# Patient Record
Sex: MALE | Race: WHITE | Employment: OTHER | URBAN - METROPOLITAN AREA
[De-identification: names, ages, dates, MRNs, and addresses within clinical notes are randomized per-mention and may not be internally consistent; named-entity substitution may affect disease eponyms.]

---

## 2017-01-03 ENCOUNTER — HOSPITAL ENCOUNTER (OUTPATIENT)
Dept: RADIATION THERAPY | Age: 82
Discharge: HOME | End: 2017-01-03
Payer: MEDICARE

## 2017-01-03 PROCEDURE — 77373 STRTCTC BDY RAD THER TX DLVR: CPT

## 2017-01-04 ENCOUNTER — HOSPITAL ENCOUNTER (OUTPATIENT)
Dept: RADIATION THERAPY | Age: 82
Discharge: HOME | End: 2017-01-04
Payer: MEDICARE

## 2017-01-05 ENCOUNTER — HOSPITAL ENCOUNTER (OUTPATIENT)
Dept: RADIATION THERAPY | Age: 82
Discharge: HOME | End: 2017-01-05
Payer: MEDICARE

## 2017-01-05 PROCEDURE — 77373 STRTCTC BDY RAD THER TX DLVR: CPT

## 2017-01-06 ENCOUNTER — HOSPITAL ENCOUNTER (OUTPATIENT)
Dept: RADIATION THERAPY | Age: 82
Discharge: HOME | End: 2017-01-06
Payer: MEDICARE

## 2017-01-09 ENCOUNTER — HOSPITAL ENCOUNTER (OUTPATIENT)
Dept: RADIATION THERAPY | Age: 82
Discharge: HOME | End: 2017-01-09
Payer: MEDICARE

## 2017-01-09 ENCOUNTER — TELEPHONE (OUTPATIENT)
Dept: CARDIOLOGY CLINIC | Age: 82
End: 2017-01-09

## 2017-01-09 NOTE — TELEPHONE ENCOUNTER
Patient scheduled with Dr Sally Rosales as [facility] is closer for him. Will notified [facility] of this.

## 2017-01-09 NOTE — TELEPHONE ENCOUNTER
Due for annual f/u with Dr Caity Schwab. Last device check was at [address]. Left message for daughter to contact us to make appt with Dr Caity Schwab.

## 2017-01-09 NOTE — TELEPHONE ENCOUNTER
His [facility] is now with [facility] for remote follow up. His return to [facility] in clinic visit was already scheduled.

## 2017-01-10 ENCOUNTER — OFFICE VISIT (OUTPATIENT)
Dept: CARDIOLOGY CLINIC | Age: 82
End: 2017-01-10

## 2017-01-10 ENCOUNTER — HOSPITAL ENCOUNTER (OUTPATIENT)
Dept: RADIATION THERAPY | Age: 82
Discharge: HOME | End: 2017-01-10
Payer: MEDICARE

## 2017-01-10 DIAGNOSIS — Z95.0 CARDIAC PACEMAKER IN SITU: Primary | ICD-10-CM

## 2017-01-10 PROCEDURE — 77373 STRTCTC BDY RAD THER TX DLVR: CPT

## 2017-01-11 ENCOUNTER — HOSPITAL ENCOUNTER (OUTPATIENT)
Dept: RADIATION THERAPY | Age: 82
Discharge: HOME | End: 2017-01-11
Payer: MEDICARE

## 2017-01-12 ENCOUNTER — HOSPITAL ENCOUNTER (OUTPATIENT)
Dept: RADIATION THERAPY | Age: 82
Discharge: HOME | End: 2017-01-12
Payer: MEDICARE

## 2017-01-12 PROCEDURE — 77373 STRTCTC BDY RAD THER TX DLVR: CPT

## 2017-01-13 ENCOUNTER — HOSPITAL ENCOUNTER (OUTPATIENT)
Dept: RADIATION THERAPY | Age: 82
Discharge: HOME | End: 2017-01-13
Payer: MEDICARE

## 2017-01-16 ENCOUNTER — HOSPITAL ENCOUNTER (OUTPATIENT)
Dept: RADIATION THERAPY | Age: 82
Discharge: HOME | End: 2017-01-16
Payer: MEDICARE

## 2017-01-17 ENCOUNTER — HOSPITAL ENCOUNTER (OUTPATIENT)
Dept: RADIATION THERAPY | Age: 82
Discharge: HOME | End: 2017-01-17
Payer: MEDICARE

## 2017-01-17 PROCEDURE — 77373 STRTCTC BDY RAD THER TX DLVR: CPT

## 2017-01-17 PROCEDURE — 77336 RADIATION PHYSICS CONSULT: CPT

## 2017-02-15 ENCOUNTER — OFFICE VISIT (OUTPATIENT)
Dept: CARDIOLOGY CLINIC | Age: 82
End: 2017-02-15

## 2017-02-15 VITALS
BODY MASS INDEX: 21.18 KG/M2 | HEIGHT: 69 IN | RESPIRATION RATE: 18 BRPM | DIASTOLIC BLOOD PRESSURE: 64 MMHG | HEART RATE: 66 BPM | WEIGHT: 143 LBS | SYSTOLIC BLOOD PRESSURE: 110 MMHG

## 2017-02-15 VITALS
BODY MASS INDEX: 21.3 KG/M2 | SYSTOLIC BLOOD PRESSURE: 110 MMHG | HEIGHT: 69 IN | WEIGHT: 143.8 LBS | OXYGEN SATURATION: 98 % | DIASTOLIC BLOOD PRESSURE: 64 MMHG | HEART RATE: 66 BPM | RESPIRATION RATE: 18 BRPM

## 2017-02-15 DIAGNOSIS — Z95.1 S/P CABG X 3: ICD-10-CM

## 2017-02-15 DIAGNOSIS — I63.9 CEREBROVASCULAR ACCIDENT (CVA), UNSPECIFIED MECHANISM (HCC): ICD-10-CM

## 2017-02-15 DIAGNOSIS — I35.0 MILD AORTIC STENOSIS: ICD-10-CM

## 2017-02-15 DIAGNOSIS — I50.22 CHRONIC SYSTOLIC HEART FAILURE (HCC): Primary | ICD-10-CM

## 2017-02-15 DIAGNOSIS — I10 ESSENTIAL HYPERTENSION: ICD-10-CM

## 2017-02-15 DIAGNOSIS — I44.2 THIRD DEGREE AV BLOCK (HCC): ICD-10-CM

## 2017-02-15 DIAGNOSIS — I25.10 CORONARY ARTERY DISEASE INVOLVING NATIVE CORONARY ARTERY OF NATIVE HEART WITHOUT ANGINA PECTORIS: ICD-10-CM

## 2017-02-15 DIAGNOSIS — I42.9 CARDIOMYOPATHY (HCC): ICD-10-CM

## 2017-02-15 DIAGNOSIS — Z95.0 PACEMAKER: Primary | ICD-10-CM

## 2017-02-15 NOTE — PROGRESS NOTES
HISTORY OF PRESENTING ILLNESS      Yordan Gonzalez is a [de-identified] y.o. male with CAD/CABG, PPM, COPD, CVA, DM, colon cancer, HTN and fluctuating cardiomyopathy here for follow up of his PPM. Recent PPM interrogation in January 2017 demonstrated normal device functioning. He is doing well overall and denies worsening SOB/edema/fatigue.         ACTIVE PROBLEM LIST     Patient Active Problem List    Diagnosis Date Noted    Recurrent left pleural effusion 11/24/2015    Chronic respiratory failure ([address] 11/24/2015    Iron deficiency anemia 11/24/2015    Counseling regarding advanced care planning and goals of care 11/23/2015    Dementia 11/20/2015    Ex-smoker 09/24/2015    Chronic systolic heart failure ([address] 09/18/2015    ICH (intracerebral hemorrhage) ([address] 08/10/2015    Pacemaker 07/02/2015    S/P CABG x 3 06/29/2015    Tobacco use 06/28/2015    Poor historian 06/28/2015    Hypothyroidism 06/28/2015    Diabetes mellitus ([address] 06/28/2015    Cerebrovascular accident ([address] 06/28/2015    Chronic obstructive pulmonary disease ([address] 06/28/2015    Malignant neoplasm of colon ([address] 06/28/2015    Other nonspecific abnormal finding of lung field 06/28/2015    Gastroesophageal reflux disease 06/28/2015    CAD (coronary artery disease) 06/26/2015    Pulmonary hypertension ([address] 06/15/2015    Mild aortic stenosis 06/15/2015    Third degree AV block ([address] 06/11/2015    Cardiomyopathy ([address] 06/11/2015    Second degree AV block 06/11/2015    Stroke ([address]     Hypertension     PVD (peripheral vascular disease) ([address]     Diabetes ([address]     Lumbar stenosis 09/29/2011           PAST MEDICAL HISTORY     Past Medical History   Diagnosis Date    Arthritis     CAD (coronary artery disease)     Cardiomyopathy ([address] 6/11/2015    Colon cancer ([address] 1993     COLON    Depression      DEPRESSION    Diabetes ([address]      IDDM    GERD (gastroesophageal reflux disease)     Hypertension     Lung cancer ([address] 11/29/2016    Mild aortic stenosis 6/15/2015    Pulmonary hypertension ([facility] 6/15/2015    PVD (peripheral vascular disease) ([facility])      STENT RIGHT GROIN, PAD    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     LIMA TO LAD; SVG TO OM; SVG TO OM    Stroke ([facility] 1999     2 STROKES    Third degree AV block ([facility] 6/11/2015    Thyroid disease            PAST SURGICAL HISTORY     Past Surgical History   Procedure Laterality Date    Vascular surgery procedure unlist  1999     PAD, RIGHT GROIN STENT    Hx gi  1993     COLON RESECTION    Hx heart catheterization  6/26/2015    Ins ppm/icd led dual only  7/2/2015          Hx pacemaker            ALLERGIES     No Known Allergies       FAMILY HISTORY     Family History   Problem Relation Age of Onset    Heart Disease Mother     Diabetes Mother     Other Father      AMPUTATION LEG FOLLOWING BUG BITE    Other Sister      CEREBRAL ANUERYSM    Heart Disease Brother     Diabetes Sister     negative for cardiac disease       SOCIAL HISTORY     Social History     Social History    Marital status:      Spouse name: N/A    Number of children: N/A    Years of education: N/A     Social History Main Topics    Smoking status: Former Smoker     Packs/day: 2.00     Years: 60.00     Types: Cigarettes     Quit date: 6/22/2015    Smokeless tobacco: Never Used    Alcohol use No    Drug use: No    Sexual activity: Yes     Partners: Female     Other Topics Concern    Not on file     Social History Narrative         MEDICATIONS     Current Outpatient Prescriptions   Medication Sig    wheat dextrin 3 gram/3.5 gram powd Take  by mouth.  omeprazole (PRILOSEC) 20 mg capsule Take 20 mg by mouth daily.  OXcarbazepine (TRILEPTAL) 300 mg tablet Take 600 mg by mouth two (2) times a day.  escitalopram oxalate (LEXAPRO) 20 mg tablet Take 20 mg by mouth daily.  polyethylene glycol (MIRALAX) 17 gram packet Take 17 g by mouth daily.     furosemide (LASIX) 40 mg tablet Take  by mouth two (2) times a day.  glipiZIDE SR (GLUCOTROL) 2.5 mg CR tablet Take  by mouth daily.  magnesium 200 mg tab Take 400 mg by mouth two (2) times a day.  albuterol (PROVENTIL VENTOLIN) 2.5 mg /3 mL (0.083 %) nebulizer solution Take 2.5 mg by inhalation every four (4) hours as needed for Wheezing.  albuterol-ipratropium (DUO-NEB) 2.5 mg-0.5 mg/3 ml nebulizer solution 3 mL by Nebulization route two (2) times a day. Indications: 1 inhalation    ketoconazole (NIZORAL) 2 % shampoo Apply  to affected area daily as needed for Itching. Indications: apply to face and scalp once a day on wed and sat every 2 weeks    donepezil (ARICEPT) 10 mg tablet Take 10 mg by mouth nightly.  carvedilol (COREG) 3.125 mg tablet Take 1 Tab by mouth two (2) times a day.  ferrous sulfate 325 mg (65 mg iron) tablet Take 325 mg by mouth Daily (before breakfast).  bisacodyl (DULCOLAX, BISACODYL,) 10 mg suppository Insert 10 mg into rectum daily as needed.  finasteride (PROSCAR) 5 mg tablet Take 5 mg by mouth daily.  ergocalciferol (ERGOCALCIFEROL) 50,000 unit capsule Take 50,000 Units by mouth every fourteen (14) days.  levothyroxine (SYNTHROID) 112 mcg tablet Take 112 mcg by mouth Daily (before breakfast).  acetaminophen (TYLENOL) 500 mg tablet Take 500 mg by mouth every six (6) hours as needed for Pain.  fluticasone (FLONASE) 50 mcg/actuation nasal spray 1 [place] by Both Nostrils route daily.  tamsulosin (FLOMAX) 0.4 mg capsule Take 0.4 mg by mouth nightly. No current facility-administered medications for this visit. I have reviewed the nurses notes, vitals, problem list, allergy list, medical history, family, social history and medications. REVIEW OF SYMPTOMS      General: Pt denies excessive weight gain or loss. Pt is able to conduct ADL's  HEENT: Denies blurred vision, headaches, hearing loss, epistaxis and difficulty swallowing.   Respiratory: Denies cough, congestion, shortness of breath, FRANCOIS, wheezing or stridor. Cardiovascular: Denies precordial pain, palpitations, edema or PND  Gastrointestinal: Denies poor appetite, indigestion, abdominal pain or blood in stool  Genitourinary: Denies hematuria, dysuria, increased urinary frequency  Musculoskeletal: Denies joint pain or swelling from muscles or joints  Neurologic: Denies tremor, paresthesias, headache, or sensory motor disturbance  Psychiatric: Denies confusion, insomnia, depression  Integumentray: Denies rash, itching or ulcers. Hematologic: Denies easy bruising, bleeding       PHYSICAL EXAMINATION      There were no vitals filed for this visit. General: Well developed, in no acute distress. HEENT: No jaundice, oral mucosa moist, no oral ulcers  Neck: Supple, no stiffness, no lymphadenopathy, supple  Heart:  Normal S1/S2 negative S3 or S4. Regular, no murmur, gallop or rub, no jugular venous distention  Respiratory: Clear bilaterally x 4, no wheezing or rales  Abdomen:   Soft, non-tender, bowel sounds are active.   Extremities:  No edema, normal cap refill, no cyanosis. Musculoskeletal: No clubbing, no deformities  Neuro: A&Ox3, speech clear, gait stable, cooperative, no focal neurologic deficits  Skin: Skin color is normal. No rashes or lesions.  Non diaphoretic, moist.  Vascular: 2+ pulses symmetric in all extremities       DIAGNOSTIC DATA      EKG:        LABORATORY DATA      Lab Results   Component Value Date/Time    WBC 7.0 11/30/2015 03:28 PM    Hemoglobin (POC) 11.6 02/13/2016 02:26 PM    HGB 9.5 11/30/2015 03:28 PM    Hematocrit (POC) 34 02/13/2016 02:26 PM    HCT 31.6 11/30/2015 03:28 PM    PLATELET 541 25/92/6600 03:28 PM    MCV 84 11/30/2015 03:28 PM      Lab Results   Component Value Date/Time    Sodium 133 02/14/2016 04:03 AM    Potassium 4.5 02/14/2016 04:03 AM    Chloride 97 02/14/2016 04:03 AM    CO2 29 02/14/2016 04:03 AM    Anion gap 7 02/14/2016 04:03 AM    Glucose 148 02/14/2016 04:03 AM    BUN 32 02/14/2016 04:03 AM Creatinine 1.16 02/14/2016 04:03 AM    BUN/Creatinine ratio 28 02/14/2016 04:03 AM    GFR est AA >60 02/14/2016 04:03 AM    GFR est non-AA >60 02/14/2016 04:03 AM    Calcium 9.1 02/14/2016 04:03 AM    Bilirubin, total 0.8 11/19/2015 11:26 AM    AST (SGOT) 10 11/19/2015 11:26 AM    Alk. phosphatase 67 11/19/2015 11:26 AM    Protein, total 6.9 11/19/2015 11:26 AM    Albumin 2.8 11/19/2015 11:26 AM    Globulin 4.1 11/19/2015 11:26 AM    A-G Ratio 0.7 11/19/2015 11:26 AM    ALT (SGPT) 15 11/19/2015 11:26 AM           ASSESSMENT      1. PPM  2. COPD on home O2  3. CAD  4. CABG  5. CVA  6. DM  7. Colon cancer       PLAN     Patient is doing well and his device is functioning well. He is >90% RV paced however does not exhibit signs/symptoms of HF at this time. Continue to monitor clinically for changes. Continue follow up in device clinic. FOLLOW-UP     1 year      Thank you, Kendell Fischer MD and Dr. Kayla Woods for allowing me to participate in the care of this extraordinarily pleasant male. Please do not hesitate to contact me for further questions/concerns.          Javad Humphreys MD  Cardiac Electrophysiology / Cardiology    [address]  [address] [place] [address]  Em[address] Dawn[address]  [phone] / [phone] Fax   [phone] / [phone] Fax

## 2017-02-15 NOTE — PROGRESS NOTES
David Theodore     1935       Jaxson Oconnor MD, [facility]  Date of Visit-2/15/2017   PCP is Dwana Rinne, MD   [facility] [place]  [facility]  HPI:  David Theodore is a [de-identified] y.o. male    here with his daughter  No cardiac complaints  Denies chest pain, edema, syncope or shortness of breath at rest   Has no tachycardia , palpitations or sense of arrythmia   Has some forgetfullness but doing pretty well  Had spot on lung and biopsy per dtr was inconclusive and Dr Kim Patel sent him to Dr Tavares Hester and he is having XRT   Lives at [address] [place] [address] Drive at [place] and seeing Dwana Rinne, MD at the [place]    Assessment/Plan:      Subjective:  Significant improvement over time with the patient. He has no symptoms. Has a place on the lung, undergoing radiation therapy. Blood pressure seems to be running in the normal range, 103-120, with pulses in the 60s. He seems to be feeling well today. He sees Dr. Yumiko Perez, who came actually early to the room before him, and is doing well with that with normal follow up of his device. Impression:  1. CHF, systolic, NYHA class 1 now. 2. COPD, improved. 3. Stable CAD. 4. Pulmonary hypertension, responding well to diuretics. Plan:  1. Continue ACE, beta blocker, optimal medical therapy. 2. Had a [employer] device with device check 01/10/17, rhythm predominantly paced, no abnormalities. 3. Plans are to follow up in six months. Patient Instructions   Follow up with Dr. Sheron Schumacher in 6 months           Impression:   1. Chronic systolic heart failure ([address]    2. Coronary artery disease involving native coronary artery of native heart without angina pectoris    3. Cardiomyopathy ([address]    4. Third degree AV block ([address]    5. Mild aortic stenosis    6. Essential hypertension    7. Cerebrovascular accident (CVA), unspecified mechanism ([address]    8.  S/P CABG x 3       Cardiac History:   ECHO 6-10-15 LV EF 35 % by Ceja's Biplane technique, mod diffuse HK, mod HK mid anteroseptal and apical septal wall(s). Mild LVH. Mod JILLIAN, MR, mild ASt, LISETH 1.5 cm2 by both continuity equation and planimetry. Mild AR, TR, PASP 60. AV Max/meanPG 16.6/7.6 mmHg. AV Vmax was 2 m/s. NUKE 6-10-15 ischemia and infarction in inferior wall with moderately reduced EF, see report  Holter showed high level AV block so underwent testing leading to cath and then CABG    The PFTs done at [facility] on 4/24/15 show a FEV1 of 1.41. FVC 56%     He has a history of colon cancer, anxiety, diabetes, depression, emphysema, hypertension, hypothyroidism. ROS-except as noted above. . A complete cardiac and respiratory are reviewed and negative except as above ; Resp-denies wheezing  or productive cough,. Const- No unusual weight loss or fever; Neuro-no recent seizure or CVA ; GI- No BRBPR, abdom pain, bloating ; - no  hematuria   see supplement sheet, initialed and to be scanned by staff  Past Medical History   Diagnosis Date    Arthritis     CAD (coronary artery disease)     Cardiomyopathy ([address] 6/11/2015    Colon cancer ([address] 1993     COLON    Depression      DEPRESSION    Diabetes ([address]      IDDM    GERD (gastroesophageal reflux disease)     Hypertension     Lung cancer ([address] 11/29/2016    Mild aortic stenosis 6/15/2015    Pulmonary hypertension ([address] 6/15/2015    PVD (peripheral vascular disease) ([address]      STENT RIGHT GROIN, PAD    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     LIMA TO LAD; SVG TO OM; SVG TO OM    Stroke ([address] 1999     2 STROKES    Third degree AV block ([address] 6/11/2015    Thyroid disease       Social Hx= reports that he quit smoking about 19 months ago. His smoking use included Cigarettes. He has a 120.00 pack-year smoking history. He has never used smokeless tobacco. He reports that he does not drink alcohol or use illicit drugs.      Exam and Labs:    Visit Vitals    /64 (BP 1 Location: Left arm, BP Patient Position: Sitting)    Pulse 66    Resp 18    Ht 5' 9\" (1.753 m)    Wt 143 lb 12.8 oz (65.2 kg)    SpO2 98%    BMI 21.24 kg/m2      Constitutional:  NAD, comfortable  Head: NC,AT. Eyes: No scleral icterus. Neck:  Neck supple. No JVD present. Throat: moist mucous membranes. Chest: Effort normal & normal respiratory excursion . Neurological: alert, conversant and oriented . Skin: Skin is not cold. No obvious systemic rash noted. Not diaphoretic. No erythema. Psychiatric:  Grossly normal mood and affect. Behavior appears normal. Extremities:  no clubbing or cyanosis. Abdomen: non distended    Lungs:breath sounds normal. No stridor. distress, wheezes or  Rales. Heart:    normal rate, regular rhythm, normal S1, S2, no murmurs, rubs, clicks or gallops , PMI non displaced. Edema: Edema is none. Wt Readings from Last 3 Encounters:   02/15/17 143 lb 12.8 oz (65.2 kg)   12/13/16 155 lb (70.3 kg)   10/05/16 152 lb (68.9 kg)      BP Readings from Last 3 Encounters:   02/15/17 110/64   10/05/16 112/64   06/16/16 100/60        Current Outpatient Prescriptions   Medication Sig    escitalopram oxalate (LEXAPRO) 20 mg tablet Take 20 mg by mouth daily.  furosemide (LASIX) 40 mg tablet Take  by mouth two (2) times a day.  glipiZIDE SR (GLUCOTROL) 2.5 mg CR tablet Take  by mouth daily.  magnesium 200 mg tab Take 400 mg by mouth two (2) times a day.  albuterol (PROVENTIL VENTOLIN) 2.5 mg /3 mL (0.083 %) nebulizer solution Take 2.5 mg by inhalation every four (4) hours as needed for Wheezing.  donepezil (ARICEPT) 10 mg tablet Take 10 mg by mouth nightly.  carvedilol (COREG) 3.125 mg tablet Take 1 Tab by mouth two (2) times a day.  ferrous sulfate 325 mg (65 mg iron) tablet Take 325 mg by mouth Daily (before breakfast).  bisacodyl (DULCOLAX, BISACODYL,) 10 mg suppository Insert 10 mg into rectum daily as needed.  finasteride (PROSCAR) 5 mg tablet Take 5 mg by mouth daily.     ergocalciferol (ERGOCALCIFEROL) 50,000 unit capsule Take 50,000 Units by mouth every fourteen (14) days.  levothyroxine (SYNTHROID) 112 mcg tablet Take 112 mcg by mouth Daily (before breakfast).  acetaminophen (TYLENOL) 500 mg tablet Take 500 mg by mouth every six (6) hours as needed for Pain.  tamsulosin (FLOMAX) 0.4 mg capsule Take 0.4 mg by mouth nightly.  wheat dextrin 3 gram/3.5 gram powd Take  by mouth.  omeprazole (PRILOSEC) 20 mg capsule Take 20 mg by mouth daily.  OXcarbazepine (TRILEPTAL) 300 mg tablet Take 600 mg by mouth two (2) times a day.  polyethylene glycol (MIRALAX) 17 gram packet Take 17 g by mouth daily.  albuterol-ipratropium (DUO-NEB) 2.5 mg-0.5 mg/3 ml nebulizer solution 3 mL by Nebulization route two (2) times a day. Indications: 1 inhalation    ketoconazole (NIZORAL) 2 % shampoo Apply  to affected area daily as needed for Itching. Indications: apply to face and scalp once a day on wed and sat every 2 weeks    fluticasone (FLONASE) 50 mcg/actuation nasal spray 1 [place] by Both Nostrils route daily. No current facility-administered medications for this visit. Impression see above.

## 2017-02-15 NOTE — MR AVS SNAPSHOT
Visit Information Date & Time Provider Department Dept. Phone Encounter #  
 2/15/2017 11:00 AM Edward Bang MD [facility] [phone] [id number] Your Appointments 2/15/2017  3:00 PM  
ESTABLISHED PATIENT with Donovan Reilly MD  
[facility] ([address]) Appt Note: pts daughter emily'd appt to see Dr. Hosea Landeros in place of Dr. Birgit Erazo due to pt now being located closet to [facility] kmr  
 [address] [facility] [address] [facility] [address] [address]  
[phone]  
  
   
 [address] [facility] [address] [address]  
  
    
 10/25/2017 11:30 AM  
PACEMAKER with PACEMAKERVALERY  
[facility] ([facility] SCHEDULING) Appt Note: med/pm/stf  
 [address] [facility] [address] [facility]  
[phone]  
  
   
 [address] [facility] [address] [address]  
  
    
  
 4/11/2017  8:00 AM  
REMOTE OFFICE VISIT with Robi Le [facility] ([facility] SCHEDULING) Appt Note: carelink/pm/stf  
 [address] [facility] [id number] [place] [address] [address] [place] [address]  
  
    
 7/18/2017  8:00 AM  
REMOTE OFFICE VISIT with Robi Le [facility] ([facility] SCHEDULING) Appt Note: carelink/pm/stf  
 [address] [place]  
[phone] Upcoming Health Maintenance Date Due  
 LIPID PANEL Q1 1935 FOOT EXAM Q1 8/24/1945 MICROALBUMIN Q1 8/24/1946 EYE EXAM RETINAL OR DILATED Q1 8/24/1946 DTaP/Tdap/Td series (1 - Tdap) 8/24/1957 ZOSTER VACCINE AGE 60> 8/24/1996 GLAUCOMA SCREENING Q2Y 8/24/2001 MEDICARE YEARLY EXAM 8/24/2001 HEMOGLOBIN A1C Q6M 5/20/2016 Pneumococcal 65+ High/Highest Risk (2 of 2 - PCV13) 7/6/2016 INFLUENZA AGE 9 TO ADULT 8/1/2016 Allergies as of 2/15/2017  Review Complete On: 2/15/2017 By: Denita Donald MD  
 No Known Allergies Current Immunizations  Reviewed on 9/18/2015 Name Date Influenza Vaccine (Quad) PF 9/23/2015  4:08 PM  
 Influenza Vaccine Split 10/3/2011  1:39 PM  
 Pneumococcal Polysaccharide (PPSV-23) 7/6/2015  3:03 PM  
  
 Not reviewed this visit You Were Diagnosed With   
  
 Codes Comments Third degree AV block (HCC)    -  Primary ICD-10-CM: W16.3 ICD-9-CM: 426.0 Cardiomyopathy ([address]     ICD-10-CM: I42.9 ICD-9-CM: 425.4 Mild aortic stenosis     ICD-10-CM: I35.0 ICD-9-CM: 424.1 Vitals BP Pulse Resp Height(growth percentile) Weight(growth percentile) SpO2  
 110/64 (BP 1 Location: Left arm, BP Patient Position: Sitting) 66 18 5' 9\" (1.753 m) 143 lb 12.8 oz (65.2 kg) 98% BMI Smoking Status 21.24 kg/m2 Former Smoker BMI and BSA Data Body Mass Index Body Surface Area  
 21.24 kg/m 2 1.78 m 2 Your Updated Medication List  
  
   
This list is accurate as of: 2/15/17 11:37 AM.  Always use your most recent med list.  
  
  
  
  
 acetaminophen 500 mg tablet Commonly known as:  TYLENOL Take 500 mg by mouth every six (6) hours as needed for Pain. albuterol 2.5 mg /3 mL (0.083 %) nebulizer solution Commonly known as:  PROVENTIL VENTOLIN Take 2.5 mg by inhalation every four (4) hours as needed for Wheezing. albuterol-ipratropium 2.5 mg-0.5 mg/3 ml Nebu Commonly known as:  DUO-NEB  
3 mL by Nebulization route two (2) times a day. Indications: 1 inhalation  
  
 carvedilol 3.125 mg tablet Commonly known as:  Dareen [place] Take 1 Tab by mouth two (2) times a day. donepezil 10 mg tablet Commonly known as:  ARICEPT Take 10 mg by mouth nightly. DULCOLAX (BISACODYL) 10 mg suppository Generic drug:  bisacodyl Insert 10 mg into rectum daily as needed. ergocalciferol 50,000 unit capsule Commonly known as:  ERGOCALCIFEROL Take 50,000 Units by mouth every fourteen (14) days. escitalopram oxalate 20 mg tablet Commonly known as:  Jemal Setters Take 20 mg by mouth daily. ferrous sulfate 325 mg (65 mg iron) tablet Take 325 mg by mouth Daily (before breakfast). finasteride 5 mg tablet Commonly known as:  PROSCAR Take 5 mg by mouth daily. fluticasone 50 mcg/actuation nasal spray Commonly known as:  FLONASE  
1 [place] by Both Nostrils route daily. furosemide 40 mg tablet Commonly known as:  LASIX Take  by mouth two (2) times a day. glipiZIDE SR 2.5 mg CR tablet Commonly known as:  GLUCOTROL XL Take  by mouth daily. ketoconazole 2 % shampoo Commonly known as:  NIZORAL Apply  to affected area daily as needed for Itching. Indications: apply to face and scalp once a day on wed and sat every 2 weeks  
  
 levothyroxine 112 mcg tablet Commonly known as:  SYNTHROID Take 112 mcg by mouth Daily (before breakfast). magnesium 200 mg Tab Take 400 mg by mouth two (2) times a day. omeprazole 20 mg capsule Commonly known as:  PRILOSEC Take 20 mg by mouth daily. OXcarbazepine 300 mg tablet Commonly known as:  TRILEPTAL Take 600 mg by mouth two (2) times a day. polyethylene glycol 17 gram packet Commonly known as:  Lelan Situ Take 17 g by mouth daily. tamsulosin 0.4 mg capsule Commonly known as:  FLOMAX Take 0.4 mg by mouth nightly. wheat dextrin 3 gram/3.5 gram Powd Take  by mouth. We Performed the Following AMB POC EKG ROUTINE W/ 12 LEADS, INTER & REP [93900 CPT(R)] To-Do List   
 04/12/2017 1:00 PM  
  Appointment with [facility] PET 1 at [facility] PET ([phone]) 1. Patient should arrive 30 minutes early to register. Patient's entire visit to the hospital will last about 2 Hours. 2.  Eat a low carb/high protein diet the evening before your procedure.  Stay away from food and drink that contains sugars the night before and ESPECIALLY the day of the test. 3.  Do Not eat 4 hours prior to your procedure. The patient may drink all the water they want, up until the time of their appointment. Encourage patient to be well hydrated. Coffee is ok, as long as an artificial sweetener is used instead of sugar. 4. Take meds with water or saltine crackers if food required. 5.  Do not exercise the morning of your procedure. 6.  If you take insulin, it must be taken at least 4 hours before your procedure. 7.  You should bring medications for pain, anxiety, or claustrophobia if you need them. If you take medications for anxiety or claustrophobia, a ride needs to come with you. 8.  Materials for this procedure are ordered in advance and are time-sensitive. If you need to cancel or reschedule, you must call [phone] at least 48 hours prior to your appointment time. 9.  Bring recent CT scan results from other facilities with you. Please have patients report to:  [facility]: ER Registration SFM: [address] [facility], Radiation/Oncology Department (left of the fireplace) MRM: OP Registration MOB 1. Patient Instructions Follow up with Dr. Fantasma Lamb in 6 months Introducing [facility]! Shani Shelby introduces [employer] patient portal. Now you can access parts of your medical record, email your doctor's office, and request medication refills online. 1. In your internet browser, go to [email or web address] 2. Click on the First Time User? Click Here link in the Sign In box. You will see the New Member Sign Up page. 3. Enter your [employer] Access Code exactly as it appears below. You will not need to use this code after youve completed the sign-up process. If you do not sign up before the expiration date, you must request a new code. · [employer] Access Code: [email or web address] Expires: 3/14/2017  6:28 PM 
 
4.  Enter the last four digits of your Social Security Number (xxxx) and Date of Birth (mm/dd/yyyy) as indicated and click Submit. You will be taken to the next sign-up page. 5. Create a [facility] ID. This will be your [facility] login ID and cannot be changed, so think of one that is secure and easy to remember. 6. Create a [facility] password. You can change your password at any time. 7. Enter your Password Reset Question and Answer. This can be used at a later time if you forget your password. 8. Enter your e-mail address. You will receive e-mail notification when new information is available in [address] 9. Click Sign Up. You can now view and download portions of your medical record. 10. Click the Download Summary menu link to download a portable copy of your medical information. If you have questions, please visit the Frequently Asked Questions section of the [facility] website. Remember, [facility] is NOT to be used for urgent needs. For medical emergencies, dial 911. Now available from your iPhone and Android! Please provide this summary of care documentation to your next provider. Your primary care clinician is listed as Samira Lizarraga. If you have any questions after today's visit, please call [phone].

## 2017-04-12 ENCOUNTER — HOSPITAL ENCOUNTER (OUTPATIENT)
Dept: PET IMAGING | Age: 82
Discharge: HOME | End: 2017-04-12
Attending: RADIOLOGY
Payer: MEDICARE

## 2017-04-12 VITALS — HEIGHT: 68 IN | BODY MASS INDEX: 21.22 KG/M2 | WEIGHT: 140 LBS

## 2017-04-12 DIAGNOSIS — C34.12 PRIMARY MALIGNANT NEOPLASM OF BRONCHUS OF LEFT UPPER LOBE (HCC): ICD-10-CM

## 2017-04-12 PROCEDURE — A9552 F18 FDG: HCPCS

## 2017-04-12 RX ORDER — SODIUM CHLORIDE 0.9 % (FLUSH) 0.9 %
10 SYRINGE (ML) INJECTION
Status: COMPLETED | OUTPATIENT
Start: 2017-04-12 | End: 2017-04-12

## 2017-04-12 RX ADMIN — Medication 10 ML: at 14:00

## 2017-07-18 ENCOUNTER — OFFICE VISIT (OUTPATIENT)
Dept: CARDIOLOGY CLINIC | Age: 82
End: 2017-07-18

## 2017-07-18 DIAGNOSIS — Z95.0 CARDIAC PACEMAKER IN SITU: Primary | ICD-10-CM

## 2017-08-11 ENCOUNTER — TELEPHONE (OUTPATIENT)
Dept: CARDIOLOGY CLINIC | Age: 82
End: 2017-08-11

## 2017-08-11 NOTE — TELEPHONE ENCOUNTER
Patients daughter Parth Wu called to confirm transmission was received, she can be reached at [phone].  Thank you

## 2017-08-23 ENCOUNTER — OFFICE VISIT (OUTPATIENT)
Dept: CARDIOLOGY CLINIC | Age: 82
End: 2017-08-23

## 2017-08-23 ENCOUNTER — CLINICAL SUPPORT (OUTPATIENT)
Dept: CARDIOLOGY CLINIC | Age: 82
End: 2017-08-23

## 2017-08-23 VITALS
HEART RATE: 62 BPM | DIASTOLIC BLOOD PRESSURE: 60 MMHG | HEIGHT: 68 IN | BODY MASS INDEX: 24.86 KG/M2 | SYSTOLIC BLOOD PRESSURE: 104 MMHG | OXYGEN SATURATION: 91 % | RESPIRATION RATE: 20 BRPM | WEIGHT: 164 LBS

## 2017-08-23 DIAGNOSIS — I50.22 CHRONIC SYSTOLIC HEART FAILURE (HCC): Primary | ICD-10-CM

## 2017-08-23 DIAGNOSIS — I10 ESSENTIAL HYPERTENSION: ICD-10-CM

## 2017-08-23 DIAGNOSIS — I44.2 THIRD DEGREE AV BLOCK (HCC): ICD-10-CM

## 2017-08-23 DIAGNOSIS — I25.10 CORONARY ARTERY DISEASE INVOLVING NATIVE CORONARY ARTERY OF NATIVE HEART WITHOUT ANGINA PECTORIS: Primary | ICD-10-CM

## 2017-08-23 DIAGNOSIS — I50.20 SYSTOLIC CONGESTIVE HEART FAILURE, UNSPECIFIED CONGESTIVE HEART FAILURE CHRONICITY: ICD-10-CM

## 2017-08-23 DIAGNOSIS — I42.9 CARDIOMYOPATHY, UNSPECIFIED TYPE (HCC): ICD-10-CM

## 2017-08-23 DIAGNOSIS — I63.9 CEREBROVASCULAR ACCIDENT (CVA), UNSPECIFIED MECHANISM (HCC): ICD-10-CM

## 2017-08-23 DIAGNOSIS — I25.10 CORONARY ARTERY DISEASE INVOLVING NATIVE CORONARY ARTERY OF NATIVE HEART WITHOUT ANGINA PECTORIS: ICD-10-CM

## 2017-08-23 RX ORDER — DIPHENOXYLATE HYDROCHLORIDE AND ATROPINE SULFATE 2.5; .025 MG/1; MG/1
1 TABLET ORAL
COMMUNITY
End: 2018-01-04

## 2017-08-23 NOTE — PROGRESS NOTES
Andres Zayas     1935       Vipal K. Ival Nissen MD, [facility]  Date of Visit-8/23/2017   PCP is Domitila Olivo MD   [facility]  [facility]  HPI:  Andres Zayas is a [de-identified] y.o. male     He is doing well overall with no cardiac complaints today. He had normal lung biopsy recently. His last device check was in February. He notes intermittent left LE cramping that typically occur at night. He currently lives at [facility]. Denies chest pain, edema, syncope or shortness of breath at rest, has no tachycardia, palpitations or sense of arrhythmia. Assessment/Plan:       CHF systolic  -NYHA class I  -today's echo showed improvement in EF to 42%  -he is asymptomatic and well compensated on current medications    Stable CAD  -with prior bypass surgery in 2015  -no angina     Pulmonary HTN   -mild RVSP today is 40  -doing well with diuretic     Mild AS  -low gradient \  -unlikely to cause problem in near future    Third degree AV block  -pacer check    HTN  -at goal     COPD   -improved    PLAN:  -no changes in medications  -reviewed echo with patient  -follow up in 6 months   The primary encounter diagnosis was Chronic systolic heart failure ([address] Diagnoses of Coronary artery disease involving native coronary artery of native heart without angina pectoris, Essential hypertension, Cardiomyopathy, unspecified type ([address] Third degree AV block ([address] and Cerebrovascular accident (CVA), unspecified mechanism ([address] were also pertinent to this visit. Cardiac History:   ECHO 6-10-15 LV EF 35 % by Ceja's Biplane technique, mod diffuse HK, mod HK mid anteroseptal and apical septal wall(s). Mild LVH. Mod JILLIAN, MR, mild ASt, LISETH 1.5 cm2 by both continuity equation and planimetry. Mild AR, TR, PASP 60. AV Max/meanPG 16.6/7.6 mmHg. AV Vmax was 2 m/s.      NUKE 6-10-15 ischemia and infarction in inferior wall with moderately reduced EF, see report  Holter showed high level AV block so underwent testing leading to cath and then CABG    [facility] show a FEV1 of 1.41. FVC 56%     He has a history of colon cancer, anxiety, diabetes, depression, emphysema, hypertension, hypothyroidism. ROS-except as noted above. . A complete cardiac and respiratory are reviewed and negative except as above ; Resp-denies wheezing  or productive cough,. Const- No unusual weight loss or fever; Neuro-no recent seizure or CVA ; GI- No BRBPR, abdom pain, bloating ; - no  hematuria   see supplement sheet, initialed and to be scanned by staff  Past Medical History:   Diagnosis Date    Arthritis     CAD (coronary artery disease)     Cardiomy[facility] 6/11/2015    Colon cancer ([facility] 1993    COLON    Depression     DEPRESSION    Diabetes ([facility]     IDDM    GERD (gastroesophageal reflux disease)     Hypertension     Lung cancer ([facility] 11/29/2016    Mild aortic stenosis 6/15/2015    Pulmonary hypertension ([facility] 6/15/2015    PVD (peripheral vascular disease) (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 1999    2 STROKES    Third degree AV block ([facility] 6/11/2015    Thyroid disease       Social Hx= reports that he quit smoking about 2 years ago. His smoking use included Cigarettes. He has a 120.00 pack-year smoking history. He has never used smokeless tobacco. He reports that he does not drink alcohol or use illicit drugs. Exam and Labs:    Visit Vitals    /60    Pulse 62    Resp 20    Ht 5' 8\" (1.727 m)    Wt 164 lb (74.4 kg)    SpO2 91%    BMI 24.94 kg/m2      Constitutional:  NAD, comfortable  Head: NC,AT. Eyes: No scleral icterus. Neck:  Neck supple. No JVD present. Throat: moist mucous membranes. Chest: Effort normal & normal respiratory excursion . Neurological: alert, conversant and oriented . Skin: Skin is not cold.  No obvious systemic rash noted. Not diaphoretic. No erythema. Psychiatric:  Grossly normal mood and affect. Behavior appears normal. Extremities:  no clubbing or cyanosis. Abdomen: non distended    Lungs:breath sounds normal. No stridor. distress, wheezes or  Rales. Heart:normal rate, regular rhythm, normal S1, S2, 2/6 murmur at the LSB, rubs, clicks or gallops , PMI non displaced. Edema: Edema is none. No results found for: CHOL, CHOLX, CHLST, CHOLV, HDL, LDL, LDLC, DLDLP, TGLX, TRIGL, TRIGP, CHHD, CHHDX  No results found for this or any previous visit. Lab Results   Component Value Date/Time    Sodium 133 02/14/2016 04:03 AM    Potassium 4.5 02/14/2016 04:03 AM    Chloride 97 02/14/2016 04:03 AM    CO2 29 02/14/2016 04:03 AM    Anion gap 7 02/14/2016 04:03 AM    Glucose 148 02/14/2016 04:03 AM    BUN 32 02/14/2016 04:03 AM    Creatinine 1.16 02/14/2016 04:03 AM    BUN/Creatinine ratio 28 02/14/2016 04:03 AM    GFR est AA >60 02/14/2016 04:03 AM    GFR est non-AA >60 02/14/2016 04:03 AM    Calcium 9.1 02/14/2016 04:03 AM      Wt Readings from Last 3 Encounters:   08/23/17 164 lb (74.4 kg)   04/12/17 140 lb (63.5 kg)   02/15/17 143 lb (64.9 kg)      BP Readings from Last 3 Encounters:   08/23/17 104/60   02/15/17 110/64   02/15/17 110/64        Current Outpatient Prescriptions   Medication Sig    diphenoxylate-atropine (LOMOTIL) 2.5-0.025 mg per tablet Take  by mouth four (4) times daily as needed for Diarrhea.  omeprazole (PRILOSEC) 20 mg capsule Take 20 mg by mouth daily.  escitalopram oxalate (LEXAPRO) 20 mg tablet Take 20 mg by mouth daily.  polyethylene glycol (MIRALAX) 17 gram packet Take 17 g by mouth daily.  furosemide (LASIX) 40 mg tablet Take  by mouth two (2) times a day.  glipiZIDE SR (GLUCOTROL) 2.5 mg CR tablet Take 5 mg by mouth daily.  magnesium 200 mg tab Take 400 mg by mouth two (2) times a day.     albuterol (PROVENTIL VENTOLIN) 2.5 mg /3 mL (0.083 %) nebulizer solution Take 2.5 mg by inhalation every four (4) hours as needed for Wheezing.  albuterol-ipratropium (DUO-NEB) 2.5 mg-0.5 mg/3 ml nebulizer solution 3 mL by Nebulization route two (2) times a day. Indications: 1 inhalation    donepezil (ARICEPT) 10 mg tablet Take 10 mg by mouth nightly.  carvedilol (COREG) 3.125 mg tablet Take 1 Tab by mouth two (2) times a day.  ferrous sulfate 325 mg (65 mg iron) tablet Take 325 mg by mouth Daily (before breakfast).  bisacodyl (DULCOLAX, BISACODYL,) 10 mg suppository Insert 10 mg into rectum daily as needed.  finasteride (PROSCAR) 5 mg tablet Take 5 mg by mouth daily.  ergocalciferol (ERGOCALCIFEROL) 50,000 unit capsule Take 50,000 Units by mouth every fourteen (14) days.  levothyroxine (SYNTHROID) 112 mcg tablet Take 112 mcg by mouth Daily (before breakfast).  acetaminophen (TYLENOL) 500 mg tablet Take 500 mg by mouth every six (6) hours as needed for Pain.  fluticasone (FLONASE) 50 mcg/actuation nasal spray 1 [place] by Both Nostrils route daily.  tamsulosin (FLOMAX) 0.4 mg capsule Take 0.4 mg by mouth nightly.  wheat dextrin 3 gram/3.5 gram powd Take  by mouth.  OXcarbazepine (TRILEPTAL) 300 mg tablet Take 600 mg by mouth two (2) times a day.  ketoconazole (NIZORAL) 2 % shampoo Apply  to affected area daily as needed for Itching. Indications: apply to face and scalp once a day on wed and sat every 2 weeks     No current facility-administered medications for this visit. Impression see above.     Written by Raysa Costa, as dictated by Mickey Aranda MD.

## 2017-10-25 ENCOUNTER — CLINICAL SUPPORT (OUTPATIENT)
Dept: CARDIOLOGY CLINIC | Age: 82
End: 2017-10-25

## 2017-10-25 DIAGNOSIS — Z95.0 CARDIAC PACEMAKER IN SITU: Primary | ICD-10-CM

## 2017-10-30 ENCOUNTER — HOSPITAL ENCOUNTER (OUTPATIENT)
Dept: CT IMAGING | Age: 82
Discharge: HOME | End: 2017-10-30
Attending: INTERNAL MEDICINE
Payer: MEDICARE

## 2017-10-30 DIAGNOSIS — R91.1 COIN LESION: ICD-10-CM

## 2017-10-30 LAB — CREAT BLD-MCNC: 1 MG/DL (ref 0.6–1.3)

## 2017-10-30 PROCEDURE — 74011636320 HC RX REV CODE- 636/320: Performed by: RADIOLOGY

## 2017-10-30 PROCEDURE — 82565 ASSAY OF CREATININE: CPT

## 2017-10-30 PROCEDURE — 71260 CT THORAX DX C+: CPT

## 2017-10-30 RX ADMIN — IOPAMIDOL 95 ML: 612 INJECTION, SOLUTION INTRAVENOUS at 14:21

## 2017-12-21 ENCOUNTER — HOSPITAL ENCOUNTER (INPATIENT)
Age: 82
LOS: 6 days | Discharge: NURSING, REHAB OR LONG-TERM CARE | DRG: 481 | End: 2017-12-27
Attending: EMERGENCY MEDICINE | Admitting: INTERNAL MEDICINE
Payer: MEDICARE

## 2017-12-21 ENCOUNTER — ANESTHESIA EVENT (OUTPATIENT)
Dept: SURGERY | Age: 82
DRG: 481 | End: 2017-12-21
Payer: MEDICARE

## 2017-12-21 ENCOUNTER — APPOINTMENT (OUTPATIENT)
Dept: GENERAL RADIOLOGY | Age: 82
DRG: 481 | End: 2017-12-21
Attending: PHYSICIAN ASSISTANT
Payer: MEDICARE
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
## 2017-12-21 DIAGNOSIS — S72.001A CLOSED RIGHT HIP FRACTURE, INITIAL ENCOUNTER (HCC): Primary | ICD-10-CM

## 2017-12-21 DIAGNOSIS — R53.83 FATIGUE, UNSPECIFIED TYPE: ICD-10-CM

## 2017-12-21 DIAGNOSIS — R09.02 HYPOXIA: ICD-10-CM

## 2017-12-21 LAB
ABO + RH BLD: NORMAL
ALBUMIN SERPL-MCNC: 3.8 G/DL (ref 3.5–5)
ALBUMIN/GLOB SERPL: 1 {RATIO} (ref 1.1–2.2)
ALP SERPL-CCNC: 103 U/L (ref 45–117)
ALT SERPL-CCNC: 19 U/L (ref 12–78)
ANION GAP SERPL CALC-SCNC: 10 MMOL/L (ref 5–15)
APPEARANCE UR: ABNORMAL
APTT PPP: 25.4 SEC (ref 22.1–32.5)
AST SERPL-CCNC: 19 U/L (ref 15–37)
ATRIAL RATE: 68 BPM
BACTERIA URNS QL MICRO: NEGATIVE /HPF
BASOPHILS # BLD: 0 K/UL (ref 0–0.1)
BASOPHILS NFR BLD: 0 % (ref 0–1)
BILIRUB SERPL-MCNC: 1 MG/DL (ref 0.2–1)
BILIRUB UR QL CFM: NEGATIVE
BLOOD GROUP ANTIBODIES SERPL: NORMAL
BNP SERPL-MCNC: 2345 PG/ML (ref 0–450)
BUN SERPL-MCNC: 25 MG/DL (ref 6–20)
BUN/CREAT SERPL: 19 (ref 12–20)
CALCIUM SERPL-MCNC: 9.3 MG/DL (ref 8.5–10.1)
CALCULATED R AXIS, ECG10: -86 DEGREES
CALCULATED T AXIS, ECG11: 107 DEGREES
CHLORIDE SERPL-SCNC: 100 MMOL/L (ref 97–108)
CO2 SERPL-SCNC: 28 MMOL/L (ref 21–32)
COLOR UR: ABNORMAL
CREAT SERPL-MCNC: 1.35 MG/DL (ref 0.7–1.3)
DIAGNOSIS, 93000: NORMAL
EOSINOPHIL # BLD: 0.1 K/UL (ref 0–0.4)
EOSINOPHIL NFR BLD: 1 % (ref 0–7)
EPITH CASTS URNS QL MICRO: ABNORMAL /LPF
ERYTHROCYTE [DISTWIDTH] IN BLOOD BY AUTOMATED COUNT: 13.8 % (ref 11.5–14.5)
GLOBULIN SER CALC-MCNC: 3.7 G/DL (ref 2–4)
GLUCOSE BLD STRIP.AUTO-MCNC: 106 MG/DL (ref 65–100)
GLUCOSE BLD STRIP.AUTO-MCNC: 162 MG/DL (ref 65–100)
GLUCOSE BLD STRIP.AUTO-MCNC: 217 MG/DL (ref 65–100)
GLUCOSE SERPL-MCNC: 221 MG/DL (ref 65–100)
GLUCOSE UR STRIP.AUTO-MCNC: NEGATIVE MG/DL
HCT VFR BLD AUTO: 38.4 % (ref 36.6–50.3)
HGB BLD-MCNC: 12.6 G/DL (ref 12.1–17)
HGB UR QL STRIP: NEGATIVE
HYALINE CASTS URNS QL MICRO: ABNORMAL /LPF (ref 0–5)
INR PPP: 1.1 (ref 0.9–1.1)
KETONES UR QL STRIP.AUTO: NEGATIVE MG/DL
LEUKOCYTE ESTERASE UR QL STRIP.AUTO: NEGATIVE
LYMPHOCYTES # BLD: 1 K/UL (ref 0.8–3.5)
LYMPHOCYTES NFR BLD: 6 % (ref 12–49)
MAGNESIUM SERPL-MCNC: 1.9 MG/DL (ref 1.6–2.4)
MCH RBC QN AUTO: 30.9 PG (ref 26–34)
MCHC RBC AUTO-ENTMCNC: 32.8 G/DL (ref 30–36.5)
MCV RBC AUTO: 94.1 FL (ref 80–99)
MONOCYTES # BLD: 1 K/UL (ref 0–1)
MONOCYTES NFR BLD: 6 % (ref 5–13)
MUCOUS THREADS URNS QL MICRO: ABNORMAL /LPF
NEUTS SEG # BLD: 13.9 K/UL (ref 1.8–8)
NEUTS SEG NFR BLD: 87 % (ref 32–75)
NITRITE UR QL STRIP.AUTO: NEGATIVE
PH UR STRIP: 5.5 [PH] (ref 5–8)
PLATELET # BLD AUTO: 174 K/UL (ref 150–400)
POTASSIUM SERPL-SCNC: 4.4 MMOL/L (ref 3.5–5.1)
PROT SERPL-MCNC: 7.5 G/DL (ref 6.4–8.2)
PROT UR STRIP-MCNC: 30 MG/DL
PROTHROMBIN TIME: 10.7 SEC (ref 9–11.1)
Q-T INTERVAL, ECG07: 510 MS
QRS DURATION, ECG06: 162 MS
QTC CALCULATION (BEZET), ECG08: 546 MS
RBC # BLD AUTO: 4.08 M/UL (ref 4.1–5.7)
RBC #/AREA URNS HPF: ABNORMAL /HPF (ref 0–5)
SERVICE CMNT-IMP: ABNORMAL
SODIUM SERPL-SCNC: 138 MMOL/L (ref 136–145)
SP GR UR REFRACTOMETRY: 1.02 (ref 1–1.03)
SPECIMEN EXP DATE BLD: NORMAL
THERAPEUTIC RANGE,PTTT: NORMAL SECS (ref 58–77)
TROPONIN I SERPL-MCNC: <0.04 NG/ML
UA: UC IF INDICATED,UAUC: ABNORMAL
UROBILINOGEN UR QL STRIP.AUTO: 1 EU/DL (ref 0.2–1)
VENTRICULAR RATE, ECG03: 69 BPM
WBC # BLD AUTO: 16 K/UL (ref 4.1–11.1)
WBC URNS QL MICRO: ABNORMAL /HPF (ref 0–4)

## 2017-12-21 PROCEDURE — 99285 EMERGENCY DEPT VISIT HI MDM: CPT

## 2017-12-21 PROCEDURE — 83735 ASSAY OF MAGNESIUM: CPT | Performed by: PHYSICIAN ASSISTANT

## 2017-12-21 PROCEDURE — 74011000250 HC RX REV CODE- 250: Performed by: INTERNAL MEDICINE

## 2017-12-21 PROCEDURE — 84484 ASSAY OF TROPONIN QUANT: CPT | Performed by: PHYSICIAN ASSISTANT

## 2017-12-21 PROCEDURE — 83036 HEMOGLOBIN GLYCOSYLATED A1C: CPT | Performed by: INTERNAL MEDICINE

## 2017-12-21 PROCEDURE — 77030032490 HC SLV COMPR SCD KNE COVD -B

## 2017-12-21 PROCEDURE — 65660000000 HC RM CCU STEPDOWN

## 2017-12-21 PROCEDURE — 96374 THER/PROPH/DIAG INJ IV PUSH: CPT

## 2017-12-21 PROCEDURE — 74011250636 HC RX REV CODE- 250/636: Performed by: INTERNAL MEDICINE

## 2017-12-21 PROCEDURE — 85025 COMPLETE CBC W/AUTO DIFF WBC: CPT | Performed by: PHYSICIAN ASSISTANT

## 2017-12-21 PROCEDURE — 77030013140 HC MSK NEB VYRM -A

## 2017-12-21 PROCEDURE — 82962 GLUCOSE BLOOD TEST: CPT

## 2017-12-21 PROCEDURE — 81001 URINALYSIS AUTO W/SCOPE: CPT | Performed by: NURSE PRACTITIONER

## 2017-12-21 PROCEDURE — 93005 ELECTROCARDIOGRAM TRACING: CPT

## 2017-12-21 PROCEDURE — 96376 TX/PRO/DX INJ SAME DRUG ADON: CPT

## 2017-12-21 PROCEDURE — 74011000250 HC RX REV CODE- 250: Performed by: NURSE PRACTITIONER

## 2017-12-21 PROCEDURE — 77030020186 HC BOOT HL PROTCT SAGE -B

## 2017-12-21 PROCEDURE — 73552 X-RAY EXAM OF FEMUR 2/>: CPT

## 2017-12-21 PROCEDURE — 74011636637 HC RX REV CODE- 636/637: Performed by: INTERNAL MEDICINE

## 2017-12-21 PROCEDURE — 85610 PROTHROMBIN TIME: CPT | Performed by: PHYSICIAN ASSISTANT

## 2017-12-21 PROCEDURE — 83880 ASSAY OF NATRIURETIC PEPTIDE: CPT | Performed by: PHYSICIAN ASSISTANT

## 2017-12-21 PROCEDURE — 80053 COMPREHEN METABOLIC PANEL: CPT | Performed by: PHYSICIAN ASSISTANT

## 2017-12-21 PROCEDURE — 74011250637 HC RX REV CODE- 250/637: Performed by: NURSE PRACTITIONER

## 2017-12-21 PROCEDURE — 74011250637 HC RX REV CODE- 250/637: Performed by: INTERNAL MEDICINE

## 2017-12-21 PROCEDURE — 85730 THROMBOPLASTIN TIME PARTIAL: CPT | Performed by: PHYSICIAN ASSISTANT

## 2017-12-21 PROCEDURE — 94640 AIRWAY INHALATION TREATMENT: CPT

## 2017-12-21 PROCEDURE — 96375 TX/PRO/DX INJ NEW DRUG ADDON: CPT

## 2017-12-21 PROCEDURE — 74011250636 HC RX REV CODE- 250/636: Performed by: PHYSICIAN ASSISTANT

## 2017-12-21 PROCEDURE — 72170 X-RAY EXAM OF PELVIS: CPT

## 2017-12-21 PROCEDURE — 77010033678 HC OXYGEN DAILY

## 2017-12-21 PROCEDURE — 36415 COLL VENOUS BLD VENIPUNCTURE: CPT | Performed by: INTERNAL MEDICINE

## 2017-12-21 PROCEDURE — 86900 BLOOD TYPING SEROLOGIC ABO: CPT | Performed by: PHYSICIAN ASSISTANT

## 2017-12-21 PROCEDURE — 71020 XR CHEST PA LAT: CPT

## 2017-12-21 PROCEDURE — 74011250636 HC RX REV CODE- 250/636: Performed by: NURSE PRACTITIONER

## 2017-12-21 RX ORDER — GLIPIZIDE 5 MG/1
10 TABLET ORAL
Status: ON HOLD | COMMUNITY
End: 2017-12-26

## 2017-12-21 RX ORDER — IPRATROPIUM BROMIDE AND ALBUTEROL SULFATE 2.5; .5 MG/3ML; MG/3ML
3 SOLUTION RESPIRATORY (INHALATION)
Status: DISCONTINUED | OUTPATIENT
Start: 2017-12-21 | End: 2017-12-21

## 2017-12-21 RX ORDER — MORPHINE SULFATE 2 MG/ML
4 INJECTION, SOLUTION INTRAMUSCULAR; INTRAVENOUS
Status: COMPLETED | OUTPATIENT
Start: 2017-12-21 | End: 2017-12-21

## 2017-12-21 RX ORDER — GLIPIZIDE 5 MG/1
5 TABLET ORAL
COMMUNITY
End: 2017-12-27

## 2017-12-21 RX ORDER — IPRATROPIUM BROMIDE AND ALBUTEROL SULFATE 2.5; .5 MG/3ML; MG/3ML
3 SOLUTION RESPIRATORY (INHALATION)
Status: DISCONTINUED | OUTPATIENT
Start: 2017-12-21 | End: 2017-12-26

## 2017-12-21 RX ORDER — LANOLIN ALCOHOL/MO/W.PET/CERES
325 CREAM (GRAM) TOPICAL DAILY
Status: DISCONTINUED | OUTPATIENT
Start: 2017-12-22 | End: 2017-12-27 | Stop reason: HOSPADM

## 2017-12-21 RX ORDER — SODIUM CHLORIDE 0.9 % (FLUSH) 0.9 %
5-10 SYRINGE (ML) INJECTION AS NEEDED
Status: DISCONTINUED | OUTPATIENT
Start: 2017-12-21 | End: 2017-12-22 | Stop reason: SDUPTHER

## 2017-12-21 RX ORDER — SODIUM CHLORIDE 0.9 % (FLUSH) 0.9 %
5-10 SYRINGE (ML) INJECTION EVERY 8 HOURS
Status: DISCONTINUED | OUTPATIENT
Start: 2017-12-21 | End: 2017-12-22 | Stop reason: SDUPTHER

## 2017-12-21 RX ORDER — MORPHINE SULFATE 2 MG/ML
2 INJECTION, SOLUTION INTRAMUSCULAR; INTRAVENOUS
Status: COMPLETED | OUTPATIENT
Start: 2017-12-21 | End: 2017-12-21

## 2017-12-21 RX ORDER — TAMSULOSIN HYDROCHLORIDE 0.4 MG/1
0.4 CAPSULE ORAL
Status: DISCONTINUED | OUTPATIENT
Start: 2017-12-21 | End: 2017-12-27 | Stop reason: HOSPADM

## 2017-12-21 RX ORDER — INSULIN LISPRO 100 [IU]/ML
INJECTION, SOLUTION INTRAVENOUS; SUBCUTANEOUS
Status: DISCONTINUED | OUTPATIENT
Start: 2017-12-21 | End: 2017-12-27 | Stop reason: HOSPADM

## 2017-12-21 RX ORDER — LEVOTHYROXINE SODIUM 112 UG/1
112 TABLET ORAL
Status: DISCONTINUED | OUTPATIENT
Start: 2017-12-22 | End: 2017-12-21 | Stop reason: SDUPTHER

## 2017-12-21 RX ORDER — IPRATROPIUM BROMIDE AND ALBUTEROL SULFATE 2.5; .5 MG/3ML; MG/3ML
3 SOLUTION RESPIRATORY (INHALATION) 2 TIMES DAILY
Status: DISCONTINUED | OUTPATIENT
Start: 2017-12-21 | End: 2017-12-21

## 2017-12-21 RX ORDER — MAGNESIUM SULFATE 100 %
4 CRYSTALS MISCELLANEOUS AS NEEDED
Status: DISCONTINUED | OUTPATIENT
Start: 2017-12-21 | End: 2017-12-27 | Stop reason: HOSPADM

## 2017-12-21 RX ORDER — NAPROXEN SODIUM 220 MG
220 TABLET ORAL DAILY
COMMUNITY
End: 2017-12-27

## 2017-12-21 RX ORDER — DEXTROSE 50 % IN WATER (D50W) INTRAVENOUS SYRINGE
12.5-25 AS NEEDED
Status: DISCONTINUED | OUTPATIENT
Start: 2017-12-21 | End: 2017-12-27 | Stop reason: HOSPADM

## 2017-12-21 RX ORDER — LEVOTHYROXINE SODIUM 137 UG/1
125 TABLET ORAL
COMMUNITY
End: 2019-02-27

## 2017-12-21 RX ORDER — FINASTERIDE 5 MG/1
5 TABLET, FILM COATED ORAL EVERY EVENING
Status: DISCONTINUED | OUTPATIENT
Start: 2017-12-21 | End: 2017-12-27 | Stop reason: HOSPADM

## 2017-12-21 RX ORDER — QUETIAPINE FUMARATE 25 MG/1
50 TABLET, FILM COATED ORAL DAILY
Status: DISCONTINUED | OUTPATIENT
Start: 2017-12-22 | End: 2017-12-27 | Stop reason: HOSPADM

## 2017-12-21 RX ORDER — ACETAMINOPHEN 325 MG/1
650 TABLET ORAL
Status: DISCONTINUED | OUTPATIENT
Start: 2017-12-21 | End: 2017-12-27 | Stop reason: HOSPADM

## 2017-12-21 RX ORDER — OXCARBAZEPINE 300 MG/1
600 TABLET, FILM COATED ORAL 2 TIMES DAILY
Status: DISCONTINUED | OUTPATIENT
Start: 2017-12-21 | End: 2017-12-21

## 2017-12-21 RX ORDER — QUETIAPINE FUMARATE 100 MG/1
50 TABLET, FILM COATED ORAL DAILY
Status: ON HOLD | COMMUNITY
End: 2017-12-26

## 2017-12-21 RX ORDER — PANTOPRAZOLE SODIUM 40 MG/1
40 TABLET, DELAYED RELEASE ORAL
Status: DISCONTINUED | OUTPATIENT
Start: 2017-12-22 | End: 2017-12-27 | Stop reason: HOSPADM

## 2017-12-21 RX ORDER — HEPARIN SODIUM 5000 [USP'U]/ML
5000 INJECTION, SOLUTION INTRAVENOUS; SUBCUTANEOUS ONCE
Status: COMPLETED | OUTPATIENT
Start: 2017-12-21 | End: 2017-12-21

## 2017-12-21 RX ORDER — ESCITALOPRAM OXALATE 10 MG/1
20 TABLET ORAL DAILY
Status: DISCONTINUED | OUTPATIENT
Start: 2017-12-22 | End: 2017-12-27 | Stop reason: HOSPADM

## 2017-12-21 RX ORDER — CEFAZOLIN SODIUM IN 0.9 % NACL 2 G/50 ML
2 INTRAVENOUS SOLUTION, PIGGYBACK (ML) INTRAVENOUS
Status: DISCONTINUED | OUTPATIENT
Start: 2017-12-21 | End: 2017-12-22 | Stop reason: SDUPTHER

## 2017-12-21 RX ORDER — ALBUTEROL SULFATE 0.83 MG/ML
2.5 SOLUTION RESPIRATORY (INHALATION)
Status: DISCONTINUED | OUTPATIENT
Start: 2017-12-21 | End: 2017-12-21

## 2017-12-21 RX ORDER — DONEPEZIL HYDROCHLORIDE 5 MG/1
10 TABLET, FILM COATED ORAL DAILY
Status: DISCONTINUED | OUTPATIENT
Start: 2017-12-22 | End: 2017-12-27 | Stop reason: HOSPADM

## 2017-12-21 RX ORDER — GUAIFENESIN 600 MG/1
600 TABLET, EXTENDED RELEASE ORAL EVERY 12 HOURS
Status: DISCONTINUED | OUTPATIENT
Start: 2017-12-21 | End: 2017-12-27 | Stop reason: HOSPADM

## 2017-12-21 RX ORDER — MORPHINE SULFATE 2 MG/ML
2 INJECTION, SOLUTION INTRAMUSCULAR; INTRAVENOUS
Status: DISCONTINUED | OUTPATIENT
Start: 2017-12-21 | End: 2017-12-26

## 2017-12-21 RX ORDER — MELATONIN
3000 DAILY
COMMUNITY
End: 2020-01-01

## 2017-12-21 RX ORDER — CARVEDILOL 3.12 MG/1
3.12 TABLET ORAL 2 TIMES DAILY
Status: DISCONTINUED | OUTPATIENT
Start: 2017-12-21 | End: 2017-12-22

## 2017-12-21 RX ORDER — SODIUM CHLORIDE 9 MG/ML
75 INJECTION, SOLUTION INTRAVENOUS CONTINUOUS
Status: DISCONTINUED | OUTPATIENT
Start: 2017-12-21 | End: 2017-12-21

## 2017-12-21 RX ORDER — ONDANSETRON 2 MG/ML
4 INJECTION INTRAMUSCULAR; INTRAVENOUS
Status: COMPLETED | OUTPATIENT
Start: 2017-12-21 | End: 2017-12-21

## 2017-12-21 RX ADMIN — TAMSULOSIN HYDROCHLORIDE 0.4 MG: 0.4 CAPSULE ORAL at 22:03

## 2017-12-21 RX ADMIN — HEPARIN SODIUM 5000 UNITS: 5000 INJECTION, SOLUTION INTRAVENOUS; SUBCUTANEOUS at 14:22

## 2017-12-21 RX ADMIN — ACETAMINOPHEN 650 MG: 325 TABLET ORAL at 11:40

## 2017-12-21 RX ADMIN — ONDANSETRON 4 MG: 2 INJECTION INTRAMUSCULAR; INTRAVENOUS at 09:52

## 2017-12-21 RX ADMIN — CARVEDILOL 3.12 MG: 6.25 TABLET, FILM COATED ORAL at 14:23

## 2017-12-21 RX ADMIN — MORPHINE SULFATE 4 MG: 2 INJECTION, SOLUTION INTRAMUSCULAR; INTRAVENOUS at 10:17

## 2017-12-21 RX ADMIN — IPRATROPIUM BROMIDE AND ALBUTEROL SULFATE 3 ML: .5; 3 SOLUTION RESPIRATORY (INHALATION) at 13:39

## 2017-12-21 RX ADMIN — CARVEDILOL 3.12 MG: 6.25 TABLET, FILM COATED ORAL at 22:03

## 2017-12-21 RX ADMIN — FINASTERIDE 5 MG: 5 TABLET, FILM COATED ORAL at 17:49

## 2017-12-21 RX ADMIN — Medication 10 ML: at 22:06

## 2017-12-21 RX ADMIN — INSULIN LISPRO 3 UNITS: 100 INJECTION, SOLUTION INTRAVENOUS; SUBCUTANEOUS at 17:49

## 2017-12-21 RX ADMIN — MORPHINE SULFATE 2 MG: 2 INJECTION, SOLUTION INTRAMUSCULAR; INTRAVENOUS at 11:40

## 2017-12-21 RX ADMIN — IPRATROPIUM BROMIDE AND ALBUTEROL SULFATE 3 ML: .5; 3 SOLUTION RESPIRATORY (INHALATION) at 23:12

## 2017-12-21 RX ADMIN — IPRATROPIUM BROMIDE AND ALBUTEROL SULFATE 3 ML: .5; 3 SOLUTION RESPIRATORY (INHALATION) at 19:46

## 2017-12-21 RX ADMIN — ACETAMINOPHEN 650 MG: 325 TABLET ORAL at 17:49

## 2017-12-21 RX ADMIN — MORPHINE SULFATE 2 MG: 2 INJECTION, SOLUTION INTRAMUSCULAR; INTRAVENOUS at 09:55

## 2017-12-21 RX ADMIN — GUAIFENESIN 600 MG: 600 TABLET, EXTENDED RELEASE ORAL at 22:03

## 2017-12-21 RX ADMIN — MORPHINE SULFATE 2 MG: 2 INJECTION, SOLUTION INTRAMUSCULAR; INTRAVENOUS at 16:36

## 2017-12-21 RX ADMIN — MORPHINE SULFATE 2 MG: 2 INJECTION, SOLUTION INTRAMUSCULAR; INTRAVENOUS at 22:05

## 2017-12-21 RX ADMIN — Medication 10 ML: at 14:45

## 2017-12-21 NOTE — CONSULTS
Hortensia Calvillo MD, [address] [facility] [phone]    Date of  Admission: 12/21/2017  9:24 AM         IMPRESSION and RECOMMENDATIONS     1.  CAD:  Stable. No evidence of ischemia, CHF, or sig dysrhythmia. For right hip Fx repair today. Mr. Umu Peck is a Low Risk patient from a cardiac perspective who will be undergoing a Moderate Risk procedure. I recommend proceeding ahead with the procedure without further cardiac testing at this time. Resume ASA when feasible post-op unless more aggressive anticoagulation (DVT prophylaxis) is anticipated. Continue coreg. I have discussed this plan with the patient. He appears to understand this plan and wishes to proceed ahead.              Problem List  Date Reviewed: 12/21/2017          Codes Class Noted    * (Principal)Hip fracture, right ([facility] ICD-10-CM: S72.001A  ICD-9-CM: 820.8  12/21/2017        Recurrent left pleural effusion ICD-10-CM: J90  ICD-9-CM: 511.9  11/24/2015        Chronic respiratory failure (HCC) (Chronic) ICD-10-CM: J96.10  ICD-9-CM: 518.83  11/24/2015        Iron deficiency anemia (Chronic) ICD-10-CM: D50.9  ICD-9-CM: 280.9  11/24/2015        Counseling regarding advanced care planning and goals of care ICD-10-CM: Z71.89  ICD-9-CM: V65.49  11/23/2015        Dementia ICD-10-CM: F03.90  ICD-9-CM: 294.20  11/20/2015        Ex-smoker ICD-10-CM: M58.229  ICD-9-CM: V15.82  9/24/2015        Chronic systolic heart failure ([facility] ICD-10-CM: I50.22  ICD-9-CM: 428.22  9/18/2015        ICH (intracerebral hemorrhage) ([facility] ICD-10-CM: I61.9  ICD-9-CM: 576  8/10/2015        Pacemaker ICD-10-CM: Z95.0  ICD-9-CM: V45.01  7/2/2015    Overview Signed 7/2/2015  7:18 PM by Myriam King MD     7/2/2015 Medtr[employer] pacer implant              S/P CABG x 3 ICD-10-CM: Z95.1  ICD-9-CM: V45.81  6/29/2015    Overview Signed 6/29/2015 11:15 AM by VAL Guillen TO LAD; SOLOMON TO OM; SVG TO OM Tobacco use ICD-10-CM: Z72.0  ICD-9-CM: 305.1  6/28/2015        Poor historian ICD-10-CM: Z78.9  ICD-9-CM: V49.89  6/28/2015        Hypothyroidism ICD-10-CM: E03.9  ICD-9-CM: 244.9  6/28/2015        Diabetes mellitus ([facility] ICD-10-CM: E11.9  ICD-9-CM: 250.00  6/28/2015        Cerebrovascular accident [facility]) ICD-10-CM: I63.9  ICD-9-CM: 434.91  6/28/2015    Overview Signed 6/28/2015  1:05 PM by Cullen Gray MD     stroke x 2             Chronic obstructive pulmonary disease ([facility] ICD-10-CM: J44.9  ICD-9-CM: 358  6/28/2015    Overview Signed 6/28/2015  1:05 PM by Cullen Gray MD     04/24/2015 [facility]  FVC 2.53-67%-post bronchodilator 2.61-69%-increase 3%  FEV1 1.41-52%-post bronchodilator value 1.45-54%-increase 3%  FEV1/FVC 56%  TLC 5.19-88%  RV 2.%  DLCO 45%  DLCO VA 73%  %    04/24/2015-6 minute walk test no exertional hypoxia             Malignant neoplasm of colon ([facility] ICD-10-CM: C18.9  ICD-9-CM: 153.9  6/28/2015    Overview Signed 6/28/2015  1:05 PM by Cullen Gray MD     3824 - Colon             Other nonspecific abnormal finding of lung field ICD-10-CM: R91.8  ICD-9-CM: 793.19  6/28/2015    Overview Signed 6/28/2015  1:05 PM by Cullen Gray MD     CT 3/17/15 - [facility]   IMPRESSION:   Interval right-sided pleural effusion with nodularity/atelectasis at the   left lung base as well. Consider sampling minimal pleural fluid on the   right, this will need to be done under ultrasonographic guidance. Alternatively PET scan to evaluate nodularity at left lung base. Coronary vascular disease. No evidence of metastatic disease in the abdomen or pelvis. Aortic atherosclerotic change. There is mild fecal stasis in the colon. Patient is status post right   colectomy. 11 mm LLL Nodule    4/2105 - PET Scan no enhancement    IMPRESSION: No Foci of Abnormal Hypermetabolism.  Specifically, no abnormal   activity is seen to correspond to the nodular density at the left lung base. Continuing followup by CT for stability is recommended. Gastroesophageal reflux disease ICD-10-CM: K21.9  ICD-9-CM: 530.81  6/28/2015        CAD (coronary artery disease) ICD-10-CM: I25.10  ICD-9-CM: 414.00  6/26/2015        Pulmonary hypertension ICD-10-CM: I27.20  ICD-9-CM: 416.8  6/15/2015        Mild aortic stenosis ICD-10-CM: I35.0  ICD-9-CM: 424.1  6/15/2015        Third degree AV block ([facility] ICD-10-CM: I44.2  ICD-9-CM: 426.0  6/11/2015        Cardiomyopathy ([facility] ICD-10-CM: I42.9  ICD-9-CM: 425.4  6/11/2015        Second degree AV block ICD-10-CM: I44.1  ICD-9-CM: 426.13  6/11/2015        Stroke ([facility] ICD-10-CM: I63.9  ICD-9-CM: 434.91  Unknown    Overview Signed 6/11/2015  9:53 PM by Macy Sanford MD     2 STROKES             Hypertension ICD-10-CM: I10  ICD-9-CM: 401.9  Unknown        PVD (peripheral vascular disease) ([facility] ICD-10-CM: I73.9  ICD-9-CM: 443.9  Unknown    Overview Signed 6/11/2015  9:54 PM by Macy Sanford MD     STENT RIGHT GROIN, PAD             Diabetes ([facility] ICD-10-CM: E11.9  ICD-9-CM: 250.00  Unknown    Overview Signed 6/11/2015  9:54 PM by Macy Sanford MD     IDDM             Lumbar stenosis ICD-10-CM: Y94.132  ICD-9-CM: 724.02  9/29/2011              History of Present Illness:     Maura Fan is a 81 y.o. male with the above problem list who was admitted for Hip fracture, right ([facility] Pt presents s/p fall resulting in right hip Fx. No LOC or cardiac issues; lost his footing. Reports right hip pain aggracated by movement. He denies chest pain/discomfort, shortness of breath, dyspnea on exertion, orthopnea, paroxysmal noctural dyspnea, lower extremity edema, palpitations, syncope, or near-syncope.     Current Facility-Administered Medications   Medication Dose Route Frequency    sodium chloride (NS) flush 5-10 mL  5-10 mL IntraVENous Q8H    sodium chloride (NS) flush 5-10 mL  5-10 mL IntraVENous PRN    acetaminophen (TYLENOL) tablet 650 mg  650 mg Oral Q4H PRN    morphine injection 2 mg  2 mg IntraVENous Q4H PRN    [START ON 12/22/2017] escitalopram oxalate (LEXAPRO) tablet 20 mg  20 mg Oral DAILY    [START ON 12/22/2017] omeprazole (PRILOSEC) capsule 20 mg  20 mg Oral DAILY    OXcarbazepine (TRILEPTAL) tablet 600 mg  600 mg Oral BID    albuterol (PROVENTIL VENTOLIN) nebulizer solution 2.5 mg  2.5 mg Inhalation Q4H PRN    albuterol-ipratropium (DUO-NEB) 2.5 MG-0.5 MG/3 ML  3 mL Nebulization BID    donepezil (ARICEPT) tablet 10 mg  10 mg Oral QHS    carvedilol (COREG) tablet 3.125 mg  3.125 mg Oral BID    [START ON 12/22/2017] ferrous sulfate tablet 325 mg  325 mg Oral ACB    [START ON 12/22/2017] finasteride (PROSCAR) tablet 5 mg  5 mg Oral DAILY    [START ON 12/22/2017] levothyroxine (SYNTHROID) tablet 112 mcg  112 mcg Oral ACB    tamsulosin (FLOMAX) capsule 0.4 mg  0.4 mg Oral QHS     Current Outpatient Prescriptions   Medication Sig    carvedilol (COREG) 3.125 mg tablet Take 1 Tab by mouth two (2) times a day.  diphenoxylate-atropine (LOMOTIL) 2.5-0.025 mg per tablet Take  by mouth four (4) times daily as needed for Diarrhea.  wheat dextrin 3 gram/3.5 gram powd Take  by mouth.  omeprazole (PRILOSEC) 20 mg capsule Take 20 mg by mouth daily.  OXcarbazepine (TRILEPTAL) 300 mg tablet Take 600 mg by mouth two (2) times a day.  escitalopram oxalate (LEXAPRO) 20 mg tablet Take 20 mg by mouth daily.  polyethylene glycol (MIRALAX) 17 gram packet Take 17 g by mouth daily.  furosemide (LASIX) 40 mg tablet Take  by mouth two (2) times a day.  glipiZIDE SR (GLUCOTROL) 2.5 mg CR tablet Take 5 mg by mouth daily.  magnesium 200 mg tab Take 400 mg by mouth two (2) times a day.  albuterol (PROVENTIL VENTOLIN) 2.5 mg /3 mL (0.083 %) nebulizer solution Take 2.5 mg by inhalation every four (4) hours as needed for Wheezing.     albuterol-ipratropium (DUO-NEB) 2.5 mg-0.5 mg/3 ml nebulizer solution 3 mL by Nebulization route two (2) times a day. Indications: 1 inhalation    ketoconazole (NIZORAL) 2 % shampoo Apply  to affected area daily as needed for Itching. Indications: apply to face and scalp once a day on wed and sat every 2 weeks    donepezil (ARICEPT) 10 mg tablet Take 10 mg by mouth nightly.  ferrous sulfate 325 mg (65 mg iron) tablet Take 325 mg by mouth Daily (before breakfast).  bisacodyl (DULCOLAX, BISACODYL,) 10 mg suppository Insert 10 mg into rectum daily as needed.  finasteride (PROSCAR) 5 mg tablet Take 5 mg by mouth daily.  ergocalciferol (ERGOCALCIFEROL) 50,000 unit capsule Take 50,000 Units by mouth every fourteen (14) days.  levothyroxine (SYNTHROID) 112 mcg tablet Take 112 mcg by mouth Daily (before breakfast).  acetaminophen (TYLENOL) 500 mg tablet Take 500 mg by mouth every six (6) hours as needed for Pain.  fluticasone (FLONASE) 50 mcg/actuation nasal spray 1 [place] by Both Nostrils route daily.  tamsulosin (FLOMAX) 0.4 mg capsule Take 0.4 mg by mouth nightly. No Known Allergies   Family History   Problem Relation Age of Onset    Heart Disease Mother     Diabetes Mother     Other Father      AMPUTATION LEG FOLLOWING BUG BITE    Other Sister      CEREBRAL ANUERYSM    Heart Disease Brother     Diabetes Sister       Social History     Social History    Marital status:      Spouse name: N/A    Number of children: N/A    Years of education: N/A     Occupational History    Not on file.      Social History Main Topics    Smoking status: Former Smoker     Packs/day: 2.00     Years: 60.00     Types: Cigarettes     Quit date: 6/22/2015    Smokeless tobacco: Never Used    Alcohol use No    Drug use: No    Sexual activity: Not Currently     Partners: Female     Other Topics Concern    Not on file     Social History Narrative       Physical Exam:     Patient Vitals for the past 16 hrs:   BP Temp Pulse Resp SpO2 Height Weight   12/21/17 1215 137/52 - 60 - 97 % - -   12/21/17 1145 152/57 - 60 - 96 % - -   12/21/17 1055 138/47 - 68 - 97 % - -   12/21/17 1015 145/72 - 64 - 96 % - -   12/21/17 1000 146/74 - 69 - 92 % - -   12/21/17 0958 - - - - 96 % - -   12/21/17 0928 155/55 97.5 °F (36.4 °C) 75 16 100 % 5' 9\" (1.753 m) 155 lb (70.3 kg)       HEENT Exam:     Normocephalic, atraumatic. EOMI. Oropharynx negative. Neck supple. No lymphadenopathy. Lung Exam:     The patient is not dyspneic. There is no cough. Breath sounds are heard equally in all lung fields. There are no wheezes, rales, rhonchi, or rubs heard on auscultation. Heart Exam:     The rhythm is regular. The PMI is in the 5th intercostal space of the MCL. Apical impulse is normal. S1 is regular. S2 is physiologic. There is no S3, S4 gallop, murmur, click, or rub. Abdomen Exam:     Bowel sounds are normoactive. Abdomen soft in all quadrants. No tenderness. No palpable masses. No organomegaly. No hernias noted. No bruits or pulsatile mass. Extremities Exam:     The extremities are atraumatic appearing. There is no clubbing, cyanosis, edema, ulcers, varicose veins, rash, erythemia noted in the extremities. The neurovascular status is grossly intact with normal distal sensation and pulses. Vascular Exam:     The radial, brachial, dorsalis pedis, posterior tibial, are equal and strong bilaterally The carotids are equal bilaterally without bruits.           Labs:     Lab Results   Component Value Date/Time    Glucose 221 12/21/2017 09:45 AM    Sodium 138 12/21/2017 09:45 AM    Potassium 4.4 12/21/2017 09:45 AM    Chloride 100 12/21/2017 09:45 AM    CO2 28 12/21/2017 09:45 AM    BUN 25 12/21/2017 09:45 AM    Creatinine 1.35 12/21/2017 09:45 AM    Calcium 9.3 12/21/2017 09:45 AM     Recent Labs      12/21/17   0945   WBC  16.0*   HGB  12.6   HCT  38.4   PLT  174     Recent Labs      12/21/17   0945   SGOT  19   ALT  19   AP  103   TBILI  1.0   TP  7.5   ALB  3.8   GLOB  3.7 Recent Labs      12/21/17   0930   INR  1.1   PTP  10.7   APTT  25.4      No results for input(s): CPK, CKMB, TNIPOC in the last 72 hours.     No lab exists for component: TROPONINI, ITNL  Recent Labs      12/21/17   0945   TROIQ  <0.04     No results found for: CHOL, CHOLX, CHLST, CHOLV, HDL, LDL, LDLC, DLDLP, TGLX, TRIGL, TRIGP, CHHD, CHHDX    EKG:  V-paced @ 69

## 2017-12-21 NOTE — CONSULTS
.   Orthopedic PRE-OP Admission History and Physical        NAME: Milan Quintana       :  1935       MRN:  [id number]      Subjective:     Patient is a 81 y.o.  male with hx of COPD, dementia, CHF, ICH, pacemaker, CABG x 3, DM, CVA, DM, GERD, CAD presents with a ground level fall this am, landing on his right hip. He was unable to ambulate after fall. Pt is normally ambulatory, does not use assistive devices for ambulation. He resides at an assistive living home with a caregiver whom is a nurse. Hospitalist admitting patient, cardiology has cleared patient for surgery, awaiting pulmonary clearance. NPO since yesterday evening.      Patient Active Problem List    Diagnosis Date Noted    Hip fracture, right ([address] 2017    Recurrent left pleural effusion 2015    Chronic respiratory failure ([address] 2015    Iron deficiency anemia 2015    Counseling regarding advanced care planning and goals of care 2015    Dementia 2015    Ex-smoker 2015    Chronic systolic heart failure ([address] 2015    ICH (intracerebral hemorrhage) ([address] 08/10/2015    Pacemaker 2015    S/P CABG x 3 2015    Tobacco use 2015    Poor historian 2015    Hypothyroidism 2015    Diabetes mellitus ([address] 2015    Cerebrovascular accident ([address] 2015    Chronic obstructive pulmonary disease ([address] 2015    Malignant neoplasm of colon ([address] 2015    Other nonspecific abnormal finding of lung field 2015    Gastroesophageal reflux disease 2015    CAD (coronary artery disease) 2015    Pulmonary hypertension 06/15/2015    Mild aortic stenosis 06/15/2015    Third degree AV block ([address] 2015    Cardiomyopathy ([address] 2015    Second degree AV block 2015    Stroke ([address]     Hypertension     PVD (peripheral vascular disease) ([address]     Diabetes ([address]     Lumbar stenosis 2011     Past Medical History:   Diagnosis Date    Arthritis     CAD (coronary artery disease)     Cardiomyopathy ([facility] 6/11/2015    Colon cancer ([facility] 1993    COLON    Depression     DEPRESSION    Diabetes ([facility]     IDDM    GERD (gastroesophageal reflux disease)     Hypertension     Lung cancer ([facility] 11/29/2016    Mild aortic stenosis 6/15/2015    Pulmonary hypertension 6/15/2015    PVD (peripheral vascular disease) ([facility]     STENT RIGHT GROIN, PAD    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    LIMA TO LAD; SVG TO OM; SVG TO OM    Stroke ([facility] 1999    2 STROKES    Third degree AV block ([facility] 6/11/2015    Thyroid disease       Past Surgical History:   Procedure Laterality Date    HX GI  1993    COLON RESECTION    HX HEART CATHETERIZATION  6/26/2015    HX PACEMAKER      INS PPM/ICD LED DUAL ONLY  7/2/2015         VASCULAR SURGERY PROCEDURE UNLIST  1999    PAD, RIGHT GROIN STENT      Prior to Admission medications    Medication Sig Start Date End Date Taking? Authorizing Provider   carvedilol (COREG) 3.125 mg tablet Take 1 Tab by mouth two (2) times a day. 11/24/15  Yes Cj George MD   diphenoxylate-atropine (LOMOTIL) 2.5-0.025 mg per tablet Take  by mouth four (4) times daily as needed for Diarrhea. Historical Provider   wheat dextrin 3 gram/3.5 gram powd Take  by mouth. Historical Provider   omeprazole (PRILOSEC) 20 mg capsule Take 20 mg by mouth daily. Historical Provider   OXcarbazepine (TRILEPTAL) 300 mg tablet Take 600 mg by mouth two (2) times a day. Historical Provider   escitalopram oxalate (LEXAPRO) 20 mg tablet Take 20 mg by mouth daily. Historical Provider   polyethylene glycol (MIRALAX) 17 gram packet Take 17 g by mouth daily. Historical Provider   furosemide (LASIX) 40 mg tablet Take  by mouth two (2) times a day. Historical Provider   glipiZIDE SR (GLUCOTROL) 2.5 mg CR tablet Take 5 mg by mouth daily. Historical Provider   magnesium 200 mg tab Take 400 mg by mouth two (2) times a day. Historical Provider   albuterol (PROVENTIL VENTOLIN) 2.5 mg /3 mL (0.083 %) nebulizer solution Take 2.5 mg by inhalation every four (4) hours as needed for Wheezing. Historical Provider   albuterol-ipratropium (DUO-NEB) 2.5 mg-0.5 mg/3 ml nebulizer solution 3 mL by Nebulization route two (2) times a day. Indications: 1 inhalation    Historical Provider   ketoconazole (NIZORAL) 2 % shampoo Apply  to affected area daily as needed for Itching. Indications: apply to face and scalp once a day on wed and sat every 2 weeks    Historical Provider   donepezil (ARICEPT) 10 mg tablet Take 10 mg by mouth nightly. Historical Provider   ferrous sulfate 325 mg (65 mg iron) tablet Take 325 mg by mouth Daily (before breakfast). Historical Provider   bisacodyl (DULCOLAX, BISACODYL,) 10 mg suppository Insert 10 mg into rectum daily as needed. Historical Provider   finasteride (PROSCAR) 5 mg tablet Take 5 mg by mouth daily. Historical Provider   ergocalciferol (ERGOCALCIFEROL) 50,000 unit capsule Take 50,000 Units by mouth every fourteen (14) days. Historical Provider   levothyroxine (SYNTHROID) 112 mcg tablet Take 112 mcg by mouth Daily (before breakfast). Historical Provider   acetaminophen (TYLENOL) 500 mg tablet Take 500 mg by mouth every six (6) hours as needed for Pain. Historical Provider   fluticasone (FLONASE) 50 mcg/actuation nasal spray 1 [place] by Both Nostrils route daily. Historical Provider   tamsulosin (FLOMAX) 0.4 mg capsule Take 0.4 mg by mouth nightly.     Historical Provider     Current Facility-Administered Medications   Medication Dose Route Frequency    sodium chloride (NS) flush 5-10 mL  5-10 mL IntraVENous Q8H    sodium chloride (NS) flush 5-10 mL  5-10 mL IntraVENous PRN    acetaminophen (TYLENOL) tablet 650 mg  650 mg Oral Q4H PRN    morphine injection 2 mg  2 mg IntraVENous Q4H PRN    [START ON 12/22/2017] escitalopram oxalate (LEXAPRO) tablet 20 mg  20 mg Oral DAILY    [START ON 12/22/2017] omeprazole (PRILOSEC) capsule 20 mg  20 mg Oral DAILY    OXcarbazepine (TRILEPTAL) tablet 600 mg  600 mg Oral BID    albuterol (PROVENTIL VENTOLIN) nebulizer solution 2.5 mg  2.5 mg Inhalation Q4H PRN    albuterol-ipratropium (DUO-NEB) 2.5 MG-0.5 MG/3 ML  3 mL Nebulization BID    donepezil (ARICEPT) tablet 10 mg  10 mg Oral QHS    carvedilol (COREG) tablet 3.125 mg  3.125 mg Oral BID    [START ON 12/22/2017] ferrous sulfate tablet 325 mg  325 mg Oral ACB    [START ON 12/22/2017] finasteride (PROSCAR) tablet 5 mg  5 mg Oral DAILY    [START ON 12/22/2017] levothyroxine (SYNTHROID) tablet 112 mcg  112 mcg Oral ACB    tamsulosin (FLOMAX) capsule 0.4 mg  0.4 mg Oral QHS     Current Outpatient Prescriptions   Medication Sig    carvedilol (COREG) 3.125 mg tablet Take 1 Tab by mouth two (2) times a day.  diphenoxylate-atropine (LOMOTIL) 2.5-0.025 mg per tablet Take  by mouth four (4) times daily as needed for Diarrhea.  wheat dextrin 3 gram/3.5 gram powd Take  by mouth.  omeprazole (PRILOSEC) 20 mg capsule Take 20 mg by mouth daily.  OXcarbazepine (TRILEPTAL) 300 mg tablet Take 600 mg by mouth two (2) times a day.  escitalopram oxalate (LEXAPRO) 20 mg tablet Take 20 mg by mouth daily.  polyethylene glycol (MIRALAX) 17 gram packet Take 17 g by mouth daily.  furosemide (LASIX) 40 mg tablet Take  by mouth two (2) times a day.  glipiZIDE SR (GLUCOTROL) 2.5 mg CR tablet Take 5 mg by mouth daily.  magnesium 200 mg tab Take 400 mg by mouth two (2) times a day.  albuterol (PROVENTIL VENTOLIN) 2.5 mg /3 mL (0.083 %) nebulizer solution Take 2.5 mg by inhalation every four (4) hours as needed for Wheezing.  albuterol-ipratropium (DUO-NEB) 2.5 mg-0.5 mg/3 ml nebulizer solution 3 mL by Nebulization route two (2) times a day. Indications: 1 inhalation    ketoconazole (NIZORAL) 2 % shampoo Apply  to affected area daily as needed for Itching.  Indications: apply to face and scalp once a day on wed and sat every 2 weeks    donepezil (ARICEPT) 10 mg tablet Take 10 mg by mouth nightly.  ferrous sulfate 325 mg (65 mg iron) tablet Take 325 mg by mouth Daily (before breakfast).  bisacodyl (DULCOLAX, BISACODYL,) 10 mg suppository Insert 10 mg into rectum daily as needed.  finasteride (PROSCAR) 5 mg tablet Take 5 mg by mouth daily.  ergocalciferol (ERGOCALCIFEROL) 50,000 unit capsule Take 50,000 Units by mouth every fourteen (14) days.  levothyroxine (SYNTHROID) 112 mcg tablet Take 112 mcg by mouth Daily (before breakfast).  acetaminophen (TYLENOL) 500 mg tablet Take 500 mg by mouth every six (6) hours as needed for Pain.  fluticasone (FLONASE) 50 mcg/actuation nasal spray 1 [place] by Both Nostrils route daily.  tamsulosin (FLOMAX) 0.4 mg capsule Take 0.4 mg by mouth nightly. No Known Allergies   Social History   Substance Use Topics    Smoking status: Former Smoker     Packs/day: 2.00     Years: 60.00     Types: Cigarettes     Quit date: 2015    Smokeless tobacco: Never Used    Alcohol use No      Family History   Problem Relation Age of Onset    Heart Disease Mother     Diabetes Mother     Other Father      AMPUTATION LEG FOLLOWING BUG BITE    Other Sister      CEREBRAL ANUERYSM    Heart Disease Brother     Diabetes Sister         Review of Systems  A comprehensive review of systems was negative except for that written in the HPI.     Mental Status: no dementia    Objective:     Patient Vitals for the past 8 hrs:   BP Temp Pulse Resp SpO2 Height Weight   17 1215 137/52 - 60 - 97 % - -   17 1145 152/57 - 60 - 96 % - -   17 1055 138/47 - 68 - 97 % - -   17 1015 145/72 - 64 - 96 % - -   17 1000 146/74 - 69 - 92 % - -   17 0958 - - - - 96 % - -   17 0928 155/55 97.5 °F (36.4 °C) 75 16 100 % 5' 9\" (1.753 m) 70.3 kg (155 lb)     Temp (24hrs), Av.5 °F (36.4 °C), Min:97.5 °F (36.4 °C), Max:97.5 °F (36.4 °C)      Gen: Well-developed, in no acute distress   Musc: Right LE shortened and internally rotated. PP +2. Full sensation throughout. Right knee flexed at 90 degrees and level of comfort. Skin:  Clean, dry, intact. No rash, ulcerations. Skin turgor good  Neuro: Cranial nerves are grossly intact, no focal motor weakness, follows commands appropriately   Psych: Good insight, oriented to person, place and time, alert        Imaging Review: CLINICAL HISTORY: Status post fall with acute right hip pain     Single AP view of the pelvis demonstrates a nondisplaced right intertrochanteric  hip fracture. The bones are osteopenic. The patient is status post fusion of the  lumbosacral spine. Vascular calcification is noted. Right iliac stent is  evident.           IMPRESSION  IMPRESSION: Nondisplaced right intertrochanteric hip fracture. Labs:   Recent Results (from the past 24 hour(s))   PROTHROMBIN TIME + INR    Collection Time: 12/21/17  9:30 AM   Result Value Ref Range    INR 1.1 0.9 - 1.1      Prothrombin time 10.7 9.0 - 11.1 sec   PTT    Collection Time: 12/21/17  9:30 AM   Result Value Ref Range    aPTT 25.4 22.1 - 32.5 sec    aPTT, therapeutic range     58.0 - 77.0 SECS   CBC WITH AUTOMATED DIFF    Collection Time: 12/21/17  9:45 AM   Result Value Ref Range    WBC 16.0 (H) 4.1 - 11.1 K/uL    RBC 4.08 (L) 4.10 - 5.70 M/uL    HGB 12.6 12.1 - 17.0 g/dL    HCT 38.4 36.6 - 50.3 %    MCV 94.1 80.0 - 99.0 FL    MCH 30.9 26.0 - 34.0 PG    MCHC 32.8 30.0 - 36.5 g/dL    RDW 13.8 11.5 - 14.5 %    PLATELET 813 488 - 463 K/uL    NEUTROPHILS 87 (H) 32 - 75 %    LYMPHOCYTES 6 (L) 12 - 49 %    MONOCYTES 6 5 - 13 %    EOSINOPHILS 1 0 - 7 %    BASOPHILS 0 0 - 1 %    ABS. NEUTROPHILS 13.9 (H) 1.8 - 8.0 K/UL    ABS. LYMPHOCYTES 1.0 0.8 - 3.5 K/UL    ABS. MONOCYTES 1.0 0.0 - 1.0 K/UL    ABS. EOSINOPHILS 0.1 0.0 - 0.4 K/UL    ABS.  BASOPHILS 0.0 0.0 - 0.1 K/UL   METABOLIC PANEL, COMPREHENSIVE    Collection Time: 12/21/17  9:45 AM   Result Value Ref Range    Sodium 138 136 - 145 mmol/L    Potassium 4.4 3.5 - 5.1 mmol/L    Chloride 100 97 - 108 mmol/L    CO2 28 21 - 32 mmol/L    Anion gap 10 5 - 15 mmol/L    Glucose 221 (H) 65 - 100 mg/dL    BUN 25 (H) 6 - 20 MG/DL    Creatinine 1.35 (H) 0.70 - 1.30 MG/DL    BUN/Creatinine ratio 19 12 - 20      GFR est AA >60 >60 ml/min/1.73m2    GFR est non-AA 51 (L) >60 ml/min/1.73m2    Calcium 9.3 8.5 - 10.1 MG/DL    Bilirubin, total 1.0 0.2 - 1.0 MG/DL    ALT (SGPT) 19 12 - 78 U/L    AST (SGOT) 19 15 - 37 U/L    Alk.  phosphatase 103 45 - 117 U/L    Protein, total 7.5 6.4 - 8.2 g/dL    Albumin 3.8 3.5 - 5.0 g/dL    Globulin 3.7 2.0 - 4.0 g/dL    A-G Ratio 1.0 (L) 1.1 - 2.2     TROPONIN I    Collection Time: 12/21/17  9:45 AM   Result Value Ref Range    Troponin-I, Qt. <0.04 <0.05 ng/mL   MAGNESIUM    Collection Time: 12/21/17  9:45 AM   Result Value Ref Range    Magnesium 1.9 1.6 - 2.4 mg/dL   NT-PRO BNP    Collection Time: 12/21/17  9:45 AM   Result Value Ref Range    NT pro-BNP 2345 (H) 0 - 450 PG/ML       Assessment:     Patient Active Problem List    Diagnosis Date Noted    Hip fracture, right ([facility] 12/21/2017    Recurrent left pleural effusion 11/24/2015    Chronic respiratory failure ([facility] 11/24/2015    Iron deficiency anemia 11/24/2015    Counseling regarding advanced care planning and goals of care 11/23/2015    Dementia 11/20/2015    Ex-smoker 09/24/2015    Chronic systolic heart failure ([facility] 09/18/2015    ICH (intracerebral hemorrhage) ([facility] 08/10/2015    Pacemaker 07/02/2015    S/P CABG x 3 06/29/2015    Tobacco use 06/28/2015    Poor historian 06/28/2015    Hypothyroidism 06/28/2015    Diabetes mellitus ([facility] 06/28/2015    Cerebrovascular accident ([facility] 06/28/2015    Chronic obstructive pulmonary disease ([facility] 06/28/2015    Malignant neoplasm of colon ([facility] 06/28/2015    Other nonspecific abnormal finding of lung field 06/28/2015    Gastroesophageal reflux disease 06/28/2015    CAD (coronary artery disease) 06/26/2015    Pulmonary hypertension 06/15/2015    Mild aortic stenosis 06/15/2015    Third degree AV block ([facility] 06/11/2015    Cardiomyopathy ([facility] 06/11/2015    Second degree AV block 06/11/2015    Stroke ([facility]     Hypertension     PVD (peripheral vascular disease) ([facility]     Diabetes ([facility]     Lumbar stenosis 09/29/2011         Plan:   Right nondisplaced IT fx  Discussed with patient and family about nature of condition and treatment options. Gave hip fracture education book to family and described in detail details of conditions, surgery and recovery process. Reviewed risks/ benefits of surgical intervention with hip fracture. Will await medical clearance. NPO after MN. Ojeda placed. Await UA results. DVT proph with Heparin 5000 units one time dose now. No other anticoagulants. Discussed with Dr. Chantale Holt,  he agrees with above plan. Shaina Fulton NP     Patient seen and independently examined. Agree with above. 82 yo male with signficiant medical hx including aortic stenosis (last echo 42% EF in August), COPD, CAD who sustained a GLF resulting in a minimally displaced right IT femur fracture. Admitted to the hospitalist service and pulm/cards consulted. Deemed medically optimized. Discussed risks and benefits of surgical stabilization with daughter. She understands and wishes to proceed.     Michael Vidal MD

## 2017-12-21 NOTE — PROGRESS NOTES
Primary Nurse Helen Rojo RN and Arvin Wilkerson RN performed a dual skin assessment on this patient. R heel very slightly red, blanchable. Scattered, minor abrasions to posterior left lower leg. Small skin tear noted to R wrist area. Heel boots applied bilaterally. Mobility team order placed for every 2 hour turns. Ladarius score is 15. Pt incontinent of stool upon arrival to the unit. Jessica-care performed. Clean bed pad under patient.

## 2017-12-21 NOTE — ED NOTES
TRANSFER - OUT REPORT:    Verbal report given to Rachel Mcknight RN(name) on Shruthi Jorje  being transferred to room 417(unit) for routine progression of care       Report consisted of patients Situation, Background, Assessment and   Recommendations(SBAR). Information from the following report(s) SBAR, Kardex, ED Summary, Intake/Output, MAR and Recent Results was reviewed with the receiving nurse. Lines:   Peripheral IV 12/21/17 Left Arm (Active)   Site Assessment Clean, dry, & intact 12/21/2017  9:46 AM   Phlebitis Assessment 0 12/21/2017  9:46 AM   Infiltration Assessment 0 12/21/2017  9:46 AM   Dressing Status Clean, dry, & intact 12/21/2017  9:46 AM   Dressing Type Transparent 12/21/2017  9:46 AM   Hub Color/Line Status Pink 12/21/2017  9:46 AM       Peripheral IV 12/21/17 Left Forearm (Active)   Site Assessment Clean, dry, & intact 12/21/2017  9:45 AM   Phlebitis Assessment 0 12/21/2017  9:45 AM   Infiltration Assessment 0 12/21/2017  9:45 AM   Dressing Status Clean, dry, & intact 12/21/2017  9:45 AM   Dressing Type Tape;Transparent 12/21/2017  9:45 AM   Hub Color/Line Status Pink;Flushed;Patent 12/21/2017  9:45 AM   Action Taken Blood drawn 12/21/2017  9:45 AM        Opportunity for questions and clarification was provided.       Patient transported with:   O2 @ 2 liters  Tech

## 2017-12-21 NOTE — H&P
Admission History and Physical      NAME:  Maura Fan   :   1935   MRN:  [id number]     PCP:  Judi Judge MD     Date/Time:  2017           Assessment/Plan:       Hip fracture, right ([facility] (2017):  Post mechanical fall. Non-displaced by imaging.   -- ortho consult   -- morphine IV prn    COPD / Pulmonary hypertension (6/15/2015) / Chronic respiratory failure:  Noted to have sats in upper 80's on RA. Confirmed by testing myself in ED. However, patient denies sob. Has chronic cough that is unchanged. Suspect there is chronic hypoxia based upon medical hx.   -- continue oxygen and wean as tolerated   -- pulm consult for preop    HTN / Mild aortic stenosis (6/15/2015) / CAD (coronary artery disease) (2015) / Chronic systolic CHF:  Hx of CABG. Denies chest pain or sob. EKG v-paced. Trop neg. Echo last with EF 40%. 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   -- monitor    Dementia (2015):  Seems to be at baseline. Lives in group home. -- continue donepezil, lexapro, and trileptal    Iron deficiency anemia (2015):   -- continue feso4    Leukocytosis:  Suspect stress response to fracture. -- check UA   -- repeat labs in AM    Third degree AV block ([facility] (2015): Has pacemaker. PVD (peripheral vascular disease) ([facility] ():  Overview: STENT RIGHT GROIN, PAD. Stent seen on xray as well. Subjective:     CHIEF COMPLAINT:  Hip pain    HISTORY OF PRESENT ILLNESS:     Mr. Mckenzie Banegas is a 81 y.o.  male who is admitted with Hip fracture, right ([facility] Mr. Mckenzie Banegas presented to the Emergency Department today complaining of fall.   Was putting on pants, when he slipped and fell on to right hip. Immediately had pain and was unable to get up from floor. Reports pain in leg worse with movement. Better with rest and pain medication. No loss of consciousness. Denies chest pain, sob, dizziness, lightheadedness. Does not recall when the last fall was, but not a frequent irineo. Uses wheelchair but does ambulate short distances. Past Medical History:   Diagnosis Date    Arthritis     CAD (coronary artery disease)     Cardiomy[facility] ([facility] 6/11/2015    Colon cancer ([facility] 1993    COLON    Depression     DEPRESSION    Diabetes ([facility]     IDDM    GERD (gastroesophageal reflux disease)     Hypertension     Lung cancer ([facility] 11/29/2016    Mild aortic stenosis 6/15/2015    Pulmonary hypertension 6/15/2015    PVD (peripheral vascular disease) ([facility]     STENT RIGHT GROIN, PAD    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    LIMA TO LAD; SVG TO OM; SVG TO OM    Stroke ([facility] 1999    2 STROKES    Third degree AV block ([facility] 6/11/2015    Thyroid disease         Past Surgical History:   Procedure Laterality Date    HX GI  1993    COLON RESECTION    HX HEART CATHETERIZATION  6/26/2015    HX PACEMAKER      INS PPM/ICD LED DUAL ONLY  7/2/2015         VASCULAR SURGERY PROCEDURE UNLIST  1999    PAD, RIGHT GROIN STENT       Social History   Substance Use Topics    Smoking status: Former Smoker     Packs/day: 2.00     Years: 60.00     Types: Cigarettes     Quit date: 6/22/2015    Smokeless tobacco: Never Used    Alcohol use No        Family History   Problem Relation Age of Onset    Heart Disease Mother     Diabetes Mother     Other Father      AMPUTATION LEG FOLLOWING BUG BITE    Other Sister      CEREBRAL ANUERYSM    Heart Disease Brother     Diabetes Sister         No Known Allergies     Prior to Admission medications    Medication Sig Start Date End Date Taking? Authorizing Provider   carvedilol (COREG) 3.125 mg tablet Take 1 Tab by mouth two (2) times a day. 11/24/15  Yes Rosealee Blizzard, MD   diphenoxylate-atropine (LOMOTIL) 2.5-0.025 mg per tablet Take  by mouth four (4) times daily as needed for Diarrhea. Historical Provider   wheat dextrin 3 gram/3.5 gram powd Take  by mouth. Historical Provider   omeprazole (PRILOSEC) 20 mg capsule Take 20 mg by mouth daily. Historical Provider   OXcarbazepine (TRILEPTAL) 300 mg tablet Take 600 mg by mouth two (2) times a day. Historical Provider   escitalopram oxalate (LEXAPRO) 20 mg tablet Take 20 mg by mouth daily. Historical Provider   polyethylene glycol (MIRALAX) 17 gram packet Take 17 g by mouth daily. Historical Provider   furosemide (LASIX) 40 mg tablet Take  by mouth two (2) times a day. Historical Provider   glipiZIDE SR (GLUCOTROL) 2.5 mg CR tablet Take 5 mg by mouth daily. Historical Provider   magnesium 200 mg tab Take 400 mg by mouth two (2) times a day. Historical Provider   albuterol (PROVENTIL VENTOLIN) 2.5 mg /3 mL (0.083 %) nebulizer solution Take 2.5 mg by inhalation every four (4) hours as needed for Wheezing. Historical Provider   albuterol-ipratropium (DUO-NEB) 2.5 mg-0.5 mg/3 ml nebulizer solution 3 mL by Nebulization route two (2) times a day. Indications: 1 inhalation    Historical Provider   ketoconazole (NIZORAL) 2 % shampoo Apply  to affected area daily as needed for Itching. Indications: apply to face and scalp once a day on wed and sat every 2 weeks    Historical Provider   donepezil (ARICEPT) 10 mg tablet Take 10 mg by mouth nightly. Historical Provider   ferrous sulfate 325 mg (65 mg iron) tablet Take 325 mg by mouth Daily (before breakfast). Historical Provider   bisacodyl (DULCOLAX, BISACODYL,) 10 mg suppository Insert 10 mg into rectum daily as needed. Historical Provider   finasteride (PROSCAR) 5 mg tablet Take 5 mg by mouth daily. Historical Provider   ergocalciferol (ERGOCALCIFEROL) 50,000 unit capsule Take 50,000 Units by mouth every fourteen (14) days. Historical Provider   levothyroxine (SYNTHROID) 112 mcg tablet Take 112 mcg by mouth Daily (before breakfast). Historical Provider   acetaminophen (TYLENOL) 500 mg tablet Take 500 mg by mouth every six (6) hours as needed for Pain. Historical Provider   fluticasone (FLONASE) 50 mcg/actuation nasal spray 1 [place] by Both Nostrils route daily. Historical Provider   tamsulosin (FLOMAX) 0.4 mg capsule Take 0.4 mg by mouth nightly. Historical Provider         Review of Systems:  (bold if positive, if negative)    Gen:  Eyes:  ENT:  CVS:  Pulm:  GI:  diarrhea  :    MS:  PainSkin:  Endo:    Hem:  Renal:    Neuro:            Objective:      VITALS:    Vital signs reviewed; most recent are:    Visit Vitals    /47    Pulse 68    Temp 97.5 °F (36.4 °C)    Resp 16    Ht 5' 9\" (1.753 m)    Wt 70.3 kg (155 lb)    SpO2 97%    BMI 22.89 kg/m2     SpO2 Readings from Last 6 Encounters:   12/21/17 97%   08/23/17 91%   02/15/17 98%   10/05/16 92%   06/16/16 95%   05/24/16 95%    O2 Flow Rate (L/min): 2 l/min   No intake or output data in the 24 hours ending 12/21/17 1135         Exam:     Physical Exam:    Gen:  Well-developed, well-nourished, in no acute distress  HEENT:  Pink conjunctivae, PERRL, hearing intact to voice, moist mucous membranes  Neck:  Supple  Resp:  No accessory muscle use, clear breath sounds without wheezes rales or rhonchi  Card:  No murmurs, normal S1, S2 without thrills or peripheral edema  Abd:  Soft, non-tender, non-distended, normoactive bowel sounds are present  Musc:  No cyanosis  Skin:  No rashes, skin turgor is good  Neuro:  Cranial nerves 3-12 are grossly intact,  strength is 5/5 bilaterally, dorsi / plantarflexion strength is 5/5 bilaterally, follows commands appropriately  Psych:  Alert. Oriented to person.        Labs:    Recent Labs      12/21/17   0945   WBC  16.0*   HGB  12.6   HCT  38.4   PLT  174     Recent Labs      12/21/17   0945   NA  138   K  4.4   CL  100 CO2  28   GLU  221*   BUN  25*   CREA  1.35*   CA  9.3   MG  1.9   ALB  3.8   TBILI  1.0   SGOT  19   ALT  19     Lab Results   Component Value Date/Time    Glucose (POC) 112 05/24/2016 08:04 AM    Glucose (POC) 230 02/14/2016 10:59 AM     No results for input(s): PH, PCO2, PO2, HCO3, FIO2 in the last 72 hours. No results for input(s): INR in the last 72 hours. No lab exists for component: INREXT    Chest Xray:  Unchanged scarring and pleural thickening. EKG reviewed:   V-paced. Medical records reviewed in preparation for this admission: Old medical records.     Surrogate decision maker:  daughter    Total time spent with patient: [address] discussed with: Patient and Family    Discussed:  Care Plan    Prophylaxis:  SCD's    Probable Disposition:  SNF/LTC           ___________________________________________________    Attending Physician: Rupal Morris MD

## 2017-12-21 NOTE — PROGRESS NOTES
[facility]: MED RECONCILIATION    Comments/Recommendations:     Daughter mentions that the medication list in the room is not up to date and there have been some changes which she cannot recall at this time. The patient's nurse Raffi Quintero will call the pharmacist at  to review home medications.     Thank you      Beth Finney, BruceD, BCPS

## 2017-12-21 NOTE — PROGRESS NOTES
[facility]: MED RECONCILIATION    Comments/Recommendations:   Interview conducted with the patient's care giver Tyson Carlson via phone at the daughter's request  Drug Drug interaction noted between donepezil, quetiapine, escitalopram as increased risk of prolonged QTC  Chronic use of NSAIDS in older adults can lead to an increased risk of acute kidney injury and GI bleed. Consider stopping naproxen which is used for back pain. Medications added:     · Vitamin D  · Quetiapine  · Naproxen    Medications removed:    · Acetaminophen  · Albuterol  · Albuterol - ipratropium  · Ergocalciferol  · Oxcarbazepine  · Polyethylene glycol  · Wheat dextrin    Medications adjusted:    · Donepezil changed to morning administration  · Finasteride changed to evening administration  · Fluticasone changed to daily prn  · Furosemide added dose  · Glipizide changed to 10 mg in the morning and 5 mg in the evening  · Levothyroxine changed to 125 mcg daily  · Magnesium changed to 500 mg daily    Allergies: Review of patient's allergies indicates no known allergies. Prior to Admission Medications:     Prior to Admission Medications   Prescriptions Last Dose Informant Patient Reported? Taking? Carboxymethylcellulose-Glycern (REFRESH OPTIVE) 0.5-0.9 % drop  Care Giver Yes Yes   Sig: Administer 1 Drop to both eyes as needed (dry eye). QUEtiapine (SEROQUEL) 100 mg tablet 12/20/2017 at [address] [place] [address] Yes Yes   Sig: Take 50 mg by mouth daily. carvedilol (COREG) 3.125 mg tablet 12/20/2017 at pm Care Giver No Yes   Sig: Take 1 Tab by mouth two (2) times a day. cholecalciferol (VITAMIN D3) 1,000 unit tablet 12/20/2017 at [address] [place] [address] Yes Yes   Sig: Take 3,000 Units by mouth daily. diphenoxylate-atropine (LOMOTIL) 2.5-0.025 mg per tablet  Care Giver Yes Yes   Sig: Take 1 Tab by mouth four (4) times daily as needed for Diarrhea. donepezil (ARICEPT) 10 mg tablet 12/20/2017 at am Care Giver Yes Yes   Sig: Take 10 mg by mouth daily. escitalopram oxalate (LEXAPRO) 20 mg tablet 2017 at Unknown time Care Giver Yes Yes   Sig: Take 20 mg by mouth daily. ferrous sulfate 325 mg (65 mg iron) tablet 2017 at Unknown time Care Giver Yes Yes   Sig: Take 325 mg by mouth daily (with breakfast). finasteride (PROSCAR) 5 mg tablet 2017 at Unknown time Care Giver Yes Yes   Sig: Take 5 mg by mouth every evening. fluticasone (FLONASE) 50 mcg/actuation nasal spray  Care Giver Yes Yes   Si Sprays by Both Nostrils route daily as needed. furosemide (LASIX) 40 mg tablet 2017 at Unknown time Care Giver Yes Yes   Sig: Take 40 mg by mouth two (2) times a day. glipiZIDE (GLUCOTROL) 5 mg tablet 2017 at [address] [place] [address] Yes Yes   Sig: Take 10 mg by mouth daily (with breakfast). glipiZIDE (GLUCOTROL) 5 mg tablet 2017 at [address] [place] [address] Yes Yes   Sig: Take 5 mg by mouth Daily (before dinner). ketoconazole (NIZORAL) 2 % shampoo  Care Giver Yes Yes   Sig: Apply  to affected area daily as needed for Itching. levothyroxine (SYNTHROID) 125 mcg tablet 2017 at [address] [place] [address] Yes Yes   Sig: Take 125 mcg by mouth Daily (before breakfast). One hour prior to breakfast   magnesium gluconate 500 mg (27 mg  elemental) tablet 2017 at Unknown time Care Giver Yes Yes   Sig: Take 500 mg by mouth daily. naproxen sodium (NAPROSYN) 220 mg tablet 2017 at Unknown time Care Giver Yes Yes   Sig: Take 220 mg by mouth daily. omeprazole (PRILOSEC) 20 mg capsule 2017 at [address] [place] [address] Yes Yes   Sig: Take 20 mg by mouth Daily (before breakfast). 30 minutes prior to breakfast   tamsulosin (FLOMAX) 0.4 mg capsule 2017 at [address] [place] [address] Yes Yes   Sig: Take 0.4 mg by mouth nightly.       Facility-Administered Medications: None     Thank you,      Olivia Slater, PharmD, BCPS

## 2017-12-21 NOTE — ED PROVIDER NOTES
HPI Comments: 81 y.o. male with past medical history significant for hypertension, stroke, GERD, PVD, diabetes, thyroid disease, colon cancer, cardiomyopathy, mild aortic stenosis, and CABG who presents from group home via EMS with chief complaint of hip pain. History obtained from both the patient and EMS since he is a somewhat vague historian. EMS note the patient suffered a ground level fall approximately 1 hour ago when the patient slipped and fell. EMS found the patient with sats in the high 80's, which improved to the upper 90's en route to the hospital with oxygen via nasal cannula. EMS reports the patient has not used oxygen at home \"for a while. \" Patient comes in complaining of moderate right hip pain since his fall with pain with any sort of movement. Patient also complains of fatigue and generalized weakness. EMS state the patient was A&O x4 en route to the hospital and did not administer any medications. Patient denies any head trauma or loss of consciousness during his fall. Patient denies any chest pain, shortness of breath, and dizziness. He specifically denies any fevers, chills, nausea, vomiting, abd pain, urinary sx, headache, rash, diarrhea, sweating or weight loss. There are no other acute medical concerns at this time. Social hx: Tobacco Use: No (former smoker), Alcohol Use: No, Drug Use: No    PCP: Rossana Cullen MD  Cardiologist: Elle Moore MD    Note written by Jose Brooks, as dictated by VAL Valdes 9:29 AM      The history is provided by the patient and the EMS personnel. The history is limited by the condition of the patient (Patient is a somewhat vague historian).         Past Medical History:   Diagnosis Date    Arthritis     CAD (coronary artery disease)     Cardiomyopathy ([facility] 6/11/2015    Colon cancer ([facility] 1993    COLON    Depression     DEPRESSION    Diabetes ([facility]     IDDM    GERD (gastroesophageal reflux disease)     Hypertension     Lung cancer ([facility] 11/29/2016    Mild aortic stenosis 6/15/2015    Pulmonary hypertension 6/15/2015    [facility]VD [facility]per[facility]     STENT RIGHT GROIN, PAD    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    LIMA TO LAD; SVG TO OM; SVG TO OM    Stroke ([facility] 1999    2 STROKES    Third degree AV block ([facility] 6/11/2015    Thyroid disease        Past Surgical History:   Procedure Laterality Date    HX GI  1993    COLON RESECTION    HX HEART CATHETERIZATION  6/26/2015    HX PACEMAKER      INS PPM/ICD LED DUAL ONLY  7/2/2015         VASCULAR SURGERY PROCEDURE UNLIST  1999    PAD, RIGHT GROIN STENT         Family History:   Problem Relation Age of Onset    Heart Disease Mother     Diabetes Mother     Other Father      AMPUTATION LEG FOLLOWING BUG BITE    Other Sister      CEREBRAL ANUERYSM    Heart Disease Brother     Diabetes Sister        Social History     Social History    Marital status:      Spouse name: N/A    Number of children: N/A    Years of education: N/A     Occupational History    Not on file. Social History Main Topics    Smoking status: Former Smoker     Packs/day: 2.00     Years: 60.00     Types: Cigarettes     Quit date: 6/22/2015    Smokeless tobacco: Never Used    Alcohol use No    Drug use: No    Sexual activity: Not Currently     Partners: Female     Other Topics Concern    Not on file     Social History Narrative         ALLERGIES: Review of patient's allergies indicates no known allergies. Review of Systems   Constitutional: Positive for fatigue. Negative for appetite change, chills and fever. HENT: Negative for congestion, ear pain, postnasal drip, rhinorrhea, sneezing and sore throat. Eyes: Negative for redness and visual disturbance. Respiratory: Negative for cough, shortness of breath and wheezing. Cardiovascular: Negative for chest pain, palpitations and leg swelling.    Gastrointestinal: Negative for abdominal pain, anal bleeding, constipation, diarrhea, nausea and vomiting. Genitourinary: Negative for difficulty urinating, dysuria, frequency and hematuria. Musculoskeletal: Positive for arthralgias (right hip pain). Negative for back pain, myalgias and neck stiffness. Skin: Negative for rash. Allergic/Immunologic: Negative for immunocompromised state. Neurological: Positive for weakness (generalized). Negative for dizziness, syncope, light-headedness and headaches. Hematological: Negative for adenopathy. Patient Vitals for the past 12 hrs:   Temp Pulse Resp BP SpO2   12/21/17 1215 - 60 - 137/52 97 %   12/21/17 1145 - 60 - 152/57 96 %   12/21/17 1055 - 68 - 138/47 97 %   12/21/17 1015 - 64 - 145/72 96 %   12/21/17 1000 - 69 - 146/74 92 %   12/21/17 0958 - - - - 96 %   12/21/17 0928 97.5 °F (36.4 °C) 75 16 155/55 100 %              Physical Exam   Constitutional: He is oriented to person, place, and time. He appears well-developed and well-nourished. No distress. Elderly, frail male   HENT:   Head: Normocephalic and atraumatic. Right Ear: External ear normal.   Left Ear: External ear normal.   Eyes: EOM are normal. Pupils are equal, round, and reactive to light. Neck: Neck supple. Cardiovascular: Normal rate, regular rhythm, normal heart sounds and intact distal pulses. Exam reveals no gallop and no friction rub. No murmur heard. Pulmonary/Chest: Effort normal and breath sounds normal. No stridor. No respiratory distress. He has no wheezes. He has no rales. He exhibits no tenderness. Abdominal: Soft. Bowel sounds are normal. He exhibits no distension and no mass. There is no tenderness. There is no rebound and no guarding. Musculoskeletal: He exhibits tenderness. He exhibits no edema or deformity. Right hip focally ttp with leg shortening. Limited rom secondary to pain. Cap refill brisk. Normothermic. Distal n/v intact. No lesions. Normothermic. No discloration. Neurological: He is alert and oriented to person, place, and time. No cranial nerve deficit. Coordination normal.   Skin: No rash noted. No erythema. No pallor. Psychiatric: He has a normal mood and affect. His behavior is normal.   Nursing note and vitals reviewed. MDM  Number of Diagnoses or Management Options     Amount and/or Complexity of Data Reviewed  Clinical lab tests: ordered and reviewed  Tests in the radiology section of CPT®: ordered and reviewed  Tests in the medicine section of CPT®: reviewed and ordered  Obtain history from someone other than the patient: yes (ems)  Review and summarize past medical records: yes  Independent visualization of images, tracings, or specimens: yes    Patient Progress  Patient progress: stable    ED Course       Procedures    ED EKG interpretation:  Rhythm: ventricular paced; and regular . Rate (approx.): 69; Axis: normal; ST/T wave: normal; No acute STEMI. Note written by Jose Myers, as dictated by Aaron Bone MD 9:32 AM     9:42 AM  Discussed pt, sx, hx and current findings with Dr Quintin Doe. He is in agreement with plan. Will xray pt and get labs/ ekg to eval for weakness, hypoxia. Pt with chronic respiratory failure hx.   Deette Yesy. KELLY Clarke      10:09 AM   pt with improved sats on oxygen. Will give additional pain meds per pt request.   Deette Yesy. KELLY Clarke      11:16 AM   Updated pt and family on current findings of hip fracture and impending admission. Pt and family in agreement with plan  Deette Yesy. KELLY Clarke    11:17 AM  Deette Yesy. KELLY Clarke spoke with Dr. Winnie Crook, Consult for Hospitalist. Discussed available diagnostic tests and clinical findings. He is in agreement with care plans as outlined. He will admit pt  Deette Yesy. KELLY Clarke    11:26 AM  Deette Yesy. KELLY Clarke spoke with Dr. Gavino Barnett and Kenrick Tejeda NP, Consult for Orthopedics. Discussed available diagnostic tests and clinical findings. They are in agreement with care plans as outlined.  Kenrick Tejeda NP will come see pt in consult  Justo Rizzo. KELLY Clarke    LABS COMPLETED AND REVIEWED:  Recent Results (from the past 12 hour(s))   PROTHROMBIN TIME + INR    Collection Time: 12/21/17  9:30 AM   Result Value Ref Range    INR 1.1 0.9 - 1.1      Prothrombin time 10.7 9.0 - 11.1 sec   PTT    Collection Time: 12/21/17  9:30 AM   Result Value Ref Range    aPTT 25.4 22.1 - 32.5 sec    aPTT, therapeutic range     58.0 - 77.0 SECS   TYPE & SCREEN    Collection Time: 12/21/17  9:30 AM   Result Value Ref Range    Crossmatch Expiration 12/24/2017     ABO/Rh(D) Mindy Thomas POSITIVE     Antibody screen NEG    CBC WITH AUTOMATED DIFF    Collection Time: 12/21/17  9:45 AM   Result Value Ref Range    WBC 16.0 (H) 4.1 - 11.1 K/uL    RBC 4.08 (L) 4.10 - 5.70 M/uL    HGB 12.6 12.1 - 17.0 g/dL    HCT 38.4 36.6 - 50.3 %    MCV 94.1 80.0 - 99.0 FL    MCH 30.9 26.0 - 34.0 PG    MCHC 32.8 30.0 - 36.5 g/dL    RDW 13.8 11.5 - 14.5 %    PLATELET 018 473 - 710 K/uL    NEUTROPHILS 87 (H) 32 - 75 %    LYMPHOCYTES 6 (L) 12 - 49 %    MONOCYTES 6 5 - 13 %    EOSINOPHILS 1 0 - 7 %    BASOPHILS 0 0 - 1 %    ABS. NEUTROPHILS 13.9 (H) 1.8 - 8.0 K/UL    ABS. LYMPHOCYTES 1.0 0.8 - 3.5 K/UL    ABS. MONOCYTES 1.0 0.0 - 1.0 K/UL    ABS. EOSINOPHILS 0.1 0.0 - 0.4 K/UL    ABS. BASOPHILS 0.0 0.0 - 0.1 K/UL   METABOLIC PANEL, COMPREHENSIVE    Collection Time: 12/21/17  9:45 AM   Result Value Ref Range    Sodium 138 136 - 145 mmol/L    Potassium 4.4 3.5 - 5.1 mmol/L    Chloride 100 97 - 108 mmol/L    CO2 28 21 - 32 mmol/L    Anion gap 10 5 - 15 mmol/L    Glucose 221 (H) 65 - 100 mg/dL    BUN 25 (H) 6 - 20 MG/DL    Creatinine 1.35 (H) 0.70 - 1.30 MG/DL    BUN/Creatinine ratio 19 12 - 20      GFR est AA >60 >60 ml/min/1.73m2    GFR est non-AA 51 (L) >60 ml/min/1.73m2    Calcium 9.3 8.5 - 10.1 MG/DL    Bilirubin, total 1.0 0.2 - 1.0 MG/DL    ALT (SGPT) 19 12 - 78 U/L    AST (SGOT) 19 15 - 37 U/L    Alk.  phosphatase 103 45 - 117 U/L    Protein, total 7.5 6.4 - 8.2 g/dL    Albumin 3.8 3.5 - 5.0 g/dL Globulin 3.7 2.0 - 4.0 g/dL    A-G Ratio 1.0 (L) 1.1 - 2.2     TROPONIN I    Collection Time: 12/21/17  9:45 AM   Result Value Ref Range    Troponin-I, Qt. <0.04 <0.05 ng/mL   MAGNESIUM    Collection Time: 12/21/17  9:45 AM   Result Value Ref Range    Magnesium 1.9 1.6 - 2.4 mg/dL   NT-PRO BNP    Collection Time: 12/21/17  9:45 AM   Result Value Ref Range    NT pro-BNP 2345 (H) 0 - 450 PG/ML       IMAGING COMPLETED AND REVIEWED:  The following have been ordered and reviewed:    Xr Chest Pa Lat    Result Date: 12/21/2017  Exam:  2 view chest Indication: Fatigue, hypoxia, fall. COMPARISON: 5/24/2016 and a CT scan of 10/30/2017 PA and lateral views demonstrate normal heart size. The patient is status post median sternotomy. Pacemaker remains in place. The lungs are clear acute process. Scarring in left suprahilar region and laterally in left mid zone is unchanged as is the pleural thickening at the lung bases. No pneumonia or pulmonary edema. There are mild degenerative changes of the thoracic spine. IMPRESSION: 1. No acute process    Xr Pelv Ap Only    Result Date: 12/21/2017  CLINICAL HISTORY: Status post fall with acute right hip pain Single AP view of the pelvis demonstrates a nondisplaced right intertrochanteric hip fracture. The bones are osteopenic. The patient is status post fusion of the lumbosacral spine. Vascular calcification is noted. Right iliac stent is evident. IMPRESSION: Nondisplaced right intertrochanteric hip fracture. Xr Femur Rt 2 Vs    Result Date: 12/21/2017  EXAM:  XR FEMUR RT 2 VS INDICATION:   fall, right hip pain. COMPARISON: None. FINDINGS: Two views of the right femur demonstrate a nondisplaced right intertrochanteric hip fracture. Extensive vascular calcification is noted. Vascular stents are evident. IMPRESSION:  Nondisplaced right intertrochanteric hip fracture.  [address]:  Medications   sodium chloride (NS) flush 5-10 mL (not administered)   sodium chloride (NS) flush 5-10 mL (not administered)   acetaminophen (TYLENOL) tablet 650 mg (650 mg Oral Given 12/21/17 1140)   morphine injection 2 mg (2 mg IntraVENous Given 12/21/17 1140)   escitalopram oxalate (LEXAPRO) tablet 20 mg (not administered)   omeprazole (PRILOSEC) capsule 20 mg (not administered)   OXcarbazepine (TRILEPTAL) tablet 600 mg (not administered)   albuterol (PROVENTIL VENTOLIN) nebulizer solution 2.5 mg (not administered)   albuterol-ipratropium (DUO-NEB) 2.5 MG-0.5 MG/3 ML (not administered)   donepezil (ARICEPT) tablet 10 mg (not administered)   carvedilol (COREG) tablet 3.125 mg (not administered)   ferrous sulfate tablet 325 mg (not administered)   finasteride (PROSCAR) tablet 5 mg (not administered)   levothyroxine (SYNTHROID) tablet 112 mcg (not administered)   tamsulosin (FLOMAX) capsule 0.4 mg (not administered)   morphine injection 2 mg (2 mg IntraVENous Given 12/21/17 0955)   ondansetron (ZOFRAN) injection 4 mg (4 mg IntraVENous Given 12/21/17 0952)   morphine injection 4 mg (4 mg IntraVENous Given 12/21/17 1017)         CLINICAL IMPRESSION:  1. Closed right hip fracture, initial encounter ([facility]    2. Fall, initial encounter    3. Fatigue, unspecified type    4. Hypoxia          Plan  1. Admission per Dr. Cindy Garcia      11:18 AM  The patient is being admitted to the hospital.  The results of their tests and reasons for their admission have been discussed with them and/or available family. The patient/family has conveyed agreement and understanding for the need to be admitted and for their admission diagnosis.   Consultation has been made with the inpatient physician specialist [facility]

## 2017-12-21 NOTE — ED TRIAGE NOTES
The patient states he slipped on the floor at his home and now complains of right hip pain. Denies LOC or dizziness. No deformity noted. Pain increased with slight movement.

## 2017-12-21 NOTE — ED TRIAGE NOTES
The patient states he slipped on the floor at group home and now complains of right hip pian. Denies LOC or dizziness.

## 2017-12-21 NOTE — CONSULTS
Name: Ancel Ganser: [address]   : 1935 Admit Date: 2017   Phone: [phone]  Room: [facility]/   PCP: Winnie Rodríguez MD  MRN: [id number]   Date: 2017  Code: Full Code        HPI:      Chart and notes reviewed. Data reviewed. I review the patient's current medications in the medical record at each encounter.  I have evaluated and examined the patient. 2:20 PM       History was obtained from patient and family member patient and daughter. I was asked by Zainab Lewis MD to see Eulaliocatherine Keanu in consultation for a chief complaint of right hip fx and COPD. History of Present Illness:  Mr. Korin Barajas is an 82 yo male who came into ED after a fall resulting in a right hip fx. We were consulted for Preop clearance. He is known to our group, sees Dr. Harjit Noe and last saw him in November. His COPD was stable at that time and he has also been following for NEELAM mass s/p radiation. Chest CT in October showed reduction in size of mass as well as emphysema. He is due for repeat 6 mo CT in April. CXR today showed no acute process. He had a CABG in  and his daughter reports several hospital admissions shortly thereafter with pleural effusions requiring a pleurex. Has not had complication since. He lives in a group home with 3 other residents and a nurse that keeps up with resident's care. Usually on RA but was hypoxic in ED, likely with chronic hypoxia given underlying lung disease. He feels his breathing is stable. He reports an occasional cough with clear sputum. He was noted to be frequently coughing while eating. Daughter and Pt deny known hx of dysphagia or aspiration. PFTs done in  in our office: FEV1 1.69L 63%; FVC 2.58L 69%; FEV1/FVC 63%    WBC 16.0  Crt 1.35  Pro-BNP 2345, no comparison  92% 3LNC  Afebrile      Images: CXR today showed no acute process.        Past Medical History:   Diagnosis Date    Arthritis     CAD (coronary artery disease)  Cardiomyopathy ([facility] 6/11/2015    Colon cancer ([facility] 1993    COLON    Depression     DEPRESSION    Diabetes ([facility]     IDDM    GERD (gastroesophageal reflux disease)     Hypertension     Lung cancer ([facility] 11/29/2016    Mild aortic stenosis 6/15/2015    Pulmonary hypertension 6/15/2015    [facility]     STENT RIGHT GROIN, PAD    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    LIMA TO LAD; SVG TO OM; SVG TO OM    Stroke ([facility] 1999    2 STROKES    Third degree AV block ([facility] 6/11/2015    Thyroid disease        Past Surgical History:   Procedure Laterality Date    HX GI  1993    COLON RESECTION    HX HEART CATHETERIZATION  6/26/2015    HX PACEMAKER      INS PPM/ICD LED DUAL ONLY  7/2/2015         VASCULAR SURGERY PROCEDURE UNLIST  1999    PAD, RIGHT GROIN STENT       Family History   Problem Relation Age of Onset    Heart Disease Mother     Diabetes Mother     Other Father      AMPUTATION LEG FOLLOWING BUG BITE    Other Sister      CEREBRAL ANUERYSM    Heart Disease Brother     Diabetes Sister        Social History   Substance Use Topics    Smoking status: Former Smoker     Packs/day: 2.00     Years: 60.00     Types: Cigarettes     Quit date: 6/22/2015    Smokeless tobacco: Never Used    Alcohol use No       No Known Allergies    Current Facility-Administered Medications   Medication Dose Route Frequency    sodium chloride (NS) flush 5-10 mL  5-10 mL IntraVENous Q8H    sodium chloride (NS) flush 5-10 mL  5-10 mL IntraVENous PRN    acetaminophen (TYLENOL) tablet 650 mg  650 mg Oral Q4H PRN    morphine injection 2 mg  2 mg IntraVENous Q4H PRN    [START ON 12/22/2017] escitalopram oxalate (LEXAPRO) tablet 20 mg  20 mg Oral DAILY    [START ON 12/22/2017] pantoprazole (PROTONIX) tablet 40 mg  40 mg Oral ACB    [START ON 12/22/2017] donepezil (ARICEPT) tablet 10 mg  10 mg Oral DAILY    carvedilol (COREG) tablet 3.125 mg  3.125 mg Oral BID    [START ON 12/22/2017] ferrous sulfate tablet 325 mg  325 mg Oral DAILY    finasteride (PROSCAR) tablet 5 mg  5 mg Oral QPM    tamsulosin (FLOMAX) capsule 0.4 mg  0.4 mg Oral QHS    ceFAZolin in 0.9% NS (ANCEF) IVPB soln 2 g  2 g IntraVENous ON CALL TO OR    heparin (porcine) injection 5,000 Units  5,000 Units SubCUTAneous ONCE    [START ON 12/22/2017] levothyroxine (SYNTHROID) tablet 125 mcg  125 mcg Oral ACB    albuterol-ipratropium (DUO-NEB) 2.5 MG-0.5 MG/3 ML  3 mL Nebulization Q4H PRN    [START ON 12/22/2017] QUEtiapine (SEROquel) tablet 50 mg  50 mg Oral DAILY         REVIEW OF SYSTEMS   Negative except as stated in the HPI. Physical Exam:   Visit Vitals    /62 (BP 1 Location: Right arm, BP Patient Position: At rest)    Pulse 60    Temp 98.2 °F (36.8 °C)    Resp 18    Ht 5' 9\" (1.753 m)    Wt 68.9 kg (152 lb)    SpO2 92%    BMI 22.45 kg/m2       General:  Alert, cooperative, no distress, appears stated age. Head:  Normocephalic, without obvious abnormality, atraumatic. Eyes:  Conjunctivae/corneas clear. PERRL, EOMs intact. Nose: Nares normal. Septum midline. Mucosa normal. No drainage or sinus tenderness. Throat: Lips, mucosa, and tongue normal. Teeth and gums normal.   Neck: Supple, symmetrical, trachea midline. Lungs:   diminshed bilaterally; congested cough   Chest wall:  No tenderness or deformity. Heart:  Regular rate and rhythm, S1, S2 normal, no murmur, click, rub or gallop, PPM   Abdomen:   Soft, non-tender. Bowel sounds normal. No masses,  No organomegaly. Extremities: Right hip fx. Pulses: 2+ and symmetric all extremities.    Skin: Skin color, texture, turgor normal. No rashes or lesions   Lymph nodes: Cervical, supraclavicular, and axillary nodes normal.   Neurologic: Grossly nonfocal       Lab Results   Component Value Date/Time    Sodium 138 12/21/2017 09:45 AM    Potassium 4.4 12/21/2017 09:45 AM    Chloride 100 12/21/2017 09:45 AM    CO2 28 12/21/2017 09:45 AM    BUN 25 12/21/2017 09:45 AM Creatinine 1.35 12/21/2017 09:45 AM    Glucose 221 12/21/2017 09:45 AM    Calcium 9.3 12/21/2017 09:45 AM    Magnesium 1.9 12/21/2017 09:45 AM    Lactic acid 1.2 09/17/2015 10:29 AM       Lab Results   Component Value Date/Time    WBC 16.0 12/21/2017 09:45 AM    HGB 12.6 12/21/2017 09:45 AM    PLATELET 428 81/29/9150 09:45 AM    MCV 94.1 12/21/2017 09:45 AM       Lab Results   Component Value Date/Time    INR 1.1 12/21/2017 09:30 AM    aPTT 25.4 12/21/2017 09:30 AM    AST (SGOT) 19 12/21/2017 09:45 AM    Alk.  phosphatase 103 12/21/2017 09:45 AM    Protein, total 7.5 12/21/2017 09:45 AM    Albumin 3.8 12/21/2017 09:45 AM    Globulin 3.7 12/21/2017 09:45 AM       No results found for: IRON, FE, TIBC, IBCT, PSAT, FERR    Lab Results   Component Value Date/Time    TSH 12.20 06/27/2015 04:29 AM        No results found for: PH, PHI, PCO2, PCO2I, PO2, PO2I, HCO3, HCO3I, FIO2, FIO2I    Lab Results   Component Value Date/Time    Troponin-I, Qt. <0.04 12/21/2017 09:45 AM     10/11/2015 03:20 AM        Lab Results   Component Value Date/Time    Culture result: MODERATE  NORMAL RESPIRATORY LONI   05/24/2016 09:00 AM    Culture result: RARE  FILAMENTOUS FUNGUS   05/24/2016 09:00 AM    Culture result:  05/24/2016 09:00 AM     AN ISOLATE WILL BE SENT TO THE STATE LAB FOR IDENTIFICATION    Culture result:  05/24/2016 09:00 AM     PLEASE REFER TO REFERENCE NUMBER [id number] FOR IDENTIFICATION       No results found for: TOXA1, RPR, HBCM, HBSAG, HAAB, HCAB1, HAAT, G6PD, CRYAC, HIVGT, HIVR, HIV1, HIV12, HIVPC, HIVRPI    No results found for: VANCT, CPK    Lab Results   Component Value Date/Time    Color YELLOW/STRAW 02/13/2016 02:12 PM    Appearance CLEAR 02/13/2016 02:12 PM    pH (UA) 5.5 02/13/2016 02:12 PM    Protein NEGATIVE  02/13/2016 02:12 PM    Glucose NEGATIVE  02/13/2016 02:12 PM    Ketone NEGATIVE  02/13/2016 02:12 PM    Bilirubin NEGATIVE  02/13/2016 02:12 PM    Blood NEGATIVE  02/13/2016 02:12 PM    Urobilinogen 0.2 02/13/2016 02:12 PM    Nitrites NEGATIVE  02/13/2016 02:12 PM    Leukocyte Esterase NEGATIVE  02/13/2016 02:12 PM    WBC 0-4 02/13/2016 02:12 PM    RBC 0-5 02/13/2016 02:12 PM    Bacteria NEGATIVE  02/13/2016 02:12 PM       IMPRESSION  · Right hip fx, post fall  · COPD/Emphysema  · Chronic hypoxia, not on oxygen at baseline  · Hx NEELAM mass post radiation-repeat imaging shows reduction in size  · Hx 3 vessel CABG 2015  · Hx pleural effusions requiring pleurex post CABG per daughter  · Dementia  · ?dysphagia w/aspiration  · Leukocytosis-thought to be stress response to fx    PLAN  · Schedule duonebs, and PRN   · Oxygen to keep SPO2 >90%  · IS/acapella  · Consider MBS/speech eval if concern for aspiration, no obvious ASD on CXR  · GI PPx: PPI  · acapella/is  · Add mucinex    He is at moderate risk and may require prolonged intubation given his underlying lung history of COPD, PAH, probable chronic hypoxia. We will schedule his duonebs, and add mucinex. May extubate to BiPaP if needed, low threshold for BiPaP. Early ambulation and OOB post surgery. IS/acapella use. If he were to have COPD exacerbation could add steroids, and consider ics/laba-pulmicort/brovana, while hospitalized. Thank you for the consultation. We will be happy to follow along in his care.      Sadiq Deng NP

## 2017-12-21 NOTE — ED NOTES
The  Patient states his pain in now 10/10. He is appears moderately comfortable with occasional sharp pain in the right hip. Family is at the bedside.

## 2017-12-22 ENCOUNTER — APPOINTMENT (OUTPATIENT)
Dept: GENERAL RADIOLOGY | Age: 82
DRG: 481 | End: 2017-12-22
Attending: ORTHOPAEDIC SURGERY
Payer: MEDICARE

## 2017-12-22 ENCOUNTER — APPOINTMENT (OUTPATIENT)
Dept: GENERAL RADIOLOGY | Age: 82
DRG: 481 | End: 2017-12-22
Attending: INTERNAL MEDICINE
Payer: MEDICARE

## 2017-12-22 ENCOUNTER — ANESTHESIA (OUTPATIENT)
Dept: SURGERY | Age: 82
DRG: 481 | End: 2017-12-22
Payer: MEDICARE

## 2017-12-22 LAB
ANION GAP SERPL CALC-SCNC: 6 MMOL/L (ref 5–15)
BUN SERPL-MCNC: 28 MG/DL (ref 6–20)
BUN/CREAT SERPL: 17 (ref 12–20)
CALCIUM SERPL-MCNC: 8.5 MG/DL (ref 8.5–10.1)
CHLORIDE SERPL-SCNC: 100 MMOL/L (ref 97–108)
CO2 SERPL-SCNC: 31 MMOL/L (ref 21–32)
CREAT SERPL-MCNC: 1.61 MG/DL (ref 0.7–1.3)
ERYTHROCYTE [DISTWIDTH] IN BLOOD BY AUTOMATED COUNT: 14 % (ref 11.5–14.5)
EST. AVERAGE GLUCOSE BLD GHB EST-MCNC: 160 MG/DL
GLUCOSE BLD STRIP.AUTO-MCNC: 156 MG/DL (ref 65–100)
GLUCOSE BLD STRIP.AUTO-MCNC: 179 MG/DL (ref 65–100)
GLUCOSE BLD STRIP.AUTO-MCNC: 213 MG/DL (ref 65–100)
GLUCOSE SERPL-MCNC: 124 MG/DL (ref 65–100)
HBA1C MFR BLD: 7.2 % (ref 4.2–6.3)
HCT VFR BLD AUTO: 32.3 % (ref 36.6–50.3)
HGB BLD-MCNC: 10.8 G/DL (ref 12.1–17)
MAGNESIUM SERPL-MCNC: 1.9 MG/DL (ref 1.6–2.4)
MCH RBC QN AUTO: 31.1 PG (ref 26–34)
MCHC RBC AUTO-ENTMCNC: 33.4 G/DL (ref 30–36.5)
MCV RBC AUTO: 93.1 FL (ref 80–99)
PLATELET # BLD AUTO: 149 K/UL (ref 150–400)
POTASSIUM SERPL-SCNC: 4.3 MMOL/L (ref 3.5–5.1)
RBC # BLD AUTO: 3.47 M/UL (ref 4.1–5.7)
SERVICE CMNT-IMP: ABNORMAL
SODIUM SERPL-SCNC: 137 MMOL/L (ref 136–145)
WBC # BLD AUTO: 10.1 K/UL (ref 4.1–11.1)

## 2017-12-22 PROCEDURE — 77030008846 HC WRE K STRY -C: Performed by: ORTHOPAEDIC SURGERY

## 2017-12-22 PROCEDURE — 76010000149 HC OR TIME 1 TO 1.5 HR: Performed by: ORTHOPAEDIC SURGERY

## 2017-12-22 PROCEDURE — 65660000000 HC RM CCU STEPDOWN

## 2017-12-22 PROCEDURE — 97530 THERAPEUTIC ACTIVITIES: CPT

## 2017-12-22 PROCEDURE — 77030018836 HC SOL IRR NACL ICUM -A: Performed by: ORTHOPAEDIC SURGERY

## 2017-12-22 PROCEDURE — C1713 ANCHOR/SCREW BN/BN,TIS/BN: HCPCS | Performed by: ORTHOPAEDIC SURGERY

## 2017-12-22 PROCEDURE — 74011250637 HC RX REV CODE- 250/637: Performed by: ORTHOPAEDIC SURGERY

## 2017-12-22 PROCEDURE — 77030020788: Performed by: ORTHOPAEDIC SURGERY

## 2017-12-22 PROCEDURE — 83735 ASSAY OF MAGNESIUM: CPT | Performed by: INTERNAL MEDICINE

## 2017-12-22 PROCEDURE — 77030018846 HC SOL IRR STRL H20 ICUM -A: Performed by: ORTHOPAEDIC SURGERY

## 2017-12-22 PROCEDURE — 77030014405 HC GD ROD RMR SYNT -C: Performed by: ORTHOPAEDIC SURGERY

## 2017-12-22 PROCEDURE — 74011250636 HC RX REV CODE- 250/636: Performed by: ORTHOPAEDIC SURGERY

## 2017-12-22 PROCEDURE — 77030010507 HC ADH SKN DERMBND J&J -B: Performed by: ORTHOPAEDIC SURGERY

## 2017-12-22 PROCEDURE — 76000 FLUOROSCOPY <1 HR PHYS/QHP: CPT

## 2017-12-22 PROCEDURE — 74011250637 HC RX REV CODE- 250/637: Performed by: NURSE PRACTITIONER

## 2017-12-22 PROCEDURE — 74011000250 HC RX REV CODE- 250

## 2017-12-22 PROCEDURE — 77030032490 HC SLV COMPR SCD KNE COVD -B

## 2017-12-22 PROCEDURE — 77030020782 HC GWN BAIR PAWS FLX 3M -B

## 2017-12-22 PROCEDURE — 77030028224 HC PDNG CST BSNM -A: Performed by: ORTHOPAEDIC SURGERY

## 2017-12-22 PROCEDURE — 77030002933 HC SUT MCRYL J&J -A: Performed by: ORTHOPAEDIC SURGERY

## 2017-12-22 PROCEDURE — 77010033678 HC OXYGEN DAILY

## 2017-12-22 PROCEDURE — 77030012935 HC DRSG AQUACEL BMS -B: Performed by: ORTHOPAEDIC SURGERY

## 2017-12-22 PROCEDURE — 77030016451 HC BIT DRL AO3 STRY -C: Performed by: ORTHOPAEDIC SURGERY

## 2017-12-22 PROCEDURE — 82962 GLUCOSE BLOOD TEST: CPT

## 2017-12-22 PROCEDURE — 80048 BASIC METABOLIC PNL TOTAL CA: CPT | Performed by: INTERNAL MEDICINE

## 2017-12-22 PROCEDURE — 74011250637 HC RX REV CODE- 250/637: Performed by: INTERNAL MEDICINE

## 2017-12-22 PROCEDURE — 74011000250 HC RX REV CODE- 250: Performed by: NURSE PRACTITIONER

## 2017-12-22 PROCEDURE — 74011250636 HC RX REV CODE- 250/636

## 2017-12-22 PROCEDURE — 76210000016 HC OR PH I REC 1 TO 1.5 HR: Performed by: ORTHOPAEDIC SURGERY

## 2017-12-22 PROCEDURE — 71010 XR CHEST PORT: CPT

## 2017-12-22 PROCEDURE — 0QH636Z INSERTION OF INTRAMEDULLARY INTERNAL FIXATION DEVICE INTO RIGHT UPPER FEMUR, PERCUTANEOUS APPROACH: ICD-10-PCS | Performed by: ORTHOPAEDIC SURGERY

## 2017-12-22 PROCEDURE — 97163 PT EVAL HIGH COMPLEX 45 MIN: CPT

## 2017-12-22 PROCEDURE — 77030011640 HC PAD GRND REM COVD -A: Performed by: ORTHOPAEDIC SURGERY

## 2017-12-22 PROCEDURE — 77030031139 HC SUT VCRL2 J&J -A: Performed by: ORTHOPAEDIC SURGERY

## 2017-12-22 PROCEDURE — 36415 COLL VENOUS BLD VENIPUNCTURE: CPT | Performed by: INTERNAL MEDICINE

## 2017-12-22 PROCEDURE — 94640 AIRWAY INHALATION TREATMENT: CPT

## 2017-12-22 PROCEDURE — 85027 COMPLETE CBC AUTOMATED: CPT | Performed by: INTERNAL MEDICINE

## 2017-12-22 PROCEDURE — 74011636637 HC RX REV CODE- 636/637: Performed by: INTERNAL MEDICINE

## 2017-12-22 PROCEDURE — 76060000033 HC ANESTHESIA 1 TO 1.5 HR: Performed by: ORTHOPAEDIC SURGERY

## 2017-12-22 PROCEDURE — 74011250636 HC RX REV CODE- 250/636: Performed by: INTERNAL MEDICINE

## 2017-12-22 PROCEDURE — 77030007866 HC KT SPN ANES BBMI -B: Performed by: ANESTHESIOLOGY

## 2017-12-22 DEVICE — TROCHANTERIC NAIL KIT, TI
Type: IMPLANTABLE DEVICE | Site: HIP | Status: FUNCTIONAL
Brand: GAMMA

## 2017-12-22 DEVICE — K-WIRE: Type: IMPLANTABLE DEVICE | Site: HIP | Status: FUNCTIONAL

## 2017-12-22 DEVICE — LAG SCREW, TI
Type: IMPLANTABLE DEVICE | Site: HIP | Status: FUNCTIONAL
Brand: GAMMA

## 2017-12-22 DEVICE — LOCKING SCREW, FULLY THREADED: Type: IMPLANTABLE DEVICE | Site: HIP | Status: FUNCTIONAL

## 2017-12-22 RX ORDER — HEPARIN SODIUM 5000 [USP'U]/ML
5000 INJECTION, SOLUTION INTRAVENOUS; SUBCUTANEOUS EVERY 8 HOURS
Status: DISCONTINUED | OUTPATIENT
Start: 2017-12-22 | End: 2017-12-22

## 2017-12-22 RX ORDER — CEFAZOLIN SODIUM IN 0.9 % NACL 2 G/100 ML
PLASTIC BAG, INJECTION (ML) INTRAVENOUS AS NEEDED
Status: DISCONTINUED | OUTPATIENT
Start: 2017-12-22 | End: 2017-12-22 | Stop reason: HOSPADM

## 2017-12-22 RX ORDER — LIDOCAINE HYDROCHLORIDE 10 MG/ML
0.1 INJECTION, SOLUTION EPIDURAL; INFILTRATION; INTRACAUDAL; PERINEURAL AS NEEDED
Status: DISCONTINUED | OUTPATIENT
Start: 2017-12-22 | End: 2017-12-22 | Stop reason: HOSPADM

## 2017-12-22 RX ORDER — SODIUM CHLORIDE 9 MG/ML
125 INJECTION, SOLUTION INTRAVENOUS CONTINUOUS
Status: DISCONTINUED | OUTPATIENT
Start: 2017-12-22 | End: 2017-12-22

## 2017-12-22 RX ORDER — DIPHENOXYLATE HYDROCHLORIDE AND ATROPINE SULFATE 2.5; .025 MG/1; MG/1
1 TABLET ORAL
Status: DISCONTINUED | OUTPATIENT
Start: 2017-12-22 | End: 2017-12-27 | Stop reason: HOSPADM

## 2017-12-22 RX ORDER — POLYETHYLENE GLYCOL 3350 17 G/17G
17 POWDER, FOR SOLUTION ORAL DAILY
Status: DISCONTINUED | OUTPATIENT
Start: 2017-12-22 | End: 2017-12-27 | Stop reason: HOSPADM

## 2017-12-22 RX ORDER — ONDANSETRON 2 MG/ML
4 INJECTION INTRAMUSCULAR; INTRAVENOUS
Status: DISCONTINUED | OUTPATIENT
Start: 2017-12-22 | End: 2017-12-23

## 2017-12-22 RX ORDER — SODIUM CHLORIDE 0.9 % (FLUSH) 0.9 %
5-10 SYRINGE (ML) INJECTION EVERY 8 HOURS
Status: DISCONTINUED | OUTPATIENT
Start: 2017-12-22 | End: 2017-12-22 | Stop reason: HOSPADM

## 2017-12-22 RX ORDER — GLIPIZIDE 5 MG/1
5 TABLET ORAL
Status: DISCONTINUED | OUTPATIENT
Start: 2017-12-22 | End: 2017-12-22

## 2017-12-22 RX ORDER — SODIUM CHLORIDE, SODIUM LACTATE, POTASSIUM CHLORIDE, CALCIUM CHLORIDE 600; 310; 30; 20 MG/100ML; MG/100ML; MG/100ML; MG/100ML
125 INJECTION, SOLUTION INTRAVENOUS CONTINUOUS
Status: DISCONTINUED | OUTPATIENT
Start: 2017-12-22 | End: 2017-12-22 | Stop reason: HOSPADM

## 2017-12-22 RX ORDER — SODIUM CHLORIDE 0.9 % (FLUSH) 0.9 %
5-10 SYRINGE (ML) INJECTION AS NEEDED
Status: DISCONTINUED | OUTPATIENT
Start: 2017-12-22 | End: 2017-12-22 | Stop reason: HOSPADM

## 2017-12-22 RX ORDER — SODIUM CHLORIDE 0.9 % (FLUSH) 0.9 %
5-10 SYRINGE (ML) INJECTION EVERY 8 HOURS
Status: DISCONTINUED | OUTPATIENT
Start: 2017-12-23 | End: 2017-12-27 | Stop reason: HOSPADM

## 2017-12-22 RX ORDER — MELATONIN
3000 DAILY
Status: DISCONTINUED | OUTPATIENT
Start: 2017-12-22 | End: 2017-12-27 | Stop reason: HOSPADM

## 2017-12-22 RX ORDER — FACIAL-BODY WIPES
10 EACH TOPICAL DAILY PRN
Status: DISCONTINUED | OUTPATIENT
Start: 2017-12-24 | End: 2017-12-27 | Stop reason: HOSPADM

## 2017-12-22 RX ORDER — AMOXICILLIN 250 MG
1 CAPSULE ORAL 2 TIMES DAILY
Status: DISCONTINUED | OUTPATIENT
Start: 2017-12-22 | End: 2017-12-27 | Stop reason: HOSPADM

## 2017-12-22 RX ORDER — FERROUS SULFATE, DRIED 160(50) MG
1 TABLET, EXTENDED RELEASE ORAL
Status: DISCONTINUED | OUTPATIENT
Start: 2017-12-23 | End: 2017-12-27 | Stop reason: HOSPADM

## 2017-12-22 RX ORDER — ONDANSETRON 2 MG/ML
4 INJECTION INTRAMUSCULAR; INTRAVENOUS AS NEEDED
Status: DISCONTINUED | OUTPATIENT
Start: 2017-12-22 | End: 2017-12-22 | Stop reason: HOSPADM

## 2017-12-22 RX ORDER — TRAMADOL HYDROCHLORIDE 50 MG/1
50 TABLET ORAL
Status: DISCONTINUED | OUTPATIENT
Start: 2017-12-22 | End: 2017-12-27 | Stop reason: HOSPADM

## 2017-12-22 RX ORDER — KETAMINE HYDROCHLORIDE 10 MG/ML
INJECTION, SOLUTION INTRAMUSCULAR; INTRAVENOUS AS NEEDED
Status: DISCONTINUED | OUTPATIENT
Start: 2017-12-22 | End: 2017-12-22 | Stop reason: HOSPADM

## 2017-12-22 RX ORDER — ACETAMINOPHEN 500 MG
1000 TABLET ORAL ONCE
Status: DISCONTINUED | OUTPATIENT
Start: 2017-12-22 | End: 2017-12-22 | Stop reason: HOSPADM

## 2017-12-22 RX ORDER — KETOROLAC TROMETHAMINE 30 MG/ML
15 INJECTION, SOLUTION INTRAMUSCULAR; INTRAVENOUS
Status: DISCONTINUED | OUTPATIENT
Start: 2017-12-22 | End: 2017-12-22

## 2017-12-22 RX ORDER — ONDANSETRON 4 MG/1
4 TABLET, ORALLY DISINTEGRATING ORAL
Status: DISCONTINUED | OUTPATIENT
Start: 2017-12-22 | End: 2017-12-27 | Stop reason: HOSPADM

## 2017-12-22 RX ORDER — DIPHENHYDRAMINE HYDROCHLORIDE 50 MG/ML
12.5 INJECTION, SOLUTION INTRAMUSCULAR; INTRAVENOUS AS NEEDED
Status: DISCONTINUED | OUTPATIENT
Start: 2017-12-22 | End: 2017-12-22 | Stop reason: HOSPADM

## 2017-12-22 RX ORDER — SODIUM CHLORIDE 9 MG/ML
75 INJECTION, SOLUTION INTRAVENOUS CONTINUOUS
Status: DISCONTINUED | OUTPATIENT
Start: 2017-12-22 | End: 2017-12-23

## 2017-12-22 RX ORDER — CEFAZOLIN SODIUM IN 0.9 % NACL 2 G/50 ML
2 INTRAVENOUS SOLUTION, PIGGYBACK (ML) INTRAVENOUS ONCE
Status: DISCONTINUED | OUTPATIENT
Start: 2017-12-22 | End: 2017-12-22 | Stop reason: HOSPADM

## 2017-12-22 RX ORDER — SODIUM CHLORIDE 0.9 % (FLUSH) 0.9 %
5-10 SYRINGE (ML) INJECTION AS NEEDED
Status: DISCONTINUED | OUTPATIENT
Start: 2017-12-22 | End: 2017-12-27 | Stop reason: HOSPADM

## 2017-12-22 RX ORDER — NALOXONE HYDROCHLORIDE 0.4 MG/ML
0.4 INJECTION, SOLUTION INTRAMUSCULAR; INTRAVENOUS; SUBCUTANEOUS AS NEEDED
Status: DISCONTINUED | OUTPATIENT
Start: 2017-12-22 | End: 2017-12-27 | Stop reason: HOSPADM

## 2017-12-22 RX ORDER — ACETAMINOPHEN 325 MG/1
650 TABLET ORAL EVERY 6 HOURS
Status: DISCONTINUED | OUTPATIENT
Start: 2017-12-22 | End: 2017-12-27 | Stop reason: HOSPADM

## 2017-12-22 RX ORDER — SODIUM CHLORIDE, SODIUM LACTATE, POTASSIUM CHLORIDE, CALCIUM CHLORIDE 600; 310; 30; 20 MG/100ML; MG/100ML; MG/100ML; MG/100ML
100 INJECTION, SOLUTION INTRAVENOUS CONTINUOUS
Status: DISCONTINUED | OUTPATIENT
Start: 2017-12-22 | End: 2017-12-22 | Stop reason: HOSPADM

## 2017-12-22 RX ORDER — GLIPIZIDE 5 MG/1
10 TABLET ORAL
Status: DISCONTINUED | OUTPATIENT
Start: 2017-12-23 | End: 2017-12-22

## 2017-12-22 RX ORDER — NAPROXEN SODIUM 220 MG
220 TABLET ORAL DAILY
Status: DISCONTINUED | OUTPATIENT
Start: 2017-12-22 | End: 2017-12-22

## 2017-12-22 RX ORDER — BUPIVACAINE HYDROCHLORIDE 7.5 MG/ML
INJECTION, SOLUTION EPIDURAL; RETROBULBAR AS NEEDED
Status: DISCONTINUED | OUTPATIENT
Start: 2017-12-22 | End: 2017-12-22 | Stop reason: HOSPADM

## 2017-12-22 RX ORDER — HYDROXYZINE HYDROCHLORIDE 10 MG/1
10 TABLET, FILM COATED ORAL
Status: DISCONTINUED | OUTPATIENT
Start: 2017-12-22 | End: 2017-12-27 | Stop reason: HOSPADM

## 2017-12-22 RX ORDER — FUROSEMIDE 40 MG/1
40 TABLET ORAL 2 TIMES DAILY
Status: DISCONTINUED | OUTPATIENT
Start: 2017-12-22 | End: 2017-12-22

## 2017-12-22 RX ORDER — CEFAZOLIN SODIUM IN 0.9 % NACL 2 G/50 ML
2 INTRAVENOUS SOLUTION, PIGGYBACK (ML) INTRAVENOUS EVERY 8 HOURS
Status: COMPLETED | OUTPATIENT
Start: 2017-12-22 | End: 2017-12-23

## 2017-12-22 RX ORDER — HYDROMORPHONE HYDROCHLORIDE 2 MG/ML
.25-1 INJECTION, SOLUTION INTRAMUSCULAR; INTRAVENOUS; SUBCUTANEOUS
Status: DISCONTINUED | OUTPATIENT
Start: 2017-12-22 | End: 2017-12-22 | Stop reason: HOSPADM

## 2017-12-22 RX ORDER — SODIUM CHLORIDE, SODIUM LACTATE, POTASSIUM CHLORIDE, CALCIUM CHLORIDE 600; 310; 30; 20 MG/100ML; MG/100ML; MG/100ML; MG/100ML
INJECTION, SOLUTION INTRAVENOUS
Status: DISCONTINUED | OUTPATIENT
Start: 2017-12-22 | End: 2017-12-22 | Stop reason: HOSPADM

## 2017-12-22 RX ORDER — PROPOFOL 10 MG/ML
INJECTION, EMULSION INTRAVENOUS
Status: DISCONTINUED | OUTPATIENT
Start: 2017-12-22 | End: 2017-12-22 | Stop reason: HOSPADM

## 2017-12-22 RX ORDER — ENOXAPARIN SODIUM 100 MG/ML
40 INJECTION SUBCUTANEOUS DAILY
Status: DISCONTINUED | OUTPATIENT
Start: 2017-12-22 | End: 2017-12-27 | Stop reason: HOSPADM

## 2017-12-22 RX ORDER — MIDAZOLAM HYDROCHLORIDE 1 MG/ML
INJECTION, SOLUTION INTRAMUSCULAR; INTRAVENOUS AS NEEDED
Status: DISCONTINUED | OUTPATIENT
Start: 2017-12-22 | End: 2017-12-22 | Stop reason: HOSPADM

## 2017-12-22 RX ADMIN — TRAMADOL HYDROCHLORIDE 50 MG: 50 TABLET, FILM COATED ORAL at 12:41

## 2017-12-22 RX ADMIN — GUAIFENESIN 600 MG: 600 TABLET, EXTENDED RELEASE ORAL at 21:28

## 2017-12-22 RX ADMIN — IPRATROPIUM BROMIDE AND ALBUTEROL SULFATE 3 ML: .5; 3 SOLUTION RESPIRATORY (INHALATION) at 13:20

## 2017-12-22 RX ADMIN — VITAMIN D, TAB 1000IU (100/BT) 3000 UNITS: 25 TAB at 11:54

## 2017-12-22 RX ADMIN — SODIUM CHLORIDE 75 ML/HR: 900 INJECTION, SOLUTION INTRAVENOUS at 20:44

## 2017-12-22 RX ADMIN — BUPIVACAINE HYDROCHLORIDE 2.4 ML: 7.5 INJECTION, SOLUTION EPIDURAL; RETROBULBAR at 06:40

## 2017-12-22 RX ADMIN — PROPOFOL 30 MCG/KG/MIN: 10 INJECTION, EMULSION INTRAVENOUS at 07:07

## 2017-12-22 RX ADMIN — ESCITALOPRAM OXALATE 20 MG: 10 TABLET ORAL at 11:54

## 2017-12-22 RX ADMIN — GUAIFENESIN 600 MG: 600 TABLET, EXTENDED RELEASE ORAL at 11:54

## 2017-12-22 RX ADMIN — INSULIN LISPRO 2 UNITS: 100 INJECTION, SOLUTION INTRAVENOUS; SUBCUTANEOUS at 21:28

## 2017-12-22 RX ADMIN — TAMSULOSIN HYDROCHLORIDE 0.4 MG: 0.4 CAPSULE ORAL at 21:28

## 2017-12-22 RX ADMIN — IPRATROPIUM BROMIDE AND ALBUTEROL SULFATE 3 ML: .5; 3 SOLUTION RESPIRATORY (INHALATION) at 09:42

## 2017-12-22 RX ADMIN — FINASTERIDE 5 MG: 5 TABLET, FILM COATED ORAL at 17:39

## 2017-12-22 RX ADMIN — KETAMINE HYDROCHLORIDE 30 MG: 10 INJECTION, SOLUTION INTRAMUSCULAR; INTRAVENOUS at 06:33

## 2017-12-22 RX ADMIN — FERROUS SULFATE TAB 325 MG (65 MG ELEMENTAL FE) 325 MG: 325 (65 FE) TAB at 11:54

## 2017-12-22 RX ADMIN — INSULIN LISPRO 2 UNITS: 100 INJECTION, SOLUTION INTRAVENOUS; SUBCUTANEOUS at 11:55

## 2017-12-22 RX ADMIN — ENOXAPARIN SODIUM 40 MG: 40 INJECTION SUBCUTANEOUS at 17:39

## 2017-12-22 RX ADMIN — IPRATROPIUM BROMIDE AND ALBUTEROL SULFATE 3 ML: .5; 3 SOLUTION RESPIRATORY (INHALATION) at 19:30

## 2017-12-22 RX ADMIN — CEFAZOLIN 2 G: 1 INJECTION, POWDER, FOR SOLUTION INTRAMUSCULAR; INTRAVENOUS; PARENTERAL at 21:28

## 2017-12-22 RX ADMIN — ACETAMINOPHEN 650 MG: 325 TABLET ORAL at 03:30

## 2017-12-22 RX ADMIN — Medication 2 G: at 07:11

## 2017-12-22 RX ADMIN — MIDAZOLAM HYDROCHLORIDE 1 MG: 1 INJECTION, SOLUTION INTRAMUSCULAR; INTRAVENOUS at 06:33

## 2017-12-22 RX ADMIN — SODIUM CHLORIDE, SODIUM LACTATE, POTASSIUM CHLORIDE, CALCIUM CHLORIDE: 600; 310; 30; 20 INJECTION, SOLUTION INTRAVENOUS at 06:31

## 2017-12-22 RX ADMIN — INSULIN LISPRO 2 UNITS: 100 INJECTION, SOLUTION INTRAVENOUS; SUBCUTANEOUS at 17:00

## 2017-12-22 RX ADMIN — DONEPEZIL HYDROCHLORIDE 10 MG: 5 TABLET, FILM COATED ORAL at 11:54

## 2017-12-22 RX ADMIN — Medication 500 MG: at 12:46

## 2017-12-22 RX ADMIN — MORPHINE SULFATE 2 MG: 2 INJECTION, SOLUTION INTRAMUSCULAR; INTRAVENOUS at 03:09

## 2017-12-22 RX ADMIN — DOCUSATE SODIUM AND SENNOSIDES 1 TABLET: 8.6; 5 TABLET, FILM COATED ORAL at 11:54

## 2017-12-22 RX ADMIN — POLYETHYLENE GLYCOL (3350) 17 G: 17 POWDER, FOR SOLUTION ORAL at 11:55

## 2017-12-22 RX ADMIN — ACETAMINOPHEN 650 MG: 325 TABLET ORAL at 11:54

## 2017-12-22 RX ADMIN — CEFAZOLIN 2 G: 1 INJECTION, POWDER, FOR SOLUTION INTRAMUSCULAR; INTRAVENOUS; PARENTERAL at 12:41

## 2017-12-22 RX ADMIN — DOCUSATE SODIUM AND SENNOSIDES 1 TABLET: 8.6; 5 TABLET, FILM COATED ORAL at 17:38

## 2017-12-22 RX ADMIN — QUETIAPINE FUMARATE 50 MG: 25 TABLET ORAL at 11:55

## 2017-12-22 RX ADMIN — ACETAMINOPHEN 650 MG: 325 TABLET ORAL at 17:38

## 2017-12-22 NOTE — OP NOTES
Name: Zac Coronado  MRN:  [id number]  : 1935  Age:  81 y.o. Surgery Date: 2017      OPERATIVE REPORT -RIGHT HIP CEPHALOMEDULLARY NAIL      PREOPERATIVE DIAGNOSIS: Right hip fracture    POSTOPERATIVE DIAGNOSIS: Right hip fracture    PROCEDURE PERFORMED: Cephalomedullary nailing right hip    SURGEON: Kennedi Sue MD    FIRST ASSISTANT:      ANESTHESIA: Spinal with sedation. PRE-OP ANTIBIOTIC: Ancef 2g    COMPLICATIONS: None. ESTIMATED BLOOD LOSS: 200 mL    SPECIMENS REMOVED: None. COMPONENTS IMPLANTED: * No implants in log *     [place] gamm nail 170x10, 125 degree    INDICATIONS: The patient is an 81 yrs male with a right hip fracture. Pre-operative medical optimization was confirmed with the medical team. Risks, benefits, alternatives of the procedure were reviewed in detail and they desired to proceed. The patient understood risks of surgery including bleeding, infection, blood clots, and death. DESCRIPTION OF PROCEDURE: Anesthetic was initiated. Preoperative dose of IV  antibiotic was given. Ojeda catheter was placed. The right side was confirmed as the operative side, prepped and draped in the usual sterile fashion. Skin was covered with Ioban occlusive dressing. After a time out was held, xrays were taken to confirm fracture site as well as entry for skin incision. An incision was made proximal to the tip of the greater trochanter and carried down through skin and subcutaneous tissue. The IT band was incised and a montiel scissor was used to clear a track to the greater trochanter. A guide wire was drilled into the tip of the greater trochanter and position was check under fluoroscopy. An exntry reamer was then used with a protective sleeve to gain access to the intramedullary canal. The nail was then opened (size mentioned above) and advanced. Position was confirmed under fluoroscopy.  Using the 125 degree jig a skin incision was made in the appropriate position and a guide wire was drilled to within 2 mm of the cortex of the femoral head. This was again check fluoroscopically in AP and lateral planes and the wire was then measured and overdrilled. The above mentioned cephalomedullary screw was advanced and locked into place. One screw was placed distally. Final shots were taken. After copious irrigation, the wounds were closed with #2 Vicryl sutures. I irrigated the skin, subcutaneous tissue. Soft tissues were infiltrated with local anesthetic. Skin and subcutaneous were closed in a standard fashion with 2-0 vicryl and 3-0 monocryl followed by Dermabond. Sterile dressings were applied. There were no complications. No specimen was sent. The patient was transferred to the recovery room in stable condition. I was present for the entirety of the case.     Gerald Lau MD

## 2017-12-22 NOTE — PROGRESS NOTES
Medical Progress Note      NAME: Tasia Mckeon   :  1935  MRM:  [id number]    Date/Time: 2017  10:07 AM       Assessment / Plan:     Acute renal insufficiency / CKD 3:  Creatinine up overnight. May be contributed by low BP.   -- continue IVF for now, but decrease rate   -- stop coreg     Hip fracture, right ([facility] [address] (2017):  Post mechanical fall. Non-displaced by imaging. S/P IM nail. -- continue IV morphine prn   -- PT/OT per ortho   -- suspect need for SNF     COPD / Pulmonary hypertension (6/15/2015) / Chronic respiratory failure:  Noted to have sats in upper 80's on RA. Confirmed by testing myself in ED. However, patient denies sob. Has chronic cough that is unchanged. Suspect there is chronic hypoxia based upon medical hx. Appreciate pulm eval preop. -- continue oxygen and wean as tolerated   -- scheduled nebs   -- repeat CXR     HTN / Mild aortic stenosis (6/15/2015) / CAD (coronary artery disease) (2015) / Chronic systolic CHF:  Hx of CABG. Denies chest pain or sob. EKG v-paced. Trop neg. Echo last with EF 40%. 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 Lives in group home. -- continue donepezil, lexapro     Iron deficiency anemia (2015):   -- continue feso4     Leukocytosis:  Suspect stress response to fracture. Resolved overnight.     Third degree AV block ([facility] [address] (2015): Has pacemaker.     PVD (peripheral vascular disease) ([facility] [address] ():  Overview: STENT RIGHT GROIN, PAD. Stent seen on xray as well.              Subjective:     Chief Complaint:  Cough. Has cough post op. Prod white mucous. Denies sob. Denies chest pain.     ROS:  (bold if positive, if negative)    CoughSputum Objective:       Vitals:          Last 24hrs VS reviewed since prior progress note. Most recent are:    Visit Vitals    /49    Pulse 63    Temp 97.5 °F (36.4 °C)    Resp 14    Ht 5' 9\" (1.753 m)    Wt 68.9 kg (152 lb)    SpO2 96%    BMI 22.45 kg/m2     SpO2 Readings from Last 6 Encounters:   12/22/17 96%   08/23/17 91%   02/15/17 98%   10/05/16 92%   06/16/16 95%   05/24/16 95%    O2 Flow Rate (L/min): 3 l/min     Intake/Output Summary (Last 24 hours) at 12/22/17 1007  Last data filed at 12/22/17 0815   Gross per 24 hour   Intake              900 ml   Output               50 ml   Net              850 ml          Exam:     Physical Exam:    Gen:  Well-developed, well-nourished, in no acute distress  HEENT:  Pink conjunctivae, hearing intact to voice, moist mucous membranes  Resp:  No accessory muscle use, clear breath sounds without wheezes rales or rhonchi  Card:  2/6 SCOTT LLSB, normal S1, S2 without thrills or peripheral edema  Abd:  Soft, non-tender, non-distended, normoactive bowel sounds are present  Skin:  No rashes  Neuro:   Face symmetric, tongue midline, speech fluent,  strength is 5/5 bilaterally, follows commands appropriately  Psych:  alert       Telemetry reviewed:   paced and PVC    Medications Reviewed: (see below)    Lab Data Reviewed: (see below)    ______________________________________________________________________    Medications:     Current Facility-Administered Medications   Medication Dose Route Frequency    0.9% sodium chloride infusion  125 mL/hr IntraVENous CONTINUOUS    sodium chloride (NS) flush 5-10 mL  5-10 mL IntraVENous Q8H    sodium chloride (NS) flush 5-10 mL  5-10 mL IntraVENous PRN    acetaminophen (TYLENOL) tablet 650 mg  650 mg Oral Q4H PRN    morphine injection 2 mg  2 mg IntraVENous Q4H PRN    escitalopram oxalate (LEXAPRO) tablet 20 mg  20 mg Oral DAILY    pantoprazole (PROTONIX) tablet 40 mg  40 mg Oral ACB    donepezil (ARICEPT) tablet 10 mg  10 mg Oral DAILY    ferrous sulfate tablet 325 mg  325 mg Oral DAILY    finasteride (PROSCAR) tablet 5 mg  5 mg Oral QPM    tamsulosin (FLOMAX) capsule 0.4 mg  0.4 mg Oral QHS    levothyroxine (SYNTHROID) tablet 125 mcg  125 mcg Oral ACB    QUEtiapine (SEROquel) tablet 50 mg  50 mg Oral DAILY    insulin lispro (HUMALOG) injection   SubCUTAneous AC&HS    glucose chewable tablet 16 g  4 Tab Oral PRN    dextrose (D50W) injection syrg 12.5-25 g  12.5-25 g IntraVENous PRN    glucagon (GLUCAGEN) injection 1 mg  1 mg IntraMUSCular PRN    albuterol-ipratropium (DUO-NEB) 2.5 MG-0.5 MG/3 ML  3 mL Nebulization Q6H RT    guaiFENesin ER (MUCINEX) tablet 600 mg  600 mg Oral Q12H            Lab Review:     Recent Labs      12/22/17   0347 12/21/17   0945   WBC  10.1  16.0*   HGB  10.8*  12.6   HCT  32.3*  38.4   PLT  149*  174     Recent Labs      12/22/17   0347  12/21/17   0945  12/21/17   0930   NA  137  138   --    K  4.3  4.4   --    CL  100  100   --    CO2  31  28   --    GLU  124*  221*   --    BUN  28*  25*   --    CREA  1.61*  1.35*   --    CA  8.5  9.3   --    MG  1.9  1.9   --    ALB   --   3.8   --    TBILI   --   1.0   --    SGOT   --   19   --    ALT   --   19   --    INR   --    --   1.1     Lab Results   Component Value Date/Time    Glucose (POC) 106 12/21/2017 09:21 PM    Glucose (POC) 217 12/21/2017 04:52 PM    Glucose (POC) 162 12/21/2017 02:25 PM    Glucose (POC) 112 05/24/2016 08:04 AM    Glucose (POC) 230 02/14/2016 10:59 AM         Total time spent with patient: 30 [facility] discussed with: Patient, Family and Nursing Staff    Discussed:  Care Plan    Prophylaxis:  Hep SQ    Disposition:  SNF/LTC           ___________________________________________________    Attending Physician: Zainab Lewis MD

## 2017-12-22 NOTE — ANESTHESIA PREPROCEDURE EVALUATION
Anesthetic History   No history of anesthetic complications            Review of Systems / Medical History  Patient summary reviewed and pertinent labs reviewed    Pulmonary    COPD      Shortness of breath      Comments: Hx of smoking  Chromic respiratory failure  NEELAM Mass S/P radiation   Neuro/Psych       CVA  Dementia     Cardiovascular    Hypertension  Valvular problems/murmurs    CHF  Dysrhythmias   Pacemaker, CAD, PAD and CABG      Comments: Mild Aortic Stenosis  Chronic systolic dysfunction EF 94% (? date)  3rd degree heart block (PM)  Lower ext stents   GI/Hepatic/Renal     GERD    Renal disease: CRI      Comments: Stage 3 CKD Endo/Other    Diabetes: type 2  Hypothyroidism  Arthritis, cancer and anemia     Other Findings            Physical Exam    Airway  Mallampati: II  TM Distance: 4 - 6 cm  Neck ROM: normal range of motion   Mouth opening: Normal     Cardiovascular    Rhythm: regular  Rate: normal         Dental      Comments:  Only 2 lower teeth   Pulmonary  Breath sounds clear to auscultation               Abdominal         Other Findings            Anesthetic Plan    ASA: 4  Anesthesia type: spinal            Anesthetic plan and risks discussed with: Patient and Son / Daughter

## 2017-12-22 NOTE — PROGRESS NOTES
Assisted Jewels Victoria RN in preparing the patient for surgery. Patient brought down by Tatianna Herzog, DEEPIKA and Bonnie Henderson RN via bed. Remote tele box was present on patient. Daughter at bedside. Patient c/o pain at right hip when moved. Dr. Ivana Garduno and Dr. Jesús Wing at bedside talking with patient and daughter. Patient alert and talking with CRNA and MD's. Spinal performed by VIKA Lambert and Dr. Ivana Garduno. Skye Oliva RN at bedside assisting with spinal procedure. Patient taken to OR via bed with Dr. Betty Hernandez CRNA and Skye Oliva RN.

## 2017-12-22 NOTE — PROGRESS NOTES
[facility] PROGRESS NOTE  Pulmonary, Critical Care, and Sleep Medicine    Name: Andria Cottrell MRN: [id number]   : 1935 Hospital: [address] [facility]   Date: 2017  Admission Date: 2017     Chart and notes reviewed. Data reviewed. I review the patient's current medications in the medical record at each encounter.  I have evaluated and examined the patient. .     Overnight events reviewed:  WBC 10.1  Crt 1.61  Afebrile   SPO2 99% 3LNC    ROS: Feels better since surgery this am. He has some coughing, but non productive. Eating without as much coughing during my exam today. Denies feeling SOB. No fevers/chills/hemoptysis/cp/n/v.    Vital Signs:  Visit Vitals    /61 (BP 1 Location: Right arm, BP Patient Position: At rest)    Pulse 60    Temp 97.6 °F (36.4 °C)    Resp 16    Ht 5' 9\" (1.753 m)    Wt 68.9 kg (152 lb)    SpO2 99%    BMI 22.45 kg/m2       O2 Device: Nasal cannula   O2 Flow Rate (L/min): 3 l/min   Temp (24hrs), Av °F (36.7 °C), Min:97.5 °F (36.4 °C), Max:98.6 °F (37 °C)       Intake/Output:   Last shift:      701 - 1900  In: 900 [I.V.:900]  Out: -   Last 3 shifts: 1901 - 700  In: -   Out: 50 [Urine:50]    Intake/Output Summary (Last 24 hours) at 17 1222  Last data filed at 17 0815   Gross per 24 hour   Intake              900 ml   Output               50 ml   Net              850 ml        Physical Exam:   General:  Alert, cooperative, no distress, appears stated age. Head:  Normocephalic, without obvious abnormality, atraumatic. Eyes:  Conjunctivae/corneas clear. PERRL, EOMs intact. Nose: Nares normal. Septum midline. Mucosa normal. No drainage or sinus tenderness. Throat: Lips, mucosa, and tongue normal. Teeth and gums normal.   Neck: Supple, symmetrical, trachea midline, no adenopathy, thyroid: no enlargment/tenderness/nodules, no carotid bruit and no JVD.    Lungs:   Aerating, rhonchi to anterior chest, no wheezing. Chest wall:  No tenderness or deformity. Heart:  Regular rate and rhythm, S1, S2 normal, no murmur, click, rub or gallop. Abdomen:   Soft, non-tender. Bowel sounds normal. No masses,  No organomegaly. Extremities: Extremities normal, atraumatic, no cyanosis or edema. Pulses: 2+ and symmetric all extremities. Skin: Post op today right hip arthroplasty   Lymph nodes: Cervical, supraclavicular, and axillary nodes normal.   Neurologic: Grossly nonfocal     DATA:  MAR reviewed and pertinent medications noted or modified as needed    Labs:  Recent Labs      12/22/17 0347 12/21/17   0945   WBC  10.1  16.0*   HGB  10.8*  12.6   HCT  32.3*  38.4   PLT  149*  174     Recent Labs      12/22/17 0347 12/21/17   0945  12/21/17   0930   NA  137  138   --    K  4.3  4.4   --    CL  100  100   --    CO2  31  28   --    GLU  124*  221*   --    BUN  28*  25*   --    CREA  1.61*  1.35*   --    CA  8.5  9.3   --    MG  1.9  1.9   --    ALB   --   3.8   --    TBILI   --   1.0   --    SGOT   --   19   --    ALT   --   19   --    INR   --    --   1.1       Imaging:  I have personally reviewed the patients radiographs and reports. CXR this am shows no acute process. IMPRESSION:   · Right hip fx-POD #0  · COPD/Chronic Resp Failure-not on oxygen at baseline  · GATO  · HTN  · Hx 3 vessel CABG 2015  · Hx pleural effusions requiring pleurex post CABG per daughter  · Dementia  · Hx dysphagia-on regular diet  · Leukocytosis-resolved-stress response from Fx. PLAN:   · Cont scheduled duonebs periop  · Oxygen to keep SPO2 >90%, currently 100% 3L, decreased to 2L, anticipate him being able to come off oxygen today. Would have exercise oximetry done to assess for hypoxia on exertion. · IS/acapella  · Early amublation and OOB  · Cont Mucinex  · Cont duonebs as he had been as outpatient. There is no further intervention from a pulmonary standpoint.  We will sign off and be available for questions/concerns. Thank you for allowing us to participate in his care.        Montez Jay, ROBYN

## 2017-12-22 NOTE — PROGRESS NOTES
Primary Nurse Justina Naylor RN and Reynaldo Schmid RN performed a dual skin assessment on this patient No impairment noted  Ladarius score is 19

## 2017-12-22 NOTE — ANESTHESIA PROCEDURE NOTES
Spinal Block    Start time: 12/22/2017 6:35 AM  End time: 12/22/2017 6:47 AM  Performed by: Tamela Solo by: Galileo Flatten:   Indications: at surgeon's request and primary anesthetic  Preanesthetic Checklist: patient identified, risks and benefits discussed, anesthesia consent and timeout performed      Spinal Block:   Patient Position:  Seated  Prep Region:  Lumbar  Prep: chlorhexidine      Location:  L3-4  Technique:  Single shot        Needle:   Needle Type:  Pencan  Needle Gauge:  22 G  Attempts:  1      Events: CSF confirmed, no blood with aspiration and no paresthesia        Assessment:  Insertion:  Uncomplicated  Patient tolerance:  Patient tolerated the procedure well with no immediate complications

## 2017-12-22 NOTE — PROGRESS NOTES
12/22/2017  5:04 PM  EMR reviewed, CM spoke w/ Pt's DTR Sarah Gordon (VINCENT)[phone] who is POA  for needs assessment and d/c planning. DTR states Pt has been living at [address] [place] [address] in [facility] for over 1 yr where he receives assistance w/ ADL's as needed, DTR states they encourage him in self care as appropriate, Pt ambulates independently, home has ramp access. Care Management Interventions  PCP Verified by CM: Yes ([address] [facility]; last visit 11/2017)  Palliative Care Criteria Met (RRAT>21 & CHF Dx)?: No (NO CHF comorbidity)  Mode of Transport at Discharge: Other (see comment) (TBD)  Transition of Care Consult (CM Consult): SNF, Home Health  Discharge Durable Medical Equipment: Yes  Physical Therapy Consult: Yes  Occupational Therapy Consult: Yes  Current Support Network: Assisted Living (Pt lives at [facility] in [facility] )  [address] Follow Up Transport: Family  Plan discussed with Pt/Family/Caregiver: Yes  Freedom of Choice Offered: Yes  Discharge Location  Discharge Placement: Skilled nursing facility   Pt has not had recent [facility];  Prior rehab at [facility] in [place],  [facility] and [facility]. . PT uses WC for mobility owns cane and RW. Pt has Rx coverage fills at [place]. CM discussed plan for SNF Rehab at d/c, left list of facilities  to review in room , advised DTR to make several SNF choices that are convenient for her to visit frequently. DTR would like Pt to return to [facility] at completion of Rehab. CM will Follow.   Zoltan Vale

## 2017-12-22 NOTE — PROGRESS NOTES
Problem: Mobility Impaired (Adult and Pediatric)  Goal: *Acute Goals and Plan of Care (Insert Text)  Physical Therapy Goals  Initiated 12/22/2017  1. Patient will move from supine to sit and sit to supine , scoot up and down and roll side to side in bed with moderate assistance  within 7 day(s). 2.  Patient will transfer from bed to chair and chair to bed with moderate assistance  using the least restrictive device within 7 day(s). 3.  Patient will perform sit <> stand with moderate assistance  within 7 day(s). 4.  Patient will ambulate with moderate assistance  for 20 feet with RW within 7 day(s). physical Therapy EVALUATION  Patient: Maura Fan (89 y.o. male)  Date: 12/22/2017  Primary Diagnosis: Hip fracture, right ([address]  RIGHT INTERTROCH FRACTURE  Procedure(s) (LRB):  RIGHT INTRAMEDULLARY NAIL RIGHT HIP (Right) Day of Surgery   Precautions: Bed Alarm, Fall, WBAT    ASSESSMENT :  Based on the objective data described below, the patient presents several hours POD #0 IM nailing right hip after GLF with fracture. Patient with right hip pain, decreased ROM and strength right hip, decreased sitting balance with complaints of severe pain, and decreased tolerance for activity. Patient very lethargic and seen not long after transfer to unit from PACU. Assisted patient with removing discharge covering his eyelids with warm compresses. Patient sat edge of bed for several minutes and denies symptoms other than pain but needed tactile stimulation to stay awake and vitals indicated too low BP to remain sitting any longer - 84/43 95% on 2 liters nasal cannula 96 bpm. Positioned back to supine with total of 2 and PRAFOs and SEDs in place as well as ice to right hip. Overall patient needed max to total assist x 2 for bed mobility today and he will need SNF at discharge. Patient will benefit from skilled intervention to address the above impairments.   Patients rehabilitation potential is considered to be Guarded  Factors which may influence rehabilitation potential include:   []         None noted  []         Mental ability/status  [x]         Medical condition  []         Home/family situation and support systems  []         Safety awareness  [x]         Pain tolerance/management  []         Other:      PLAN :  Recommendations and Planned Interventions:  [x]           Bed Mobility Training             []    Neuromuscular Re-Education  [x]           Transfer Training                   []    Orthotic/Prosthetic Training  [x]           Gait Training                         []    Modalities  [x]           Therapeutic Exercises           []    Edema Management/Control  [x]           Therapeutic Activities            [x]    Patient and Family Training/Education  []           Other (comment):    Frequency/Duration: Patient will be followed by physical therapy  twice daily Monday - Friday as carlos alberto, daily on Sat- Sun to address goals. Discharge Recommendations: Fausto Mari  Further Equipment Recommendations for Discharge: possibly bedside commode, gait belt, rolling walker and wheelchair 22 inch     SUBJECTIVE:   Patient stated I am really sleeping and I hurt alot.     OBJECTIVE DATA SUMMARY:   HISTORY:    Past Medical History:   Diagnosis Date    Arthritis     CAD (coronary artery disease)     Cardiomyopathy ([facility] 6/11/2015    Colon cancer ([facility] 1993    COLON    Depression     DEPRESSION    Diabetes ([facility]     IDDM    GERD (gastroesophageal reflux disease)     Hypertension     Lung cancer ([facility] 11/29/2016    Mild aortic stenosis 6/15/2015    Pulmonary hypertension 6/15/2015    PVD (peripheral vascular disease) ([facility]     STENT RIGHT GROIN, PAD    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 Patient falling asleep with history interview  Personal factors and/or comorbidities impacting plan of care: extensive medical historycardiac hx, lung and colon cancer, IDDM, 2 previous CVAs    Home Situation  Home Environment: Assisted living  # Steps to Enter: 0  One/Two Story Residence: One story  Living Alone: No  Support Systems: Assisted living (private duty RN ?)  Patient Expects to be Discharged to[de-identified] Skilled nursing facility  Current DME Used/Available at Home: None    EXAMINATION/PRESENTATION/DECISION MAKING:   Critical Behavior:  Neurologic State: Alert  Orientation Level: Oriented X4  Cognition: Decreased attention/concentration, Decreased command following, Follows commands  Safety/Judgement: Not assessed (patient too lethargic)  Hearing: Auditory  Auditory Impairment: Hard of hearing, bilateral, Hearing aid(s)  Hearing Aids/Status: At home    Range Of Motion:  AROM: Generally decreased, functional (non functional right hip)  PROM: Generally decreased, functional  Strength:    Strength: Grossly decreased, non-functional (right hip)  Tone & Sensation:   Tone: Normal  Sensation: Intact    Coordination:  Coordination: Grossly decreased, non-functional    Functional Mobility:  Bed Mobility:  Rolling: Maximum assistance; Additional time;Assist x2  Supine to Sit: Maximum assistance; Additional time;Assist x2  Sit to Supine:  Total assistance;Assist x2  Scooting: Maximum assistance;Assist x1  Transfers:  Sit to Stand:  (NT)  Balance:   Sitting: High guard  Standing:  (NT)  Ambulation/Gait Training:  Gait Description (WDL):  (NT today - low BP and too drowsy)    Functional Measure:  Barthel Index:    Bathin  Bladder: 0  Bowels: 0  Groomin  Dressin  Feedin  Mobility: 0  Stairs: 0  Toilet Use: 0  Transfer (Bed to Chair and Back): 0  Total: 5       Barthel and G-code impairment scale:  Percentage of impairment CH  0% CI  1-19% CJ  20-39% CK  40-59% CL  60-79% CM  80-99% CN  100%   Barthel Score 0-100 100 99-80 79-60 59-40 20-39 1-19   0   Barthel Score 0-20 20 17-19 13-16 9-12 5-8 1-4 0      The Barthel ADL Index: Guidelines  1. The index should be used as a record of what a patient does, not as a record of what a patient could do. 2. The main aim is to establish degree of independence from any help, physical or verbal, however minor and for whatever reason. 3. The need for supervision renders the patient not independent. 4. A patient's performance should be established using the best available evidence. Asking the patient, friends/relatives and nurses are the usual sources, but direct observation and common sense are also important. However direct testing is not needed. 5. Usually the patient's performance over the preceding 24-48 hours is important, but occasionally longer periods will be relevant. 6. Middle categories imply that the patient supplies over 50 per cent of the effort. 7. Use of aids to be independent is allowed. Alton Rogel., Barthel, D.W. (7155). Functional evaluation: the Barthel Index. [address] [facility] (14)2. RUBI Hernadez, Samina Arredondo, [place]., [facility], [address] [place] [address] (). Measuring the change indisability after inpatient rehabilitation; comparison of the responsiveness of the Barthel Index and Functional McPherson Measure. Journal of Neurology, Neurosurgery, and Psychiatry, 66(4), 963-380. Elaina Mejía, N.J.A, THELMA LópezJ.ANA, & Jahaira Watson M.A. (2004.) Assessment of post-stroke quality of life in cost-effectiveness studies: The usefulness of the Barthel Index and the EuroQoL-5D. Quality of Life Research, 13, 261-67       G codes:   In compliance with CMSs Claims Based Outcome Reporting, the following G-code set was chosen for this patient based on their primary functional limitation being treated: The outcome measure chosen to determine the severity of the functional limitation was the Barthel Index with a score of 5/100 which was correlated with the impairment scale. ? Mobility - Walking and Moving Around:     - CURRENT STATUS: CM - 80%-99% impaired, limited or restricted    - GOAL STATUS: CL - 60%-79% impaired, limited or restricted    - D/C STATUS:  ---------------To be determined---------------      Physical Therapy Evaluation Charge Determination   History Examination Presentation Decision-Making   HIGH Complexity :3+ comorbidities / personal factors will impact the outcome/ POC  HIGH Complexity : 4+ Standardized tests and measures addressing body structure, function, activity limitation and / or participation in recreation  HIGH Complexity : Unstable and unpredictable characteristics  Other outcome measures Barthel Index  HIGH       Based on the above components, the patient evaluation is determined to be of the following complexity level: HIGH     Pain:  Pain Scale 1: Numeric (0 - 10)  Pain Intensity 1: 0  Pain Location 1: Hip  Pain Orientation 1: Right  Pain Description 1: Aching  Pain Intervention(s) 1: Medication (see MAR)  Activity Tolerance:   poor  Please refer to the flowsheet for vital signs taken during this treatment. After treatment:   []         Patient left in no apparent distress sitting up in chair  [x]         Patient left in no apparent distress in bed  [x]         Call bell left within reach  [x]         Nursing notified  []         Caregiver present  [x]         Bed alarm activated    COMMUNICATION/EDUCATION:   The patients plan of care was discussed with: Registered Nurse.  []         Fall prevention education was provided and the patient/caregiver indicated understanding.   []         Patient/family have participated as able in goal setting and plan of care. []         Patient/family agree to work toward stated goals and plan of care. []         Patient understands intent and goals of therapy, but is neutral about his/her participation. [x]         Patient is unable to participate in goal setting and plan of care.     Thank you for this referral.  Ladarius Mena, PT   Time Calculation: 25 mins

## 2017-12-22 NOTE — ANESTHESIA POSTPROCEDURE EVALUATION
Post-Anesthesia Evaluation and Assessment    Patient: Dionte Osbonr MRN: [id number]  SSN: [id number]    YOB: 1935  Age: 81 y.o. Sex: male       Cardiovascular Function/Vital Signs  Visit Vitals    /49    Pulse 63    Temp 36.7 °C (98 °F)    Resp 14    Ht 5' 9\" (1.753 m)    Wt 68.9 kg (152 lb)    SpO2 100%    BMI 22.45 kg/m2       Patient is status post spinal anesthesia for Procedure(s):  RIGHT INTRAMEDULLARY NAIL RIGHT HIP. Nausea/Vomiting: None    Postoperative hydration reviewed and adequate. Pain:  Pain Scale 1: Numeric (0 - 10) (12/22/17 0828)  Pain Intensity 1: 0 (12/22/17 0828)   Managed    Neurological Status:   Neuro (WDL): Exceptions to [facility] (12/22/17 1508)  Neuro  Neurologic State: Alert; Appropriate for age (12/22/17 9177)  Orientation Level: Oriented X4 (12/22/17 2897)  Cognition: Appropriate for age attention/concentration (12/22/17 0799)  Speech: Clear (12/22/17 9695)  Assessment L Pupil: Brisk (12/22/17 0639)  Size L Pupil (mm): 3 (12/21/17 1001)  Assessment R Pupil: Brisk (12/21/17 1001)  Size R Pupil (mm): 2 (12/21/17 1001)  LLE Motor Response: Numbness; No movement to any stimulus (12/22/17 0828)  RLE Motor Response: Numbness; No movement to any stimulus (12/22/17 0828)   At baseline    Mental Status and Level of Consciousness: Arousable    Pulmonary Status:   O2 Device: Nasal cannula (12/22/17 0828)   Adequate oxygenation and airway patent    Complications related to anesthesia: None    Post-anesthesia assessment completed.  No concerns    Signed By: Rella Soulier, MD     December 22, 2017

## 2017-12-22 NOTE — PROGRESS NOTES
Nutrition Assessment:    RECOMMENDATIONS/INTERVENTION(S):   Consult SLP for appropriate diet/liquid consistency  Change diet to Diabetic (CCD)/Cardiac and add Ensure HP BID to assist with surgical wound healing  Monitor BG  Continue to monitor appetite/diet tolerance and acceptance of ONS    ASSESSMENT:   12/22:  Noted consult for general nutrition & supplement management. Chart reviewed. 82 yo male s/p fall at group home admitted with right hip fracture, s/p repair. Diet advanced to Regular. Appetite is fair at baseline, pt with frequent coughing after eating/drinking. Pt has been on altered diet/liquid consistency in past, hx dysphagia. Labs/Meds reviewed. IVF infusing. -[phone], A1c 7.2%. LBM recorded 12/21. Skin--no PI or edema, + surgical incision to right hip. Weight record reviewed--large fluctuations in weight noted (130s to 160s x 1.5-2 years). SUBJECTIVE/OBJECTIVE:     Diet Order: Regular  % Eaten:  No data found.     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    LIMA TO LAD; SVG TO OM; SVG TO OM    Stroke ([address] 1999    2 STROKES    Third degree AV block ([address] 6/11/2015    Thyroid disease          Chemistries:  Lab Results   Component Value Date/Time    Sodium 137 12/22/2017 03:47 AM    Potassium 4.3 12/22/2017 03:47 AM    Chloride 100 12/22/2017 03:47 AM    CO2 31 12/22/2017 03:47 AM    Anion gap 6 12/22/2017 03:47 AM    Glucose 124 12/22/2017 03:47 AM    BUN 28 12/22/2017 03:47 AM    Creatinine 1.61 12/22/2017 03:47 AM    BUN/Creatinine ratio 17 12/22/2017 03:47 AM    GFR est AA 50 12/22/2017 03:47 AM    GFR est non-AA 41 12/22/2017 03:47 AM    Calcium 8.5 12/22/2017 03:47 AM    AST (SGOT) 19 12/21/2017 09:45 AM    Alk. phosphatase 103 12/21/2017 09:45 AM    Protein, total 7.5 12/21/2017 09:45 AM    Albumin 3.8 12/21/2017 09:45 AM    Globulin 3.7 12/21/2017 09:45 AM    A-G Ratio 1.0 12/21/2017 09:45 AM    ALT (SGPT) 19 12/21/2017 09:45 AM      Anthropometrics: Height: 5' 9\" (175.3 cm) Weight: 68.9 kg (152 lb)    IBW (%IBW): 72.7 kg (160 lb 4.4 oz) ( ) UBW (%UBW):   (  %)    BMI: Body mass index is 22.45 kg/(m^2). This BMI is indicative of:   [x] Underweight for adv age    [] Normal    [] Overweight    []  Obesity    []  Extreme Obesity (BMI>40)  Estimated Nutrition Needs (Based on): 1799 Kcals/day (MSJ X 1.3 AF) , 73 g (1.0 g/Kg IBW) Protein  Carbohydrate: At Least 130 g/day  Fluids: 1800 mL/day    Last BM: 12/21   [x]Active     []Hyperactive  []Hypoactive       [] Absent   BS  Skin:    [] Intact   [x] Incision--rt hip  [] Breakdown   [] DTI   [] Tears/Excoriation/Abrasion  []Edema [] Other:    Wt Readings from Last 30 Encounters:   12/21/17 68.9 kg (152 lb)   08/23/17 74.4 kg (164 lb)   04/12/17 63.5 kg (140 lb)   02/15/17 64.9 kg (143 lb)   02/15/17 65.2 kg (143 lb 12.8 oz)   12/13/16 70.3 kg (155 lb)   10/05/16 68.9 kg (152 lb)   06/16/16 65.8 kg (145 lb)   05/24/16 62.6 kg (138 lb)   03/17/16 62.6 kg (138 lb)   02/16/16 62.9 kg (138 lb 9.6 oz)   02/14/16 62.4 kg (137 lb 9.1 oz)   01/26/16 60.8 kg (134 lb)   01/18/16 61.7 kg (136 lb)   12/15/15 62.1 kg (136 lb 12.8 oz)   11/30/15 60.8 kg (134 lb 1.6 oz)   11/25/15 61.8 kg (136 lb 3.9 oz)   11/12/15 69.7 kg (153 lb 11.2 oz)   11/02/15 68 kg (150 lb)   10/12/15 62.1 kg (137 lb)   10/01/15 64.6 kg (142 lb 6.4 oz)   09/30/15 63.5 kg (140 lb)   09/24/15 64 kg (141 lb)   09/23/15 60.6 kg (133 lb 10.9 oz)   09/03/15 68.2 kg (150 lb 6.4 oz)   08/12/15 66 kg (145 lb 9.6 oz)   07/24/15 74.8 kg (165 lb)   07/06/15 66 kg (145 lb 8.1 oz)   06/10/15 67.1 kg (148 lb)   05/26/15 67.1 kg (148 lb)      NUTRITION DIAGNOSES:   Problem:  Increased nutrient needs     Etiology: related to post-op wound healing     Signs/Symptoms: as evidenced by s/p right hip repair      NUTRITION INTERVENTIONS:  Meals/Snacks: General/healthful diet   Supplements: Commercial supplement              GOAL:   Pt will tolerate/consume >50% meals and ONS in next 3-5 days    Cultural, Sikhism, or Ethnic Dietary Needs:   None    EDUCATION & DISCHARGE NEEDS:    [x] None Identified   [] Identified and Education Provided/Documented   [] Identified and Pt declined/was not appropriate      [] Pt discussed in interdisciplinary rounds   [x] Discharge Needs:  tbd--? altered diet/liquid consistency   [] No Nutrition Related Discharge Needs    NUTRITION RISK:   Pt Is At Nutrition Risk  [x]     No Nutrition Risk Identified  []       PT SEEN FOR:    [x]  MD Consult: []Calorie Count      []Diabetic Diet Education        []Diet Education     []Electrolyte Management     [x]General Nutrition Management and Supplements     []Management of Tube Feeding     []TPN Recommendations    []  RN Referral:  []MST score >=2     []Enteral/Parenteral Nutrition PTA     []Pregnant: Gestational DM or Multigestation                 [] Pressure Ulcer      []  Low BMI      []  Length of Stay       [] Dysphagia Diet         [] Ventilator      []  Follow-Up      Previous Recommendations:   [] Implemented          [] Not Implemented          [x] Not Applicable    Previous Goal:   [] Met              [] Progressing Towards Goal              [] Not Progressing Towards Goal   [x] Not Applicable              Scott Angulo, [address] [place] [address]   Pager [phone]

## 2017-12-22 NOTE — PERIOP NOTES
TRANSFER - OUT REPORT:    Verbal report given to A Byron Le RN on David Eastover  being transferred to [phone]) for routine post - op       Report consisted of patients Situation, Background, Assessment and   Recommendations(SBAR). Information from the following report(s) SBAR was reviewed with the receiving nurse. Lines:   Peripheral IV 12/21/17 Left Forearm (Active)   Site Assessment Clean, dry, & intact 12/22/2017  8:28 AM   Phlebitis Assessment 0 12/22/2017  8:28 AM   Infiltration Assessment 0 12/22/2017  8:28 AM   Dressing Status Clean, dry, & intact 12/22/2017  8:28 AM   Dressing Type Transparent 12/22/2017  8:28 AM   Hub Color/Line Status Pink 12/22/2017  8:28 AM   Action Taken Open ports on tubing capped 12/21/2017  7:37 PM   Alcohol Cap Used Yes 12/22/2017  3:30 AM        Opportunity for questions and clarification was provided.       Patient transported with:   O2 @ 3 liters  Registered Nurse

## 2017-12-22 NOTE — PROGRESS NOTES
Bedside and Verbal shift change report given to Blake Peck RN (oncoming nurse) by George Calhoun RN (offgoing nurse). Report included the following information SBAR, Kardex, Procedure Summary, Intake/Output, MAR, Recent Results and Cardiac Rhythm V Paced.

## 2017-12-22 NOTE — PROGRESS NOTES
TRANSFER - IN REPORT:    Verbal report received from Booker Centeno RN on [address]  being received from 4th floor Room 417 for ordered procedure      Report consisted of patients Situation, Background, Assessment and   Recommendations(SBAR). Information from the following report(s) SBAR was reviewed with the receiving nurse. Opportunity for questions and clarification was provided. Assessment completed upon patients arrival to unit and care assumed.

## 2017-12-22 NOTE — PROGRESS NOTES
Andrade Marie MD, [address]  [address]  Office [phone]      IMPRESSION and RECOMMENDATIONS     1.  CAD:  Stable. Tolerated surgery well. I see that he has not been on ASA for > 1yr. Unsure why, but would not start now unless for DVT prophylaxis purposes. Resume coreg as BP allows. Chronically not on statin. No other recs at this time. Will see prn, but call should concerns arise. Discussed with Pt and daughter. Subjective:       Pt feels \"alright\" immediately s/p surgery. Objective:   Patient Vitals for the past 16 hrs:   BP Temp Pulse Resp SpO2   12/22/17 0942 - - - - 96 %   12/22/17 0900 105/49 97.5 °F (36.4 °C) 63 14 100 %   12/22/17 0850 91/50 - 60 17 99 %   12/22/17 0840 116/56 - 60 13 100 %   12/22/17 0835 115/49 - 61 18 100 %   12/22/17 0830 111/60 - 65 15 100 %   12/22/17 0828 106/65 98 °F (36.7 °C) 60 16 98 %   12/22/17 0825 104/50 - 60 20 97 %   12/22/17 0820 109/49 - 61 14 100 %   12/22/17 0634 126/65 98.2 °F (36.8 °C) 65 18 -   12/22/17 0355 115/57 98 °F (36.7 °C) 60 18 96 %   12/22/17 0107 99/50 98.6 °F (37 °C) 60 18 95 %   12/21/17 2312 - - - - 98 %   12/21/17 2241 - - 60 - -   12/21/17 2036 95/51 98.6 °F (37 °C) 60 16 98 %   12/21/17 1949 - - - - 96 %       HEENT Exam:     WNL         Lung Exam:     The patient is not dyspneic. Breath sounds are heard equally in all lung fields. There are no wheezes, rales, rhonchi, or rubs heard on auscultation. Heart Exam:     The rhythm is regular. The PMI is in the 5th intercostal space of the MCL. Apical impulse is normal. S1 is regular. S2 is physiologic. There is no S3, S4 gallop, murmur, click, or rub. Abdomen Exam:     Benign. Extremities Exam:     No cyanosis, clubbing, edema. Distal pulses intact.            Lab Results   Component Value Date/Time    Glucose 124 12/22/2017 03:47 AM    Sodium 137 12/22/2017 03:47 AM    Potassium 4.3 12/22/2017 03:47 AM    Chloride 100 12/22/2017 03:47 AM    CO2 31 12/22/2017 03:47 AM    BUN 28 12/22/2017 03:47 AM    Creatinine 1.61 12/22/2017 03:47 AM    Calcium 8.5 12/22/2017 03:47 AM     Recent Labs      12/22/17   0347  12/21/17   0945   WBC  10.1  16.0*   HGB  10.8*  12.6   HCT  32.3*  38.4   PLT  149*  174     Recent Labs      12/21/17   0945   SGOT  19   ALT  19   AP  103   TBILI  1.0   TP  7.5   ALB  3.8   GLOB  3.7     Recent Labs      12/21/17   0930   INR  1.1   PTP  10.7   APTT  25.4      No results for input(s): CPK, CKMB, TNIPOC in the last 72 hours.     No lab exists for component: TROPONINI, ITNL  Recent Labs      12/21/17   0945   TROIQ  <0.04     No results found for: CHOL, CHOLX, CHLST, CHOLV, HDL, LDL, LDLC, DLDLP, TGLX, Ronaldo Maidens, [facility], [facility]      Future Appointments  Date Time Provider Preethi Kathi   2/21/2018 10:45 AM PACEMAKER, [facility] SEAN SAMANTHA SCHED   2/21/2018 11:00 AM Kulwant Hemphill MD CAVTRES SAMANTHA SCHED   3/7/2018 10:40 AM MD SEAN Harmon SAMANTHA SCHED   5/7/2018 11:00 AM [facility] CT 1 [facility]

## 2017-12-23 LAB
ANION GAP SERPL CALC-SCNC: 9 MMOL/L (ref 5–15)
BNP SERPL-MCNC: 5418 PG/ML (ref 0–450)
BUN SERPL-MCNC: 22 MG/DL (ref 6–20)
BUN/CREAT SERPL: 19 (ref 12–20)
CALCIUM SERPL-MCNC: 8.2 MG/DL (ref 8.5–10.1)
CHLORIDE SERPL-SCNC: 103 MMOL/L (ref 97–108)
CO2 SERPL-SCNC: 27 MMOL/L (ref 21–32)
CREAT SERPL-MCNC: 1.17 MG/DL (ref 0.7–1.3)
ERYTHROCYTE [DISTWIDTH] IN BLOOD BY AUTOMATED COUNT: 13.7 % (ref 11.5–14.5)
GLUCOSE BLD STRIP.AUTO-MCNC: 168 MG/DL (ref 65–100)
GLUCOSE BLD STRIP.AUTO-MCNC: 188 MG/DL (ref 65–100)
GLUCOSE BLD STRIP.AUTO-MCNC: 214 MG/DL (ref 65–100)
GLUCOSE BLD STRIP.AUTO-MCNC: 222 MG/DL (ref 65–100)
GLUCOSE BLD STRIP.AUTO-MCNC: 226 MG/DL (ref 65–100)
GLUCOSE SERPL-MCNC: 196 MG/DL (ref 65–100)
HCT VFR BLD AUTO: 27.2 % (ref 36.6–50.3)
HGB BLD-MCNC: 9 G/DL (ref 12.1–17)
MAGNESIUM SERPL-MCNC: 2 MG/DL (ref 1.6–2.4)
MCH RBC QN AUTO: 30.8 PG (ref 26–34)
MCHC RBC AUTO-ENTMCNC: 33.1 G/DL (ref 30–36.5)
MCV RBC AUTO: 93.2 FL (ref 80–99)
PLATELET # BLD AUTO: 120 K/UL (ref 150–400)
POTASSIUM SERPL-SCNC: 4.3 MMOL/L (ref 3.5–5.1)
RBC # BLD AUTO: 2.92 M/UL (ref 4.1–5.7)
SERVICE CMNT-IMP: ABNORMAL
SODIUM SERPL-SCNC: 139 MMOL/L (ref 136–145)
WBC # BLD AUTO: 8.8 K/UL (ref 4.1–11.1)

## 2017-12-23 PROCEDURE — 74011250637 HC RX REV CODE- 250/637: Performed by: INTERNAL MEDICINE

## 2017-12-23 PROCEDURE — 77010033678 HC OXYGEN DAILY

## 2017-12-23 PROCEDURE — 94640 AIRWAY INHALATION TREATMENT: CPT

## 2017-12-23 PROCEDURE — 80048 BASIC METABOLIC PNL TOTAL CA: CPT | Performed by: INTERNAL MEDICINE

## 2017-12-23 PROCEDURE — 97530 THERAPEUTIC ACTIVITIES: CPT

## 2017-12-23 PROCEDURE — 85027 COMPLETE CBC AUTOMATED: CPT | Performed by: INTERNAL MEDICINE

## 2017-12-23 PROCEDURE — 74011250637 HC RX REV CODE- 250/637: Performed by: ORTHOPAEDIC SURGERY

## 2017-12-23 PROCEDURE — 82962 GLUCOSE BLOOD TEST: CPT

## 2017-12-23 PROCEDURE — 83880 ASSAY OF NATRIURETIC PEPTIDE: CPT | Performed by: INTERNAL MEDICINE

## 2017-12-23 PROCEDURE — 74011636637 HC RX REV CODE- 636/637: Performed by: INTERNAL MEDICINE

## 2017-12-23 PROCEDURE — 97166 OT EVAL MOD COMPLEX 45 MIN: CPT

## 2017-12-23 PROCEDURE — 36415 COLL VENOUS BLD VENIPUNCTURE: CPT | Performed by: INTERNAL MEDICINE

## 2017-12-23 PROCEDURE — 83735 ASSAY OF MAGNESIUM: CPT | Performed by: INTERNAL MEDICINE

## 2017-12-23 PROCEDURE — 65660000000 HC RM CCU STEPDOWN

## 2017-12-23 PROCEDURE — 74011250637 HC RX REV CODE- 250/637: Performed by: NURSE PRACTITIONER

## 2017-12-23 PROCEDURE — 74011250636 HC RX REV CODE- 250/636: Performed by: ORTHOPAEDIC SURGERY

## 2017-12-23 PROCEDURE — 74011250636 HC RX REV CODE- 250/636: Performed by: INTERNAL MEDICINE

## 2017-12-23 PROCEDURE — 74011000250 HC RX REV CODE- 250: Performed by: NURSE PRACTITIONER

## 2017-12-23 RX ADMIN — ACETAMINOPHEN 650 MG: 325 TABLET ORAL at 23:03

## 2017-12-23 RX ADMIN — Medication 10 ML: at 21:25

## 2017-12-23 RX ADMIN — CEFAZOLIN 2 G: 1 INJECTION, POWDER, FOR SOLUTION INTRAMUSCULAR; INTRAVENOUS; PARENTERAL at 04:47

## 2017-12-23 RX ADMIN — INSULIN LISPRO 2 UNITS: 100 INJECTION, SOLUTION INTRAVENOUS; SUBCUTANEOUS at 08:08

## 2017-12-23 RX ADMIN — DOCUSATE SODIUM AND SENNOSIDES 1 TABLET: 8.6; 5 TABLET, FILM COATED ORAL at 17:18

## 2017-12-23 RX ADMIN — ACETAMINOPHEN 650 MG: 325 TABLET ORAL at 05:27

## 2017-12-23 RX ADMIN — INSULIN LISPRO 2 UNITS: 100 INJECTION, SOLUTION INTRAVENOUS; SUBCUTANEOUS at 17:17

## 2017-12-23 RX ADMIN — Medication 10 ML: at 06:24

## 2017-12-23 RX ADMIN — ACETAMINOPHEN 650 MG: 325 TABLET ORAL at 17:17

## 2017-12-23 RX ADMIN — TAMSULOSIN HYDROCHLORIDE 0.4 MG: 0.4 CAPSULE ORAL at 21:22

## 2017-12-23 RX ADMIN — DONEPEZIL HYDROCHLORIDE 10 MG: 5 TABLET, FILM COATED ORAL at 08:08

## 2017-12-23 RX ADMIN — QUETIAPINE FUMARATE 50 MG: 25 TABLET ORAL at 08:08

## 2017-12-23 RX ADMIN — OYSTER SHELL CALCIUM WITH VITAMIN D 1 TABLET: 500; 200 TABLET, FILM COATED ORAL at 17:18

## 2017-12-23 RX ADMIN — MORPHINE SULFATE 2 MG: 2 INJECTION, SOLUTION INTRAMUSCULAR; INTRAVENOUS at 17:19

## 2017-12-23 RX ADMIN — IPRATROPIUM BROMIDE AND ALBUTEROL SULFATE 3 ML: .5; 3 SOLUTION RESPIRATORY (INHALATION) at 07:34

## 2017-12-23 RX ADMIN — INSULIN LISPRO 3 UNITS: 100 INJECTION, SOLUTION INTRAVENOUS; SUBCUTANEOUS at 12:05

## 2017-12-23 RX ADMIN — LEVOTHYROXINE SODIUM 125 MCG: 100 TABLET ORAL at 06:24

## 2017-12-23 RX ADMIN — FERROUS SULFATE TAB 325 MG (65 MG ELEMENTAL FE) 325 MG: 325 (65 FE) TAB at 08:09

## 2017-12-23 RX ADMIN — TRAMADOL HYDROCHLORIDE 50 MG: 50 TABLET, FILM COATED ORAL at 08:16

## 2017-12-23 RX ADMIN — ENOXAPARIN SODIUM 40 MG: 40 INJECTION SUBCUTANEOUS at 17:17

## 2017-12-23 RX ADMIN — PANTOPRAZOLE SODIUM 40 MG: 40 TABLET, DELAYED RELEASE ORAL at 06:24

## 2017-12-23 RX ADMIN — OYSTER SHELL CALCIUM WITH VITAMIN D 1 TABLET: 500; 200 TABLET, FILM COATED ORAL at 08:09

## 2017-12-23 RX ADMIN — Medication 500 MG: at 08:10

## 2017-12-23 RX ADMIN — GUAIFENESIN 600 MG: 600 TABLET, EXTENDED RELEASE ORAL at 21:22

## 2017-12-23 RX ADMIN — ESCITALOPRAM OXALATE 20 MG: 10 TABLET ORAL at 08:09

## 2017-12-23 RX ADMIN — ACETAMINOPHEN 650 MG: 325 TABLET ORAL at 12:05

## 2017-12-23 RX ADMIN — IPRATROPIUM BROMIDE AND ALBUTEROL SULFATE 3 ML: .5; 3 SOLUTION RESPIRATORY (INHALATION) at 14:57

## 2017-12-23 RX ADMIN — TRAMADOL HYDROCHLORIDE 50 MG: 50 TABLET, FILM COATED ORAL at 21:22

## 2017-12-23 RX ADMIN — OYSTER SHELL CALCIUM WITH VITAMIN D 1 TABLET: 500; 200 TABLET, FILM COATED ORAL at 12:05

## 2017-12-23 RX ADMIN — INSULIN LISPRO 2 UNITS: 100 INJECTION, SOLUTION INTRAVENOUS; SUBCUTANEOUS at 23:00

## 2017-12-23 RX ADMIN — IPRATROPIUM BROMIDE AND ALBUTEROL SULFATE 3 ML: .5; 3 SOLUTION RESPIRATORY (INHALATION) at 23:30

## 2017-12-23 RX ADMIN — VITAMIN D, TAB 1000IU (100/BT) 3000 UNITS: 25 TAB at 08:09

## 2017-12-23 RX ADMIN — POLYETHYLENE GLYCOL (3350) 17 G: 17 POWDER, FOR SOLUTION ORAL at 08:08

## 2017-12-23 RX ADMIN — Medication 10 ML: at 17:18

## 2017-12-23 RX ADMIN — IPRATROPIUM BROMIDE AND ALBUTEROL SULFATE 3 ML: .5; 3 SOLUTION RESPIRATORY (INHALATION) at 01:51

## 2017-12-23 RX ADMIN — GUAIFENESIN 600 MG: 600 TABLET, EXTENDED RELEASE ORAL at 08:09

## 2017-12-23 RX ADMIN — DOCUSATE SODIUM AND SENNOSIDES 1 TABLET: 8.6; 5 TABLET, FILM COATED ORAL at 08:09

## 2017-12-23 RX ADMIN — ACETAMINOPHEN 650 MG: 325 TABLET ORAL at 00:11

## 2017-12-23 RX ADMIN — IPRATROPIUM BROMIDE AND ALBUTEROL SULFATE 3 ML: .5; 3 SOLUTION RESPIRATORY (INHALATION) at 19:39

## 2017-12-23 RX ADMIN — FINASTERIDE 5 MG: 5 TABLET, FILM COATED ORAL at 17:17

## 2017-12-23 NOTE — PROGRESS NOTES
Orthopaedic Progress Note  Post Op day: 1 Day Post-Op    2017 11:08 AM     Patient: Marybel King MRN: [id number]  SSN: [id number]    YOB: 1935  Age: 81 y.o. Sex: male      Admit date:  2017  Date of Surgery:  2017   Procedures:  Procedure(s):  RIGHT INTRAMEDULLARY NAIL RIGHT HIP  Admitting Physician:  Valdez Mcdaniel MD   Surgeon:  Kevin Richards) and Role:     * Mavis Lazcano MD - Primary    Consulting Physician(s): Treatment Team: Attending Provider: Cameron Flynn MD; Consulting Provider: Ike Parrish DO; Consulting Provider: Sarah Mirza MD; Consulting Provider: Mavis Lazcano MD; Care Manager: Titus Leal    SUBJECTIVE:     Marybel King is a 81 y.o. male is 1 Day Post-Op s/p Procedure(s):  RIGHT INTRAMEDULLARY NAIL RIGHT HIP with an appropriate level of post-operative pain. No complaints of nausea, vomiting, dizziness, lightheadedness, chest pain, or shortness of breath. OBJECTIVE:       Physical Exam:  General: Alert, cooperative, no distress. Respiratory: Respirations unlabored  Neurological:  Neurovascular exam within normal limits. Motor: + DF/PF. Musculoskeletal: Calves soft, supple, non-tender upon palpation. Dressing/Wound:  Clean, dry and intact. No significant erythema or swelling. Vital Signs:      Patient Vitals for the past 8 hrs:   BP Temp Pulse Resp SpO2   17 0724 119/48 98.3 °F (36.8 °C) 60 18 90 %   17 0652 - - 60 - -   17 0313 137/51 98.6 °F (37 °C) 61 19 96 %                                          Temp (24hrs), Av.4 °F (36.9 °C), Min:97.6 °F (36.4 °C), Max:99.4 °F (37.4 °C)      Labs:        Recent Labs      17   0446   17   0930   HCT  27.2*   < >   --    HGB  9.0*   < >   --    INR   --    --   1.1    < > = values in this interval not displayed.      Lab Results   Component Value Date/Time    Sodium 139 2017 04:46 AM    Potassium 4.3 2017 04:46 AM Chloride 103 12/23/2017 04:46 AM    CO2 27 12/23/2017 04:46 AM    Glucose 196 12/23/2017 04:46 AM    BUN 22 12/23/2017 04:46 AM    Creatinine 1.17 12/23/2017 04:46 AM    Calcium 8.2 12/23/2017 04:46 AM       PT/OT:                Patient mobility  Bed Mobility Training  Rolling: Maximum assistance, Additional time, Assist x2  Supine to Sit: Maximum assistance, Additional time, Assist x2  Sit to Supine: Total assistance, Assist x2  Scooting: Maximum assistance, Assist x1  Transfer Training  Sit to Stand:  (NT)                   ASSESSMENT / PLAN:   Principal Problem:    Hip fracture, right ([facility] [address] (12/21/2017)    Active Problems:    Third degree AV block ([facility] [address] (6/11/2015)      Hypertension ()      PVD (peripheral vascular disease) ([facility] [address] ()      Overview: STENT RIGHT GROIN, PAD      Pulmonary hypertension (6/15/2015)      Mild aortic stenosis (6/15/2015)      CAD (coronary artery disease) (6/26/2015)      Hypothyroidism (6/28/2015)      Diabetes mellitus ([facility] [address] (6/28/2015)      Chronic obstructive pulmonary disease ([facility] [address] (6/28/2015)      Overview: 04/24/2015 PFTs at [facility]      FVC 2.53-67%-post bronchodilator 2.61-69%-increase 3%      FEV1 1.41-52%-post bronchodilator value 1.45-54%-increase 3%      FEV1/FVC 56%      TLC 5.19-88%      RV 2.%      DLCO 45%      DLCO VA 73%      %            04/24/2015-6 minute walk test no exertional hypoxia      Chronic systolic heart failure ([facility] [address] (9/18/2015)      Dementia (11/20/2015)      Chronic respiratory failure ([facility] [address] (11/24/2015)      Iron deficiency anemia (11/24/2015)                    Pain Control: Adequate with oral agents   DVT Prophylaxis: Lovenox daily, SCDs, JO Hose    Therapy/ Weight bearing: WBAT   Wound/ incisional issues: C, D, and I   Medical concerns:    Disposition: SNF when ok with Hospitalist     DC to SNF when ok with medicine. Outpatient follow up with Ortho in 2 weeks.     Signed By:  Mara Moctezuma PA-C    [facility] [address]

## 2017-12-23 NOTE — PROGRESS NOTES
Problem: Mobility Impaired (Adult and Pediatric)  Goal: *Acute Goals and Plan of Care (Insert Text)  Physical Therapy Goals  Initiated 2017  1. Patient will move from supine to sit and sit to supine , scoot up and down and roll side to side in bed with moderate assistance  within 7 day(s). 2.  Patient will transfer from bed to chair and chair to bed with moderate assistance  using the least restrictive device within 7 day(s). 3.  Patient will perform sit <> stand with moderate assistance  within 7 day(s). 4.  Patient will ambulate with moderate assistance  for 20 feet with RW within 7 day(s). physical Therapy TREATMENT  Patient: Clara Chapman (15 y.o. male)  Date: 2017  Diagnosis: Hip fracture, right ([address]  RIGHT INTERTROCH FRACTURE Hip fracture, right (HCC)  Procedure(s) (LRB):  RIGHT INTRAMEDULLARY NAIL RIGHT HIP (Right) 1 Day Post-Op  Precautions: Bed Alarm, Fall, WBAT    ASSESSMENT:  Patient supine upon entry and agrees to PT/OT. Patient oriented to person, place, disoriented to situation stating \"I broke my back\". Patient with 2L 02 on however oximeter beeping due to Sats reading 85-89. VC for nasal inhalation when patient hears the beeping due to patient being a mouth breather. Patient with audible chest congestion and once up several attempts at coughing up phlegm. Nothing produced. Patient is still max A/total A for bed mobility, transfers. Patient was able to stand Max A x 2 up to RW however with significant retropulsion flexed posture and without constant support would fall backward onto bed. Patient able to stand 30\"-1 min for linin change. Unable to take steps at this time and returned to supine position then placed in chair position with pillow props. Nursing aware of patient progress today.   Patient will most likely need rehab to be able to return to his functional level of and reduce his fall risk    BP stable: Supine: 109/50, HR 61  Sittin/83, HR 64  Returning to supine after activity: 117/54, HR 67    Progression toward goals:  []    Improving appropriately and progressing toward goals  [x]    Improving slowly and progressing toward goals  []    Not making progress toward goals and plan of care will be adjusted     PLAN:  Patient continues to benefit from skilled intervention to address the above impairments. Continue treatment per established plan of care. Discharge Recommendations:  Rehab and [facility]  Further Equipment Recommendations for Discharge:  May need RW     SUBJECTIVE:   Patient stated I think I broke my back.     OBJECTIVE DATA SUMMARY:   Critical Behavior:  Neurologic State: Alert  Orientation Level: Oriented to person, Oriented to place, Disoriented to time, Disoriented to situation  Cognition: Decreased attention/concentration, Decreased command following, Follows commands  Safety/Judgement: Not assessed (patient too lethargic)  Functional Mobility Training:  Bed Mobility:  Rolling: Maximum assistance  Supine to Sit: Total assistance;Assist x2  Sit to Supine: Maximum assistance;Assist x2  Scooting: Maximum assistance;Assist x1        Transfers:  Sit to Stand: Maximum assistance;Assist x2  Stand to Sit: Moderate assistance;Assist x2                             Balance:  Sitting: Intact; High guard  Sitting - Static: Occassional  Sitting - Dynamic: Fair (occasional)  Standing: Impaired; With support  Standing - Static: Poor  Ambulation/Gait Training:unable to take steps                                                       Stairs:NT              Pain:                    Activity Tolerance:   See above  Please refer to the flowsheet for vital signs taken during this treatment.   After treatment:   []    Patient left in no apparent distress sitting up in chair  [x]    Patient left in no apparent distress in bed, in chair position  []    Call bell left within reach  [x]    Nursing notified  []    Caregiver present  [x]    Bed alarm activated    COMMUNICATION/COLLABORATION:   The patients plan of care was discussed with: Registered Nurse    Theresa Villegas DPT   Time Calculation: 18 mins

## 2017-12-23 NOTE — PROGRESS NOTES
Medical Progress Note      NAME: Yordan Gonzalez   :  1935  MRM:  [id number]    Date/Time: 2017  10:07 AM         Subjective:     Chief Complaint:  \"I have pain\"     Only complaint this AM is R sided hip pain. Tramadol given which did not alleviate pain. Morphine to be given. ROS:  (bold if positive, if negative)      Cough        Objective:       Vitals:          Last 24hrs VS reviewed since prior progress note. Most recent are:    Visit Vitals    /48 (BP 1 Location: Right arm)    Pulse 60    Temp 98.3 °F (36.8 °C)    Resp 18    Ht 5' 9\" (1.753 m)    Wt 68.9 kg (152 lb)    SpO2 90%    BMI 22.45 kg/m2     SpO2 Readings from Last 6 Encounters:   17 90%   17 91%   02/15/17 98%   10/05/16 92%   16 95%   16 95%    O2 Flow Rate (L/min): 2 l/min       Intake/Output Summary (Last 24 hours) at 17 1122  Last data filed at 17 0830   Gross per 24 hour   Intake          1526.25 ml   Output              910 ml   Net           616.25 ml          Exam:     Physical Exam:    Gen:  Well-developed, well-nourished, in no acute distress  HEENT:  Pink conjunctivae, hearing intact to voice, moist mucous membranes  Resp:  No accessory muscle use, clear breath sounds without wheezes rales or rhonchi  Card:  2/6 SCOTT LLSB, normal S1, S2 without thrills or peripheral edema  Abd:  Soft, non-tender, non-distended, normoactive bowel sounds are present  Skin:  No rashes  Neuro:   Face symmetric, tongue midline, speech fluent,  strength is 5/5 bilaterally, follows commands appropriately  Psych:  alert      Medications Reviewed: (see below)    Lab Data Reviewed: (see below)    ______________________________________________________________________    Medications:     Current Facility-Administered Medications   Medication Dose Route Frequency    diphenoxylate-atropine (LOMOTIL) tablet 1 Tab  1 Tab Oral QID PRN    cholecalciferol (VITAMIN D3) tablet 3,000 Units  3,000 Units Oral DAILY    magnesium gluconate 500 mg (27 mg  elemental) tablet 500 mg  500 mg Oral DAILY    sodium chloride (NS) flush 5-10 mL  5-10 mL IntraVENous Q8H    sodium chloride (NS) flush 5-10 mL  5-10 mL IntraVENous PRN    acetaminophen (TYLENOL) tablet 650 mg  650 mg Oral Q6H    traMADol (ULTRAM) tablet 50 mg  50 mg Oral Q6H PRN    hydrOXYzine HCl (ATARAX) tablet 10 mg  10 mg Oral Q8H PRN    naloxone (NARCAN) injection 0.4 mg  0.4 mg IntraVENous PRN    ondansetron (ZOFRAN ODT) tablet 4 mg  4 mg Oral Q4H PRN    calcium-vitamin D (OS-VERONICA) 500 mg-200 unit tablet  1 Tab Oral TID WITH MEALS    senna-docusate (PERICOLACE) 8.6-50 mg per tablet 1 Tab  1 Tab Oral BID    polyethylene glycol (MIRALAX) packet 17 g  17 g Oral DAILY    [START ON 12/24/2017] bisacodyl (DULCOLAX) suppository 10 mg  10 mg Rectal DAILY PRN    enoxaparin (LOVENOX) injection 40 mg  40 mg SubCUTAneous DAILY    acetaminophen (TYLENOL) tablet 650 mg  650 mg Oral Q4H PRN    morphine injection 2 mg  2 mg IntraVENous Q4H PRN    escitalopram oxalate (LEXAPRO) tablet 20 mg  20 mg Oral DAILY    pantoprazole (PROTONIX) tablet 40 mg  40 mg Oral ACB    donepezil (ARICEPT) tablet 10 mg  10 mg Oral DAILY    ferrous sulfate tablet 325 mg  325 mg Oral DAILY    finasteride (PROSCAR) tablet 5 mg  5 mg Oral QPM    tamsulosin (FLOMAX) capsule 0.4 mg  0.4 mg Oral QHS    levothyroxine (SYNTHROID) tablet 125 mcg  125 mcg Oral ACB    QUEtiapine (SEROquel) tablet 50 mg  50 mg Oral DAILY    insulin lispro (HUMALOG) injection   SubCUTAneous AC&HS    glucose chewable tablet 16 g  4 Tab Oral PRN    dextrose (D50W) injection syrg 12.5-25 g  12.5-25 g IntraVENous PRN    glucagon (GLUCAGEN) injection 1 mg  1 mg IntraMUSCular PRN    albuterol-ipratropium (DUO-NEB) 2.5 MG-0.5 MG/3 ML  3 mL Nebulization Q6H RT    guaiFENesin ER (MUCINEX) tablet 600 mg  600 mg Oral Q12H            Lab Review:     Recent Labs      12/23/17   0446  12/22/17   0347  12/21/17 0945   WBC  8.8  10.1  16.0*   HGB  9.0*  10.8*  12.6   HCT  27.2*  32.3*  38.4   PLT  120*  149*  174     Recent Labs      12/23/17   0446  12/22/17   0347  12/21/17   0945  12/21/17   0930   NA  139  137  138   --    K  4.3  4.3  4.4   --    CL  103  100  100   --    CO2  27  31  28   --    GLU  196*  124*  221*   --    BUN  22*  28*  25*   --    CREA  1.17  1.61*  1.35*   --    CA  8.2*  8.5  9.3   --    MG  2.0  1.9  1.9   --    ALB   --    --   3.8   --    TBILI   --    --   1.0   --    SGOT   --    --   19   --    ALT   --    --   19   --    INR   --    --    --   1.1     Lab Results   Component Value Date/Time    Glucose (POC) 168 12/23/2017 07:28 AM    Glucose (POC) 213 12/22/2017 09:12 PM    Glucose (POC) 156 12/22/2017 04:38 PM    Glucose (POC) 179 12/22/2017 11:48 AM    Glucose (POC) 106 12/21/2017 09:21 PM        Assessment / Plan:   Acute renal insufficiency / CKD 3:  Improved with IVF's   -encourage PO intake      Hip fracture, right ([facility] [address] (12/21/2017):  Post mechanical fall. Non-displaced by imaging. S/P IM nail.  -will need SNF   -PT/OT on board  -ortho on board  -Lovenox for DVT prophylaxis at discharge     COPD / Pulmonary hypertension (6/15/2015) / Chronic respiratory failure: CXR without acute process   -continue duonebs  -wean O2 as able   -check LE dopplers for DVT (unable to tell if pt is tachy as on a beta blocker but is hypoxic and recently has had surgery)      HTN / Mild aortic stenosis (6/15/2015) / CAD (coronary artery disease) (6/26/2015) / Chronic systolic CHF:  Hx of CABG.    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     PVD (peripheral vascular disease) ([facility] ():  Overview: STENT RIGHT GROIN, PAD    Total time spent with patient: [address] discussed with: Patient, RN     Discussed:  Care Plan    Prophylaxis:  Lovenox    Disposition:  SNF/LTC           ___________________________________________________    Attending Physician: Simone Shields MD

## 2017-12-23 NOTE — PROGRESS NOTES
Problem: Self Care Deficits Care Plan (Adult)  Goal: *Acute Goals and Plan of Care (Insert Text)  Occupational Therapy Goals  Initiated 12/23/2017  1. Patient will perform grooming seated EOB x 5 mins with no LOB with supervision/set-up within 7 day(s). 2.  Patient will perform upper body dressing sitting EOB with supervision/set-up within 7 day(s). 3.  Patient will perform toilet transfers with moderate assistance x 1 at RW level within 7 day(s). 4.  Patient will perform sit <> stand with mod A at RW level to participate in ADL tasks within 7 day(s). 5.  Patient will participate in upper extremity therapeutic exercise/activities with supervision/set-up for 8 minutes within 7 day(s). Occupational Therapy EVALUATION  Patient: Marybel King (15 y.o. male)  Date: 12/23/2017  Primary Diagnosis: Hip fracture, right (HCC)  RIGHT INTERTROCH FRACTURE  Procedure(s) (LRB):  RIGHT INTRAMEDULLARY NAIL RIGHT HIP (Right) 1 Day Post-Op   Precautions:  Bed Alarm, Fall, WBAT    ASSESSMENT :  Based on the objective data described below, the patient presents with impaired activity tolerance, pain, impaired sitting/standing balance, safety awareness and functional molbility necessary in ADL tasks s/p R hip fx from [facility] with intramedullary nail POD 1. Nursing cleared for therapy and he had received pain medication. Patient oriented to self and hospital with poor awareness of situation and time. He lives at [address] [place] [address] and per chart amb indep with assistance for ADL tasks. Received on 2 L O2 with possible poor read in sensor, but dropping into 80s needing verbal cues for PLB. Demonstrates congestion. At this time he presents below baseline needing max A- total A x2 for bed mob, CGA-min A for unsupported sitting, total A LB dressing, and total A toileting(incontinent at this time for urine). Unable to advance LE in standing, returned to bed and left in bed in chair position.   Patient will benefit from skilled intervention to address the above impairments. Recommend SNF for additional rehab prior to returning to [facility] to maximize indep with ADL tasks and transfers, reduce fall risk and decrease caregiver burden. BP stable: Supine: 109/50, HR 61  Sittin/83, HR 64  Returning to supine after activity: 117/54, HR 67    Patients rehabilitation potential is considered to be Good  Factors which may influence rehabilitation potential include:   []             None noted  [x]             Mental ability/status  [x]             Medical condition  []             Home/family situation and support systems  []             Safety awareness  []             Pain tolerance/management  []             Other:      PLAN :  Recommendations and Planned Interventions:  [x]               Self Care Training                  [x]        Therapeutic Activities  [x]               Functional Mobility Training    []        Cognitive Retraining  [x]               Therapeutic Exercises           [x]        Endurance Activities  [x]               Balance Training                   []        Neuromuscular Re-Education  []               Visual/Perceptual Training     [x]   Home Safety Training  [x]               Patient Education                 [x]        Family Training/Education  []               Other (comment):    Frequency/Duration: Patient will be followed by occupational therapy 5 times a week to address goals. Discharge Recommendations: Rehab  Further Equipment Recommendations for Discharge: TBD rehab     SUBJECTIVE:   Patient stated Moon Gorman broke my back.   When asked why he was here    OBJECTIVE DATA SUMMARY:   HISTORY:   Past Medical History:   Diagnosis Date    Arthritis     CAD (coronary artery disease)     [facility]omy[facility] 2015    [facility]     COLON    Depression     DEPRESSION    Diabetes ([facility]     IDDM    GERD (gastroesophageal reflux disease)     Hypertension     Lung [facility] 2016    Mild aortic stenosis 6/15/2015    Pulmonary hypertension 6/15/2015    PVD (peripheral vascular disease) (HCC)     STENT RIGHT GROIN, PAD    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 INdep amb with assist with ADL tasks per chart. Assuming he was able to participate in basic ADL tasks for dressing and toileting with supervision or light assistance. Poor historian    Home Situation  Home Environment: Assisted living  # Steps to Enter: 0  One/Two Story Residence: One story  Living Alone: No  Support Systems: Assisted living (private duty RN ?)  Patient Expects to be Discharged to[de-identified] Skilled nursing facility  Current DME Used/Available at Home: None  Tub or Shower Type: Shower  [x]  Right hand dominant   []  Left hand dominant    EXAMINATION OF PERFORMANCE DEFICITS:  Cognitive/Behavioral Status:     Orientation Level: Oriented to person;Oriented to place; Disoriented to time;Disoriented to situation          Edema: uppers none    Hearing: Auditory  Auditory Impairment: Hard of hearing, bilateral, Hearing aid(s)  Hearing Aids/Status: At home    Vision/Perceptual:                           Acuity: Within Defined Limits         Range of Motion:  AROM: Generally decreased, functional- BUE  PROM: Generally decreased, functional                      Strength:  Strength: Generally decreased, functional- BUE                Coordination:  Coordination: Generally decreased, functional  Fine Motor Skills-Upper: Left Intact; Right Intact    Gross Motor Skills-Upper: Left Intact; Right Intact    Tone & Sensation:  Tone: Normal- BUE  Sensation: Intact                      Balance:  Sitting: Impaired  Sitting - Static: Fair (occasional)  Sitting - Dynamic: Fair (occasional)  Standing: Impaired  Standing - Static: Poor    Functional Mobility and Transfers for ADLs:  Bed Mobility:  Rolling: Maximum assistance;Assist x2  Supine to Sit: Total assistance;Assist x2  Sit to Supine: Maximum assistance;Assist x2  Scooting: Maximum assistance    Transfers:  Sit to Stand: Maximum assistance;Assist x2;Adaptive equipment  Stand to Sit: Maximum assistance;Assist x2    ADL Assessment:  Feeding: Setup    Oral Facial Hygiene/Grooming: Setup (seated)    Bathing: Maximum assistance    Upper Body Dressing: Minimum assistance    Lower Body Dressing: Total assistance    Toileting: Total assistance                ADL Intervention and task modifications:  Feeding  Feeding Assistance: Supervision/set-up (mild spillage)       Provided ed on sequencing, safety and positioning for bed mobility, sit <> stand and standing balance necessary to preapre for ADL tasks. Patient with fair command follow but poor carryover. Unable to advance feet when standing. Returned to bed and left in bed in chair position. Functional Measure:  Barthel Index:    Bathin  Bladder: 0  Bowels: 0  Groomin  Dressin  Feedin  Mobility: 0  Stairs: 0  Toilet Use: 0  Transfer (Bed to Chair and Back): 5  Total: 10       Barthel and G-code impairment scale:  Percentage of impairment CH  0% CI  1-19% CJ  20-39% CK  40-59% CL  60-79% CM  80-99% CN  100%   Barthel Score 0-100 100 99-80 79-60 59-40 20-39 1-19   0   Barthel Score 0-20 20 17-19 13-16 9-12 5-8 1-4 0      The Barthel ADL Index: Guidelines  1. The index should be used as a record of what a patient does, not as a record of what a patient could do. 2. The main aim is to establish degree of independence from any help, physical or verbal, however minor and for whatever reason.   3. The need for supervision renders the patient not independent. 4. A patient's performance should be established using the best available evidence. Asking the patient, friends/relatives and nurses are the usual sources, but direct observation and common sense are also important. However direct testing is not needed. 5. Usually the patient's performance over the preceding 24-48 hours is important, but occasionally longer periods will be relevant. 6. Middle categories imply that the patient supplies over 50 per cent of the effort. 7. Use of aids to be independent is allowed. Jennifer Padron., Barthel, D.W. (1756). Functional evaluation: the Barthel Index. [address] [facility] (14)2. Nahid Santana gracy RUBI Guzman, Torin Slater., Babita Anderson., Shahid, [address] [place] [address] Ave (1999). Measuring the change indisability after inpatient rehabilitation; comparison of the responsiveness of the Barthel Index and Functional [place] Measure. Journal of Neurology, Neurosurgery, and Psychiatry, 66(4), 905-649. Eduardo Dougherty, N.J.A, MARQUITA López, & Christina Monae MNE. (2004.) Assessment of post-stroke quality of life in cost-effectiveness studies: The usefulness of the Barthel Index and the EuroQoL-5D. Quality of Life Research, 13, 810-54         G codes: In compliance with CMSs Claims Based Outcome Reporting, the following G-code set was chosen for this patient based on their primary functional limitation being treated: The outcome measure chosen to determine the severity of the functional limitation was the Barthel INdex with a score of 10/100 which was correlated with the impairment scale. ?  Self Care:     - CURRENT STATUS: CM - 80%-99% impaired, limited or restricted    - GOAL STATUS: CL - 60%-79% impaired, limited or restricted    - D/C STATUS:  ---------------To be determined---------------     Occupational Therapy Evaluation Charge Determination   History Examination Decision-Making   MEDIUM Complexity : Expanded review of history including physical, cognitive and psychosocial  history  MEDIUM Complexity : 3-5 performance deficits relating to physical, cognitive , or psychosocial skils that result in activity limitations and / or participation restrictions MEDIUM Complexity : Patient may present with comorbidities that affect occupational performnce. Miniml to moderate modification of tasks or assistance (eg, physical or verbal ) with assesment(s) is necessary to enable patient to complete evaluation       Based on the above components, the patient evaluation is determined to be of the following complexity level: MEDIUM  Pain:  Unable to  Verbalized numerical value, grimacing with transitional movements     Activity Tolerance:   BP stable- see assesment section  LImited standing tolerance, <2 mins. After treatment:   [] Patient left in no apparent distress sitting up in chair  [x] Patient left in no apparent distress in bed in chair position  [x] Call bell left within reach  [x] Nursing notified  [] Caregiver present  [x] Bed alarm activated    COMMUNICATION/EDUCATION:   The patients plan of care was discussed with: Physical Therapist, Registered Nurse and Patient. [] Home safety education was provided and the patient/caregiver indicated understanding. [] Patient/family have participated as able in goal setting and plan of care. [] Patient/family agree to work toward stated goals and plan of care. [x] Patient understands intent and goals of therapy, but is neutral about his/her participation. [] Patient is unable to participate in goal setting and plan of care. This patients plan of care is appropriate for delegation to [facility].     Thank you for this referral.  Raegan Rivas, OT  Time Calculation: 23 mins

## 2017-12-23 NOTE — PROGRESS NOTES
Bedside and Verbal shift change report given to [facility] YULIANA HARLEY RN (oncoming nurse) by GUY Landa (offgoing nurse). Report given with SBAR, Kardex, OR Summary, Intake/Output, MAR and Recent Results.

## 2017-12-23 NOTE — PROGRESS NOTES
Bedside, Verbal and Written shift change report given to Rhina Elizabeth (oncoming nurse) by David Cavazos RN (offgoing nurse). Report included the following information SBAR, Kardex, Intake/Output, MAR and Med Rec Status.

## 2017-12-24 LAB
ANION GAP SERPL CALC-SCNC: 7 MMOL/L (ref 5–15)
BASOPHILS # BLD: 0 K/UL (ref 0–0.1)
BASOPHILS NFR BLD: 0 % (ref 0–1)
BUN SERPL-MCNC: 22 MG/DL (ref 6–20)
BUN/CREAT SERPL: 20 (ref 12–20)
CALCIUM SERPL-MCNC: 8.8 MG/DL (ref 8.5–10.1)
CHLORIDE SERPL-SCNC: 105 MMOL/L (ref 97–108)
CO2 SERPL-SCNC: 29 MMOL/L (ref 21–32)
CREAT SERPL-MCNC: 1.1 MG/DL (ref 0.7–1.3)
EOSINOPHIL # BLD: 0.7 K/UL (ref 0–0.4)
EOSINOPHIL NFR BLD: 8 % (ref 0–7)
ERYTHROCYTE [DISTWIDTH] IN BLOOD BY AUTOMATED COUNT: 14 % (ref 11.5–14.5)
GLUCOSE BLD STRIP.AUTO-MCNC: 135 MG/DL (ref 65–100)
GLUCOSE BLD STRIP.AUTO-MCNC: 153 MG/DL (ref 65–100)
GLUCOSE BLD STRIP.AUTO-MCNC: 217 MG/DL (ref 65–100)
GLUCOSE BLD STRIP.AUTO-MCNC: 231 MG/DL (ref 65–100)
GLUCOSE SERPL-MCNC: 194 MG/DL (ref 65–100)
HCT VFR BLD AUTO: 26.7 % (ref 36.6–50.3)
HGB BLD-MCNC: 8.8 G/DL (ref 12.1–17)
LYMPHOCYTES # BLD: 1.2 K/UL (ref 0.8–3.5)
LYMPHOCYTES NFR BLD: 13 % (ref 12–49)
MCH RBC QN AUTO: 31 PG (ref 26–34)
MCHC RBC AUTO-ENTMCNC: 33 G/DL (ref 30–36.5)
MCV RBC AUTO: 94 FL (ref 80–99)
MONOCYTES # BLD: 0.7 K/UL (ref 0–1)
MONOCYTES NFR BLD: 7 % (ref 5–13)
NEUTS SEG # BLD: 6.9 K/UL (ref 1.8–8)
NEUTS SEG NFR BLD: 72 % (ref 32–75)
PLATELET # BLD AUTO: 130 K/UL (ref 150–400)
POTASSIUM SERPL-SCNC: 4.9 MMOL/L (ref 3.5–5.1)
RBC # BLD AUTO: 2.84 M/UL (ref 4.1–5.7)
SERVICE CMNT-IMP: ABNORMAL
SODIUM SERPL-SCNC: 141 MMOL/L (ref 136–145)
WBC # BLD AUTO: 9.5 K/UL (ref 4.1–11.1)

## 2017-12-24 PROCEDURE — 74011250637 HC RX REV CODE- 250/637: Performed by: INTERNAL MEDICINE

## 2017-12-24 PROCEDURE — 74011636637 HC RX REV CODE- 636/637: Performed by: INTERNAL MEDICINE

## 2017-12-24 PROCEDURE — 94640 AIRWAY INHALATION TREATMENT: CPT

## 2017-12-24 PROCEDURE — 74011250636 HC RX REV CODE- 250/636: Performed by: ORTHOPAEDIC SURGERY

## 2017-12-24 PROCEDURE — 93970 EXTREMITY STUDY: CPT

## 2017-12-24 PROCEDURE — 94762 N-INVAS EAR/PLS OXIMTRY CONT: CPT

## 2017-12-24 PROCEDURE — 77010033678 HC OXYGEN DAILY

## 2017-12-24 PROCEDURE — 65660000000 HC RM CCU STEPDOWN

## 2017-12-24 PROCEDURE — 82962 GLUCOSE BLOOD TEST: CPT

## 2017-12-24 PROCEDURE — 97110 THERAPEUTIC EXERCISES: CPT

## 2017-12-24 PROCEDURE — 74011250636 HC RX REV CODE- 250/636: Performed by: INTERNAL MEDICINE

## 2017-12-24 PROCEDURE — 74011250637 HC RX REV CODE- 250/637: Performed by: NURSE PRACTITIONER

## 2017-12-24 PROCEDURE — 36415 COLL VENOUS BLD VENIPUNCTURE: CPT | Performed by: INTERNAL MEDICINE

## 2017-12-24 PROCEDURE — 80048 BASIC METABOLIC PNL TOTAL CA: CPT | Performed by: INTERNAL MEDICINE

## 2017-12-24 PROCEDURE — 97530 THERAPEUTIC ACTIVITIES: CPT

## 2017-12-24 PROCEDURE — 74011250637 HC RX REV CODE- 250/637: Performed by: ORTHOPAEDIC SURGERY

## 2017-12-24 PROCEDURE — 74011000250 HC RX REV CODE- 250: Performed by: NURSE PRACTITIONER

## 2017-12-24 PROCEDURE — 85025 COMPLETE CBC W/AUTO DIFF WBC: CPT | Performed by: INTERNAL MEDICINE

## 2017-12-24 RX ORDER — GLIPIZIDE 5 MG/1
5 TABLET ORAL
Status: DISCONTINUED | OUTPATIENT
Start: 2017-12-24 | End: 2017-12-25

## 2017-12-24 RX ORDER — FLUTICASONE PROPIONATE 50 MCG
2 SPRAY, SUSPENSION (ML) NASAL DAILY
Status: DISCONTINUED | OUTPATIENT
Start: 2017-12-24 | End: 2017-12-27 | Stop reason: HOSPADM

## 2017-12-24 RX ORDER — FUROSEMIDE 40 MG/1
40 TABLET ORAL
Status: DISCONTINUED | OUTPATIENT
Start: 2017-12-24 | End: 2017-12-25

## 2017-12-24 RX ADMIN — POLYETHYLENE GLYCOL (3350) 17 G: 17 POWDER, FOR SOLUTION ORAL at 09:16

## 2017-12-24 RX ADMIN — FLUTICASONE PROPIONATE 2 SPRAY: 50 SPRAY, METERED NASAL at 11:00

## 2017-12-24 RX ADMIN — TAMSULOSIN HYDROCHLORIDE 0.4 MG: 0.4 CAPSULE ORAL at 21:23

## 2017-12-24 RX ADMIN — OYSTER SHELL CALCIUM WITH VITAMIN D 1 TABLET: 500; 200 TABLET, FILM COATED ORAL at 09:17

## 2017-12-24 RX ADMIN — OYSTER SHELL CALCIUM WITH VITAMIN D 1 TABLET: 500; 200 TABLET, FILM COATED ORAL at 12:30

## 2017-12-24 RX ADMIN — IPRATROPIUM BROMIDE AND ALBUTEROL SULFATE 3 ML: .5; 3 SOLUTION RESPIRATORY (INHALATION) at 23:13

## 2017-12-24 RX ADMIN — Medication 500 MG: at 11:00

## 2017-12-24 RX ADMIN — MORPHINE SULFATE 2 MG: 2 INJECTION, SOLUTION INTRAMUSCULAR; INTRAVENOUS at 19:17

## 2017-12-24 RX ADMIN — Medication 10 ML: at 21:23

## 2017-12-24 RX ADMIN — VITAMIN D, TAB 1000IU (100/BT) 3000 UNITS: 25 TAB at 09:17

## 2017-12-24 RX ADMIN — GUAIFENESIN 600 MG: 600 TABLET, EXTENDED RELEASE ORAL at 09:17

## 2017-12-24 RX ADMIN — IPRATROPIUM BROMIDE AND ALBUTEROL SULFATE 3 ML: .5; 3 SOLUTION RESPIRATORY (INHALATION) at 07:35

## 2017-12-24 RX ADMIN — GUAIFENESIN 600 MG: 600 TABLET, EXTENDED RELEASE ORAL at 21:23

## 2017-12-24 RX ADMIN — INSULIN LISPRO 3 UNITS: 100 INJECTION, SOLUTION INTRAVENOUS; SUBCUTANEOUS at 12:30

## 2017-12-24 RX ADMIN — INSULIN LISPRO 3 UNITS: 100 INJECTION, SOLUTION INTRAVENOUS; SUBCUTANEOUS at 09:16

## 2017-12-24 RX ADMIN — MORPHINE SULFATE 2 MG: 2 INJECTION, SOLUTION INTRAMUSCULAR; INTRAVENOUS at 12:30

## 2017-12-24 RX ADMIN — FERROUS SULFATE TAB 325 MG (65 MG ELEMENTAL FE) 325 MG: 325 (65 FE) TAB at 09:18

## 2017-12-24 RX ADMIN — DOCUSATE SODIUM AND SENNOSIDES 1 TABLET: 8.6; 5 TABLET, FILM COATED ORAL at 09:18

## 2017-12-24 RX ADMIN — FINASTERIDE 5 MG: 5 TABLET, FILM COATED ORAL at 17:38

## 2017-12-24 RX ADMIN — LEVOTHYROXINE SODIUM 125 MCG: 100 TABLET ORAL at 05:36

## 2017-12-24 RX ADMIN — IPRATROPIUM BROMIDE AND ALBUTEROL SULFATE 3 ML: .5; 3 SOLUTION RESPIRATORY (INHALATION) at 19:00

## 2017-12-24 RX ADMIN — PANTOPRAZOLE SODIUM 40 MG: 40 TABLET, DELAYED RELEASE ORAL at 05:37

## 2017-12-24 RX ADMIN — IPRATROPIUM BROMIDE AND ALBUTEROL SULFATE 3 ML: .5; 3 SOLUTION RESPIRATORY (INHALATION) at 15:01

## 2017-12-24 RX ADMIN — GLIPIZIDE 5 MG: 5 TABLET ORAL at 09:16

## 2017-12-24 RX ADMIN — DOCUSATE SODIUM AND SENNOSIDES 1 TABLET: 8.6; 5 TABLET, FILM COATED ORAL at 17:38

## 2017-12-24 RX ADMIN — ESCITALOPRAM OXALATE 20 MG: 10 TABLET ORAL at 09:17

## 2017-12-24 RX ADMIN — FUROSEMIDE 40 MG: 40 TABLET ORAL at 17:38

## 2017-12-24 RX ADMIN — ACETAMINOPHEN 650 MG: 325 TABLET ORAL at 12:31

## 2017-12-24 RX ADMIN — QUETIAPINE FUMARATE 50 MG: 25 TABLET ORAL at 09:18

## 2017-12-24 RX ADMIN — DONEPEZIL HYDROCHLORIDE 10 MG: 5 TABLET, FILM COATED ORAL at 09:17

## 2017-12-24 RX ADMIN — OYSTER SHELL CALCIUM WITH VITAMIN D 1 TABLET: 500; 200 TABLET, FILM COATED ORAL at 17:38

## 2017-12-24 RX ADMIN — FUROSEMIDE 40 MG: 40 TABLET ORAL at 09:17

## 2017-12-24 RX ADMIN — ACETAMINOPHEN 650 MG: 325 TABLET ORAL at 05:34

## 2017-12-24 RX ADMIN — ENOXAPARIN SODIUM 40 MG: 40 INJECTION SUBCUTANEOUS at 17:37

## 2017-12-24 RX ADMIN — Medication 10 ML: at 05:40

## 2017-12-24 RX ADMIN — ACETAMINOPHEN 650 MG: 325 TABLET ORAL at 17:38

## 2017-12-24 NOTE — PROGRESS NOTES
Bedside shift change report given to Griselda (oncoming nurse) by Brigette Chu (offgoing nurse). Report included the following information SBAR, Kardex, Procedure Summary, MAR and Recent Results.

## 2017-12-24 NOTE — PROGRESS NOTES
Medical Progress Note      NAME: Andria Cottrell   :  1935  MRM:  [id number]    Date/Time: 2017  10:07 AM         Subjective:     Chief Complaint:  \"I am okay\"     Pt without complaints other than hip pain. Occasionally desat'ing but when prompted to breathe through his nose this seems to help (he typically breathes with his mouth)       ROS:  (bold if positive, if negative)      Cough        Objective:       Vitals:          Last 24hrs VS reviewed since prior progress note. Most recent are:    Visit Vitals    /54 (BP 1 Location: Right arm, BP Patient Position: At rest)    Pulse 60    Temp 97.9 °F (36.6 °C)    Resp 18    Ht 5' 9\" (1.753 m)    Wt 68.9 kg (152 lb)    SpO2 96%    BMI 22.45 kg/m2     SpO2 Readings from Last 6 Encounters:   17 96%   17 91%   02/15/17 98%   10/05/16 92%   16 95%   16 95%    O2 Flow Rate (L/min): 2 l/min       Intake/Output Summary (Last 24 hours) at 17 1111  Last data filed at 17 0530   Gross per 24 hour   Intake                0 ml   Output              300 ml   Net             -300 ml          Exam:     Physical Exam:    Gen:  Well-developed, well-nourished, in no acute distress  HEENT:  Pink conjunctivae, hearing intact to voice, moist mucous membranes  Resp:  No accessory muscle use, coarse breath sounds  Card:  2/6 SCOTT LLSB, normal S1, S2 without thrills or peripheral edema  Abd:  Soft, non-tender, non-distended, normoactive bowel sounds are present  Skin:  No rashes  Neuro: Face symmetric, tongue midline, speech fluent,  strength is 5/5 bilaterally, follows commands appropriately  Psych:  Alert.  Moderate insight   R hip dressing C/D/I     Medications Reviewed: (see below)    Lab Data Reviewed: (see below)    ______________________________________________________________________    Medications:     Current Facility-Administered Medications   Medication Dose Route Frequency    furosemide (LASIX) tablet 40 mg  40 mg Oral ACB&D    glipiZIDE (GLUCOTROL) tablet 5 mg  5 mg Oral ACB    fluticasone (FLONASE) 50 mcg/actuation nasal spray 2 Spray  2 Spray Both Nostrils DAILY    diphenoxylate-atropine (LOMOTIL) tablet 1 Tab  1 Tab Oral QID PRN    cholecalciferol (VITAMIN D3) tablet 3,000 Units  3,000 Units Oral DAILY    magnesium gluconate 500 mg (27 mg  elemental) tablet 500 mg  500 mg Oral DAILY    sodium chloride (NS) flush 5-10 mL  5-10 mL IntraVENous Q8H    sodium chloride (NS) flush 5-10 mL  5-10 mL IntraVENous PRN    acetaminophen (TYLENOL) tablet 650 mg  650 mg Oral Q6H    traMADol (ULTRAM) tablet 50 mg  50 mg Oral Q6H PRN    hydrOXYzine HCl (ATARAX) tablet 10 mg  10 mg Oral Q8H PRN    naloxone (NARCAN) injection 0.4 mg  0.4 mg IntraVENous PRN    ondansetron (ZOFRAN ODT) tablet 4 mg  4 mg Oral Q4H PRN    calcium-vitamin D (OS-VERONICA) 500 mg-200 unit tablet  1 Tab Oral TID WITH MEALS    senna-docusate (PERICOLACE) 8.6-50 mg per tablet 1 Tab  1 Tab Oral BID    polyethylene glycol (MIRALAX) packet 17 g  17 g Oral DAILY    bisacodyl (DULCOLAX) suppository 10 mg  10 mg Rectal DAILY PRN    enoxaparin (LOVENOX) injection 40 mg  40 mg SubCUTAneous DAILY    acetaminophen (TYLENOL) tablet 650 mg  650 mg Oral Q4H PRN    morphine injection 2 mg  2 mg IntraVENous Q4H PRN    escitalopram oxalate (LEXAPRO) tablet 20 mg  20 mg Oral DAILY    pantoprazole (PROTONIX) tablet 40 mg  40 mg Oral ACB    donepezil (ARICEPT) tablet 10 mg  10 mg Oral DAILY    ferrous sulfate tablet 325 mg  325 mg Oral DAILY    finasteride (PROSCAR) tablet 5 mg  5 mg Oral QPM    tamsulosin (FLOMAX) capsule 0.4 mg  0.4 mg Oral QHS    levothyroxine (SYNTHROID) tablet 125 mcg  125 mcg Oral ACB    QUEtiapine (SEROquel) tablet 50 mg  50 mg Oral DAILY    insulin lispro (HUMALOG) injection   SubCUTAneous AC&HS    glucose chewable tablet 16 g  4 Tab Oral PRN    dextrose (D50W) injection syrg 12.5-25 g  12.5-25 g IntraVENous PRN    glucagon (GLUCAGEN) injection 1 mg  1 mg IntraMUSCular PRN    albuterol-ipratropium (DUO-NEB) 2.5 MG-0.5 MG/3 ML  3 mL Nebulization Q6H RT    guaiFENesin ER (MUCINEX) tablet 600 mg  600 mg Oral Q12H            Lab Review:     Recent Labs      12/24/17   0545  12/23/17   0446  12/22/17   0347   WBC  9.5  8.8  10.1   HGB  8.8*  9.0*  10.8*   HCT  26.7*  27.2*  32.3*   PLT  130*  120*  149*     Recent Labs      12/24/17   0545  12/23/17   0446  12/22/17   0347   NA  141  139  137   K  4.9  4.3  4.3   CL  105  103  100   CO2  29  27  31   GLU  194*  196*  124*   BUN  22*  22*  28*   CREA  1.10  1.17  1.61*   CA  8.8  8.2*  8.5   MG   --   2.0  1.9     Lab Results   Component Value Date/Time    Glucose (POC) 231 12/24/2017 07:27 AM    Glucose (POC) 214 12/23/2017 09:18 PM    Glucose (POC) 222 12/23/2017 09:17 PM    Glucose (POC) 188 12/23/2017 04:51 PM    Glucose (POC) 226 12/23/2017 11:29 AM        Assessment / Plan:   Acute renal insufficiency / CKD 3: resolved   -resume home lasix     Hip fracture, right ([facility] (12/21/2017):  Post mechanical fall. Non-displaced by imaging. S/P IM nail. -[facility] v[facility] SNF   -PT/OT on board  -ortho on board  -Lovenox for DVT prophylaxis at discharge     COPD / Pulmonary hypertension (6/15/2015) / Chronic respiratory failure: CXR without acute process   -continue duonebs  -LE dopplers negative for DVT   -resume home lasix     HTN / Mild aortic stenosis (6/15/2015) / CAD (coronary artery disease) (6/26/2015) / Chronic systolic CHF:  Hx of CABG. 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     PVD (peripheral vascular disease) ([facility] ():  Overview: STENT RIGHT GROIN, PAD    Total time spent with patient: [address] discussed with: Patient, RN     Discussed:  Care Plan    Prophylaxis:  Lovenox    Disposition:  [facility]           ___________________________________________________    Attending Physician: Brennan Combs MD

## 2017-12-24 NOTE — PROGRESS NOTES
Orthopaedic Progress Note  Post Op day: 2 Days Post-Op    2017 10:32 AM     Patient: Yordan Gonzalez MRN: [id number]  SSN: [id number]    YOB: 1935  Age: 81 y.o. Sex: male      Admit date:  2017  Date of Surgery:  2017   Procedures:  Procedure(s):  RIGHT INTRAMEDULLARY NAIL RIGHT HIP  Admitting Physician:  Vish Villegas MD   Surgeon:  Thang Calderon) and Role:     * Demetria Jones MD - Primary    Consulting Physician(s): Treatment Team: Attending Provider: Daisy Rodríguez MD; Consulting Provider: Sai Crawford DO; Consulting Provider: Lorena Jackman MD; Consulting Provider: Demetria Jones MD; Care Manager: Nadeen Downey    SUBJECTIVE:     Yordan Gonzalez is a 81 y.o. male is 2 Days Post-Op s/p Procedure(s):  RIGHT INTRAMEDULLARY NAIL RIGHT HIP with an appropriate level of post-operative pain. OBJECTIVE:       Physical Exam:  General: Alert, cooperative, no distress. Respiratory: Respirations unlabored  Neurological:  Neurovascular exam within normal limits. Motor: + DF/PF. Musculoskeletal: Calves soft, supple, non-tender upon palpation. Dressing/Wound:  Clean, dry and intact. No significant erythema or swelling.       Vital Signs:      Patient Vitals for the past 8 hrs:   BP Temp Pulse Resp SpO2   17 0724 103/54 97.9 °F (36.6 °C) 60 18 96 %   17 0650 - - 60 - -   17 0342 135/66 99 °F (37.2 °C) 60 20 96 %                                          Temp (24hrs), Av.5 °F (36.9 °C), Min:97.9 °F (36.6 °C), Max:99 °F (37.2 °C)      Labs:        Recent Labs      17   0545   HCT  26.7*   HGB  8.8*     Lab Results   Component Value Date/Time    Sodium 141 2017 05:45 AM    Potassium 4.9 2017 05:45 AM    Chloride 105 2017 05:45 AM    CO2 29 2017 05:45 AM    Glucose 194 2017 05:45 AM    BUN 22 2017 05:45 AM    Creatinine 1.10 2017 05:45 AM    Calcium 8.8 2017 05:45 AM PT/OT:                Patient mobility  Bed Mobility Training  Rolling: Maximum assistance  Supine to Sit: Total assistance, Assist x2  Sit to Supine: Maximum assistance, Assist x2  Scooting: Maximum assistance, Assist x1  Transfer Training  Sit to Stand: Maximum assistance, Assist x2  Stand to Sit: Moderate assistance, Assist x2                   ASSESSMENT / PLAN:   Principal Problem:    Hip fracture, right ([facility] (12/21/2017)    Active Problems:    [facility] (6/11/2015)      Hypertension ()      PVD (peripheral vascular disease) ([facility] ()      Overview: STENT RIGHT GROIN, PAD      Pulmonary hypertension (6/15/2015)      Mild aortic stenosis (6/15/2015)      CAD (coronary artery disease) (6/26/2015)      Hypothyroidism (6/28/2015)      Diabetes mellitus ([facility] (6/28/2015)      Chronic obstructive pulmonary disease ([facility] (6/28/2015)      Overview: 04/24/2015 [facility]      FVC 2.53-67%-post bronchodilator 2.61-69%-increase 3%      FEV1 1.41-52%-post bronchodilator value 1.45-54%-increase 3%      FEV1/FVC 56%      TLC 5.19-88%      RV 2.%      DLCO 45%      DLCO VA 73%      %            04/24/2015-6 minute walk test no exertional hypoxia      Chronic systolic heart failure ([facility] (9/18/2015)      Dementia (11/20/2015)      Chronic respiratory failure ([facility] (11/24/2015)      Iron deficiency anemia (11/24/2015)       A: S/P right hip IM nail POD 2    P: 1. WBAT Rt LE       2. OOB to chair and ambulate as tolerated       3. Tachycardia: workup done by Dr. Mar Murillo. Dopplers negative for DVT. Appreciate their input. 4. Lovenox for DVT ppx       5. Dispo: needs placement. Signed By:  Gary Cornell, [address] [place] [address]  Pgr.  [phone]

## 2017-12-24 NOTE — PROGRESS NOTES
Problem: Mobility Impaired (Adult and Pediatric)  Goal: *Acute Goals and Plan of Care (Insert Text)  Physical Therapy Goals  Initiated 12/22/2017  1. Patient will move from supine to sit and sit to supine , scoot up and down and roll side to side in bed with moderate assistance  within 7 day(s). 2.  Patient will transfer from bed to chair and chair to bed with moderate assistance  using the least restrictive device within 7 day(s). 3.  Patient will perform sit <> stand with moderate assistance  within 7 day(s). 4.  Patient will ambulate with moderate assistance  for 20 feet with RW within 7 day(s). physical Therapy TREATMENT  Patient: Jennifer Carrillo (47 y.o. male)  Date: 12/24/2017  Diagnosis: Hip fracture, right ([address]  RIGHT INTERTROCH FRACTURE Hip fracture, right (HCC)  Procedure(s) (LRB):  RIGHT INTRAMEDULLARY NAIL RIGHT HIP (Right) 2 Days Post-Op  Precautions: Bed Alarm, Fall, WBAT    ASSESSMENT:  Pt making good but slow progress with PT. Agreeable to work with therapy today with encouragement. Mod A x 2 to come to sitting with increased time and maximal verbal cues for hand placement. Pt hesitant to move R LE d/t stiffness and pain. Fair seated balance initially but progressed to good once upright and feet flat on floor. Mod A x 2 to come to stand by pulling up on RW and bed height elevated. Pt completed 4 sit<>stand transfers. Attempted marching in standing. Pt able to hover R foot off of ground x 5 reps. R LE buckling when weight shifting to lift L foot. Able to complete side shuffle x 2 feet at EOB with maximal verbal cues and A x 2. Returned to supine at end of session. Will need rehab.   Progression toward goals:  [x]    Improving appropriately and progressing toward goals  []    Improving slowly and progressing toward goals  []    Not making progress toward goals and plan of care will be adjusted     PLAN:  Patient continues to benefit from skilled intervention to address the above impairments. Continue treatment per established plan of care. Discharge Recommendations:  Rehab  Further Equipment Recommendations for Discharge:  TBD     SUBJECTIVE:   Patient stated Thinking about you makes me quiver.     OBJECTIVE DATA SUMMARY:   Critical Behavior:  Neurologic State: Alert  Orientation Level: Oriented X4  Cognition: Appropriate decision making, Follows commands  Safety/Judgement: Not assessed (patient too lethargic)  Functional Mobility Training:  Bed Mobility:     Supine to Sit: Moderate assistance;Assist x2  Sit to Supine: Maximum assistance;Assist x2           Transfers:  Sit to Stand: Moderate assistance;Assist x2  Stand to Sit: Moderate assistance;Assist x2                             Balance:  Sitting: Intact; With support  Sitting - Static: Good (unsupported)  Sitting - Dynamic: Good (unsupported)  Standing: Impaired  Standing - Static: Fair;Constant support  Standing - Dynamic : Poor  Ambulation/Gait Training:                                                  Activity Tolerance:   Good  Please refer to the flowsheet for vital signs taken during this treatment.   After treatment:   []    Patient left in no apparent distress sitting up in chair  [x]    Patient left in no apparent distress in bed  [x]    Call bell left within reach  [x]    Nursing notified  []    Caregiver present  [x]    Bed alarm activated    COMMUNICATION/COLLABORATION:   The patients plan of care was discussed with: Registered Nurse    Sander Yu, PT   Time Calculation: 26 mins

## 2017-12-24 NOTE — ROUTINE PROCESS
1945: Received patient bed receiving neb treatment. Patient c/o mild right hip pain, patient reposition off hip. 2117: Received patient diaphoretic but A+Ox3. Blood glucose checked twice 214 &222, patient was incontinent of urine. Patient had bed bath, received night medication and repositioned. Patient has toileted before leaving, 200 output and ice pack placed on right hip for comfort. VSS, will continue to monitor, call bell in reach. Bedside shift change report given to Alyssa Paulson (oncoming nurse) by Ricardo Shaw (offgoing nurse). Report included the following information SBAR, Kardex, Intake/Output and MAR.

## 2017-12-24 NOTE — PROCEDURES
[facility]  *** FINAL REPORT ***    Name: Rosalie Martel  MRN: [id number]    Inpatient  : 24 Aug 1935  HIS Order #: [id number]  [address] [facility] Visit #: [id number]  Date: 24 Dec 2017    TYPE OF TEST: Peripheral Venous Testing    REASON FOR TEST  Pain in limb, Limb swelling    Right Leg:-  Deep venous thrombosis:           No  Superficial venous thrombosis:    No  Deep venous insufficiency:        No  Superficial venous insufficiency: No    Left Leg:-  Deep venous thrombosis:           No  Superficial venous thrombosis:    No  Deep venous insufficiency:        No  Superficial venous insufficiency: No      INTERPRETATION/FINDINGS  PROCEDURE:  BILATERAL LE VENOUS DUPLEX. Evaluation of lower extremity veins with ultrasound (B-mode imaging,  pulsed Doppler, color Doppler). Includes the common femoral, deep  femoral, femoral, popliteal, posterior tibial, peroneal, and great  saphenous veins. FINDINGS:  Drake Prose scale and color flow duplex images of the veins in  both lower extremities demonstrate normal compressibility, spontaneous   and augmented flow profiles, and absence of filling defects  throughout the deep and superficial veins in both lower extremities. CONCLUSION:  Bilateral lower extremity venous duplex negative for deep   venous thrombosis or thrombophlebitis. ADDITIONAL COMMENTS    I have personally reviewed the data relevant to the interpretation of  this  study. TECHNOLOGIST: Quirino Sanders RVT  Signed: 2017 09:47 AM    PHYSICIAN: Emilia Cuevas.  Buck Boast, MD  Signed: 2017 07:50 AM

## 2017-12-24 NOTE — PROGRESS NOTES
12/24/2017   3:19 PM  CM spoke w/ [facility] liaison they have accepted Pt and can admit 12/26/17 and when medically stable; Dr. Caitlin Wilkerson notified. CM will follow and assist.  Bassem Olivas    12:54 PM  CM discussed Pt w/ Dr. Caitlin Wilkerson in AM, Pt progressing maybe ready for d/c in 1-2 days, SNF vs. [facility]. CM sent referral to [facility] in [place], called [facility] Percy Gillespie [phone] left  for SNF choices. CM to follow.   Bassem Olivas

## 2017-12-25 LAB
ANION GAP SERPL CALC-SCNC: 8 MMOL/L (ref 5–15)
BASOPHILS # BLD: 0 K/UL (ref 0–0.1)
BASOPHILS NFR BLD: 0 % (ref 0–1)
BNP SERPL-MCNC: 7698 PG/ML (ref 0–450)
BUN SERPL-MCNC: 29 MG/DL (ref 6–20)
BUN/CREAT SERPL: 24 (ref 12–20)
CALCIUM SERPL-MCNC: 9 MG/DL (ref 8.5–10.1)
CHLORIDE SERPL-SCNC: 101 MMOL/L (ref 97–108)
CO2 SERPL-SCNC: 28 MMOL/L (ref 21–32)
CREAT SERPL-MCNC: 1.22 MG/DL (ref 0.7–1.3)
EOSINOPHIL # BLD: 0.7 K/UL (ref 0–0.4)
EOSINOPHIL NFR BLD: 10 % (ref 0–7)
ERYTHROCYTE [DISTWIDTH] IN BLOOD BY AUTOMATED COUNT: 14 % (ref 11.5–14.5)
GLUCOSE BLD STRIP.AUTO-MCNC: 122 MG/DL (ref 65–100)
GLUCOSE BLD STRIP.AUTO-MCNC: 208 MG/DL (ref 65–100)
GLUCOSE BLD STRIP.AUTO-MCNC: 211 MG/DL (ref 65–100)
GLUCOSE BLD STRIP.AUTO-MCNC: 296 MG/DL (ref 65–100)
GLUCOSE SERPL-MCNC: 317 MG/DL (ref 65–100)
HCT VFR BLD AUTO: 26.8 % (ref 36.6–50.3)
HGB BLD-MCNC: 8.7 G/DL (ref 12.1–17)
LYMPHOCYTES # BLD: 0.9 K/UL (ref 0.8–3.5)
LYMPHOCYTES NFR BLD: 12 % (ref 12–49)
MAGNESIUM SERPL-MCNC: 1.8 MG/DL (ref 1.6–2.4)
MCH RBC QN AUTO: 30.6 PG (ref 26–34)
MCHC RBC AUTO-ENTMCNC: 32.5 G/DL (ref 30–36.5)
MCV RBC AUTO: 94.4 FL (ref 80–99)
MONOCYTES # BLD: 0.5 K/UL (ref 0–1)
MONOCYTES NFR BLD: 6 % (ref 5–13)
NEUTS SEG # BLD: 5.1 K/UL (ref 1.8–8)
NEUTS SEG NFR BLD: 72 % (ref 32–75)
PLATELET # BLD AUTO: 140 K/UL (ref 150–400)
POTASSIUM SERPL-SCNC: 4.5 MMOL/L (ref 3.5–5.1)
RBC # BLD AUTO: 2.84 M/UL (ref 4.1–5.7)
SERVICE CMNT-IMP: ABNORMAL
SODIUM SERPL-SCNC: 137 MMOL/L (ref 136–145)
WBC # BLD AUTO: 7.1 K/UL (ref 4.1–11.1)

## 2017-12-25 PROCEDURE — 83880 ASSAY OF NATRIURETIC PEPTIDE: CPT | Performed by: INTERNAL MEDICINE

## 2017-12-25 PROCEDURE — 83735 ASSAY OF MAGNESIUM: CPT | Performed by: INTERNAL MEDICINE

## 2017-12-25 PROCEDURE — 77010033678 HC OXYGEN DAILY

## 2017-12-25 PROCEDURE — 36415 COLL VENOUS BLD VENIPUNCTURE: CPT | Performed by: INTERNAL MEDICINE

## 2017-12-25 PROCEDURE — 82962 GLUCOSE BLOOD TEST: CPT

## 2017-12-25 PROCEDURE — 74011636637 HC RX REV CODE- 636/637: Performed by: INTERNAL MEDICINE

## 2017-12-25 PROCEDURE — 74011250637 HC RX REV CODE- 250/637: Performed by: INTERNAL MEDICINE

## 2017-12-25 PROCEDURE — 94640 AIRWAY INHALATION TREATMENT: CPT

## 2017-12-25 PROCEDURE — 74011250636 HC RX REV CODE- 250/636: Performed by: INTERNAL MEDICINE

## 2017-12-25 PROCEDURE — 74011000250 HC RX REV CODE- 250: Performed by: NURSE PRACTITIONER

## 2017-12-25 PROCEDURE — 80048 BASIC METABOLIC PNL TOTAL CA: CPT | Performed by: INTERNAL MEDICINE

## 2017-12-25 PROCEDURE — 74011250636 HC RX REV CODE- 250/636: Performed by: ORTHOPAEDIC SURGERY

## 2017-12-25 PROCEDURE — 74011250637 HC RX REV CODE- 250/637: Performed by: ORTHOPAEDIC SURGERY

## 2017-12-25 PROCEDURE — 85025 COMPLETE CBC W/AUTO DIFF WBC: CPT | Performed by: INTERNAL MEDICINE

## 2017-12-25 PROCEDURE — 74011250637 HC RX REV CODE- 250/637: Performed by: NURSE PRACTITIONER

## 2017-12-25 PROCEDURE — 65660000000 HC RM CCU STEPDOWN

## 2017-12-25 RX ORDER — GLIPIZIDE 5 MG/1
10 TABLET ORAL
Status: DISCONTINUED | OUTPATIENT
Start: 2017-12-26 | End: 2017-12-25

## 2017-12-25 RX ORDER — GLIPIZIDE 5 MG/1
5 TABLET ORAL
Status: DISCONTINUED | OUTPATIENT
Start: 2017-12-25 | End: 2017-12-27 | Stop reason: HOSPADM

## 2017-12-25 RX ORDER — FUROSEMIDE 10 MG/ML
40 INJECTION INTRAMUSCULAR; INTRAVENOUS 2 TIMES DAILY
Status: DISCONTINUED | OUTPATIENT
Start: 2017-12-25 | End: 2017-12-26

## 2017-12-25 RX ADMIN — INSULIN LISPRO 5 UNITS: 100 INJECTION, SOLUTION INTRAVENOUS; SUBCUTANEOUS at 11:34

## 2017-12-25 RX ADMIN — POLYETHYLENE GLYCOL (3350) 17 G: 17 POWDER, FOR SOLUTION ORAL at 07:54

## 2017-12-25 RX ADMIN — GUAIFENESIN 600 MG: 600 TABLET, EXTENDED RELEASE ORAL at 07:53

## 2017-12-25 RX ADMIN — Medication 10 ML: at 06:46

## 2017-12-25 RX ADMIN — PANTOPRAZOLE SODIUM 40 MG: 40 TABLET, DELAYED RELEASE ORAL at 06:45

## 2017-12-25 RX ADMIN — MORPHINE SULFATE 2 MG: 2 INJECTION, SOLUTION INTRAMUSCULAR; INTRAVENOUS at 18:07

## 2017-12-25 RX ADMIN — ACETAMINOPHEN 650 MG: 325 TABLET ORAL at 11:34

## 2017-12-25 RX ADMIN — GLIPIZIDE 5 MG: 5 TABLET ORAL at 17:58

## 2017-12-25 RX ADMIN — ACETAMINOPHEN 650 MG: 325 TABLET ORAL at 17:58

## 2017-12-25 RX ADMIN — TAMSULOSIN HYDROCHLORIDE 0.4 MG: 0.4 CAPSULE ORAL at 19:42

## 2017-12-25 RX ADMIN — DOCUSATE SODIUM AND SENNOSIDES 1 TABLET: 8.6; 5 TABLET, FILM COATED ORAL at 17:58

## 2017-12-25 RX ADMIN — IPRATROPIUM BROMIDE AND ALBUTEROL SULFATE 3 ML: .5; 3 SOLUTION RESPIRATORY (INHALATION) at 23:21

## 2017-12-25 RX ADMIN — TRAMADOL HYDROCHLORIDE 50 MG: 50 TABLET, FILM COATED ORAL at 19:42

## 2017-12-25 RX ADMIN — ESCITALOPRAM OXALATE 20 MG: 10 TABLET ORAL at 07:53

## 2017-12-25 RX ADMIN — Medication 10 ML: at 11:35

## 2017-12-25 RX ADMIN — IPRATROPIUM BROMIDE AND ALBUTEROL SULFATE 3 ML: .5; 3 SOLUTION RESPIRATORY (INHALATION) at 19:25

## 2017-12-25 RX ADMIN — FLUTICASONE PROPIONATE 2 SPRAY: 50 SPRAY, METERED NASAL at 07:58

## 2017-12-25 RX ADMIN — INSULIN LISPRO 3 UNITS: 100 INJECTION, SOLUTION INTRAVENOUS; SUBCUTANEOUS at 07:54

## 2017-12-25 RX ADMIN — INSULIN LISPRO 2 UNITS: 100 INJECTION, SOLUTION INTRAVENOUS; SUBCUTANEOUS at 21:45

## 2017-12-25 RX ADMIN — FINASTERIDE 5 MG: 5 TABLET, FILM COATED ORAL at 17:58

## 2017-12-25 RX ADMIN — DONEPEZIL HYDROCHLORIDE 10 MG: 5 TABLET, FILM COATED ORAL at 09:30

## 2017-12-25 RX ADMIN — OYSTER SHELL CALCIUM WITH VITAMIN D 1 TABLET: 500; 200 TABLET, FILM COATED ORAL at 07:54

## 2017-12-25 RX ADMIN — Medication 10 ML: at 00:12

## 2017-12-25 RX ADMIN — FUROSEMIDE 40 MG: 10 INJECTION, SOLUTION INTRAMUSCULAR; INTRAVENOUS at 17:58

## 2017-12-25 RX ADMIN — FERROUS SULFATE TAB 325 MG (65 MG ELEMENTAL FE) 325 MG: 325 (65 FE) TAB at 07:54

## 2017-12-25 RX ADMIN — IPRATROPIUM BROMIDE AND ALBUTEROL SULFATE 3 ML: .5; 3 SOLUTION RESPIRATORY (INHALATION) at 07:38

## 2017-12-25 RX ADMIN — MORPHINE SULFATE 2 MG: 2 INJECTION, SOLUTION INTRAMUSCULAR; INTRAVENOUS at 00:12

## 2017-12-25 RX ADMIN — ACETAMINOPHEN 650 MG: 325 TABLET ORAL at 06:45

## 2017-12-25 RX ADMIN — GUAIFENESIN 600 MG: 600 TABLET, EXTENDED RELEASE ORAL at 19:41

## 2017-12-25 RX ADMIN — GLIPIZIDE 5 MG: 5 TABLET ORAL at 07:52

## 2017-12-25 RX ADMIN — LEVOTHYROXINE SODIUM 125 MCG: 100 TABLET ORAL at 06:45

## 2017-12-25 RX ADMIN — FUROSEMIDE 40 MG: 40 TABLET ORAL at 06:45

## 2017-12-25 RX ADMIN — VITAMIN D, TAB 1000IU (100/BT) 3000 UNITS: 25 TAB at 07:52

## 2017-12-25 RX ADMIN — ENOXAPARIN SODIUM 40 MG: 40 INJECTION SUBCUTANEOUS at 17:58

## 2017-12-25 RX ADMIN — QUETIAPINE FUMARATE 50 MG: 25 TABLET ORAL at 07:53

## 2017-12-25 RX ADMIN — ACETAMINOPHEN 650 MG: 325 TABLET ORAL at 23:57

## 2017-12-25 RX ADMIN — Medication 500 MG: at 09:30

## 2017-12-25 RX ADMIN — DOCUSATE SODIUM AND SENNOSIDES 1 TABLET: 8.6; 5 TABLET, FILM COATED ORAL at 07:53

## 2017-12-25 RX ADMIN — IPRATROPIUM BROMIDE AND ALBUTEROL SULFATE 3 ML: .5; 3 SOLUTION RESPIRATORY (INHALATION) at 15:01

## 2017-12-25 RX ADMIN — OYSTER SHELL CALCIUM WITH VITAMIN D 1 TABLET: 500; 200 TABLET, FILM COATED ORAL at 11:34

## 2017-12-25 RX ADMIN — OYSTER SHELL CALCIUM WITH VITAMIN D 1 TABLET: 500; 200 TABLET, FILM COATED ORAL at 17:58

## 2017-12-25 NOTE — PROGRESS NOTES
12/25/2017 10:37 AM [facility] has accepted pt for admission on 12/26 per CM note on 12/24. Called and spoke with pt's daughter, Melita Gonzales at [phone] and relayed [facility] has accepted. Pt's daughter was agreeable but requested pt go to [facility] in [place] due to location to family. CM called and lvm with [facility] liaison regarding pt's request for placement at [facility]. CM will follow up on 12/26.  FRANCK Cole

## 2017-12-25 NOTE — PROGRESS NOTES
Patient coughed up a plug of blood right before shift change. I advised Griselda, night shift. Bedside shift change report given to Griselda (oncoming nurse) by Emelia Marcelino (offgoing nurse). Report included the following information SBAR, Kardex, Procedure Summary, MAR and Recent Results.

## 2017-12-25 NOTE — PROGRESS NOTES
Bedside shift change report given to Greg Krishnamurthy RN (oncoming nurse) by Donnie Soulier (offgoing nurse). Report included the following information SBAR, Kardex, OR Summary, Intake/Output and MAR.

## 2017-12-25 NOTE — PROGRESS NOTES
Bedside and Verbal shift change report given to Pj Keller RN (oncoming nurse) by Gemma Davis RN (offgoing nurse). Report included the following information SBAR, Kardex, Procedure Summary, Intake/Output, MAR, Accordion and Recent Results.

## 2017-12-26 LAB
ANION GAP SERPL CALC-SCNC: 7 MMOL/L (ref 5–15)
BASOPHILS # BLD: 0 K/UL (ref 0–0.1)
BASOPHILS NFR BLD: 0 % (ref 0–1)
BUN SERPL-MCNC: 35 MG/DL (ref 6–20)
BUN/CREAT SERPL: 27 (ref 12–20)
CALCIUM SERPL-MCNC: 9.1 MG/DL (ref 8.5–10.1)
CHLORIDE SERPL-SCNC: 103 MMOL/L (ref 97–108)
CO2 SERPL-SCNC: 29 MMOL/L (ref 21–32)
CREAT SERPL-MCNC: 1.29 MG/DL (ref 0.7–1.3)
EOSINOPHIL # BLD: 0.8 K/UL (ref 0–0.4)
EOSINOPHIL NFR BLD: 10 % (ref 0–7)
ERYTHROCYTE [DISTWIDTH] IN BLOOD BY AUTOMATED COUNT: 14 % (ref 11.5–14.5)
GLUCOSE BLD STRIP.AUTO-MCNC: 109 MG/DL (ref 65–100)
GLUCOSE BLD STRIP.AUTO-MCNC: 156 MG/DL (ref 65–100)
GLUCOSE BLD STRIP.AUTO-MCNC: 157 MG/DL (ref 65–100)
GLUCOSE BLD STRIP.AUTO-MCNC: 311 MG/DL (ref 65–100)
GLUCOSE SERPL-MCNC: 192 MG/DL (ref 65–100)
HCT VFR BLD AUTO: 27.3 % (ref 36.6–50.3)
HGB BLD-MCNC: 8.8 G/DL (ref 12.1–17)
LYMPHOCYTES # BLD: 1.7 K/UL (ref 0.8–3.5)
LYMPHOCYTES NFR BLD: 21 % (ref 12–49)
MAGNESIUM SERPL-MCNC: 1.9 MG/DL (ref 1.6–2.4)
MCH RBC QN AUTO: 30.4 PG (ref 26–34)
MCHC RBC AUTO-ENTMCNC: 32.2 G/DL (ref 30–36.5)
MCV RBC AUTO: 94.5 FL (ref 80–99)
MONOCYTES # BLD: 0.5 K/UL (ref 0–1)
MONOCYTES NFR BLD: 7 % (ref 5–13)
NEUTS SEG # BLD: 4.8 K/UL (ref 1.8–8)
NEUTS SEG NFR BLD: 62 % (ref 32–75)
PLATELET # BLD AUTO: 151 K/UL (ref 150–400)
POTASSIUM SERPL-SCNC: 4.4 MMOL/L (ref 3.5–5.1)
RBC # BLD AUTO: 2.89 M/UL (ref 4.1–5.7)
SERVICE CMNT-IMP: ABNORMAL
SODIUM SERPL-SCNC: 139 MMOL/L (ref 136–145)
WBC # BLD AUTO: 7.8 K/UL (ref 4.1–11.1)

## 2017-12-26 PROCEDURE — 65660000000 HC RM CCU STEPDOWN

## 2017-12-26 PROCEDURE — 97116 GAIT TRAINING THERAPY: CPT

## 2017-12-26 PROCEDURE — 36415 COLL VENOUS BLD VENIPUNCTURE: CPT | Performed by: INTERNAL MEDICINE

## 2017-12-26 PROCEDURE — 74011250637 HC RX REV CODE- 250/637: Performed by: NURSE PRACTITIONER

## 2017-12-26 PROCEDURE — 85025 COMPLETE CBC W/AUTO DIFF WBC: CPT | Performed by: INTERNAL MEDICINE

## 2017-12-26 PROCEDURE — 83735 ASSAY OF MAGNESIUM: CPT | Performed by: INTERNAL MEDICINE

## 2017-12-26 PROCEDURE — 74011250637 HC RX REV CODE- 250/637: Performed by: INTERNAL MEDICINE

## 2017-12-26 PROCEDURE — 97530 THERAPEUTIC ACTIVITIES: CPT

## 2017-12-26 PROCEDURE — 74011250636 HC RX REV CODE- 250/636: Performed by: INTERNAL MEDICINE

## 2017-12-26 PROCEDURE — 74011000250 HC RX REV CODE- 250: Performed by: NURSE PRACTITIONER

## 2017-12-26 PROCEDURE — 82962 GLUCOSE BLOOD TEST: CPT

## 2017-12-26 PROCEDURE — 77030012935 HC DRSG AQUACEL BMS -B

## 2017-12-26 PROCEDURE — 80048 BASIC METABOLIC PNL TOTAL CA: CPT | Performed by: INTERNAL MEDICINE

## 2017-12-26 PROCEDURE — 77010033678 HC OXYGEN DAILY

## 2017-12-26 PROCEDURE — 74011636637 HC RX REV CODE- 636/637: Performed by: INTERNAL MEDICINE

## 2017-12-26 PROCEDURE — 94640 AIRWAY INHALATION TREATMENT: CPT

## 2017-12-26 PROCEDURE — 74011250636 HC RX REV CODE- 250/636: Performed by: ORTHOPAEDIC SURGERY

## 2017-12-26 PROCEDURE — 74011250637 HC RX REV CODE- 250/637: Performed by: ORTHOPAEDIC SURGERY

## 2017-12-26 RX ORDER — GLIPIZIDE 5 MG/1
5 TABLET ORAL 2 TIMES DAILY
Qty: 60 TAB | Refills: 0 | Status: SHIPPED
Start: 2017-12-26 | End: 2017-12-27

## 2017-12-26 RX ORDER — FUROSEMIDE 40 MG/1
40 TABLET ORAL
Status: DISCONTINUED | OUTPATIENT
Start: 2017-12-26 | End: 2017-12-27 | Stop reason: HOSPADM

## 2017-12-26 RX ORDER — CARVEDILOL 3.12 MG/1
3.12 TABLET ORAL 2 TIMES DAILY WITH MEALS
Status: DISCONTINUED | OUTPATIENT
Start: 2017-12-27 | End: 2017-12-27 | Stop reason: HOSPADM

## 2017-12-26 RX ORDER — ENOXAPARIN SODIUM 100 MG/ML
40 INJECTION SUBCUTANEOUS DAILY
Qty: 10.4 ML | Refills: 0 | Status: SHIPPED
Start: 2017-12-26 | End: 2018-01-21

## 2017-12-26 RX ORDER — MAGNESIUM CITRATE
296 SOLUTION, ORAL ORAL
Status: COMPLETED | OUTPATIENT
Start: 2017-12-26 | End: 2017-12-26

## 2017-12-26 RX ORDER — TRAMADOL HYDROCHLORIDE 50 MG/1
50 TABLET ORAL
Qty: 30 TAB | Refills: 0 | Status: SHIPPED | OUTPATIENT
Start: 2017-12-26 | End: 2017-12-27

## 2017-12-26 RX ORDER — IPRATROPIUM BROMIDE AND ALBUTEROL SULFATE 2.5; .5 MG/3ML; MG/3ML
3 SOLUTION RESPIRATORY (INHALATION)
Status: DISCONTINUED | OUTPATIENT
Start: 2017-12-26 | End: 2017-12-27 | Stop reason: HOSPADM

## 2017-12-26 RX ORDER — FERROUS SULFATE, DRIED 160(50) MG
1 TABLET, EXTENDED RELEASE ORAL
Qty: 90 TAB | Refills: 0 | Status: SHIPPED
Start: 2017-12-26 | End: 2017-12-27

## 2017-12-26 RX ORDER — QUETIAPINE FUMARATE 100 MG/1
50 TABLET, FILM COATED ORAL DAILY
Qty: 15 TAB | Refills: 0 | Status: SHIPPED | OUTPATIENT
Start: 2017-12-26 | End: 2019-02-27

## 2017-12-26 RX ADMIN — MAGESIUM CITRATE 296 ML: 1.75 LIQUID ORAL at 10:41

## 2017-12-26 RX ADMIN — OYSTER SHELL CALCIUM WITH VITAMIN D 1 TABLET: 500; 200 TABLET, FILM COATED ORAL at 11:21

## 2017-12-26 RX ADMIN — OYSTER SHELL CALCIUM WITH VITAMIN D 1 TABLET: 500; 200 TABLET, FILM COATED ORAL at 16:49

## 2017-12-26 RX ADMIN — OYSTER SHELL CALCIUM WITH VITAMIN D 1 TABLET: 500; 200 TABLET, FILM COATED ORAL at 08:00

## 2017-12-26 RX ADMIN — TRAMADOL HYDROCHLORIDE 50 MG: 50 TABLET, FILM COATED ORAL at 23:12

## 2017-12-26 RX ADMIN — FERROUS SULFATE TAB 325 MG (65 MG ELEMENTAL FE) 325 MG: 325 (65 FE) TAB at 09:06

## 2017-12-26 RX ADMIN — DONEPEZIL HYDROCHLORIDE 10 MG: 5 TABLET, FILM COATED ORAL at 09:05

## 2017-12-26 RX ADMIN — ENOXAPARIN SODIUM 40 MG: 40 INJECTION SUBCUTANEOUS at 18:25

## 2017-12-26 RX ADMIN — GLIPIZIDE 5 MG: 5 TABLET ORAL at 16:49

## 2017-12-26 RX ADMIN — FUROSEMIDE 40 MG: 40 TABLET ORAL at 16:49

## 2017-12-26 RX ADMIN — IPRATROPIUM BROMIDE AND ALBUTEROL SULFATE 3 ML: .5; 3 SOLUTION RESPIRATORY (INHALATION) at 07:28

## 2017-12-26 RX ADMIN — VITAMIN D, TAB 1000IU (100/BT) 3000 UNITS: 25 TAB at 09:05

## 2017-12-26 RX ADMIN — ACETAMINOPHEN 650 MG: 325 TABLET ORAL at 18:25

## 2017-12-26 RX ADMIN — POLYETHYLENE GLYCOL (3350) 17 G: 17 POWDER, FOR SOLUTION ORAL at 10:41

## 2017-12-26 RX ADMIN — Medication 10 ML: at 21:43

## 2017-12-26 RX ADMIN — MORPHINE SULFATE 2 MG: 2 INJECTION, SOLUTION INTRAMUSCULAR; INTRAVENOUS at 11:22

## 2017-12-26 RX ADMIN — TAMSULOSIN HYDROCHLORIDE 0.4 MG: 0.4 CAPSULE ORAL at 21:43

## 2017-12-26 RX ADMIN — ACETAMINOPHEN 650 MG: 325 TABLET ORAL at 11:21

## 2017-12-26 RX ADMIN — FINASTERIDE 5 MG: 5 TABLET, FILM COATED ORAL at 18:25

## 2017-12-26 RX ADMIN — GUAIFENESIN 600 MG: 600 TABLET, EXTENDED RELEASE ORAL at 21:43

## 2017-12-26 RX ADMIN — ESCITALOPRAM OXALATE 20 MG: 10 TABLET ORAL at 09:05

## 2017-12-26 RX ADMIN — PANTOPRAZOLE SODIUM 40 MG: 40 TABLET, DELAYED RELEASE ORAL at 05:52

## 2017-12-26 RX ADMIN — LEVOTHYROXINE SODIUM 125 MCG: 100 TABLET ORAL at 05:52

## 2017-12-26 RX ADMIN — ACETAMINOPHEN 650 MG: 325 TABLET ORAL at 23:12

## 2017-12-26 RX ADMIN — DOCUSATE SODIUM AND SENNOSIDES 1 TABLET: 8.6; 5 TABLET, FILM COATED ORAL at 18:25

## 2017-12-26 RX ADMIN — Medication 500 MG: at 10:41

## 2017-12-26 RX ADMIN — FLUTICASONE PROPIONATE 2 SPRAY: 50 SPRAY, METERED NASAL at 09:11

## 2017-12-26 RX ADMIN — Medication 10 ML: at 16:50

## 2017-12-26 RX ADMIN — FUROSEMIDE 40 MG: 10 INJECTION, SOLUTION INTRAMUSCULAR; INTRAVENOUS at 09:05

## 2017-12-26 RX ADMIN — INSULIN LISPRO 2 UNITS: 100 INJECTION, SOLUTION INTRAVENOUS; SUBCUTANEOUS at 09:05

## 2017-12-26 RX ADMIN — INSULIN LISPRO 7 UNITS: 100 INJECTION, SOLUTION INTRAVENOUS; SUBCUTANEOUS at 11:22

## 2017-12-26 RX ADMIN — ACETAMINOPHEN 650 MG: 325 TABLET ORAL at 05:52

## 2017-12-26 RX ADMIN — GLIPIZIDE 5 MG: 5 TABLET ORAL at 05:52

## 2017-12-26 RX ADMIN — QUETIAPINE FUMARATE 50 MG: 25 TABLET ORAL at 09:04

## 2017-12-26 RX ADMIN — TRAMADOL HYDROCHLORIDE 50 MG: 50 TABLET, FILM COATED ORAL at 16:53

## 2017-12-26 RX ADMIN — GUAIFENESIN 600 MG: 600 TABLET, EXTENDED RELEASE ORAL at 09:06

## 2017-12-26 RX ADMIN — DOCUSATE SODIUM AND SENNOSIDES 1 TABLET: 8.6; 5 TABLET, FILM COATED ORAL at 09:05

## 2017-12-26 NOTE — PROGRESS NOTES
12/26/2017 3:23 PM Received message from [facility] liaison, they cannot accept pt due to him receiving iv pain medicine this morning, pt will need to be 24 hours without iv pain medicine. [facility] will accept pt on 12/27. Pt's attending and daughter were notified of delay in discharge. 12/26/2017  11:11 AM Pt will discharge today to [facility] at [facility]. Pt's daughter is aware and agreeable. [facility] cannot accept pt until after 3PM today. Nursing please call report to [phone].    12/26/2017  8:55 AM Spoke with [facility] liaison, who is checking bed availability at [facility] today. CM will follow up.  FRANCK Calloway

## 2017-12-26 NOTE — PROGRESS NOTES
Problem: Mobility Impaired (Adult and Pediatric)  Goal: *Acute Goals and Plan of Care (Insert Text)  Physical Therapy Goals  Initiated 12/22/2017  1. Patient will move from supine to sit and sit to supine , scoot up and down and roll side to side in bed with moderate assistance  within 7 day(s). 2.  Patient will transfer from bed to chair and chair to bed with moderate assistance  using the least restrictive device within 7 day(s). 3.  Patient will perform sit <> stand with moderate assistance  within 7 day(s). 4.  Patient will ambulate with moderate assistance  for 20 feet with RW within 7 day(s). physical Therapy TREATMENT  Patient: Yani Hassan (89 y.o. male)  Date: 12/26/2017  Diagnosis: Hip fracture, right ([address]  RIGHT INTERTROCH FRACTURE Hip fracture, right (HCC)  Procedure(s) (LRB):  RIGHT INTRAMEDULLARY NAIL RIGHT HIP (Right) 4 Days Post-Op  Precautions: Bed Alarm, Fall, WBAT    ASSESSMENT:  OPT received in bed, agreeable to participate with physical therapy with encouragement. increased time for all mobility tasks with verbal cues for sequencing and hand placement. Fair sitting balance sitting EOB with verbal and tactile cues to initiate fwd lean for increased WB thru BLE. Sit<>stand with Max A x2 using RW for steadying, verbal cues for upright posture. Performed standing lateral wt shifting for improved tolerance for WB on RLE  using RW for steadying with Max A. Side stepping toward HOB with Max A x2 and max verbal cues for sequencing. Pt fatigued with effort and returned to bed, bed placed in chair position. Performed SAQ BLE with bed. Progression toward goals:  []    Improving appropriately and progressing toward goals  [x]    Improving slowly and progressing toward goals  []    Not making progress toward goals and plan of care will be adjusted     PLAN:  Patient continues to benefit from skilled intervention to address the above impairments.   Continue treatment per established plan of care.  Discharge Recommendations:  Rehab  Further Equipment Recommendations for Discharge:  TBD at rehab     SUBJECTIVE:   Patient stated I have pain when I move.     OBJECTIVE DATA SUMMARY:   Critical Behavior:  Neurologic State: Alert  Orientation Level: Oriented X4  Cognition: Appropriate decision making, Appropriate for age attention/concentration, Appropriate safety awareness, Follows commands  Safety/Judgement: Not assessed (patient too lethargic)  Functional Mobility Training:  Bed Mobility:  Rolling: Moderate assistance;Assist x2; Additional time  Supine to Sit: Moderate assistance;Assist x2; Additional time  Sit to Supine: Maximum assistance; Additional time;Assist x2           Transfers:  Sit to Stand: Maximum assistance;Assist x2; Additional time  Stand to Sit: Maximum assistance;Assist x2                             Balance:  Sitting: High guard  Sitting - Static: Fair (occasional)  Sitting - Dynamic: Fair (occasional)  Standing: Impaired; With support  Standing - Static: Fair  Standing - Dynamic : Poor  Ambulation/Gait Training:  Distance (ft): 3 Feet (ft)  Assistive Device: Walker, rolling;Gait belt  Ambulation - Level of Assistance: Maximum assistance;Assist x2; Additional time        Gait Abnormalities: Decreased step clearance;Shuffling gait; Antalgic        Base of Support: Center of gravity altered;Shift to left     Speed/Kamila: Slow                       Stairs:            Neuro Re-Education:    Therapeutic Exercises:     Pain:  Pain Scale 1: Numeric (0 - 10)  Pain Intensity 1: 0              Activity Tolerance:   Good  Please refer to the flowsheet for vital signs taken during this treatment.   After treatment:   []    Patient left in no apparent distress sitting up in chair  [x]    Patient left in no apparent distress in bed  [x]    Call bell left within reach  [x]    Nursing notified  []    Caregiver present  [x]    Bed alarm activated    COMMUNICATION/COLLABORATION:   The patients plan of care was discussed with: Registered Nurse    Mercedes Dumont   Time Calculation: 30 mins

## 2017-12-26 NOTE — PROGRESS NOTES
Problem: Falls - Risk of  Goal: *Absence of Falls  Document Abraham Fall Risk and appropriate interventions in the flowsheet.    Outcome: Progressing Towards Goal  Fall Risk Interventions:  Mobility Interventions: Bed/chair exit alarm, Communicate number of staff needed for ambulation/transfer, Patient to call before getting OOB, Utilize walker, cane, or other assitive device    Mentation Interventions: Bed/chair exit alarm, Adequate sleep, hydration, pain control, More frequent rounding, Reorient patient, Room close to nurse's station    Medication Interventions: Bed/chair exit alarm, Patient to call before getting OOB, Teach patient to arise slowly    Elimination Interventions: Bed/chair exit alarm, Call light in reach, Patient to call for help with toileting needs, Toileting schedule/hourly rounds, Urinal in reach    History of Falls Interventions: Bed/chair exit alarm, Door open when patient unattended, Room close to nurse's station

## 2017-12-26 NOTE — PROGRESS NOTES
Bedside and Verbal shift change report given to Ricarda Morocho RN (oncoming nurse) by Lien Velarde RN (offgoing nurse). Report included the following information SBAR, Kardex, Procedure Summary, Intake/Output, MAR, Accordion and Recent Results.

## 2017-12-27 ENCOUNTER — HOSPITAL ENCOUNTER (OUTPATIENT)
Age: 82
Discharge: OTHER INSTITUTION | End: 2018-01-04
Attending: PHYSICAL MEDICINE & REHABILITATION | Admitting: PHYSICAL MEDICINE & REHABILITATION

## 2017-12-27 VITALS
DIASTOLIC BLOOD PRESSURE: 58 MMHG | TEMPERATURE: 97.9 F | BODY MASS INDEX: 23.7 KG/M2 | RESPIRATION RATE: 16 BRPM | OXYGEN SATURATION: 98 % | HEIGHT: 69 IN | WEIGHT: 160 LBS | HEART RATE: 60 BPM | SYSTOLIC BLOOD PRESSURE: 110 MMHG

## 2017-12-27 LAB
ALBUMIN SERPL-MCNC: 2.6 G/DL (ref 3.5–5)
ALBUMIN/GLOB SERPL: 0.7 {RATIO} (ref 1.1–2.2)
ALP SERPL-CCNC: 86 U/L (ref 45–117)
ALT SERPL-CCNC: 28 U/L (ref 12–78)
ANION GAP SERPL CALC-SCNC: 3 MMOL/L (ref 5–15)
APPEARANCE UR: CLEAR
AST SERPL-CCNC: 39 U/L (ref 15–37)
BACTERIA URNS QL MICRO: NEGATIVE /HPF
BASOPHILS # BLD: 0 K/UL (ref 0–0.1)
BASOPHILS NFR BLD: 0 % (ref 0–1)
BILIRUB DIRECT SERPL-MCNC: 0.2 MG/DL (ref 0–0.2)
BILIRUB SERPL-MCNC: 0.6 MG/DL (ref 0.2–1)
BILIRUB UR QL: NEGATIVE
BNP SERPL-MCNC: 3539 PG/ML (ref 0–450)
BUN SERPL-MCNC: 34 MG/DL (ref 6–20)
BUN/CREAT SERPL: 30 (ref 12–20)
CALCIUM SERPL-MCNC: 9.2 MG/DL (ref 8.5–10.1)
CHLORIDE SERPL-SCNC: 101 MMOL/L (ref 97–108)
CO2 SERPL-SCNC: 34 MMOL/L (ref 21–32)
COLOR UR: NORMAL
CREAT SERPL-MCNC: 1.14 MG/DL (ref 0.7–1.3)
EOSINOPHIL # BLD: 0.8 K/UL (ref 0–0.4)
EOSINOPHIL NFR BLD: 13 % (ref 0–7)
EPITH CASTS URNS QL MICRO: NORMAL /LPF
ERYTHROCYTE [DISTWIDTH] IN BLOOD BY AUTOMATED COUNT: 14.1 % (ref 11.5–14.5)
GLOBULIN SER CALC-MCNC: 3.6 G/DL (ref 2–4)
GLUCOSE BLD STRIP.AUTO-MCNC: 148 MG/DL (ref 65–100)
GLUCOSE BLD STRIP.AUTO-MCNC: 228 MG/DL (ref 65–100)
GLUCOSE SERPL-MCNC: 139 MG/DL (ref 65–100)
GLUCOSE UR STRIP.AUTO-MCNC: NEGATIVE MG/DL
HCT VFR BLD AUTO: 26.1 % (ref 36.6–50.3)
HGB BLD-MCNC: 8.3 G/DL (ref 12.1–17)
HGB UR QL STRIP: NEGATIVE
HYALINE CASTS URNS QL MICRO: NORMAL /LPF (ref 0–5)
KETONES UR QL STRIP.AUTO: NEGATIVE MG/DL
LEUKOCYTE ESTERASE UR QL STRIP.AUTO: NEGATIVE
LYMPHOCYTES # BLD: 1.1 K/UL (ref 0.8–3.5)
LYMPHOCYTES NFR BLD: 17 % (ref 12–49)
MAGNESIUM SERPL-MCNC: 2.1 MG/DL (ref 1.6–2.4)
MCH RBC QN AUTO: 31.1 PG (ref 26–34)
MCHC RBC AUTO-ENTMCNC: 31.8 G/DL (ref 30–36.5)
MCV RBC AUTO: 97.8 FL (ref 80–99)
MONOCYTES # BLD: 0.7 K/UL (ref 0–1)
MONOCYTES NFR BLD: 12 % (ref 5–13)
NEUTS SEG # BLD: 3.7 K/UL (ref 1.8–8)
NEUTS SEG NFR BLD: 58 % (ref 32–75)
NITRITE UR QL STRIP.AUTO: NEGATIVE
PH UR STRIP: 5.5 [PH] (ref 5–8)
PLATELET # BLD AUTO: 187 K/UL (ref 150–400)
POTASSIUM SERPL-SCNC: 4.5 MMOL/L (ref 3.5–5.1)
PROT SERPL-MCNC: 6.2 G/DL (ref 6.4–8.2)
PROT UR STRIP-MCNC: NEGATIVE MG/DL
RBC # BLD AUTO: 2.67 M/UL (ref 4.1–5.7)
RBC #/AREA URNS HPF: NORMAL /HPF (ref 0–5)
SERVICE CMNT-IMP: ABNORMAL
SERVICE CMNT-IMP: ABNORMAL
SODIUM SERPL-SCNC: 138 MMOL/L (ref 136–145)
SP GR UR REFRACTOMETRY: 1.02 (ref 1–1.03)
UROBILINOGEN UR QL STRIP.AUTO: 0.2 EU/DL (ref 0.2–1)
WBC # BLD AUTO: 6.3 K/UL (ref 4.1–11.1)
WBC URNS QL MICRO: NORMAL /HPF (ref 0–4)

## 2017-12-27 PROCEDURE — 97530 THERAPEUTIC ACTIVITIES: CPT

## 2017-12-27 PROCEDURE — 83735 ASSAY OF MAGNESIUM: CPT | Performed by: INTERNAL MEDICINE

## 2017-12-27 PROCEDURE — 74011250636 HC RX REV CODE- 250/636: Performed by: PHYSICAL MEDICINE & REHABILITATION

## 2017-12-27 PROCEDURE — 80076 HEPATIC FUNCTION PANEL: CPT | Performed by: INTERNAL MEDICINE

## 2017-12-27 PROCEDURE — 83880 ASSAY OF NATRIURETIC PEPTIDE: CPT | Performed by: INTERNAL MEDICINE

## 2017-12-27 PROCEDURE — 74011636637 HC RX REV CODE- 636/637: Performed by: PHYSICAL MEDICINE & REHABILITATION

## 2017-12-27 PROCEDURE — 80048 BASIC METABOLIC PNL TOTAL CA: CPT | Performed by: INTERNAL MEDICINE

## 2017-12-27 PROCEDURE — 77010033678 HC OXYGEN DAILY

## 2017-12-27 PROCEDURE — 87086 URINE CULTURE/COLONY COUNT: CPT | Performed by: PHYSICAL MEDICINE & REHABILITATION

## 2017-12-27 PROCEDURE — 74011250637 HC RX REV CODE- 250/637: Performed by: INTERNAL MEDICINE

## 2017-12-27 PROCEDURE — 74011250637 HC RX REV CODE- 250/637: Performed by: PHYSICAL MEDICINE & REHABILITATION

## 2017-12-27 PROCEDURE — 82962 GLUCOSE BLOOD TEST: CPT

## 2017-12-27 PROCEDURE — 74011250637 HC RX REV CODE- 250/637: Performed by: NURSE PRACTITIONER

## 2017-12-27 PROCEDURE — 85025 COMPLETE CBC W/AUTO DIFF WBC: CPT | Performed by: INTERNAL MEDICINE

## 2017-12-27 PROCEDURE — 36415 COLL VENOUS BLD VENIPUNCTURE: CPT | Performed by: INTERNAL MEDICINE

## 2017-12-27 PROCEDURE — 81001 URINALYSIS AUTO W/SCOPE: CPT | Performed by: PHYSICAL MEDICINE & REHABILITATION

## 2017-12-27 PROCEDURE — 74011636637 HC RX REV CODE- 636/637: Performed by: INTERNAL MEDICINE

## 2017-12-27 PROCEDURE — 74011250637 HC RX REV CODE- 250/637: Performed by: ORTHOPAEDIC SURGERY

## 2017-12-27 RX ORDER — LANOLIN ALCOHOL/MO/W.PET/CERES
325 CREAM (GRAM) TOPICAL DAILY
Status: DISCONTINUED | OUTPATIENT
Start: 2017-12-28 | End: 2018-01-04 | Stop reason: HOSPADM

## 2017-12-27 RX ORDER — GUAIFENESIN 600 MG/1
600 TABLET, EXTENDED RELEASE ORAL EVERY 12 HOURS
Status: DISCONTINUED | OUTPATIENT
Start: 2017-12-27 | End: 2018-01-04 | Stop reason: HOSPADM

## 2017-12-27 RX ORDER — TRAMADOL HYDROCHLORIDE 50 MG/1
50 TABLET ORAL
Status: DISCONTINUED | OUTPATIENT
Start: 2017-12-27 | End: 2018-01-04 | Stop reason: HOSPADM

## 2017-12-27 RX ORDER — ESCITALOPRAM OXALATE 10 MG/1
20 TABLET ORAL DAILY
Status: DISCONTINUED | OUTPATIENT
Start: 2017-12-28 | End: 2018-01-04 | Stop reason: HOSPADM

## 2017-12-27 RX ORDER — SORBITOL SOLUTION 70 %
30 SOLUTION, ORAL MISCELLANEOUS DAILY PRN
Status: DISCONTINUED | OUTPATIENT
Start: 2017-12-27 | End: 2018-01-04 | Stop reason: HOSPADM

## 2017-12-27 RX ORDER — PANTOPRAZOLE SODIUM 40 MG/1
40 TABLET, DELAYED RELEASE ORAL
Status: DISCONTINUED | OUTPATIENT
Start: 2017-12-28 | End: 2018-01-04 | Stop reason: HOSPADM

## 2017-12-27 RX ORDER — GLIPIZIDE 5 MG/1
5 TABLET ORAL 2 TIMES DAILY
Qty: 60 TAB | Refills: 0 | Status: SHIPPED | OUTPATIENT
Start: 2017-12-27 | End: 2018-01-04

## 2017-12-27 RX ORDER — ENOXAPARIN SODIUM 100 MG/ML
40 INJECTION SUBCUTANEOUS DAILY
Status: DISCONTINUED | OUTPATIENT
Start: 2017-12-27 | End: 2018-01-04 | Stop reason: HOSPADM

## 2017-12-27 RX ORDER — IPRATROPIUM BROMIDE AND ALBUTEROL SULFATE 2.5; .5 MG/3ML; MG/3ML
3 SOLUTION RESPIRATORY (INHALATION)
Qty: 30 NEBULE | Refills: 0 | Status: SHIPPED
Start: 2017-12-27 | End: 2018-01-04

## 2017-12-27 RX ORDER — ACETAMINOPHEN 325 MG/1
325 TABLET ORAL
Status: DISCONTINUED | OUTPATIENT
Start: 2017-12-27 | End: 2018-01-04 | Stop reason: HOSPADM

## 2017-12-27 RX ORDER — FACIAL-BODY WIPES
10 EACH TOPICAL DAILY PRN
Status: DISCONTINUED | OUTPATIENT
Start: 2017-12-27 | End: 2018-01-04 | Stop reason: HOSPADM

## 2017-12-27 RX ORDER — ONDANSETRON 4 MG/1
4 TABLET, ORALLY DISINTEGRATING ORAL
Status: DISCONTINUED | OUTPATIENT
Start: 2017-12-27 | End: 2018-01-04 | Stop reason: HOSPADM

## 2017-12-27 RX ORDER — INSULIN LISPRO 100 [IU]/ML
INJECTION, SOLUTION INTRAVENOUS; SUBCUTANEOUS
Status: DISCONTINUED | OUTPATIENT
Start: 2017-12-27 | End: 2018-01-04 | Stop reason: HOSPADM

## 2017-12-27 RX ORDER — ZOLPIDEM TARTRATE 5 MG/1
5 TABLET ORAL
Status: DISCONTINUED | OUTPATIENT
Start: 2017-12-27 | End: 2018-01-04 | Stop reason: HOSPADM

## 2017-12-27 RX ORDER — AMOXICILLIN 250 MG
1 CAPSULE ORAL 2 TIMES DAILY
Status: DISCONTINUED | OUTPATIENT
Start: 2017-12-27 | End: 2017-12-29

## 2017-12-27 RX ORDER — DONEPEZIL HYDROCHLORIDE 5 MG/1
10 TABLET, FILM COATED ORAL DAILY
Status: DISCONTINUED | OUTPATIENT
Start: 2017-12-28 | End: 2018-01-04 | Stop reason: HOSPADM

## 2017-12-27 RX ORDER — FACIAL-BODY WIPES
10 EACH TOPICAL
Qty: 7 EACH | Refills: 0 | Status: SHIPPED
Start: 2017-12-27 | End: 2020-01-01

## 2017-12-27 RX ORDER — FLUTICASONE PROPIONATE 50 MCG
2 SPRAY, SUSPENSION (ML) NASAL DAILY
Status: DISCONTINUED | OUTPATIENT
Start: 2017-12-28 | End: 2018-01-04 | Stop reason: HOSPADM

## 2017-12-27 RX ORDER — GLIPIZIDE 5 MG/1
5 TABLET ORAL
Status: DISCONTINUED | OUTPATIENT
Start: 2017-12-27 | End: 2018-01-04 | Stop reason: HOSPADM

## 2017-12-27 RX ORDER — QUETIAPINE FUMARATE 25 MG/1
50 TABLET, FILM COATED ORAL DAILY
Status: DISCONTINUED | OUTPATIENT
Start: 2017-12-28 | End: 2018-01-04 | Stop reason: HOSPADM

## 2017-12-27 RX ORDER — TAMSULOSIN HYDROCHLORIDE 0.4 MG/1
0.4 CAPSULE ORAL
Status: DISCONTINUED | OUTPATIENT
Start: 2017-12-27 | End: 2018-01-04 | Stop reason: HOSPADM

## 2017-12-27 RX ORDER — DEXTROSE 50 % IN WATER (D50W) INTRAVENOUS SYRINGE
25 AS NEEDED
Status: DISCONTINUED | OUTPATIENT
Start: 2017-12-27 | End: 2018-01-04 | Stop reason: HOSPADM

## 2017-12-27 RX ORDER — AMOXICILLIN 250 MG
1 CAPSULE ORAL DAILY
Qty: 30 TAB | Refills: 0 | Status: SHIPPED
Start: 2017-12-27 | End: 2020-01-01

## 2017-12-27 RX ORDER — FUROSEMIDE 40 MG/1
40 TABLET ORAL 2 TIMES DAILY
Status: DISCONTINUED | OUTPATIENT
Start: 2017-12-27 | End: 2018-01-04 | Stop reason: HOSPADM

## 2017-12-27 RX ORDER — IPRATROPIUM BROMIDE AND ALBUTEROL SULFATE 2.5; .5 MG/3ML; MG/3ML
3 SOLUTION RESPIRATORY (INHALATION)
Status: DISCONTINUED | OUTPATIENT
Start: 2017-12-27 | End: 2018-01-04 | Stop reason: HOSPADM

## 2017-12-27 RX ORDER — ACETAMINOPHEN 325 MG/1
650 TABLET ORAL
Status: DISCONTINUED | OUTPATIENT
Start: 2017-12-27 | End: 2018-01-04 | Stop reason: HOSPADM

## 2017-12-27 RX ORDER — CARVEDILOL 3.12 MG/1
3.12 TABLET ORAL 2 TIMES DAILY WITH MEALS
Status: DISCONTINUED | OUTPATIENT
Start: 2017-12-27 | End: 2018-01-04 | Stop reason: HOSPADM

## 2017-12-27 RX ORDER — MAGNESIUM SULFATE 100 %
16 CRYSTALS MISCELLANEOUS AS NEEDED
Status: DISCONTINUED | OUTPATIENT
Start: 2017-12-27 | End: 2018-01-04 | Stop reason: HOSPADM

## 2017-12-27 RX ORDER — TRAMADOL HYDROCHLORIDE 50 MG/1
50 TABLET ORAL
Qty: 30 TAB | Refills: 0 | Status: SHIPPED | OUTPATIENT
Start: 2017-12-27 | End: 2018-02-26

## 2017-12-27 RX ORDER — FERROUS SULFATE, DRIED 160(50) MG
1 TABLET, EXTENDED RELEASE ORAL
Status: DISCONTINUED | OUTPATIENT
Start: 2017-12-27 | End: 2018-01-04 | Stop reason: HOSPADM

## 2017-12-27 RX ORDER — HYDROXYZINE HYDROCHLORIDE 10 MG/1
10 TABLET, FILM COATED ORAL
Status: DISCONTINUED | OUTPATIENT
Start: 2017-12-27 | End: 2017-12-28

## 2017-12-27 RX ORDER — FINASTERIDE 5 MG/1
5 TABLET, FILM COATED ORAL EVERY EVENING
Status: DISCONTINUED | OUTPATIENT
Start: 2017-12-27 | End: 2018-01-04 | Stop reason: HOSPADM

## 2017-12-27 RX ORDER — NITROGLYCERIN 0.4 MG/1
0.4 TABLET SUBLINGUAL
Status: DISCONTINUED | OUTPATIENT
Start: 2017-12-27 | End: 2018-01-04 | Stop reason: HOSPADM

## 2017-12-27 RX ORDER — MELATONIN
3000 DAILY
Status: DISCONTINUED | OUTPATIENT
Start: 2017-12-28 | End: 2018-01-04 | Stop reason: HOSPADM

## 2017-12-27 RX ADMIN — GLIPIZIDE 5 MG: 5 TABLET ORAL at 08:28

## 2017-12-27 RX ADMIN — ACETAMINOPHEN 650 MG: 325 TABLET ORAL at 17:44

## 2017-12-27 RX ADMIN — ENOXAPARIN SODIUM 40 MG: 100 INJECTION SUBCUTANEOUS at 17:43

## 2017-12-27 RX ADMIN — OYSTER SHELL CALCIUM WITH VITAMIN D 1 TABLET: 500; 200 TABLET, FILM COATED ORAL at 11:44

## 2017-12-27 RX ADMIN — TAMSULOSIN HYDROCHLORIDE 0.4 MG: 0.4 CAPSULE ORAL at 21:44

## 2017-12-27 RX ADMIN — FINASTERIDE 5 MG: 5 TABLET, FILM COATED ORAL at 17:43

## 2017-12-27 RX ADMIN — FERROUS SULFATE TAB 325 MG (65 MG ELEMENTAL FE) 325 MG: 325 (65 FE) TAB at 08:31

## 2017-12-27 RX ADMIN — ACETAMINOPHEN 650 MG: 325 TABLET ORAL at 11:44

## 2017-12-27 RX ADMIN — INSULIN LISPRO 2 UNITS: 100 INJECTION, SOLUTION INTRAVENOUS; SUBCUTANEOUS at 08:32

## 2017-12-27 RX ADMIN — INSULIN LISPRO 2 UNITS: 100 INJECTION, SOLUTION INTRAVENOUS; SUBCUTANEOUS at 22:00

## 2017-12-27 RX ADMIN — QUETIAPINE FUMARATE 50 MG: 25 TABLET ORAL at 08:28

## 2017-12-27 RX ADMIN — GUAIFENESIN 600 MG: 600 TABLET, EXTENDED RELEASE ORAL at 21:44

## 2017-12-27 RX ADMIN — TRAMADOL HYDROCHLORIDE 50 MG: 50 TABLET, FILM COATED ORAL at 13:30

## 2017-12-27 RX ADMIN — INSULIN LISPRO 3 UNITS: 100 INJECTION, SOLUTION INTRAVENOUS; SUBCUTANEOUS at 11:44

## 2017-12-27 RX ADMIN — Medication 10 ML: at 06:33

## 2017-12-27 RX ADMIN — PANTOPRAZOLE SODIUM 40 MG: 40 TABLET, DELAYED RELEASE ORAL at 06:30

## 2017-12-27 RX ADMIN — DOCUSATE SODIUM AND SENNOSIDES 1 TABLET: 8.6; 5 TABLET, FILM COATED ORAL at 08:32

## 2017-12-27 RX ADMIN — DONEPEZIL HYDROCHLORIDE 10 MG: 5 TABLET, FILM COATED ORAL at 08:29

## 2017-12-27 RX ADMIN — ACETAMINOPHEN 650 MG: 325 TABLET ORAL at 06:30

## 2017-12-27 RX ADMIN — FUROSEMIDE 40 MG: 40 TABLET ORAL at 08:31

## 2017-12-27 RX ADMIN — CARVEDILOL 3.12 MG: 3.12 TABLET, FILM COATED ORAL at 08:31

## 2017-12-27 RX ADMIN — Medication 500 MG: at 09:00

## 2017-12-27 RX ADMIN — POLYETHYLENE GLYCOL (3350) 17 G: 17 POWDER, FOR SOLUTION ORAL at 08:27

## 2017-12-27 RX ADMIN — ESCITALOPRAM OXALATE 20 MG: 10 TABLET ORAL at 08:30

## 2017-12-27 RX ADMIN — OYSTER SHELL CALCIUM WITH VITAMIN D 1 TABLET: 500; 200 TABLET, FILM COATED ORAL at 17:44

## 2017-12-27 RX ADMIN — FUROSEMIDE 40 MG: 40 TABLET ORAL at 17:44

## 2017-12-27 RX ADMIN — FLUTICASONE PROPIONATE 2 SPRAY: 50 SPRAY, METERED NASAL at 08:32

## 2017-12-27 RX ADMIN — GUAIFENESIN 600 MG: 600 TABLET, EXTENDED RELEASE ORAL at 08:31

## 2017-12-27 RX ADMIN — TRAMADOL HYDROCHLORIDE 50 MG: 50 TABLET, FILM COATED ORAL at 06:30

## 2017-12-27 RX ADMIN — OYSTER SHELL CALCIUM WITH VITAMIN D 1 TABLET: 500; 200 TABLET, FILM COATED ORAL at 08:30

## 2017-12-27 RX ADMIN — DOCUSATE SODIUM AND SENNOSIDES 1 TABLET: 8.6; 5 TABLET, FILM COATED ORAL at 21:43

## 2017-12-27 RX ADMIN — CARVEDILOL 3.12 MG: 3.12 TABLET, FILM COATED ORAL at 17:44

## 2017-12-27 RX ADMIN — VITAMIN D, TAB 1000IU (100/BT) 3000 UNITS: 25 TAB at 08:28

## 2017-12-27 RX ADMIN — LEVOTHYROXINE SODIUM 125 MCG: 100 TABLET ORAL at 06:30

## 2017-12-27 RX ADMIN — GLIPIZIDE 5 MG: 5 TABLET ORAL at 17:44

## 2017-12-27 NOTE — DIABETES MGMT
Progress Note     Chart reviewed for elevated blood glucose ( > 180 mg/dL x 2 in the past 24 hours) . Recommendations/ Comments: Noted anticipated discharge to [address] [place] [address]. Glucose was > 200mg/dL x 2 but is now returning to target range. Will continue to follow as needed. Fasting glucose today: 148 mg/dL (per am POCT Glucose). Required 13 units of correction in the last 24 hours. Inpatient medications for glucose management:  1. Correction Scale: Lispro (Humalog) Normal Sensitivity scale to cover for glucose > 139 mg/dL before meals and for glucose >199 at bedtime      2. Glipizide 5mg before breakfast and before dinner      POC Glucose last 24hrs:   Lab Results   Component Value Date/Time     (H) 12/27/2017 03:05 AM    GLUCPOC 148 (H) 12/27/2017 07:21 AM    GLUCPOC 157 (H) 12/26/2017 09:02 PM    GLUCPOC 109 (H) 12/26/2017 04:36 PM        Estimated Creatinine Clearance: 50.8 mL/min (based on Cr of 1.14). Diet order:   Active Orders   Diet    DIET CARDIAC Regular; Consistent Carb 1800kcal      PO intake: No data found. History of Diabetes:   David Theodore is a 81 y.o. male with a past medical history significant for DM per Dr. Maurice Yip MD's H&P dated 12/21/2017. Prior to admission medications for glucose management: per past medical records  --Glipizide 5mg before dinner only     A1C:   Lab Results   Component Value Date/Time    Hemoglobin A1c 7.2 12/21/2017 09:45 AM    Hemoglobin A1c 7.1 11/20/2015 05:44 AM       Reference range*:  Increased risk for diabetes: 5.7 - 6.4%  Diabetes: >6.4%  Glycemic control for adults with diabetes: <7.0 %    *NANCY SEYMOUR (2014). Diagnosis and classification of diabetes mellitus. Diabetes care, 37, S81. Thank you. Elida Paredes.  Sally MPH, RN, BSN, [address]   [phone]    -For most hospitalized persons with hyperglycemia in the intensive care unit (ICU), a glucose range of 140 to 180 mg/dL is recommended, provided this target can be safely achieved. *  - For general medicine and surgery patients in non-ICU settings, a premeal glucose target <140 mg/dL and a random blood glucose <180 mg/dL are recommended. *    MARIA DEL CARMEN Ken, Andre Gutierrez, Daniel Chan., ... & Ava Peters ([id number]). AMERICAN ASSOCIATION OF CLINICAL ENDOCRINOLOGISTS AND AMERICAN COLLEGE OF ENDOCRINOLOGY-CLINICAL PRACTICE GUIDELINES FOR DEVELOPING A DIABETES MELLITUS COMPREHENSIVE CARE PLAN-2015-EXECUTIVE SUMMARY: Complete guidelines are available at https://www. aace. com/publications/guidelines. Endocrine Practice, 21(4), N300509.

## 2017-12-27 NOTE — PROGRESS NOTES
12/27/2017 1:50 PM Pt going to room 404a at [facility]. Pt's daughter is aware. 12/27/2017  10:29 AM CM called and notified pt's daughter, Kelly Bardales that pt will discharge to [facility] today. Pt's daughter was understanding. Nursing please call report to [phone].     12/27/2017 9:38 AM [facility] confirmed they can accept pt today after lunch. 12/27/2017 8:32 AM Called and lvm with [facility] to confirm discharge today. CM will follow up.  Octavio Young, LUZW

## 2017-12-27 NOTE — DISCHARGE INSTRUCTIONS
HOSPITALIST DISCHARGE INSTRUCTIONS  NAME: Nataliya Justice   :  1935   MRN:  [id number]     Date/Time:  2017 8:00 AM    ADMIT DATE: 2017     DISCHARGE DATE: 2017     PRINCIPAL DISCHARGE DIAGNOSES:  Hip fracture    MEDICATIONS:  · It is important that you take the medication exactly as they are prescribed. Note the changes and additions to your medications. Be sure you understand these changes before you are discharged today. · Keep your medication in the bottles provided by the pharmacist and keep a list of the medication names, dosages, and times to be taken in your wallet. · Do not take other medications without consulting your doctor. Pain Management: per above medications    What to do at Home    Recommended diet:  Resume previous diet    Recommended activity: PT/OT Eval and Treat    If you experience any of the following symptoms then please call your primary care physician or return to the emergency room if you cannot get hold of your doctor:  Fever, chills, severe hip / leg pain, swelling, redness, chest pain, shortness of breath, or other severe concerning symptoms that brought you to the hospital in the first place    Follow Up: Follow-up Information     Follow up With Details Comments Contact Info    MD Tegan Akhtar MD In 2 weeks  [place] [phone]               Information obtained by :  I understand that if any problems occur once I am at home I am to contact my physician. I understand and acknowledge receipt of the instructions indicated above.                                                                                                                                            Physician's or R.N.'s Signature                                                                  Date/Time Patient or Representative Signature                                                          Date/Time      ORTHO:    Weight bear as tolerates. Follow up with Dr. Akhil Keyes in 2 weeks. Call [phone] for appointment. Keep dressing on wound, change PRN.

## 2017-12-27 NOTE — PROGRESS NOTES
Bedside shift change report given to [facility] (oncoming nurse) by Matthew Wells (offgoing nurse). Report included the following information SBAR, Kardex, Procedure Summary, MAR and Recent Results.

## 2017-12-27 NOTE — PROGRESS NOTES
Problem: Mobility Impaired (Adult and Pediatric)  Goal: *Acute Goals and Plan of Care (Insert Text)  Physical Therapy Goals  Initiated 12/22/2017  1. Patient will move from supine to sit and sit to supine , scoot up and down and roll side to side in bed with moderate assistance  within 7 day(s). 2.  Patient will transfer from bed to chair and chair to bed with moderate assistance  using the least restrictive device within 7 day(s). 3.  Patient will perform sit <> stand with moderate assistance  within 7 day(s). 4.  Patient will ambulate with moderate assistance  for 20 feet with RW within 7 day(s). physical Therapy TREATMENT  Patient: Campbell Navarrete (54 y.o. male)  Date: 12/27/2017  Diagnosis: Hip fracture, right ([address]  RIGHT INTERTROCH FRACTURE Hip fracture, right (HCC)  Procedure(s) (LRB):  RIGHT INTRAMEDULLARY NAIL RIGHT HIP (Right) 5 Days Post-Op  Precautions: Bed Alarm, Fall, WBAT    ASSESSMENT:  Pt received in bed, agreeable to participate with physical therapy. Mod A x1 for bed mobility>sitting EOB, able to initiate movement of RLE toward EOB. Sit<>stand using RW with Mod A x2. Tolerates static standing x 3 min, while tele leads reapplied, verbal cues to maintain upright posture. Decreased safety awareness while standing, requiring verbal cues to keep hands on walker. Stand pivot  Using RW with Mod A x2 with verbal and tactile cues to manage RW safely. Pt returned to chair, chair alarm in place. Progression toward goals:  []    Improving appropriately and progressing toward goals  [x]    Improving slowly and progressing toward goals  []    Not making progress toward goals and plan of care will be adjusted     PLAN:  Patient continues to benefit from skilled intervention to address the above impairments. Continue treatment per established plan of care.   Discharge Recommendations:  Rehab  Further Equipment Recommendations for Discharge:  none     SUBJECTIVE:   Patient stated you are my only pain.    OBJECTIVE DATA SUMMARY:   Critical Behavior:  Neurologic State: Alert  Orientation Level: Oriented X4  Cognition: Appropriate decision making, Appropriate for age attention/concentration, Follows commands  Safety/Judgement: Not assessed (patient too lethargic)  Functional Mobility Training:  Bed Mobility:     Supine to Sit: Moderate assistance;Assist x1;Additional time     Scooting: Maximum assistance        Transfers:  Sit to Stand: Moderate assistance;Assist x2; Additional time  Stand to Sit: Moderate assistance;Assist x2                             Balance:  Sitting: High guard  Sitting - Static: Fair (occasional)  Sitting - Dynamic: Fair (occasional)  Standing: Impaired; With support  Standing - Static: Fair;Constant support  Standing - Dynamic : Fair  Ambulation/Gait Training:  Distance (ft): 3 Feet (ft)  Assistive Device: Walker, rolling;Gait belt  Ambulation - Level of Assistance: Moderate assistance;Assist x2        Gait Abnormalities: Decreased step clearance; Antalgic              Speed/Kamila: Slow                       Stairs:            Neuro Re-Education:    Therapeutic Exercises:     Pain:  Pain Scale 1: Numeric (0 - 10)  Pain Intensity 1: 3  Pain Location 1: Hip  Pain Orientation 1: Right  Pain Description 1: Aching; Intermittent  Pain Intervention(s) 1: Medication (see MAR)  Activity Tolerance:   Good  Please refer to the flowsheet for vital signs taken during this treatment.   After treatment:   [x]    Patient left in no apparent distress sitting up in chair  []    Patient left in no apparent distress in bed  [x]    Call bell left within reach  [x]    Nursing notified  []    Caregiver present  [x]    Chair alarm activated    COMMUNICATION/COLLABORATION:   The patients plan of care was discussed with: Registered Nurse    Adelina Verde   Time Calculation: 27 mins

## 2017-12-27 NOTE — DISCHARGE SUMMARY
Physician Discharge Summary     Patient ID:  Wicho Silverio  [id number]  43 y.o.  1935    Admit date: 12/21/2017    Discharge date: 12/27/2017    Admission Diagnoses: Hip fracture, right ([address]  RIGHT INTERTROCH FRACTURE    Principal Discharge Diagnoses:    Hip fracture    OTHER PROBLEMS ADDRESSEDS  Principal Diagnosis Hip fracture, right ([address]                                            Principal Problem:    Hip fracture, right ([address] (12/21/2017)    Active Problems:    Third degree AV block ([address] (6/11/2015)      Hypertension ()      PVD (peripheral vascular disease) ([address] ()      Overview: STENT RIGHT GROIN, PAD      Pulmonary hypertension (6/15/2015)      Mild aortic stenosis (6/15/2015)      CAD (coronary artery disease) (6/26/2015)      Hypothyroidism (6/28/2015)      Diabetes mellitus ([address] (6/28/2015)      Chronic obstructive pulmonary disease ([address] (6/28/2015)      Overview: 04/24/2015 [facility]      FVC 2.53-67%-post bronchodilator 2.61-69%-increase 3%      FEV1 1.41-52%-post bronchodilator value 1.45-54%-increase 3%      FEV1/FVC 56%      TLC 5.19-88%      RV 2.%      DLCO 45%      DLCO VA 73%      %            04/24/2015-6 minute walk test no exertional hypoxia      Chronic systolic heart failure ([address] (9/18/2015)      Dementia (11/20/2015)      Chronic respiratory failure ([address] (11/24/2015)      Iron deficiency anemia (11/24/2015)       Patient Active Problem List   Diagnosis Code    Lumbar stenosis M48.061    Third degree AV block ([address] I44.2    Cardiomyopathy ([address] I42.9    Second degree AV block I44.1    Stroke ([address] I63.9    Hypertension I10    PVD (peripheral vascular disease) (HCC) I73.9    Diabetes ([address] E11.9    Pulmonary hypertension I27.20    Mild aortic stenosis I35.0    CAD (coronary artery disease) I25.10    Tobacco use Z72.0    Poor historian Z78.9    Hypothyroidism E03.9    Diabetes mellitus ([facility] E11.9    Cerebrovascular accident ([facility] I63.9    Chronic obstructive pulmonary disease (HCC) J44.9    Malignant neoplasm of colon (HCC) C18.9    Other nonspecific abnormal finding of lung field R91.8    Gastroesophageal reflux disease K21.9    S/P CABG x 3 Z95.1    Pacemaker Z95.0    ICH (intracerebral hemorrhage) (HCC) I61.9    Chronic systolic heart failure (HCC) I50.22    Ex-smoker Z87.891    Dementia F03.90    Counseling regarding advanced care planning and goals of care Z71.89    Recurrent left pleural effusion J90    Chronic respiratory failure (HCC) J96.10    Iron deficiency anemia D50.9    Hip fracture, right [facility]) S72.001A         Hospital Course:     Hip fracture, right ([facility] (12/21/2017):  Post mechanical fall. S/P IM nail. Stable for discharge to rehab. PT / Johnathon Rosalind continues at [facility]. Lovenox SQ for DVT ppx. Pain control on Tramadol. Start bowel regimen. GATO on CKD 3: resolved. Can resume home Lasix       COPD / Pulmonary hypertension (6/15/2015) / Chronic respiratory failure: CXR without acute process. BLE dopplers negative for DVT. I suspect chronic hypoxia. Can take duonebs as needed. Cont diuretics. Wean O2 as tolerated. May need home O2      HTN / Mild aortic stenosis (6/15/2015) / CAD (coronary artery disease) (1/13/6937) / Chronic systolic CHF:  Hx of CABG. Cont Coreg, Lasix. 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 Decreased glipizide and added holding parameters      Dementia (11/20/2015):  at baseline. Cont Aricept    Third degree AV block ([facility] (6/11/2015):  Has pacemaker      PVD (peripheral vascular disease) ([facility] ():  Overview: STENT RIGHT GROIN, PAD. Consider adding ASA    Pt discharged in improved and stable condition.      Procedures performed: see above    Imaging studies: see above        PCP: Kendell Fischer MD    Consults: Cardiology, Pulmonary/Intensive care and Orthopedic Surgery    Discharge Exam:  Patient Vitals for the past 12 hrs:   Temp Pulse Resp BP SpO2   12/27/17 1122 97.9 °F (36.6 °C) 60 16 110/58 98 %   12/27/17 0722 98.4 °F (36.9 °C) 60 16 132/63 96 %   12/27/17 0328 98.1 °F (36.7 °C) 60 14 121/56 95 %       GEN: pleasant, NAD  CV: RRR, no MRG  RESP: moving good air. No wheezes noted  ABD: soft, NTTP      [facility]      Patient Instructions:   Current Discharge Medication List      START taking these medications    Details   traMADol (ULTRAM) 50 mg tablet Take 1 Tab by mouth every six (6) hours as needed. Max Daily Amount: 200 mg. Do not take if sedated  Qty: 30 Tab, Refills: 0    Associated Diagnoses: Closed right hip fracture, initial encounter ([address]      bisacodyl (DULCOLAX) 10 mg suppository Insert 10 mg into rectum daily as needed. Qty: 7 Each, Refills: 0      senna-docusate (PERICOLACE) 8.6-50 mg per tablet Take 1 Tab by mouth daily. For constipation. Do not take if you have loose stools  Qty: 30 Tab, Refills: 0      enoxaparin (LOVENOX) 40 mg/0.4 mL 0.4 mL by SubCUTAneous route daily for 26 days. Qty: 10.4 mL, Refills: 0         CONTINUE these medications which have CHANGED    Details   glipiZIDE (GLUCOTROL) 5 mg tablet Take 1 Tab by mouth two (2) times a day. Do not take if blood sugar is less than 120  Qty: 60 Tab, Refills: 0      albuterol-ipratropium (DUO-NEB) 2.5 mg-0.5 mg/3 ml nebu 3 mL by Nebulization route every six (6) hours as needed. Indications: 1 inhalation  Qty: 30 Nebule, Refills: 0      QUEtiapine (SEROQUEL) 100 mg tablet Take 0.5 Tabs by mouth daily. At [facility]: 15 Tab, Refills: 0         CONTINUE these medications which have NOT CHANGED    Details   cholecalciferol (VITAMIN D3) 1,000 unit tablet Take 3,000 Units by mouth daily. levothyroxine (SYNTHROID) 125 mcg tablet Take 125 mcg by mouth Daily (before breakfast).  One hour prior to breakfast      magnesium gluconate 500 mg (27 mg  elemental) tablet Take 500 mg by mouth daily. Carboxymethylcellulose-Glycern (REFRESH OPTIVE) 0.5-0.9 % drop Administer 1 Drop to both eyes as needed (dry eye). diphenoxylate-atropine (LOMOTIL) 2.5-0.025 mg per tablet Take 1 Tab by mouth four (4) times daily as needed for Diarrhea. omeprazole (PRILOSEC) 20 mg capsule Take 20 mg by mouth Daily (before breakfast). 30 minutes prior to breakfast      escitalopram oxalate (LEXAPRO) 20 mg tablet Take 20 mg by mouth daily. furosemide (LASIX) 40 mg tablet Take 40 mg by mouth two (2) times a day.      ketoconazole (NIZORAL) 2 % shampoo Apply  to affected area daily as needed for Itching. donepezil (ARICEPT) 10 mg tablet Take 10 mg by mouth daily. carvedilol (COREG) 3.125 mg tablet Take 1 Tab by mouth two (2) times a day. Qty: 60 Tab, Refills: 0      ferrous sulfate 325 mg (65 mg iron) tablet Take 325 mg by mouth daily (with breakfast). finasteride (PROSCAR) 5 mg tablet Take 5 mg by mouth every evening. fluticasone (FLONASE) 50 mcg/actuation nasal spray 2 Sprays by Both Nostrils route daily as needed. tamsulosin (FLOMAX) 0.4 mg capsule Take 0.4 mg by mouth nightly. STOP taking these medications       naproxen sodium (NAPROSYN) 220 mg tablet Comments:   Reason for Stopping:               Activity: See discharge instructions  Diet: See discharge instructions  Wound Care: See discharge instructions    Follow-up Information     Follow up With Details Comments Contact Info    MD Jennyfer Sarabia MD In 2 weeks  [address] [place] [id number]  [phone]            I spent 35 minutes on this discharge.     Signed:  Johnathon Lee MD  12/27/2017  8:01 AM

## 2017-12-27 NOTE — ROUTINE PROCESS
Bedside shift change report given to Cornelio Morocho RN (oncoming nurse) by Rosibel Galarza RN and Tammie Sánchez RN (offgoing nurse). Report included the following information SBAR, Kardex, Procedure Summary, Intake/Output and MAR.

## 2017-12-27 NOTE — PROGRESS NOTES
Medical Progress Note      NAME: Jessica Merrill   :  1935  MRM:  [id number]    Date/Time: 2017  10:07 AM         Subjective:     Chief Complaint:  \"I'm good\"     Pt without complaints. Did require IV pain meds for his hip. Otherwise denies SOB. ROS:  (bold if positive, if negative)      Cough        Objective:       Vitals:          Last 24hrs VS reviewed since prior progress note. Most recent are:    Visit Vitals    /62 (BP 1 Location: Right arm, BP Patient Position: At rest)    Pulse 62    Temp 95.2 °F (35.1 °C)    Resp 16    Ht 5' 9\" (1.753 m)    Wt 72.6 kg (160 lb)    SpO2 92%    BMI 23.63 kg/m2     SpO2 Readings from Last 6 Encounters:   17 92%   17 91%   02/15/17 98%   10/05/16 92%   16 95%   16 95%    O2 Flow Rate (L/min): 4 l/min       Intake/Output Summary (Last 24 hours) at 17  Last data filed at 17 1755   Gross per 24 hour   Intake                0 ml   Output              875 ml   Net             -875 ml          Exam:     Physical Exam:    Gen:  Well-developed, well-nourished, in no acute distress  HEENT:  Pink conjunctivae, hearing intact to voice, moist mucous membranes  Resp:  Scattered wheezes and crackles   Card:  2/6 SCOTT LLSB, normal S1, S2 without thrills or peripheral edema  Abd:  Soft, non-tender, non-distended, normoactive bowel sounds are present  Skin:  No rashes  Neuro: Face symmetric, tongue midline, speech fluent,  strength is 5/5 bilaterally, follows commands appropriately  Psych:  Alert.  Moderate insight   R hip dressing C/D/I     Medications Reviewed: (see below)    Lab Data Reviewed: (see below)    ______________________________________________________________________    Medications:     Current Facility-Administered Medications   Medication Dose Route Frequency    furosemide (LASIX) tablet 40 mg  40 mg Oral ACB&D    albuterol-ipratropium (DUO-NEB) 2.5 MG-0.5 MG/3 ML  3 mL Nebulization Q6H PRN    glipiZIDE (GLUCOTROL) tablet 5 mg  5 mg Oral ACB&D    fluticasone (FLONASE) 50 mcg/actuation nasal spray 2 Spray  2 Spray Both Nostrils DAILY    diphenoxylate-atropine (LOMOTIL) tablet 1 Tab  1 Tab Oral QID PRN    cholecalciferol (VITAMIN D3) tablet 3,000 Units  3,000 Units Oral DAILY    magnesium gluconate 500 mg (27 mg  elemental) tablet 500 mg  500 mg Oral DAILY    sodium chloride (NS) flush 5-10 mL  5-10 mL IntraVENous Q8H    sodium chloride (NS) flush 5-10 mL  5-10 mL IntraVENous PRN    acetaminophen (TYLENOL) tablet 650 mg  650 mg Oral Q6H    traMADol (ULTRAM) tablet 50 mg  50 mg Oral Q6H PRN    hydrOXYzine HCl (ATARAX) tablet 10 mg  10 mg Oral Q8H PRN    naloxone (NARCAN) injection 0.4 mg  0.4 mg IntraVENous PRN    ondansetron (ZOFRAN ODT) tablet 4 mg  4 mg Oral Q4H PRN    calcium-vitamin D (OS-VERONICA) 500 mg-200 unit tablet  1 Tab Oral TID WITH MEALS    senna-docusate (PERICOLACE) 8.6-50 mg per tablet 1 Tab  1 Tab Oral BID    polyethylene glycol (MIRALAX) packet 17 g  17 g Oral DAILY    bisacodyl (DULCOLAX) suppository 10 mg  10 mg Rectal DAILY PRN    enoxaparin (LOVENOX) injection 40 mg  40 mg SubCUTAneous DAILY    acetaminophen (TYLENOL) tablet 650 mg  650 mg Oral Q4H PRN    morphine injection 2 mg  2 mg IntraVENous Q4H PRN    escitalopram oxalate (LEXAPRO) tablet 20 mg  20 mg Oral DAILY    pantoprazole (PROTONIX) tablet 40 mg  40 mg Oral ACB    donepezil (ARICEPT) tablet 10 mg  10 mg Oral DAILY    ferrous sulfate tablet 325 mg  325 mg Oral DAILY    finasteride (PROSCAR) tablet 5 mg  5 mg Oral QPM    tamsulosin (FLOMAX) capsule 0.4 mg  0.4 mg Oral QHS    levothyroxine (SYNTHROID) tablet 125 mcg  125 mcg Oral ACB    QUEtiapine (SEROquel) tablet 50 mg  50 mg Oral DAILY    insulin lispro (HUMALOG) injection   SubCUTAneous AC&HS    glucose chewable tablet 16 g  4 Tab Oral PRN    dextrose (D50W) injection syrg 12.5-25 g  12.5-25 g IntraVENous PRN    glucagon (GLUCAGEN) injection 1 mg 1 mg IntraMUSCular PRN    guaiFENesin ER (MUCINEX) tablet 600 mg  600 mg Oral Q12H            Lab Review:     Recent Labs      12/26/17   0006  12/25/17   0923  12/24/17   0545   WBC  7.8  7.1  9.5   HGB  8.8*  8.7*  8.8*   HCT  27.3*  26.8*  26.7*   PLT  151  140*  130*     Recent Labs      12/26/17   0006  12/25/17   0923  12/24/17   0545   NA  139  137  141   K  4.4  4.5  4.9   CL  103  101  105   CO2  29  28  29   GLU  192*  317*  194*   BUN  35*  29*  22*   CREA  1.29  1.22  1.10   CA  9.1  9.0  8.8   MG  1.9  1.8   --      Lab Results   Component Value Date/Time    Glucose (POC) 109 12/26/2017 04:36 PM    Glucose (POC) 311 12/26/2017 11:04 AM    Glucose (POC) 156 12/26/2017 07:25 AM    Glucose (POC) 208 12/25/2017 09:32 PM    Glucose (POC) 122 12/25/2017 04:49 PM        Assessment / Plan:   Acute renal insufficiency / CKD 3: resolved   -resume home lasix dose     Hip fracture, right ([facility] (12/21/2017):  Post mechanical fall. Non-displaced by imaging. S/P IM nail. -PT/OT on board  -ortho on board  -Lovenox for DVT prophylaxis at discharge  -[address] at discharge      COPD / Pulmonary hypertension (6/15/2015) / Chronic respiratory failure: CXR without acute process   -continue duonebs  -LE dopplers negative for DVT   -resume home PO diuresis   -wean O2 as able     HTN / Mild aortic stenosis (6/15/2015) / CAD (coronary artery disease) (6/26/2015) / Chronic systolic CHF:  Hx of CABG. 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     PVD (peripheral vascular disease) ([facility] ():  Overview: STENT RIGHT GROIN, PAD    Total time spent with patient: 32 Minutes                  Care Plan discussed with: Patient, RN     Discussed:  Care Plan    Prophylaxis:  Lovenox    [facility]            ___________________________________________________    Attending Physician: Aaron Trent MD

## 2017-12-27 NOTE — PROGRESS NOTES
Problem: Falls - Risk of  Goal: *Absence of Falls  Document Abraham Fall Risk and appropriate interventions in the flowsheet.    Outcome: Progressing Towards Goal  Fall Risk Interventions:  Mobility Interventions: Bed/chair exit alarm, Patient to call before getting OOB, Strengthening exercises (ROM-active/passive)    Mentation Interventions: Bed/chair exit alarm, Adequate sleep, hydration, pain control, More frequent rounding, Reorient patient, Room close to nurse's station    Medication Interventions: Bed/chair exit alarm, Patient to call before getting OOB    Elimination Interventions: Call light in reach, Bed/chair exit alarm, Patient to call for help with toileting needs    History of Falls Interventions: Bed/chair exit alarm

## 2017-12-27 NOTE — PROGRESS NOTES
Full report called to Lea at [address]. All questions answered. [facility] care tech.

## 2017-12-27 NOTE — DISCHARGE SUMMARY
Physician Interim Summary   Patient ID:  Ifrah Hayward  [id number]  2 y.o.  1935    PCP: Jewell Allen MD     Consults: orthopedics    Covering dates: 12/21/2017 through 12/26/2017    Admission Diagnoses: Hip fracture, right [facility])  [address] Course: Mr. Nolan Chong is a 80 yo male with PMH of 3rd degree AV block s/p pacer placement, HTN, COPD, chronic systolic CHF, pulm HTN, CAD and DM admitted for R hip fracture. He is s/p repair. He is awaiting [facility] placement. He was accepted and had a bed on 12/25 but received IV pain meds and per policy cannot go to [facility] today. He will be discharged on 12/27. Of note, pt has required O2. CXR obtained showed no acute process. He is receiving nebs scheduled as due to his dementia likely would not ask and home lasix has been resumed after IV diuresis for mild volume overload    Additional hospital course and discharge summary will be done by discharging physician.

## 2017-12-28 LAB
ANION GAP SERPL CALC-SCNC: 4 MMOL/L (ref 5–15)
BUN SERPL-MCNC: 31 MG/DL (ref 6–20)
BUN/CREAT SERPL: 27 (ref 12–20)
CALCIUM SERPL-MCNC: 9.1 MG/DL (ref 8.5–10.1)
CHLORIDE SERPL-SCNC: 100 MMOL/L (ref 97–108)
CO2 SERPL-SCNC: 33 MMOL/L (ref 21–32)
CREAT SERPL-MCNC: 1.13 MG/DL (ref 0.7–1.3)
ERYTHROCYTE [DISTWIDTH] IN BLOOD BY AUTOMATED COUNT: 14 % (ref 11.5–14.5)
GLUCOSE SERPL-MCNC: 99 MG/DL (ref 65–100)
HCT VFR BLD AUTO: 25.7 % (ref 36.6–50.3)
HGB BLD-MCNC: 8.4 G/DL (ref 12.1–17)
MCH RBC QN AUTO: 31 PG (ref 26–34)
MCHC RBC AUTO-ENTMCNC: 32.7 G/DL (ref 30–36.5)
MCV RBC AUTO: 94.8 FL (ref 80–99)
PLATELET # BLD AUTO: 220 K/UL (ref 150–400)
POTASSIUM SERPL-SCNC: 4.3 MMOL/L (ref 3.5–5.1)
RBC # BLD AUTO: 2.71 M/UL (ref 4.1–5.7)
SODIUM SERPL-SCNC: 137 MMOL/L (ref 136–145)
WBC # BLD AUTO: 6.5 K/UL (ref 4.1–11.1)

## 2017-12-28 PROCEDURE — 74011250637 HC RX REV CODE- 250/637: Performed by: PHYSICAL MEDICINE & REHABILITATION

## 2017-12-28 PROCEDURE — 90686 IIV4 VACC NO PRSV 0.5 ML IM: CPT | Performed by: PHYSICAL MEDICINE & REHABILITATION

## 2017-12-28 PROCEDURE — 80048 BASIC METABOLIC PNL TOTAL CA: CPT | Performed by: PHYSICAL MEDICINE & REHABILITATION

## 2017-12-28 PROCEDURE — 36415 COLL VENOUS BLD VENIPUNCTURE: CPT | Performed by: PHYSICAL MEDICINE & REHABILITATION

## 2017-12-28 PROCEDURE — 74011250636 HC RX REV CODE- 250/636: Performed by: PHYSICAL MEDICINE & REHABILITATION

## 2017-12-28 PROCEDURE — 85027 COMPLETE CBC AUTOMATED: CPT | Performed by: PHYSICAL MEDICINE & REHABILITATION

## 2017-12-28 PROCEDURE — 74011636637 HC RX REV CODE- 636/637: Performed by: PHYSICAL MEDICINE & REHABILITATION

## 2017-12-28 RX ADMIN — OYSTER SHELL CALCIUM WITH VITAMIN D 1 TABLET: 500; 200 TABLET, FILM COATED ORAL at 08:40

## 2017-12-28 RX ADMIN — FERROUS SULFATE TAB 325 MG (65 MG ELEMENTAL FE) 325 MG: 325 (65 FE) TAB at 08:39

## 2017-12-28 RX ADMIN — DEXTRAN 70, AND HYPROMELLOSE 2910 2 DROP: 1; 3 SOLUTION/ DROPS OPHTHALMIC at 18:08

## 2017-12-28 RX ADMIN — LEVOTHYROXINE SODIUM 125 MCG: 75 TABLET ORAL at 05:32

## 2017-12-28 RX ADMIN — TAMSULOSIN HYDROCHLORIDE 0.4 MG: 0.4 CAPSULE ORAL at 22:09

## 2017-12-28 RX ADMIN — Medication 2 SPRAY: at 08:41

## 2017-12-28 RX ADMIN — FINASTERIDE 5 MG: 5 TABLET, FILM COATED ORAL at 18:08

## 2017-12-28 RX ADMIN — OYSTER SHELL CALCIUM WITH VITAMIN D 1 TABLET: 500; 200 TABLET, FILM COATED ORAL at 12:31

## 2017-12-28 RX ADMIN — PANTOPRAZOLE SODIUM 40 MG: 40 TABLET, DELAYED RELEASE ORAL at 05:32

## 2017-12-28 RX ADMIN — INSULIN LISPRO 2 UNITS: 100 INJECTION, SOLUTION INTRAVENOUS; SUBCUTANEOUS at 08:41

## 2017-12-28 RX ADMIN — Medication 500 MG: at 08:41

## 2017-12-28 RX ADMIN — VITAMIN D, TAB 1000IU (100/BT) 3000 UNITS: 25 TAB at 08:40

## 2017-12-28 RX ADMIN — INSULIN LISPRO 4 UNITS: 100 INJECTION, SOLUTION INTRAVENOUS; SUBCUTANEOUS at 12:32

## 2017-12-28 RX ADMIN — DOCUSATE SODIUM AND SENNOSIDES 1 TABLET: 8.6; 5 TABLET, FILM COATED ORAL at 08:39

## 2017-12-28 RX ADMIN — QUETIAPINE FUMARATE 50 MG: 25 TABLET ORAL at 08:40

## 2017-12-28 RX ADMIN — ESCITALOPRAM OXALATE 20 MG: 10 TABLET ORAL at 08:40

## 2017-12-28 RX ADMIN — OYSTER SHELL CALCIUM WITH VITAMIN D 1 TABLET: 500; 200 TABLET, FILM COATED ORAL at 18:08

## 2017-12-28 RX ADMIN — DEXTRAN 70, AND HYPROMELLOSE 2910 2 DROP: 1; 3 SOLUTION/ DROPS OPHTHALMIC at 22:00

## 2017-12-28 RX ADMIN — FUROSEMIDE 40 MG: 40 TABLET ORAL at 08:39

## 2017-12-28 RX ADMIN — CARVEDILOL 3.12 MG: 3.12 TABLET, FILM COATED ORAL at 18:08

## 2017-12-28 RX ADMIN — INFLUENZA VIRUS VACCINE 0.5 ML: 15; 15; 15; 15 SUSPENSION INTRAMUSCULAR at 12:31

## 2017-12-28 RX ADMIN — FUROSEMIDE 40 MG: 40 TABLET ORAL at 18:09

## 2017-12-28 RX ADMIN — GUAIFENESIN 600 MG: 600 TABLET, EXTENDED RELEASE ORAL at 08:40

## 2017-12-28 RX ADMIN — GUAIFENESIN 600 MG: 600 TABLET, EXTENDED RELEASE ORAL at 22:09

## 2017-12-28 RX ADMIN — GLIPIZIDE 5 MG: 5 TABLET ORAL at 08:39

## 2017-12-28 RX ADMIN — GLIPIZIDE 5 MG: 5 TABLET ORAL at 18:08

## 2017-12-28 RX ADMIN — DONEPEZIL HYDROCHLORIDE 10 MG: 5 TABLET, FILM COATED ORAL at 08:39

## 2017-12-28 RX ADMIN — CARVEDILOL 3.12 MG: 3.12 TABLET, FILM COATED ORAL at 08:40

## 2017-12-28 RX ADMIN — INSULIN LISPRO 4 UNITS: 100 INJECTION, SOLUTION INTRAVENOUS; SUBCUTANEOUS at 22:15

## 2017-12-28 RX ADMIN — DOCUSATE SODIUM AND SENNOSIDES 1 TABLET: 8.6; 5 TABLET, FILM COATED ORAL at 22:09

## 2017-12-28 RX ADMIN — ENOXAPARIN SODIUM 40 MG: 100 INJECTION SUBCUTANEOUS at 18:10

## 2017-12-29 ENCOUNTER — APPOINTMENT (OUTPATIENT)
Dept: GENERAL RADIOLOGY | Age: 82
End: 2017-12-29
Attending: PHYSICAL MEDICINE & REHABILITATION

## 2017-12-29 LAB
BACTERIA SPEC CULT: NORMAL
CC UR VC: NORMAL
SERVICE CMNT-IMP: NORMAL

## 2017-12-29 PROCEDURE — 74011636637 HC RX REV CODE- 636/637: Performed by: PHYSICAL MEDICINE & REHABILITATION

## 2017-12-29 PROCEDURE — 74011250636 HC RX REV CODE- 250/636: Performed by: PHYSICAL MEDICINE & REHABILITATION

## 2017-12-29 PROCEDURE — 74011250637 HC RX REV CODE- 250/637: Performed by: PHYSICAL MEDICINE & REHABILITATION

## 2017-12-29 RX ADMIN — DONEPEZIL HYDROCHLORIDE 10 MG: 5 TABLET, FILM COATED ORAL at 09:45

## 2017-12-29 RX ADMIN — FERROUS SULFATE TAB 325 MG (65 MG ELEMENTAL FE) 325 MG: 325 (65 FE) TAB at 09:45

## 2017-12-29 RX ADMIN — LEVOTHYROXINE SODIUM 125 MCG: 75 TABLET ORAL at 05:55

## 2017-12-29 RX ADMIN — ENOXAPARIN SODIUM 40 MG: 100 INJECTION SUBCUTANEOUS at 17:48

## 2017-12-29 RX ADMIN — OYSTER SHELL CALCIUM WITH VITAMIN D 1 TABLET: 500; 200 TABLET, FILM COATED ORAL at 17:47

## 2017-12-29 RX ADMIN — Medication 2 SPRAY: at 09:46

## 2017-12-29 RX ADMIN — VITAMIN D, TAB 1000IU (100/BT) 3000 UNITS: 25 TAB at 09:46

## 2017-12-29 RX ADMIN — FUROSEMIDE 40 MG: 40 TABLET ORAL at 09:45

## 2017-12-29 RX ADMIN — INSULIN LISPRO 2 UNITS: 100 INJECTION, SOLUTION INTRAVENOUS; SUBCUTANEOUS at 13:07

## 2017-12-29 RX ADMIN — CARVEDILOL 3.12 MG: 3.12 TABLET, FILM COATED ORAL at 17:47

## 2017-12-29 RX ADMIN — OYSTER SHELL CALCIUM WITH VITAMIN D 1 TABLET: 500; 200 TABLET, FILM COATED ORAL at 09:45

## 2017-12-29 RX ADMIN — CARVEDILOL 3.12 MG: 3.12 TABLET, FILM COATED ORAL at 09:46

## 2017-12-29 RX ADMIN — FUROSEMIDE 40 MG: 40 TABLET ORAL at 17:46

## 2017-12-29 RX ADMIN — Medication 500 MG: at 09:48

## 2017-12-29 RX ADMIN — FINASTERIDE 5 MG: 5 TABLET, FILM COATED ORAL at 17:47

## 2017-12-29 RX ADMIN — GLIPIZIDE 5 MG: 5 TABLET ORAL at 17:47

## 2017-12-29 RX ADMIN — GLIPIZIDE 5 MG: 5 TABLET ORAL at 09:45

## 2017-12-29 RX ADMIN — TAMSULOSIN HYDROCHLORIDE 0.4 MG: 0.4 CAPSULE ORAL at 22:12

## 2017-12-29 RX ADMIN — QUETIAPINE FUMARATE 50 MG: 25 TABLET ORAL at 09:45

## 2017-12-29 RX ADMIN — OYSTER SHELL CALCIUM WITH VITAMIN D 1 TABLET: 500; 200 TABLET, FILM COATED ORAL at 13:01

## 2017-12-29 RX ADMIN — DEXTRAN 70, AND HYPROMELLOSE 2910 2 DROP: 1; 3 SOLUTION/ DROPS OPHTHALMIC at 09:47

## 2017-12-29 RX ADMIN — DEXTRAN 70, AND HYPROMELLOSE 2910 2 DROP: 1; 3 SOLUTION/ DROPS OPHTHALMIC at 22:12

## 2017-12-29 RX ADMIN — TRAMADOL HYDROCHLORIDE 50 MG: 50 TABLET, FILM COATED ORAL at 15:29

## 2017-12-29 RX ADMIN — GUAIFENESIN 600 MG: 600 TABLET, EXTENDED RELEASE ORAL at 22:12

## 2017-12-29 RX ADMIN — PANTOPRAZOLE SODIUM 40 MG: 40 TABLET, DELAYED RELEASE ORAL at 05:55

## 2017-12-29 RX ADMIN — ESCITALOPRAM OXALATE 20 MG: 10 TABLET ORAL at 09:45

## 2017-12-29 RX ADMIN — DOCUSATE SODIUM AND SENNOSIDES 1 TABLET: 8.6; 5 TABLET, FILM COATED ORAL at 09:45

## 2017-12-29 RX ADMIN — GUAIFENESIN 600 MG: 600 TABLET, EXTENDED RELEASE ORAL at 09:46

## 2017-12-30 PROCEDURE — 74011636637 HC RX REV CODE- 636/637: Performed by: PHYSICAL MEDICINE & REHABILITATION

## 2017-12-30 PROCEDURE — 74011250637 HC RX REV CODE- 250/637: Performed by: PHYSICAL MEDICINE & REHABILITATION

## 2017-12-30 PROCEDURE — 74011250636 HC RX REV CODE- 250/636: Performed by: PHYSICAL MEDICINE & REHABILITATION

## 2017-12-30 RX ADMIN — FUROSEMIDE 40 MG: 40 TABLET ORAL at 08:42

## 2017-12-30 RX ADMIN — LEVOTHYROXINE SODIUM 125 MCG: 75 TABLET ORAL at 05:35

## 2017-12-30 RX ADMIN — VITAMIN D, TAB 1000IU (100/BT) 3000 UNITS: 25 TAB at 08:41

## 2017-12-30 RX ADMIN — DONEPEZIL HYDROCHLORIDE 10 MG: 5 TABLET, FILM COATED ORAL at 08:42

## 2017-12-30 RX ADMIN — ESCITALOPRAM OXALATE 20 MG: 10 TABLET ORAL at 08:41

## 2017-12-30 RX ADMIN — Medication 2 SPRAY: at 08:43

## 2017-12-30 RX ADMIN — OYSTER SHELL CALCIUM WITH VITAMIN D 1 TABLET: 500; 200 TABLET, FILM COATED ORAL at 08:42

## 2017-12-30 RX ADMIN — ENOXAPARIN SODIUM 40 MG: 100 INJECTION SUBCUTANEOUS at 17:33

## 2017-12-30 RX ADMIN — Medication 500 MG: at 08:41

## 2017-12-30 RX ADMIN — FINASTERIDE 5 MG: 5 TABLET, FILM COATED ORAL at 17:34

## 2017-12-30 RX ADMIN — FUROSEMIDE 40 MG: 40 TABLET ORAL at 17:34

## 2017-12-30 RX ADMIN — TAMSULOSIN HYDROCHLORIDE 0.4 MG: 0.4 CAPSULE ORAL at 20:20

## 2017-12-30 RX ADMIN — TRAMADOL HYDROCHLORIDE 50 MG: 50 TABLET, FILM COATED ORAL at 20:20

## 2017-12-30 RX ADMIN — DEXTRAN 70, AND HYPROMELLOSE 2910 2 DROP: 1; 3 SOLUTION/ DROPS OPHTHALMIC at 08:44

## 2017-12-30 RX ADMIN — TRAMADOL HYDROCHLORIDE 50 MG: 50 TABLET, FILM COATED ORAL at 14:00

## 2017-12-30 RX ADMIN — OYSTER SHELL CALCIUM WITH VITAMIN D 1 TABLET: 500; 200 TABLET, FILM COATED ORAL at 12:22

## 2017-12-30 RX ADMIN — OYSTER SHELL CALCIUM WITH VITAMIN D 1 TABLET: 500; 200 TABLET, FILM COATED ORAL at 17:34

## 2017-12-30 RX ADMIN — DEXTRAN 70, AND HYPROMELLOSE 2910 2 DROP: 1; 3 SOLUTION/ DROPS OPHTHALMIC at 20:20

## 2017-12-30 RX ADMIN — PANTOPRAZOLE SODIUM 40 MG: 40 TABLET, DELAYED RELEASE ORAL at 05:35

## 2017-12-30 RX ADMIN — INSULIN LISPRO 2 UNITS: 100 INJECTION, SOLUTION INTRAVENOUS; SUBCUTANEOUS at 17:33

## 2017-12-30 RX ADMIN — GLIPIZIDE 5 MG: 5 TABLET ORAL at 08:42

## 2017-12-30 RX ADMIN — CARVEDILOL 3.12 MG: 3.12 TABLET, FILM COATED ORAL at 17:34

## 2017-12-30 RX ADMIN — QUETIAPINE FUMARATE 50 MG: 25 TABLET ORAL at 08:42

## 2017-12-30 RX ADMIN — INSULIN LISPRO 4 UNITS: 100 INJECTION, SOLUTION INTRAVENOUS; SUBCUTANEOUS at 21:23

## 2017-12-30 RX ADMIN — FERROUS SULFATE TAB 325 MG (65 MG ELEMENTAL FE) 325 MG: 325 (65 FE) TAB at 08:41

## 2017-12-30 RX ADMIN — GUAIFENESIN 600 MG: 600 TABLET, EXTENDED RELEASE ORAL at 20:19

## 2017-12-30 RX ADMIN — CARVEDILOL 3.12 MG: 3.12 TABLET, FILM COATED ORAL at 08:42

## 2017-12-30 RX ADMIN — GLIPIZIDE 5 MG: 5 TABLET ORAL at 17:34

## 2017-12-30 RX ADMIN — TRAMADOL HYDROCHLORIDE 50 MG: 50 TABLET, FILM COATED ORAL at 06:05

## 2017-12-30 RX ADMIN — TRAMADOL HYDROCHLORIDE 50 MG: 50 TABLET, FILM COATED ORAL at 00:20

## 2017-12-30 RX ADMIN — GUAIFENESIN 600 MG: 600 TABLET, EXTENDED RELEASE ORAL at 08:42

## 2017-12-31 PROCEDURE — 74011250636 HC RX REV CODE- 250/636: Performed by: PHYSICAL MEDICINE & REHABILITATION

## 2017-12-31 PROCEDURE — 74011250637 HC RX REV CODE- 250/637: Performed by: PHYSICAL MEDICINE & REHABILITATION

## 2017-12-31 PROCEDURE — 74011636637 HC RX REV CODE- 636/637: Performed by: PHYSICAL MEDICINE & REHABILITATION

## 2017-12-31 RX ADMIN — QUETIAPINE FUMARATE 50 MG: 25 TABLET ORAL at 09:12

## 2017-12-31 RX ADMIN — CARVEDILOL 3.12 MG: 3.12 TABLET, FILM COATED ORAL at 18:08

## 2017-12-31 RX ADMIN — ESCITALOPRAM OXALATE 20 MG: 10 TABLET ORAL at 09:11

## 2017-12-31 RX ADMIN — ENOXAPARIN SODIUM 40 MG: 100 INJECTION SUBCUTANEOUS at 18:09

## 2017-12-31 RX ADMIN — GLIPIZIDE 5 MG: 5 TABLET ORAL at 09:12

## 2017-12-31 RX ADMIN — TRAMADOL HYDROCHLORIDE 50 MG: 50 TABLET, FILM COATED ORAL at 18:08

## 2017-12-31 RX ADMIN — PANTOPRAZOLE SODIUM 40 MG: 40 TABLET, DELAYED RELEASE ORAL at 05:38

## 2017-12-31 RX ADMIN — DEXTRAN 70, AND HYPROMELLOSE 2910 2 DROP: 1; 3 SOLUTION/ DROPS OPHTHALMIC at 09:15

## 2017-12-31 RX ADMIN — FUROSEMIDE 40 MG: 40 TABLET ORAL at 18:08

## 2017-12-31 RX ADMIN — GUAIFENESIN 600 MG: 600 TABLET, EXTENDED RELEASE ORAL at 20:27

## 2017-12-31 RX ADMIN — ACETAMINOPHEN 650 MG: 325 TABLET ORAL at 14:04

## 2017-12-31 RX ADMIN — CARVEDILOL 3.12 MG: 3.12 TABLET, FILM COATED ORAL at 09:12

## 2017-12-31 RX ADMIN — Medication 2 SPRAY: at 09:00

## 2017-12-31 RX ADMIN — INSULIN LISPRO 2 UNITS: 100 INJECTION, SOLUTION INTRAVENOUS; SUBCUTANEOUS at 12:08

## 2017-12-31 RX ADMIN — FERROUS SULFATE TAB 325 MG (65 MG ELEMENTAL FE) 325 MG: 325 (65 FE) TAB at 09:11

## 2017-12-31 RX ADMIN — OYSTER SHELL CALCIUM WITH VITAMIN D 1 TABLET: 500; 200 TABLET, FILM COATED ORAL at 12:09

## 2017-12-31 RX ADMIN — DEXTRAN 70, AND HYPROMELLOSE 2910 2 DROP: 1; 3 SOLUTION/ DROPS OPHTHALMIC at 20:27

## 2017-12-31 RX ADMIN — VITAMIN D, TAB 1000IU (100/BT) 3000 UNITS: 25 TAB at 09:11

## 2017-12-31 RX ADMIN — FUROSEMIDE 40 MG: 40 TABLET ORAL at 09:11

## 2017-12-31 RX ADMIN — OYSTER SHELL CALCIUM WITH VITAMIN D 1 TABLET: 500; 200 TABLET, FILM COATED ORAL at 18:08

## 2017-12-31 RX ADMIN — TRAMADOL HYDROCHLORIDE 50 MG: 50 TABLET, FILM COATED ORAL at 09:22

## 2017-12-31 RX ADMIN — GLIPIZIDE 5 MG: 5 TABLET ORAL at 18:08

## 2017-12-31 RX ADMIN — LEVOTHYROXINE SODIUM 125 MCG: 75 TABLET ORAL at 05:38

## 2017-12-31 RX ADMIN — TAMSULOSIN HYDROCHLORIDE 0.4 MG: 0.4 CAPSULE ORAL at 20:28

## 2017-12-31 RX ADMIN — DONEPEZIL HYDROCHLORIDE 10 MG: 5 TABLET, FILM COATED ORAL at 09:11

## 2017-12-31 RX ADMIN — GUAIFENESIN 600 MG: 600 TABLET, EXTENDED RELEASE ORAL at 09:11

## 2017-12-31 RX ADMIN — Medication 500 MG: at 09:14

## 2017-12-31 RX ADMIN — INSULIN LISPRO 4 UNITS: 100 INJECTION, SOLUTION INTRAVENOUS; SUBCUTANEOUS at 18:08

## 2017-12-31 RX ADMIN — OYSTER SHELL CALCIUM WITH VITAMIN D 1 TABLET: 500; 200 TABLET, FILM COATED ORAL at 09:12

## 2017-12-31 RX ADMIN — FINASTERIDE 5 MG: 5 TABLET, FILM COATED ORAL at 18:08

## 2018-01-01 LAB
ANION GAP SERPL CALC-SCNC: 6 MMOL/L (ref 5–15)
BUN SERPL-MCNC: 27 MG/DL (ref 6–20)
BUN/CREAT SERPL: 25 (ref 12–20)
CALCIUM SERPL-MCNC: 9 MG/DL (ref 8.5–10.1)
CHLORIDE SERPL-SCNC: 101 MMOL/L (ref 97–108)
CO2 SERPL-SCNC: 30 MMOL/L (ref 21–32)
CREAT SERPL-MCNC: 1.06 MG/DL (ref 0.7–1.3)
ERYTHROCYTE [DISTWIDTH] IN BLOOD BY AUTOMATED COUNT: 14.6 % (ref 11.5–14.5)
GLUCOSE SERPL-MCNC: 134 MG/DL (ref 65–100)
HCT VFR BLD AUTO: 26.8 % (ref 36.6–50.3)
HGB BLD-MCNC: 8.6 G/DL (ref 12.1–17)
MCH RBC QN AUTO: 30.5 PG (ref 26–34)
MCHC RBC AUTO-ENTMCNC: 32.1 G/DL (ref 30–36.5)
MCV RBC AUTO: 95 FL (ref 80–99)
PLATELET # BLD AUTO: 361 K/UL (ref 150–400)
POTASSIUM SERPL-SCNC: 4.4 MMOL/L (ref 3.5–5.1)
RBC # BLD AUTO: 2.82 M/UL (ref 4.1–5.7)
SODIUM SERPL-SCNC: 137 MMOL/L (ref 136–145)
WBC # BLD AUTO: 7.5 K/UL (ref 4.1–11.1)

## 2018-01-01 PROCEDURE — 74011250637 HC RX REV CODE- 250/637: Performed by: PHYSICAL MEDICINE & REHABILITATION

## 2018-01-01 PROCEDURE — 74011636637 HC RX REV CODE- 636/637: Performed by: PHYSICAL MEDICINE & REHABILITATION

## 2018-01-01 PROCEDURE — 74011250636 HC RX REV CODE- 250/636: Performed by: PHYSICAL MEDICINE & REHABILITATION

## 2018-01-01 PROCEDURE — 85027 COMPLETE CBC AUTOMATED: CPT | Performed by: PHYSICAL MEDICINE & REHABILITATION

## 2018-01-01 PROCEDURE — 80048 BASIC METABOLIC PNL TOTAL CA: CPT | Performed by: PHYSICAL MEDICINE & REHABILITATION

## 2018-01-01 PROCEDURE — 36415 COLL VENOUS BLD VENIPUNCTURE: CPT | Performed by: PHYSICAL MEDICINE & REHABILITATION

## 2018-01-01 RX ADMIN — INSULIN LISPRO 4 UNITS: 100 INJECTION, SOLUTION INTRAVENOUS; SUBCUTANEOUS at 21:15

## 2018-01-01 RX ADMIN — FUROSEMIDE 40 MG: 40 TABLET ORAL at 08:57

## 2018-01-01 RX ADMIN — TRAMADOL HYDROCHLORIDE 50 MG: 50 TABLET, FILM COATED ORAL at 16:58

## 2018-01-01 RX ADMIN — PANTOPRAZOLE SODIUM 40 MG: 40 TABLET, DELAYED RELEASE ORAL at 05:15

## 2018-01-01 RX ADMIN — OYSTER SHELL CALCIUM WITH VITAMIN D 1 TABLET: 500; 200 TABLET, FILM COATED ORAL at 08:57

## 2018-01-01 RX ADMIN — DONEPEZIL HYDROCHLORIDE 10 MG: 5 TABLET, FILM COATED ORAL at 08:57

## 2018-01-01 RX ADMIN — INSULIN LISPRO 2 UNITS: 100 INJECTION, SOLUTION INTRAVENOUS; SUBCUTANEOUS at 11:57

## 2018-01-01 RX ADMIN — OYSTER SHELL CALCIUM WITH VITAMIN D 1 TABLET: 500; 200 TABLET, FILM COATED ORAL at 11:58

## 2018-01-01 RX ADMIN — FERROUS SULFATE TAB 325 MG (65 MG ELEMENTAL FE) 325 MG: 325 (65 FE) TAB at 08:57

## 2018-01-01 RX ADMIN — ACETAMINOPHEN 650 MG: 325 TABLET ORAL at 21:22

## 2018-01-01 RX ADMIN — LEVOTHYROXINE SODIUM 125 MCG: 75 TABLET ORAL at 05:15

## 2018-01-01 RX ADMIN — OYSTER SHELL CALCIUM WITH VITAMIN D 1 TABLET: 500; 200 TABLET, FILM COATED ORAL at 16:58

## 2018-01-01 RX ADMIN — CARVEDILOL 3.12 MG: 3.12 TABLET, FILM COATED ORAL at 08:57

## 2018-01-01 RX ADMIN — Medication 2 SPRAY: at 08:56

## 2018-01-01 RX ADMIN — DEXTRAN 70, AND HYPROMELLOSE 2910 2 DROP: 1; 3 SOLUTION/ DROPS OPHTHALMIC at 09:05

## 2018-01-01 RX ADMIN — GLIPIZIDE 5 MG: 5 TABLET ORAL at 16:58

## 2018-01-01 RX ADMIN — GUAIFENESIN 600 MG: 600 TABLET, EXTENDED RELEASE ORAL at 21:17

## 2018-01-01 RX ADMIN — GUAIFENESIN 600 MG: 600 TABLET, EXTENDED RELEASE ORAL at 08:57

## 2018-01-01 RX ADMIN — DEXTRAN 70, AND HYPROMELLOSE 2910 2 DROP: 1; 3 SOLUTION/ DROPS OPHTHALMIC at 21:26

## 2018-01-01 RX ADMIN — GLIPIZIDE 5 MG: 5 TABLET ORAL at 08:56

## 2018-01-01 RX ADMIN — TRAMADOL HYDROCHLORIDE 50 MG: 50 TABLET, FILM COATED ORAL at 10:45

## 2018-01-01 RX ADMIN — QUETIAPINE FUMARATE 50 MG: 25 TABLET ORAL at 08:56

## 2018-01-01 RX ADMIN — ZOLPIDEM TARTRATE 5 MG: 5 TABLET ORAL at 21:18

## 2018-01-01 RX ADMIN — VITAMIN D, TAB 1000IU (100/BT) 3000 UNITS: 25 TAB at 08:57

## 2018-01-01 RX ADMIN — TAMSULOSIN HYDROCHLORIDE 0.4 MG: 0.4 CAPSULE ORAL at 21:17

## 2018-01-01 RX ADMIN — TRAMADOL HYDROCHLORIDE 50 MG: 50 TABLET, FILM COATED ORAL at 03:50

## 2018-01-01 RX ADMIN — ENOXAPARIN SODIUM 40 MG: 100 INJECTION SUBCUTANEOUS at 17:02

## 2018-01-01 RX ADMIN — Medication 500 MG: at 08:58

## 2018-01-01 RX ADMIN — CARVEDILOL 3.12 MG: 3.12 TABLET, FILM COATED ORAL at 16:58

## 2018-01-01 RX ADMIN — FUROSEMIDE 40 MG: 40 TABLET ORAL at 16:58

## 2018-01-01 RX ADMIN — FINASTERIDE 5 MG: 5 TABLET, FILM COATED ORAL at 17:02

## 2018-01-01 RX ADMIN — ESCITALOPRAM OXALATE 20 MG: 10 TABLET ORAL at 08:56

## 2018-01-02 ENCOUNTER — APPOINTMENT (OUTPATIENT)
Dept: GENERAL RADIOLOGY | Age: 83
End: 2018-01-02
Attending: PHYSICAL MEDICINE & REHABILITATION

## 2018-01-02 PROCEDURE — 73502 X-RAY EXAM HIP UNI 2-3 VIEWS: CPT

## 2018-01-02 PROCEDURE — 74011250637 HC RX REV CODE- 250/637: Performed by: PHYSICAL MEDICINE & REHABILITATION

## 2018-01-02 PROCEDURE — 74011250636 HC RX REV CODE- 250/636: Performed by: PHYSICAL MEDICINE & REHABILITATION

## 2018-01-02 PROCEDURE — 74230 X-RAY XM SWLNG FUNCJ C+: CPT

## 2018-01-02 PROCEDURE — 74011636637 HC RX REV CODE- 636/637: Performed by: PHYSICAL MEDICINE & REHABILITATION

## 2018-01-02 RX ADMIN — Medication 2 SPRAY: at 08:31

## 2018-01-02 RX ADMIN — ESCITALOPRAM OXALATE 20 MG: 10 TABLET ORAL at 08:30

## 2018-01-02 RX ADMIN — QUETIAPINE FUMARATE 50 MG: 25 TABLET ORAL at 08:29

## 2018-01-02 RX ADMIN — TAMSULOSIN HYDROCHLORIDE 0.4 MG: 0.4 CAPSULE ORAL at 21:25

## 2018-01-02 RX ADMIN — INSULIN LISPRO 2 UNITS: 100 INJECTION, SOLUTION INTRAVENOUS; SUBCUTANEOUS at 12:31

## 2018-01-02 RX ADMIN — FUROSEMIDE 40 MG: 40 TABLET ORAL at 08:30

## 2018-01-02 RX ADMIN — ENOXAPARIN SODIUM 40 MG: 100 INJECTION SUBCUTANEOUS at 17:27

## 2018-01-02 RX ADMIN — TRAMADOL HYDROCHLORIDE 50 MG: 50 TABLET, FILM COATED ORAL at 13:17

## 2018-01-02 RX ADMIN — OYSTER SHELL CALCIUM WITH VITAMIN D 1 TABLET: 500; 200 TABLET, FILM COATED ORAL at 12:31

## 2018-01-02 RX ADMIN — CARVEDILOL 3.12 MG: 3.12 TABLET, FILM COATED ORAL at 08:30

## 2018-01-02 RX ADMIN — PANTOPRAZOLE SODIUM 40 MG: 40 TABLET, DELAYED RELEASE ORAL at 05:38

## 2018-01-02 RX ADMIN — OYSTER SHELL CALCIUM WITH VITAMIN D 1 TABLET: 500; 200 TABLET, FILM COATED ORAL at 08:30

## 2018-01-02 RX ADMIN — DEXTRAN 70, AND HYPROMELLOSE 2910 2 DROP: 1; 3 SOLUTION/ DROPS OPHTHALMIC at 09:42

## 2018-01-02 RX ADMIN — ACETAMINOPHEN 650 MG: 325 TABLET ORAL at 09:41

## 2018-01-02 RX ADMIN — VITAMIN D, TAB 1000IU (100/BT) 3000 UNITS: 25 TAB at 08:29

## 2018-01-02 RX ADMIN — FINASTERIDE 5 MG: 5 TABLET, FILM COATED ORAL at 17:26

## 2018-01-02 RX ADMIN — FUROSEMIDE 40 MG: 40 TABLET ORAL at 17:26

## 2018-01-02 RX ADMIN — DEXTRAN 70, AND HYPROMELLOSE 2910 2 DROP: 1; 3 SOLUTION/ DROPS OPHTHALMIC at 21:26

## 2018-01-02 RX ADMIN — CARVEDILOL 3.12 MG: 3.12 TABLET, FILM COATED ORAL at 17:26

## 2018-01-02 RX ADMIN — INSULIN LISPRO 2 UNITS: 100 INJECTION, SOLUTION INTRAVENOUS; SUBCUTANEOUS at 21:26

## 2018-01-02 RX ADMIN — DONEPEZIL HYDROCHLORIDE 10 MG: 5 TABLET, FILM COATED ORAL at 08:30

## 2018-01-02 RX ADMIN — GUAIFENESIN 600 MG: 600 TABLET, EXTENDED RELEASE ORAL at 08:30

## 2018-01-02 RX ADMIN — OYSTER SHELL CALCIUM WITH VITAMIN D 1 TABLET: 500; 200 TABLET, FILM COATED ORAL at 17:26

## 2018-01-02 RX ADMIN — GUAIFENESIN 600 MG: 600 TABLET, EXTENDED RELEASE ORAL at 21:25

## 2018-01-02 RX ADMIN — FERROUS SULFATE TAB 325 MG (65 MG ELEMENTAL FE) 325 MG: 325 (65 FE) TAB at 08:30

## 2018-01-02 RX ADMIN — LEVOTHYROXINE SODIUM 125 MCG: 75 TABLET ORAL at 05:37

## 2018-01-02 RX ADMIN — Medication 500 MG: at 08:31

## 2018-01-02 RX ADMIN — GLIPIZIDE 5 MG: 5 TABLET ORAL at 08:29

## 2018-01-03 ENCOUNTER — APPOINTMENT (OUTPATIENT)
Dept: GENERAL RADIOLOGY | Age: 83
End: 2018-01-03
Attending: PHYSICAL MEDICINE & REHABILITATION

## 2018-01-03 LAB
APPEARANCE UR: ABNORMAL
BACTERIA URNS QL MICRO: ABNORMAL /HPF
BILIRUB UR QL: NEGATIVE
COLOR UR: ABNORMAL
EPITH CASTS URNS QL MICRO: ABNORMAL /LPF
GLUCOSE UR STRIP.AUTO-MCNC: NEGATIVE MG/DL
HGB UR QL STRIP: ABNORMAL
KETONES UR QL STRIP.AUTO: NEGATIVE MG/DL
LEUKOCYTE ESTERASE UR QL STRIP.AUTO: ABNORMAL
NITRITE UR QL STRIP.AUTO: NEGATIVE
PH UR STRIP: 5 [PH] (ref 5–8)
PROT UR STRIP-MCNC: 30 MG/DL
RBC #/AREA URNS HPF: ABNORMAL /HPF (ref 0–5)
SP GR UR REFRACTOMETRY: 1.02 (ref 1–1.03)
UROBILINOGEN UR QL STRIP.AUTO: 1 EU/DL (ref 0.2–1)
WBC URNS QL MICRO: >100 /HPF (ref 0–4)

## 2018-01-03 PROCEDURE — 87086 URINE CULTURE/COLONY COUNT: CPT | Performed by: PHYSICAL MEDICINE & REHABILITATION

## 2018-01-03 PROCEDURE — 81001 URINALYSIS AUTO W/SCOPE: CPT | Performed by: PHYSICAL MEDICINE & REHABILITATION

## 2018-01-03 PROCEDURE — 74011250637 HC RX REV CODE- 250/637: Performed by: PHYSICAL MEDICINE & REHABILITATION

## 2018-01-03 PROCEDURE — 87186 SC STD MICRODIL/AGAR DIL: CPT | Performed by: PHYSICAL MEDICINE & REHABILITATION

## 2018-01-03 PROCEDURE — 74011250636 HC RX REV CODE- 250/636: Performed by: PHYSICAL MEDICINE & REHABILITATION

## 2018-01-03 PROCEDURE — 71046 X-RAY EXAM CHEST 2 VIEWS: CPT

## 2018-01-03 PROCEDURE — 74011636637 HC RX REV CODE- 636/637: Performed by: PHYSICAL MEDICINE & REHABILITATION

## 2018-01-03 PROCEDURE — 87077 CULTURE AEROBIC IDENTIFY: CPT | Performed by: PHYSICAL MEDICINE & REHABILITATION

## 2018-01-03 RX ADMIN — GUAIFENESIN 600 MG: 600 TABLET, EXTENDED RELEASE ORAL at 08:27

## 2018-01-03 RX ADMIN — FERROUS SULFATE TAB 325 MG (65 MG ELEMENTAL FE) 325 MG: 325 (65 FE) TAB at 08:28

## 2018-01-03 RX ADMIN — VITAMIN D, TAB 1000IU (100/BT) 3000 UNITS: 25 TAB at 08:27

## 2018-01-03 RX ADMIN — FUROSEMIDE 40 MG: 40 TABLET ORAL at 08:28

## 2018-01-03 RX ADMIN — INSULIN LISPRO 2 UNITS: 100 INJECTION, SOLUTION INTRAVENOUS; SUBCUTANEOUS at 08:28

## 2018-01-03 RX ADMIN — FINASTERIDE 5 MG: 5 TABLET, FILM COATED ORAL at 17:44

## 2018-01-03 RX ADMIN — QUETIAPINE FUMARATE 50 MG: 25 TABLET ORAL at 08:28

## 2018-01-03 RX ADMIN — CARVEDILOL 3.12 MG: 3.12 TABLET, FILM COATED ORAL at 08:28

## 2018-01-03 RX ADMIN — GUAIFENESIN 600 MG: 600 TABLET, EXTENDED RELEASE ORAL at 21:13

## 2018-01-03 RX ADMIN — FUROSEMIDE 40 MG: 40 TABLET ORAL at 17:43

## 2018-01-03 RX ADMIN — LEVOTHYROXINE SODIUM 125 MCG: 75 TABLET ORAL at 05:44

## 2018-01-03 RX ADMIN — INSULIN LISPRO 4 UNITS: 100 INJECTION, SOLUTION INTRAVENOUS; SUBCUTANEOUS at 12:01

## 2018-01-03 RX ADMIN — INSULIN LISPRO 4 UNITS: 100 INJECTION, SOLUTION INTRAVENOUS; SUBCUTANEOUS at 17:44

## 2018-01-03 RX ADMIN — CARVEDILOL 3.12 MG: 3.12 TABLET, FILM COATED ORAL at 17:43

## 2018-01-03 RX ADMIN — INSULIN LISPRO 4 UNITS: 100 INJECTION, SOLUTION INTRAVENOUS; SUBCUTANEOUS at 21:13

## 2018-01-03 RX ADMIN — OYSTER SHELL CALCIUM WITH VITAMIN D 1 TABLET: 500; 200 TABLET, FILM COATED ORAL at 12:01

## 2018-01-03 RX ADMIN — GLIPIZIDE 5 MG: 5 TABLET ORAL at 17:43

## 2018-01-03 RX ADMIN — ENOXAPARIN SODIUM 40 MG: 100 INJECTION SUBCUTANEOUS at 17:44

## 2018-01-03 RX ADMIN — Medication 2 SPRAY: at 08:30

## 2018-01-03 RX ADMIN — OYSTER SHELL CALCIUM WITH VITAMIN D 1 TABLET: 500; 200 TABLET, FILM COATED ORAL at 17:43

## 2018-01-03 RX ADMIN — Medication 500 MG: at 08:29

## 2018-01-03 RX ADMIN — TAMSULOSIN HYDROCHLORIDE 0.4 MG: 0.4 CAPSULE ORAL at 21:17

## 2018-01-03 RX ADMIN — DONEPEZIL HYDROCHLORIDE 10 MG: 5 TABLET, FILM COATED ORAL at 08:28

## 2018-01-03 RX ADMIN — GLIPIZIDE 5 MG: 5 TABLET ORAL at 08:27

## 2018-01-03 RX ADMIN — ACETAMINOPHEN 650 MG: 325 TABLET ORAL at 21:13

## 2018-01-03 RX ADMIN — TRAMADOL HYDROCHLORIDE 50 MG: 50 TABLET, FILM COATED ORAL at 15:56

## 2018-01-03 RX ADMIN — DEXTRAN 70, AND HYPROMELLOSE 2910 2 DROP: 1; 3 SOLUTION/ DROPS OPHTHALMIC at 11:56

## 2018-01-03 RX ADMIN — ESCITALOPRAM OXALATE 20 MG: 10 TABLET ORAL at 08:27

## 2018-01-03 RX ADMIN — PANTOPRAZOLE SODIUM 40 MG: 40 TABLET, DELAYED RELEASE ORAL at 05:44

## 2018-01-03 RX ADMIN — DEXTRAN 70, AND HYPROMELLOSE 2910 2 DROP: 1; 3 SOLUTION/ DROPS OPHTHALMIC at 21:14

## 2018-01-03 RX ADMIN — OYSTER SHELL CALCIUM WITH VITAMIN D 1 TABLET: 500; 200 TABLET, FILM COATED ORAL at 08:27

## 2018-01-04 ENCOUNTER — APPOINTMENT (OUTPATIENT)
Dept: CT IMAGING | Age: 83
DRG: 871 | End: 2018-01-04
Attending: INTERNAL MEDICINE
Payer: MEDICARE

## 2018-01-04 ENCOUNTER — HOSPITAL ENCOUNTER (INPATIENT)
Age: 83
LOS: 3 days | Discharge: NURSING, REHAB OR LONG-TERM CARE | DRG: 871 | End: 2018-01-07
Attending: EMERGENCY MEDICINE | Admitting: INTERNAL MEDICINE
Payer: MEDICARE

## 2018-01-04 ENCOUNTER — APPOINTMENT (OUTPATIENT)
Dept: GENERAL RADIOLOGY | Age: 83
DRG: 871 | End: 2018-01-04
Attending: EMERGENCY MEDICINE
Payer: MEDICARE

## 2018-01-04 VITALS
HEIGHT: 69 IN | BODY MASS INDEX: 24.22 KG/M2 | HEART RATE: 62 BPM | DIASTOLIC BLOOD PRESSURE: 56 MMHG | SYSTOLIC BLOOD PRESSURE: 135 MMHG | WEIGHT: 163.5 LBS

## 2018-01-04 DIAGNOSIS — A41.9 SEPSIS, DUE TO UNSPECIFIED ORGANISM: Primary | ICD-10-CM

## 2018-01-04 DIAGNOSIS — N39.0 URINARY TRACT INFECTION WITHOUT HEMATURIA, SITE UNSPECIFIED: ICD-10-CM

## 2018-01-04 PROBLEM — G93.40 ACUTE ENCEPHALOPATHY: Status: ACTIVE | Noted: 2018-01-04

## 2018-01-04 PROBLEM — R65.20 SEVERE SEPSIS (HCC): Status: ACTIVE | Noted: 2018-01-04

## 2018-01-04 LAB
ALBUMIN SERPL-MCNC: 3 G/DL (ref 3.5–5)
ALBUMIN/GLOB SERPL: 0.7 {RATIO} (ref 1.1–2.2)
ALP SERPL-CCNC: 109 U/L (ref 45–117)
ALT SERPL-CCNC: 28 U/L (ref 12–78)
AMMONIA PLAS-SCNC: 15 UMOL/L
ANION GAP SERPL CALC-SCNC: 9 MMOL/L (ref 5–15)
AST SERPL-CCNC: 16 U/L (ref 15–37)
BASOPHILS # BLD: 0 K/UL (ref 0–0.1)
BASOPHILS NFR BLD: 0 % (ref 0–1)
BILIRUB DIRECT SERPL-MCNC: 0.4 MG/DL (ref 0–0.2)
BILIRUB SERPL-MCNC: 1.1 MG/DL (ref 0.2–1)
BNP SERPL-MCNC: 9880 PG/ML (ref 0–450)
BUN SERPL-MCNC: 35 MG/DL (ref 6–20)
BUN/CREAT SERPL: 26 (ref 12–20)
CALCIUM SERPL-MCNC: 9.1 MG/DL (ref 8.5–10.1)
CHLORIDE SERPL-SCNC: 99 MMOL/L (ref 97–108)
CK MB CFR SERPL CALC: NORMAL % (ref 0–2.5)
CK MB SERPL-MCNC: <1 NG/ML (ref 5–25)
CK SERPL-CCNC: 40 U/L (ref 39–308)
CO2 SERPL-SCNC: 30 MMOL/L (ref 21–32)
CREAT SERPL-MCNC: 1.35 MG/DL (ref 0.7–1.3)
EOSINOPHIL # BLD: 0.1 K/UL (ref 0–0.4)
EOSINOPHIL NFR BLD: 0 % (ref 0–7)
ERYTHROCYTE [DISTWIDTH] IN BLOOD BY AUTOMATED COUNT: 15.2 % (ref 11.5–14.5)
GLOBULIN SER CALC-MCNC: 4.1 G/DL (ref 2–4)
GLUCOSE BLD STRIP.AUTO-MCNC: 135 MG/DL (ref 65–100)
GLUCOSE BLD STRIP.AUTO-MCNC: 164 MG/DL (ref 65–100)
GLUCOSE SERPL-MCNC: 92 MG/DL (ref 65–100)
HCT VFR BLD AUTO: 27.7 % (ref 36.6–50.3)
HGB BLD-MCNC: 8.8 G/DL (ref 12.1–17)
LACTATE SERPL-SCNC: 1.2 MMOL/L (ref 0.4–2)
LACTATE SERPL-SCNC: 2 MMOL/L (ref 0.4–2)
LYMPHOCYTES # BLD: 1.7 K/UL (ref 0.8–3.5)
LYMPHOCYTES NFR BLD: 8 % (ref 12–49)
MCH RBC QN AUTO: 30.3 PG (ref 26–34)
MCHC RBC AUTO-ENTMCNC: 31.8 G/DL (ref 30–36.5)
MCV RBC AUTO: 95.5 FL (ref 80–99)
MONOCYTES # BLD: 1.5 K/UL (ref 0–1)
MONOCYTES NFR BLD: 7 % (ref 5–13)
NEUTS SEG # BLD: 19.5 K/UL (ref 1.8–8)
NEUTS SEG NFR BLD: 85 % (ref 32–75)
PLATELET # BLD AUTO: 421 K/UL (ref 150–400)
POTASSIUM SERPL-SCNC: 3.9 MMOL/L (ref 3.5–5.1)
PROT SERPL-MCNC: 7.1 G/DL (ref 6.4–8.2)
RBC # BLD AUTO: 2.9 M/UL (ref 4.1–5.7)
SERVICE CMNT-IMP: ABNORMAL
SERVICE CMNT-IMP: ABNORMAL
SODIUM SERPL-SCNC: 138 MMOL/L (ref 136–145)
TROPONIN I SERPL-MCNC: <0.04 NG/ML
WBC # BLD AUTO: 22.7 K/UL (ref 4.1–11.1)

## 2018-01-04 PROCEDURE — 83605 ASSAY OF LACTIC ACID: CPT | Performed by: EMERGENCY MEDICINE

## 2018-01-04 PROCEDURE — 80076 HEPATIC FUNCTION PANEL: CPT | Performed by: EMERGENCY MEDICINE

## 2018-01-04 PROCEDURE — 74011000258 HC RX REV CODE- 258: Performed by: EMERGENCY MEDICINE

## 2018-01-04 PROCEDURE — 71045 X-RAY EXAM CHEST 1 VIEW: CPT

## 2018-01-04 PROCEDURE — 74011250637 HC RX REV CODE- 250/637: Performed by: INTERNAL MEDICINE

## 2018-01-04 PROCEDURE — 74011250636 HC RX REV CODE- 250/636: Performed by: INTERNAL MEDICINE

## 2018-01-04 PROCEDURE — 84484 ASSAY OF TROPONIN QUANT: CPT | Performed by: EMERGENCY MEDICINE

## 2018-01-04 PROCEDURE — 99285 EMERGENCY DEPT VISIT HI MDM: CPT

## 2018-01-04 PROCEDURE — 74011250636 HC RX REV CODE- 250/636: Performed by: EMERGENCY MEDICINE

## 2018-01-04 PROCEDURE — 93005 ELECTROCARDIOGRAM TRACING: CPT

## 2018-01-04 PROCEDURE — 96360 HYDRATION IV INFUSION INIT: CPT

## 2018-01-04 PROCEDURE — 87040 BLOOD CULTURE FOR BACTERIA: CPT | Performed by: EMERGENCY MEDICINE

## 2018-01-04 PROCEDURE — 80048 BASIC METABOLIC PNL TOTAL CA: CPT | Performed by: PHYSICAL MEDICINE & REHABILITATION

## 2018-01-04 PROCEDURE — 82962 GLUCOSE BLOOD TEST: CPT

## 2018-01-04 PROCEDURE — 85025 COMPLETE CBC W/AUTO DIFF WBC: CPT | Performed by: PHYSICAL MEDICINE & REHABILITATION

## 2018-01-04 PROCEDURE — 36415 COLL VENOUS BLD VENIPUNCTURE: CPT | Performed by: PHYSICAL MEDICINE & REHABILITATION

## 2018-01-04 PROCEDURE — 74011250637 HC RX REV CODE- 250/637: Performed by: PHYSICAL MEDICINE & REHABILITATION

## 2018-01-04 PROCEDURE — 65660000000 HC RM CCU STEPDOWN

## 2018-01-04 PROCEDURE — 74011000250 HC RX REV CODE- 250: Performed by: INTERNAL MEDICINE

## 2018-01-04 PROCEDURE — 74011000258 HC RX REV CODE- 258: Performed by: INTERNAL MEDICINE

## 2018-01-04 PROCEDURE — 74011636637 HC RX REV CODE- 636/637: Performed by: PHYSICAL MEDICINE & REHABILITATION

## 2018-01-04 PROCEDURE — 93306 TTE W/DOPPLER COMPLETE: CPT

## 2018-01-04 PROCEDURE — 82550 ASSAY OF CK (CPK): CPT | Performed by: EMERGENCY MEDICINE

## 2018-01-04 PROCEDURE — 96365 THER/PROPH/DIAG IV INF INIT: CPT

## 2018-01-04 PROCEDURE — 83880 ASSAY OF NATRIURETIC PEPTIDE: CPT | Performed by: EMERGENCY MEDICINE

## 2018-01-04 PROCEDURE — 94762 N-INVAS EAR/PLS OXIMTRY CONT: CPT

## 2018-01-04 PROCEDURE — 70450 CT HEAD/BRAIN W/O DYE: CPT

## 2018-01-04 PROCEDURE — 82140 ASSAY OF AMMONIA: CPT | Performed by: INTERNAL MEDICINE

## 2018-01-04 RX ORDER — ENOXAPARIN SODIUM 100 MG/ML
40 INJECTION SUBCUTANEOUS EVERY 24 HOURS
Status: DISCONTINUED | OUTPATIENT
Start: 2018-01-04 | End: 2018-01-07 | Stop reason: HOSPADM

## 2018-01-04 RX ORDER — TAMSULOSIN HYDROCHLORIDE 0.4 MG/1
0.4 CAPSULE ORAL
Status: DISCONTINUED | OUTPATIENT
Start: 2018-01-04 | End: 2018-01-07 | Stop reason: HOSPADM

## 2018-01-04 RX ORDER — NALOXONE HYDROCHLORIDE 0.4 MG/ML
0.4 INJECTION, SOLUTION INTRAMUSCULAR; INTRAVENOUS; SUBCUTANEOUS AS NEEDED
Status: DISCONTINUED | OUTPATIENT
Start: 2018-01-04 | End: 2018-01-07 | Stop reason: HOSPADM

## 2018-01-04 RX ORDER — INSULIN LISPRO 100 [IU]/ML
INJECTION, SOLUTION INTRAVENOUS; SUBCUTANEOUS
Status: DISCONTINUED | OUTPATIENT
Start: 2018-01-04 | End: 2018-01-07 | Stop reason: HOSPADM

## 2018-01-04 RX ORDER — FERROUS SULFATE, DRIED 160(50) MG
1 TABLET, EXTENDED RELEASE ORAL DAILY
COMMUNITY
End: 2020-01-01

## 2018-01-04 RX ORDER — ACETAMINOPHEN 325 MG/1
650 TABLET ORAL
Status: DISCONTINUED | OUTPATIENT
Start: 2018-01-04 | End: 2018-01-07 | Stop reason: HOSPADM

## 2018-01-04 RX ORDER — PANTOPRAZOLE SODIUM 40 MG/1
40 TABLET, DELAYED RELEASE ORAL
Status: DISCONTINUED | OUTPATIENT
Start: 2018-01-05 | End: 2018-01-07 | Stop reason: HOSPADM

## 2018-01-04 RX ORDER — DEXTROSE 50 % IN WATER (D50W) INTRAVENOUS SYRINGE
12.5-25 AS NEEDED
Status: DISCONTINUED | OUTPATIENT
Start: 2018-01-04 | End: 2018-01-07 | Stop reason: HOSPADM

## 2018-01-04 RX ORDER — CARVEDILOL 3.12 MG/1
3.12 TABLET ORAL 2 TIMES DAILY
Status: DISCONTINUED | OUTPATIENT
Start: 2018-01-04 | End: 2018-01-04

## 2018-01-04 RX ORDER — DIPHENHYDRAMINE HYDROCHLORIDE 50 MG/ML
12.5 INJECTION, SOLUTION INTRAMUSCULAR; INTRAVENOUS
Status: DISCONTINUED | OUTPATIENT
Start: 2018-01-04 | End: 2018-01-07 | Stop reason: HOSPADM

## 2018-01-04 RX ORDER — IPRATROPIUM BROMIDE AND ALBUTEROL SULFATE 2.5; .5 MG/3ML; MG/3ML
3 SOLUTION RESPIRATORY (INHALATION)
Status: DISCONTINUED | OUTPATIENT
Start: 2018-01-04 | End: 2018-01-07 | Stop reason: HOSPADM

## 2018-01-04 RX ORDER — GLIPIZIDE 10 MG/1
10 TABLET ORAL
COMMUNITY
End: 2019-03-01 | Stop reason: DRUGHIGH

## 2018-01-04 RX ORDER — SODIUM CHLORIDE AND POTASSIUM CHLORIDE .9; .15 G/100ML; G/100ML
SOLUTION INTRAVENOUS CONTINUOUS
Status: DISCONTINUED | OUTPATIENT
Start: 2018-01-04 | End: 2018-01-05

## 2018-01-04 RX ORDER — SODIUM CHLORIDE 0.9 % (FLUSH) 0.9 %
5-10 SYRINGE (ML) INJECTION AS NEEDED
Status: DISCONTINUED | OUTPATIENT
Start: 2018-01-04 | End: 2018-01-07 | Stop reason: HOSPADM

## 2018-01-04 RX ORDER — SODIUM CHLORIDE 0.9 % (FLUSH) 0.9 %
5-10 SYRINGE (ML) INJECTION EVERY 8 HOURS
Status: DISCONTINUED | OUTPATIENT
Start: 2018-01-04 | End: 2018-01-07 | Stop reason: HOSPADM

## 2018-01-04 RX ORDER — LEVOTHYROXINE SODIUM 125 UG/1
125 TABLET ORAL
Status: DISCONTINUED | OUTPATIENT
Start: 2018-01-05 | End: 2018-01-07 | Stop reason: HOSPADM

## 2018-01-04 RX ORDER — MAGNESIUM SULFATE 100 %
4 CRYSTALS MISCELLANEOUS AS NEEDED
Status: DISCONTINUED | OUTPATIENT
Start: 2018-01-04 | End: 2018-01-07 | Stop reason: HOSPADM

## 2018-01-04 RX ORDER — ACETAMINOPHEN 325 MG/1
650 TABLET ORAL
COMMUNITY
Start: 2020-01-01 | End: 2020-01-01 | Stop reason: CLARIF

## 2018-01-04 RX ORDER — FACIAL-BODY WIPES
10 EACH TOPICAL
Status: DISCONTINUED | OUTPATIENT
Start: 2018-01-04 | End: 2018-01-07 | Stop reason: HOSPADM

## 2018-01-04 RX ORDER — FINASTERIDE 5 MG/1
5 TABLET, FILM COATED ORAL EVERY EVENING
Status: DISCONTINUED | OUTPATIENT
Start: 2018-01-04 | End: 2018-01-07 | Stop reason: HOSPADM

## 2018-01-04 RX ORDER — LANOLIN ALCOHOL/MO/W.PET/CERES
325 CREAM (GRAM) TOPICAL
Status: DISCONTINUED | OUTPATIENT
Start: 2018-01-05 | End: 2018-01-04

## 2018-01-04 RX ORDER — ONDANSETRON 2 MG/ML
4 INJECTION INTRAMUSCULAR; INTRAVENOUS
Status: DISCONTINUED | OUTPATIENT
Start: 2018-01-04 | End: 2018-01-07 | Stop reason: HOSPADM

## 2018-01-04 RX ORDER — AMOXICILLIN 250 MG
1 CAPSULE ORAL DAILY
Status: DISCONTINUED | OUTPATIENT
Start: 2018-01-05 | End: 2018-01-07 | Stop reason: HOSPADM

## 2018-01-04 RX ORDER — INSULIN LISPRO 100 [IU]/ML
INJECTION, SOLUTION INTRAVENOUS; SUBCUTANEOUS
COMMUNITY
End: 2018-02-26

## 2018-01-04 RX ORDER — TRAMADOL HYDROCHLORIDE 50 MG/1
50 TABLET ORAL
Status: DISCONTINUED | OUTPATIENT
Start: 2018-01-04 | End: 2018-01-07 | Stop reason: HOSPADM

## 2018-01-04 RX ORDER — SODIUM CHLORIDE 9 MG/ML
30 INJECTION, SOLUTION INTRAVENOUS CONTINUOUS
Status: DISCONTINUED | OUTPATIENT
Start: 2018-01-04 | End: 2018-01-04

## 2018-01-04 RX ORDER — DONEPEZIL HYDROCHLORIDE 5 MG/1
10 TABLET, FILM COATED ORAL DAILY
Status: DISCONTINUED | OUTPATIENT
Start: 2018-01-05 | End: 2018-01-07 | Stop reason: HOSPADM

## 2018-01-04 RX ORDER — IPRATROPIUM BROMIDE AND ALBUTEROL SULFATE 2.5; .5 MG/3ML; MG/3ML
3 SOLUTION RESPIRATORY (INHALATION)
COMMUNITY
Start: 2020-01-01

## 2018-01-04 RX ADMIN — CARVEDILOL 3.12 MG: 3.12 TABLET, FILM COATED ORAL at 08:44

## 2018-01-04 RX ADMIN — ENOXAPARIN SODIUM 40 MG: 40 INJECTION SUBCUTANEOUS at 21:14

## 2018-01-04 RX ADMIN — FUROSEMIDE 40 MG: 40 TABLET ORAL at 08:45

## 2018-01-04 RX ADMIN — ESCITALOPRAM OXALATE 20 MG: 10 TABLET ORAL at 08:45

## 2018-01-04 RX ADMIN — ACETAMINOPHEN 650 MG: 325 TABLET ORAL at 16:21

## 2018-01-04 RX ADMIN — FERROUS SULFATE TAB 325 MG (65 MG ELEMENTAL FE) 325 MG: 325 (65 FE) TAB at 08:45

## 2018-01-04 RX ADMIN — DEXTRAN 70, AND HYPROMELLOSE 2910 2 DROP: 1; 3 SOLUTION/ DROPS OPHTHALMIC at 09:13

## 2018-01-04 RX ADMIN — QUETIAPINE FUMARATE 50 MG: 25 TABLET ORAL at 08:45

## 2018-01-04 RX ADMIN — INSULIN LISPRO 2 UNITS: 100 INJECTION, SOLUTION INTRAVENOUS; SUBCUTANEOUS at 08:45

## 2018-01-04 RX ADMIN — SODIUM CHLORIDE 1000 ML: 9 INJECTION, SOLUTION INTRAVENOUS at 20:10

## 2018-01-04 RX ADMIN — Medication 10 ML: at 21:16

## 2018-01-04 RX ADMIN — Medication 2 SPRAY: at 08:44

## 2018-01-04 RX ADMIN — GLIPIZIDE 5 MG: 5 TABLET ORAL at 08:45

## 2018-01-04 RX ADMIN — SODIUM CHLORIDE 3.38 G: 900 INJECTION, SOLUTION INTRAVENOUS at 17:22

## 2018-01-04 RX ADMIN — OYSTER SHELL CALCIUM WITH VITAMIN D 1 TABLET: 500; 200 TABLET, FILM COATED ORAL at 08:45

## 2018-01-04 RX ADMIN — LEVOTHYROXINE SODIUM 125 MCG: 75 TABLET ORAL at 05:51

## 2018-01-04 RX ADMIN — DONEPEZIL HYDROCHLORIDE 10 MG: 5 TABLET, FILM COATED ORAL at 08:45

## 2018-01-04 RX ADMIN — Medication 500 MG: at 08:44

## 2018-01-04 RX ADMIN — VITAMIN D, TAB 1000IU (100/BT) 3000 UNITS: 25 TAB at 08:44

## 2018-01-04 RX ADMIN — SODIUM CHLORIDE 2226 ML: 9 INJECTION, SOLUTION INTRAVENOUS at 10:41

## 2018-01-04 RX ADMIN — PIPERACILLIN SODIUM AND TAZOBACTAM SODIUM 3.38 G: 3; .375 INJECTION, POWDER, LYOPHILIZED, FOR SOLUTION INTRAVENOUS at 10:41

## 2018-01-04 RX ADMIN — PANTOPRAZOLE SODIUM 40 MG: 40 TABLET, DELAYED RELEASE ORAL at 05:51

## 2018-01-04 RX ADMIN — GUAIFENESIN 600 MG: 600 TABLET, EXTENDED RELEASE ORAL at 08:45

## 2018-01-04 RX ADMIN — IPRATROPIUM BROMIDE AND ALBUTEROL SULFATE 3 ML: .5; 3 SOLUTION RESPIRATORY (INHALATION) at 15:50

## 2018-01-04 RX ADMIN — SODIUM CHLORIDE AND POTASSIUM CHLORIDE 1 ML: 9; 1.49 INJECTION, SOLUTION INTRAVENOUS at 16:20

## 2018-01-04 NOTE — ED NOTES
Patient turned onto his left side using pillow support. The sacral area is free of any tissue deformity.

## 2018-01-04 NOTE — ED NOTES
Verbal report given to Pj Lucero RN(name) on Soraya Henao being transferred to room 336(unit) for routine progression of care    Report consisted of patient's Situation, Background, Assessment and Recommendations (SBAR)    Information from the following report(s)  SBAR, Kardex, ED Summary, Intake/Output, MAR, Recent Results, Med Rec Status and Cardiac Rhythm paced was reviewed with the receiving nurse. Opportunity for questions and clarification was provided. Patient transported with:  Monitor  O2 @ 4 liters  Tech    Last Filed Values:  Temp: 100.2 °F (37.9 °C) (01/04/18 1015)  Pulse (Heart Rate): 60 (01/04/18 1815)  Resp Rate: 24 (01/04/18 1015)  O2 Sat (%): 97 % (01/04/18 1815)  BP: (!) 108/33 (01/04/18 1815)  MAP (Monitor): (!) 62 (01/04/18 1815)  Level of Consciousness: (!) Responds to Pain (01/04/18 1015)      WBC   Date Value Ref Range Status   01/04/2018 22.7 (H) 4.1 - 11.1 K/uL Final   01/01/2018 7.5 4.1 - 11.1 K/uL Final   12/28/2017 6.5 4.1 - 11.1 K/uL Final       Blood Cultures Drawn:  yes    Initial Lactic Acid (LA):  Time 1537,  Result 1.2    Repeat LA:  Time Due 0 Done & Result 0    Fluid Restriction:  Total needed 2226, Status completed, amount 2226    All Antibiotics Started:  yes, Dose Due continuing with admission.     VS x 2 post-fluid resuscitation:   yes    Vasopressor Infusion:  no none    Provider Reassessment needed and notified:  no , Due    Additional Interventions/Comments:  none

## 2018-01-04 NOTE — ED PROVIDER NOTES
HPI Comments: 81 y.o. male with extensive past medical history, please see list, significant for dementia, hypertension, stroke, GERD, PVD, diabetes, arthritis, cardiomyopathy, mild aortic stenosis, CABG, and lung cancer who presents from [address] [place] [address] with chief complaint of lethargy. History mostly obtained from [address] [place] [address] since the patient is currently nonverbal at this time. Patient has apparently been increasingly lethargic over the last few days with a notable WBC jump from 7.5 on 01/01/18 to 22,000 today. His creatinine also increased from 1.06 to 1.35 over the same time period. His UA revealed greater than 100 white cells and his hemoglobin remains stable at 8.8. He had a normal chest x-ray last night. There are no other acute medical concerns at this time. Old Chart Review: Per note, patient was admitted here from 12/21/17 through the 27th for right hip fracture following a fall. Social hx: Tobacco Use: No (former smoker), Alcohol Use: No, Drug Use: No    PCP: Juan Osei MD    Note written by Shawna Soulier, Scribe, as dictated by Maria Del Rosario Cuenca MD 10:14 AM    Full history, physical exam, and ROS unable to be obtained due to:  Patient is nonverbal at this time. The history is provided by the nursing home and medical records. The history is limited by the condition of the patient.         Past Medical History:   Diagnosis Date    Arthritis     CAD (coronary artery disease)     Cardiomyopathy ([address] 6/11/2015    Colon cancer ([facility] 1993    COLON    Depression     DEPRESSION    Diabetes ([address]     IDDM    GERD (gastroesophageal reflux disease)     Hypertension     Lung cancer ([address] 11/29/2016    Mild aortic stenosis 6/15/2015    Pulmonary hypertension 6/15/2015    PVD (peripheral vascular disease) ([address]     STENT RIGHT GROIN, PAD    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    LIMA TO LAD; SVG TO OM; SVG TO OM    Stroke ([address] 1999    2 STROKES    Third degree AV block ([address] 6/11/2015    Thyroid disease        Past Surgical History:   Procedure Laterality Date    HX GI  1993    COLON RESECTION    HX HEART CATHETERIZATION  6/26/2015    HX PACEMAKER      INS PPM/ICD LED DUAL ONLY  7/2/2015         VASCULAR SURGERY PROCEDURE UNLIST  1999    PAD, RIGHT GROIN STENT         Family History:   Problem Relation Age of Onset    Heart Disease Mother     Diabetes Mother     Other Father      AMPUTATION LEG FOLLOWING BUG BITE    Other Sister      CEREBRAL ANUERYSM    Heart Disease Brother     Diabetes Sister        Social History     Social History    Marital status:      Spouse name: N/A    Number of children: N/A    Years of education: N/A     Occupational History    Not on file. Social History Main Topics    Smoking status: Former Smoker     Packs/day: 2.00     Years: 60.00     Types: Cigarettes     Quit date: 6/22/2015    Smokeless tobacco: Never Used    Alcohol use No    Drug use: No    Sexual activity: Not Currently     Partners: Female     Other Topics Concern    Not on file     Social History Narrative         ALLERGIES: Review of patient's allergies indicates no known allergies. Review of Systems   Unable to perform ROS: Patient nonverbal       There were no vitals filed for this visit. Physical Exam   Constitutional: He is oriented to person, place, and time. He appears well-developed and well-nourished. Patient is moderately ill-appearing   Nonverbal at this time   HENT:   Head: Normocephalic and atraumatic. Eyes: Conjunctivae are normal. No scleral icterus. Neck: Neck supple. No tracheal deviation present. Cardiovascular: Normal rate, regular rhythm and intact distal pulses. Exam reveals no gallop and no friction rub. Murmur heard. Patient has a moderate heart murmur    Pulmonary/Chest: Effort normal and breath sounds normal. Tachypnea noted. He has no wheezes. He has no rales.    Mild Tachypnea and has O2 cannula in place Abdominal: Soft. He exhibits no distension. There is no tenderness. There is no rebound and no guarding. Musculoskeletal: He exhibits no edema. Neurological: He is alert and oriented to person, place, and time. Skin: Skin is warm and dry. No rash noted. Psychiatric: He has a normal mood and affect. Nursing note and vitals reviewed. Note written by Jose Hallman, as dictated by Erma Shelby MD 10:14 AM    [facility]  ED Course       Procedures    ED EKG interpretation:  Rhythm: paced; and regular . Rate (approx.): 60; Axis: normal; ST/T wave: normal; Widened QRS interval  Note written by Jose Hallman, as dictated by Erma Shelby MD 10:54 AM    Total critical care time spent exclusive of procedures:  35 min. Erma Shelby MD  2:06 PM    A/P: sepsis likely from urinary source - getting 30 cc/kg NS and broad-spectrum AB's; BP has been stable thus far; admit for further management.   Erma Shelby MD  2:07 PM

## 2018-01-04 NOTE — H&P
[facility] [place] [place] [address]  [address] [facility], [address] [place] [address]  [phone]    Admission History and Physical      NAME:  Colette Schneider   :   1935   MRN:  [id number]     PCP:  Yamileth Kilgore MD     Date/Time:  2018         Subjective:     CHIEF COMPLAINT: confusion, sepsis     HISTORY OF PRESENT ILLNESS:     The patient is a 82 yo hx of HTN, DM, CAD s/p CABG, sCHF EF 30%, heart block s/p pacer, PVD, CVA, recent right hip fx, presented w/ AMS, severe sepsis, UTI, CHF. The patient is a poor historian. Hx obtained from ED. Per [facility] care, the patient was discharged to [address] [place] [address] from [facility] on  after a hospitalization for a right hip fracture. Today, the patient became more confused and lethargic, associated with a fever and leukocytosis. In the ED, WBC was 22.7, lactate 2.0. U/A significant for a UTI. No Known Allergies    Prior to Admission medications    Medication Sig Start Date End Date Taking? Authorizing Provider   glipiZIDE (GLUCOTROL) 5 mg tablet Take 1 Tab by mouth two (2) times a day. Do not take if blood sugar is less than 120 17   Giovanni Rodriguez MD   traMADol Cristal Ortega) 50 mg tablet Take 1 Tab by mouth every six (6) hours as needed. Max Daily Amount: 200 mg. Do not take if sedated 17   Giovanni Rodriguez MD   bisacodyl (DULCOLAX) 10 mg suppository Insert 10 mg into rectum daily as needed. 17   Giovanni Rodriguez MD   senna-docusate (PERICOLACE) 8.6-50 mg per tablet Take 1 Tab by mouth daily. For constipation. Do not take if you have loose stools 17   Giovanni Rodriguez MD   albuterol-ipratropium (DUO-NEB) 2.5 mg-0.5 mg/3 ml nebu 3 mL by Nebulization route every six (6) hours as needed. Indications: 1 inhalation 17   Giovanni Rodriguez MD   QUEtiapine (SEROQUEL) 100 mg tablet Take 0.5 Tabs by mouth daily. At [facility] 17   Joann Galeas MD   enoxaparin (LOVENOX) 40 mg/0.4 mL 0.4 mL by SubCUTAneous route daily for 26 days.  17 1/21/18  Melita Torres MD   cholecalciferol (VITAMIN D3) 1,000 unit tablet Take 3,000 Units by mouth daily. Historical Provider   levothyroxine (SYNTHROID) 125 mcg tablet Take 125 mcg by mouth Daily (before breakfast). One hour prior to breakfast    Historical Provider   magnesium gluconate 500 mg (27 mg  elemental) tablet Take 500 mg by mouth daily. Historical Provider   Carboxymethylcellulose-Glycern (REFRESH OPTIVE) 0.5-0.9 % drop Administer 1 Drop to both eyes as needed (dry eye). Historical Provider   diphenoxylate-atropine (LOMOTIL) 2.5-0.025 mg per tablet Take 1 Tab by mouth four (4) times daily as needed for Diarrhea. Historical Provider   omeprazole (PRILOSEC) 20 mg capsule Take 20 mg by mouth Daily (before breakfast). 30 minutes prior to breakfast    Historical Provider   escitalopram oxalate (LEXAPRO) 20 mg tablet Take 20 mg by mouth daily. Historical Provider   furosemide (LASIX) 40 mg tablet Take 40 mg by mouth two (2) times a day. Historical Provider   ketoconazole (NIZORAL) 2 % shampoo Apply  to affected area daily as needed for Itching. Historical Provider   donepezil (ARICEPT) 10 mg tablet Take 10 mg by mouth daily. Historical Provider   carvedilol (COREG) 3.125 mg tablet Take 1 Tab by mouth two (2) times a day. 11/24/15   Kathryn Bullock MD   ferrous sulfate 325 mg (65 mg iron) tablet Take 325 mg by mouth daily (with breakfast). Historical Provider   finasteride (PROSCAR) 5 mg tablet Take 5 mg by mouth every evening. Historical Provider   fluticasone (FLONASE) 50 mcg/actuation nasal spray 2 Sprays by Both Nostrils route daily as needed. Historical Provider   tamsulosin (FLOMAX) 0.4 mg capsule Take 0.4 mg by mouth nightly.     Historical Provider       Past Medical History:   Diagnosis Date    Arthritis     CAD (coronary artery disease)     Cardiomy[facility] ([facility] 6/11/2015    Colon cancer ([facility] 1993    COLON    Depression     DEPRESSION    Diabetes ([facility] IDDM    GERD (gastroesophageal reflux disease)     Hypertension     [facility]/29[facility]2016    Mild aortic stenosis 6/15/2015    Pulmonary hypertension 6/15/2015    PVD (peripheral vascular disease) ([facility]     STENT RIGHT GROIN, PAD    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    LIMA TO LAD; SVG TO OM; SVG TO OM    Stroke ([facility] 1999    2 STROKES    Third degree AV block ([facility] 6/11/2015    Thyroid disease         Past Surgical History:   Procedure Laterality Date    HX GI  1993    COLON RESECTION    HX HEART CATHETERIZATION  6/26/2015    HX PACEMAKER      INS PPM/ICD LED DUAL ONLY  7/2/2015         VASCULAR SURGERY PROCEDURE UNLIST  1999    PAD, RIGHT GROIN STENT       Social History   Substance Use Topics    Smoking status: Former Smoker     Packs/day: 2.00     Years: 60.00     Types: Cigarettes     Quit date: 6/22/2015    Smokeless tobacco: Never Used    Alcohol use No        Family History   Problem Relation Age of Onset    Heart Disease Mother     Diabetes Mother     Other Father      AMPUTATION LEG FOLLOWING BUG BITE    Other Sister      CEREBRAL ANUERYSM    Heart Disease Brother     Diabetes Sister         Review of Systems: (Unable to obtain due to AMS)  (bold if positive, if negative)    Gen:  Eyes:  ENT:  CVS:  Pulm:  GI:    :    MS:  Skin:  Psych:  Endo:    Hem:  Renal:    Neuro:          Objective:      VITALS:    Vital signs reviewed; most recent are:    Visit Vitals    /50    Pulse 60    Temp 100.2 °F (37.9 °C)    Resp 24    Wt 74.2 kg (163 lb 9.3 oz)    SpO2 100%    BMI 24.16 kg/m2     SpO2 Readings from Last 6 Encounters:   01/04/18 100%   12/27/17 98%   08/23/17 91%   02/15/17 98%   10/05/16 92%   06/16/16 95%    O2 Flow Rate (L/min): 4 l/min   No intake or output data in the 24 hours ending 01/04/18 1220     Exam:     Physical Exam:    Gen:  Elderly, chronically ill-appearing, lethargic  HEENT:  Pink conjunctivae, PERRL, hearing intact to voice, dry mucous membranes  Neck: Supple, without masses, thyroid non-tender  Resp:  No accessory muscle use, bilateral crackles at bases  Card:  No murmurs, normal S1, S2 without thrills, 1+ edema  Abd:  Soft, non-tender, non-distended, normoactive bowel sounds are present  Lymph:  No cervical adenopathy  Musc:  No cyanosis or clubbing, pulses intact, 2+ cap refills  Skin:  No rashes   Neuro: Moves all ext  Psych:  Alert with poor insight. Oriented to person    Labs:    Recent Labs      01/04/18   0355   WBC  22.7*   HGB  8.8*   HCT  27.7*   PLT  421*     Recent Labs      01/04/18   1018  01/04/18   0355   NA   --   138   K   --   3.9   CL   --   99   CO2   --   30   GLU   --   92   BUN   --   35*   CREA   --   1.35*   CA   --   9.1   ALB  3.0*   --    TBILI  1.1*   --    SGOT  16   --    ALT  28   --      Lab Results   Component Value Date/Time    Glucose (POC) 228 12/27/2017 11:19 AM    Glucose (POC) 148 12/27/2017 07:21 AM     No results for input(s): PH, PCO2, PO2, HCO3, FIO2 in the last 72 hours. No results for input(s): INR in the last 72 hours. No lab exists for component: INREXT    Radiology and EKG reviewed:   CXR with some interstitial edema    **Old Records reviewed in [facility]**       Assessment/Plan:       Principal Problem:    82 yo hx of HTN, DM, CAD s/p CABG, sCHF EF 30%, heart block s/p pacer, PVD, CVA, recent right hip fx, presented w/ AMS, severe sepsis, UTI, CHF    1) Acute encephalopathy: could be multifactorial, relating to hypoxia, sepsis, UTI, medications. Given hx of CVA, will obtain a head CT. Will also check ammonia. Hold seroquel. Monitor closely    2) Severe sepsis/complicated UTI: 2/4 SIRS on admission with AMS, lactic acidosis. Will check blood Cx, urine Cx. Gentle on fluid management given low EF, CHF. Will start on IV Zosyn    3) Acute on chronic syst CHF: last EF 30%. Has pulm edema on exam and CXR. Will check pro BNP, cardiac enzymes, echo. Hold on IV diuretics for now given severe sepsis. Will consult Cards    4) Renal insuff: likely pre-renal.  Hold lasix. Cont gentle IV hydration, monitor BMP    5) HTN/CAD: cont coreg. Unclear why patient is not on a low dose ASA    6) DM type 2: last A1C 7.2%. Hold glipizide. Start SSI    7) Recent right hip fx: s/p repair.   Will re-consult ortho to eval.  Cont lovenox for DVT prophy    8) COPD: stable, cont duo nebs prn    Code: Full - will need to confirm with family    Risk of deterioration: high      Total time spent with patient: 79 Minutes (30min on critical care)                  Care Plan discussed with: Patient, nursing    Discussed:  Care Plan    Prophylaxis:  Lovenox    Probable Disposition:  [facility]/Rehab           ___________________________________________________    Attending Physician: Jennifer Cuevas MD

## 2018-01-04 NOTE — PROGRESS NOTES
[facility] Dosing Services: Antimicrobial Stewardship Daily Doc    Consult for antibiotic dosing of Zosyn by Dr. Preston Hunter  Indication: sepsis   Day of Therapy 1  Non-Kinetic Antimicrobial Dosing Regimen:   Current Regimen:  3.375 g IV x 1 over 30 minutes then 3.375 g IV q 8 hours over 4 hours   Recommendation: continue current therapy     Other Antimicrobial   (not dosed by pharmacist)    Cultures 1/4/18: 2/2 BCX: NGTD P   Serum Creatinine Lab Results   Component Value Date/Time    Creatinine 1.35 01/04/2018 03:55 AM    Creatinine (POC) 1.0 10/30/2017 02:16 PM         Creatinine Clearance Estimated Creatinine Clearance: 42.9 mL/min (based on Cr of 1.35).      Temp Temp: 100.2 °F (37.9 °C)       WBC Lab Results   Component Value Date/Time    WBC 22.7 01/04/2018 03:55 AM        H/H Lab Results   Component Value Date/Time    HGB 8.8 01/04/2018 03:55 AM        Platelets    Lab Results   Component Value Date/Time    PLATELET 266 19/62/8552 03:55 AM            Pharmacist Ginger Rao, [facility] Contact information: [phone]

## 2018-01-04 NOTE — PROGRESS NOTES
[facility]: MED RECONCILIATION    Comments/Recommendations:   PTA medication list updated from [facility] EMR  Drug drug interaction noted between quetiapine, donepezil, escitalopram as increased risk of Qtc    Allergies: Review of patient's allergies indicates no known allergies. Prior to Admission Medications:   Prior to Admission Medications   Prescriptions Last Dose Informant Patient Reported? Taking? QUEtiapine (SEROQUEL) 100 mg tablet 2018 at Unknown time  No Yes   Sig: Take 0.5 Tabs by mouth daily. At 9AM   acetaminophen (TYLENOL) 325 mg tablet 1/3/2018 at Unknown time  Yes Yes   Sig: Take 650 mg by mouth every four (4) hours as needed for Pain. albuterol-ipratropium (DUO-NEB) 2.5 mg-0.5 mg/3 ml nebu   Yes Yes   Sig: 3 mL by Nebulization route every four (4) hours as needed (shortness of breath). bisacodyl (DULCOLAX) 10 mg suppository   No Yes   Sig: Insert 10 mg into rectum daily as needed. calcium-vitamin D (OYSTER SHELL) 500 mg(1,250mg) -200 unit per tablet 2018 at am  Yes Yes   Sig: Take 1 Tab by mouth three (3) times daily (with meals). carvedilol (COREG) 3.125 mg tablet 2018 at Unknown time Care Giver No Yes   Sig: Take 1 Tab by mouth two (2) times a day. cholecalciferol (VITAMIN D3) 1,000 unit tablet 2018 at Unknown time Care Giver Yes Yes   Sig: Take 3,000 Units by mouth daily. dextran 70-hypromellose (ARTIFICIAL TEARS,[id number],) ophthalmic solution 2018 at am  Yes Yes   Sig: Administer 2 Drops to both eyes two (2) times a day. donepezil (ARICEPT) 10 mg tablet 2018 at Unknown time Care Giver Yes Yes   Sig: Take 10 mg by mouth daily. enoxaparin (LOVENOX) 40 mg/0.4 mL 1/3/2018 at 1800  No Yes   Si.4 mL by SubCUTAneous route daily for 26 days. escitalopram oxalate (LEXAPRO) 20 mg tablet 2018 at Unknown time Care Giver Yes Yes   Sig: Take 20 mg by mouth daily.    ferrous sulfate 325 mg (65 mg iron) tablet 2018 at Unknown time Care Giver Yes Yes   Sig: Take 325 mg by mouth daily (with breakfast). finasteride (PROSCAR) 5 mg tablet 1/3/2018 at Unknown time Care Giver Yes Yes   Sig: Take 5 mg by mouth every evening. fluticasone (FLONASE) 50 mcg/actuation nasal spray 2018 at Unknown time Care Giver Yes Yes   Si Sprays by Both Nostrils route daily. furosemide (LASIX) 40 mg tablet 2018 at Unknown time Care Giver Yes Yes   Sig: Take 40 mg by mouth two (2) times a day. glipiZIDE (GLUCOTROL) 5 mg tablet 2018 at Unknown time  Yes Yes   Sig: Take 5 mg by mouth Before breakfast and dinner. Do not take if blood sugar is less than 120   guaiFENesin SR (MUCINEX) 600 mg SR tablet 2018 at am  Yes Yes   Sig: Take 600 mg by mouth two (2) times a day. insulin lispro (HUMALOG) 100 unit/mL injection 2018 at am  Yes Yes   Sig: by SubCUTAneous route Before breakfast, lunch, dinner and at bedtime. Sliding scale   levothyroxine (SYNTHROID) 125 mcg tablet 2018 at Unknown time Care Giver Yes Yes   Sig: Take 125 mcg by mouth Daily (before breakfast). One hour prior to breakfast   magnesium gluconate 500 mg (27 mg  elemental) tablet 2018 at Unknown time Care Giver Yes Yes   Sig: Take 500 mg by mouth daily. omeprazole (PRILOSEC) 20 mg capsule 2018 at Unknown time Care Giver Yes Yes   Sig: Take 20 mg by mouth Daily (before breakfast). 30 minutes prior to breakfast   senna-docusate (PERICOLACE) 8.6-50 mg per tablet   No Yes   Sig: Take 1 Tab by mouth daily. For constipation. Do not take if you have loose stools   tamsulosin (FLOMAX) 0.4 mg capsule 1/3/2018 at Unknown time Care Giver Yes Yes   Sig: Take 0.4 mg by mouth nightly. traMADol (ULTRAM) 50 mg tablet 1/3/2018 at Unknown time  No Yes   Sig: Take 1 Tab by mouth every six (6) hours as needed. Max Daily Amount: 200 mg.  Do not take if sedated      Facility-Administered Medications: None      Thank you,     Monico Keys, PharmD, BCPS

## 2018-01-04 NOTE — ED TRIAGE NOTES
Rule out sepsis from [address] [place] [address], not verbalizing, febrile last night, elevated WBC 22.0

## 2018-01-04 NOTE — PROGRESS NOTES
Consult was called to me tonight but I am not covering for [address] [place] [address]  I requested this pass to on call cardiologist at [address] [place] [address]

## 2018-01-04 NOTE — ED NOTES
Breath sounds slightly diminished with some expiratory wheezes noted in the left upper lobes. Breathing treatment started as directed.

## 2018-01-05 LAB
ALBUMIN SERPL-MCNC: 2.5 G/DL (ref 3.5–5)
ALBUMIN/GLOB SERPL: 0.7 {RATIO} (ref 1.1–2.2)
ALP SERPL-CCNC: 89 U/L (ref 45–117)
ALT SERPL-CCNC: 22 U/L (ref 12–78)
ANION GAP SERPL CALC-SCNC: 9 MMOL/L (ref 5–15)
AST SERPL-CCNC: 14 U/L (ref 15–37)
ATRIAL RATE: 64 BPM
BILIRUB DIRECT SERPL-MCNC: 0.3 MG/DL (ref 0–0.2)
BILIRUB SERPL-MCNC: 0.8 MG/DL (ref 0.2–1)
BUN SERPL-MCNC: 25 MG/DL (ref 6–20)
BUN/CREAT SERPL: 23 (ref 12–20)
CALCIUM SERPL-MCNC: 8.8 MG/DL (ref 8.5–10.1)
CALCULATED R AXIS, ECG10: -83 DEGREES
CALCULATED T AXIS, ECG11: 103 DEGREES
CHLORIDE SERPL-SCNC: 103 MMOL/L (ref 97–108)
CO2 SERPL-SCNC: 26 MMOL/L (ref 21–32)
CREAT SERPL-MCNC: 1.1 MG/DL (ref 0.7–1.3)
DIAGNOSIS, 93000: NORMAL
ERYTHROCYTE [DISTWIDTH] IN BLOOD BY AUTOMATED COUNT: 15.2 % (ref 11.5–14.5)
EST. AVERAGE GLUCOSE BLD GHB EST-MCNC: 146 MG/DL
GLOBULIN SER CALC-MCNC: 3.5 G/DL (ref 2–4)
GLUCOSE BLD STRIP.AUTO-MCNC: 196 MG/DL (ref 65–100)
GLUCOSE BLD STRIP.AUTO-MCNC: 202 MG/DL (ref 65–100)
GLUCOSE BLD STRIP.AUTO-MCNC: 231 MG/DL (ref 65–100)
GLUCOSE BLD STRIP.AUTO-MCNC: 287 MG/DL (ref 65–100)
GLUCOSE SERPL-MCNC: 195 MG/DL (ref 65–100)
HBA1C MFR BLD: 6.7 % (ref 4.2–6.3)
HCT VFR BLD AUTO: 24.9 % (ref 36.6–50.3)
HGB BLD-MCNC: 8 G/DL (ref 12.1–17)
LACTATE SERPL-SCNC: 1.1 MMOL/L (ref 0.4–2)
LACTATE SERPL-SCNC: 2.5 MMOL/L (ref 0.4–2)
MAGNESIUM SERPL-MCNC: 2.1 MG/DL (ref 1.6–2.4)
MCH RBC QN AUTO: 31.3 PG (ref 26–34)
MCHC RBC AUTO-ENTMCNC: 32.1 G/DL (ref 30–36.5)
MCV RBC AUTO: 97.3 FL (ref 80–99)
PHOSPHATE SERPL-MCNC: 3.4 MG/DL (ref 2.6–4.7)
PLATELET # BLD AUTO: 316 K/UL (ref 150–400)
POTASSIUM SERPL-SCNC: 4.2 MMOL/L (ref 3.5–5.1)
PROT SERPL-MCNC: 6 G/DL (ref 6.4–8.2)
Q-T INTERVAL, ECG07: 538 MS
QRS DURATION, ECG06: 218 MS
QTC CALCULATION (BEZET), ECG08: 538 MS
RBC # BLD AUTO: 2.56 M/UL (ref 4.1–5.7)
SERVICE CMNT-IMP: ABNORMAL
SODIUM SERPL-SCNC: 138 MMOL/L (ref 136–145)
VENTRICULAR RATE, ECG03: 60 BPM
WBC # BLD AUTO: 18.5 K/UL (ref 4.1–11.1)

## 2018-01-05 PROCEDURE — 83036 HEMOGLOBIN GLYCOSYLATED A1C: CPT | Performed by: INTERNAL MEDICINE

## 2018-01-05 PROCEDURE — 74011636637 HC RX REV CODE- 636/637: Performed by: INTERNAL MEDICINE

## 2018-01-05 PROCEDURE — 36415 COLL VENOUS BLD VENIPUNCTURE: CPT | Performed by: INTERNAL MEDICINE

## 2018-01-05 PROCEDURE — 97161 PT EVAL LOW COMPLEX 20 MIN: CPT

## 2018-01-05 PROCEDURE — 83605 ASSAY OF LACTIC ACID: CPT | Performed by: INTERNAL MEDICINE

## 2018-01-05 PROCEDURE — 80076 HEPATIC FUNCTION PANEL: CPT | Performed by: INTERNAL MEDICINE

## 2018-01-05 PROCEDURE — 74011000258 HC RX REV CODE- 258: Performed by: INTERNAL MEDICINE

## 2018-01-05 PROCEDURE — 97166 OT EVAL MOD COMPLEX 45 MIN: CPT

## 2018-01-05 PROCEDURE — 83735 ASSAY OF MAGNESIUM: CPT | Performed by: INTERNAL MEDICINE

## 2018-01-05 PROCEDURE — 92610 EVALUATE SWALLOWING FUNCTION: CPT

## 2018-01-05 PROCEDURE — 77010033678 HC OXYGEN DAILY

## 2018-01-05 PROCEDURE — 97116 GAIT TRAINING THERAPY: CPT

## 2018-01-05 PROCEDURE — 85027 COMPLETE CBC AUTOMATED: CPT | Performed by: INTERNAL MEDICINE

## 2018-01-05 PROCEDURE — 97535 SELF CARE MNGMENT TRAINING: CPT

## 2018-01-05 PROCEDURE — 74011250637 HC RX REV CODE- 250/637: Performed by: INTERNAL MEDICINE

## 2018-01-05 PROCEDURE — 80048 BASIC METABOLIC PNL TOTAL CA: CPT | Performed by: INTERNAL MEDICINE

## 2018-01-05 PROCEDURE — 84100 ASSAY OF PHOSPHORUS: CPT | Performed by: INTERNAL MEDICINE

## 2018-01-05 PROCEDURE — 74011000250 HC RX REV CODE- 250: Performed by: SPECIALIST

## 2018-01-05 PROCEDURE — 74011250636 HC RX REV CODE- 250/636: Performed by: INTERNAL MEDICINE

## 2018-01-05 PROCEDURE — 65660000000 HC RM CCU STEPDOWN

## 2018-01-05 PROCEDURE — 82962 GLUCOSE BLOOD TEST: CPT

## 2018-01-05 RX ORDER — PEPPERMINT OIL
SPIRIT ORAL ONCE
Status: COMPLETED | OUTPATIENT
Start: 2018-01-05 | End: 2018-01-05

## 2018-01-05 RX ORDER — LISINOPRIL 5 MG/1
2.5 TABLET ORAL DAILY
Status: DISCONTINUED | OUTPATIENT
Start: 2018-01-06 | End: 2018-01-05

## 2018-01-05 RX ORDER — ESCITALOPRAM OXALATE 10 MG/1
20 TABLET ORAL DAILY
Status: DISCONTINUED | OUTPATIENT
Start: 2018-01-05 | End: 2018-01-07 | Stop reason: HOSPADM

## 2018-01-05 RX ORDER — CARVEDILOL 3.12 MG/1
3.12 TABLET ORAL 2 TIMES DAILY WITH MEALS
Status: DISCONTINUED | OUTPATIENT
Start: 2018-01-06 | End: 2018-01-07 | Stop reason: HOSPADM

## 2018-01-05 RX ORDER — BUMETANIDE 0.25 MG/ML
1 INJECTION INTRAMUSCULAR; INTRAVENOUS 2 TIMES DAILY
Status: COMPLETED | OUTPATIENT
Start: 2018-01-05 | End: 2018-01-06

## 2018-01-05 RX ORDER — BUMETANIDE 1 MG/1
1 TABLET ORAL DAILY
Status: DISCONTINUED | OUTPATIENT
Start: 2018-01-07 | End: 2018-01-07

## 2018-01-05 RX ORDER — BUMETANIDE 0.25 MG/ML
1 INJECTION INTRAMUSCULAR; INTRAVENOUS 2 TIMES DAILY
Status: DISCONTINUED | OUTPATIENT
Start: 2018-01-05 | End: 2018-01-05

## 2018-01-05 RX ADMIN — Medication 10 ML: at 01:19

## 2018-01-05 RX ADMIN — PANTOPRAZOLE SODIUM 40 MG: 40 TABLET, DELAYED RELEASE ORAL at 09:14

## 2018-01-05 RX ADMIN — ACETAMINOPHEN 650 MG: 325 TABLET ORAL at 06:01

## 2018-01-05 RX ADMIN — TAMSULOSIN HYDROCHLORIDE 0.4 MG: 0.4 CAPSULE ORAL at 22:08

## 2018-01-05 RX ADMIN — Medication: at 03:54

## 2018-01-05 RX ADMIN — BUMETANIDE 1 MG: 0.25 INJECTION INTRAMUSCULAR; INTRAVENOUS at 17:50

## 2018-01-05 RX ADMIN — FINASTERIDE 5 MG: 5 TABLET, FILM COATED ORAL at 19:41

## 2018-01-05 RX ADMIN — TRAMADOL HYDROCHLORIDE 50 MG: 50 TABLET, FILM COATED ORAL at 16:10

## 2018-01-05 RX ADMIN — SODIUM CHLORIDE 3.38 G: 900 INJECTION, SOLUTION INTRAVENOUS at 09:15

## 2018-01-05 RX ADMIN — DONEPEZIL HYDROCHLORIDE 10 MG: 5 TABLET, FILM COATED ORAL at 09:14

## 2018-01-05 RX ADMIN — INSULIN LISPRO 1 UNITS: 100 INJECTION, SOLUTION INTRAVENOUS; SUBCUTANEOUS at 22:09

## 2018-01-05 RX ADMIN — Medication 10 ML: at 16:03

## 2018-01-05 RX ADMIN — ESCITALOPRAM OXALATE 20 MG: 10 TABLET ORAL at 17:50

## 2018-01-05 RX ADMIN — Medication 10 ML: at 22:09

## 2018-01-05 RX ADMIN — LEVOTHYROXINE SODIUM 125 MCG: 125 TABLET ORAL at 09:14

## 2018-01-05 RX ADMIN — TRAMADOL HYDROCHLORIDE 50 MG: 50 TABLET, FILM COATED ORAL at 03:48

## 2018-01-05 RX ADMIN — DOCUSATE SODIUM AND SENNOSIDES 1 TABLET: 8.6; 5 TABLET, FILM COATED ORAL at 09:14

## 2018-01-05 RX ADMIN — ENOXAPARIN SODIUM 40 MG: 40 INJECTION SUBCUTANEOUS at 17:49

## 2018-01-05 RX ADMIN — SODIUM CHLORIDE 3.38 G: 900 INJECTION, SOLUTION INTRAVENOUS at 17:51

## 2018-01-05 RX ADMIN — TAMSULOSIN HYDROCHLORIDE 0.4 MG: 0.4 CAPSULE ORAL at 01:13

## 2018-01-05 RX ADMIN — SODIUM CHLORIDE 3.38 G: 900 INJECTION, SOLUTION INTRAVENOUS at 01:34

## 2018-01-05 RX ADMIN — BUMETANIDE 1 MG: 0.25 INJECTION INTRAMUSCULAR; INTRAVENOUS at 09:14

## 2018-01-05 RX ADMIN — SODIUM CHLORIDE AND POTASSIUM CHLORIDE: 9; 1.49 INJECTION, SOLUTION INTRAVENOUS at 00:53

## 2018-01-05 RX ADMIN — INSULIN LISPRO 2 UNITS: 100 INJECTION, SOLUTION INTRAVENOUS; SUBCUTANEOUS at 09:13

## 2018-01-05 RX ADMIN — INSULIN LISPRO 3 UNITS: 100 INJECTION, SOLUTION INTRAVENOUS; SUBCUTANEOUS at 14:08

## 2018-01-05 NOTE — PROGRESS NOTES
Cardiology Progress Note         NAME:  Agustín Stark   :   1935   MRN:   [id number]     Assessment/Plan:   1) Septic UTI   - on Abx, lactic acid normalized  -BC negative after 24 hours  -WBC 18.25     2) Acute on chronic CHF (HFrEF improved)  - Echo 40-45% with mild AS per Dr Jonathan Blackman. Distal apical septal akinesis. MIld MR and Tr. Moderate aortic calcification and sclerosis. Severe pulm hypertension.  -coreg 3.125 mg (PTA). Will start low dose Coreg as BP allows. - His acute illness is likely primarily from sepsis. - However he likely has decompensated CHF in setting of acute illness. -BP better this morning, 110-120's. IV bumex 1 mg q12 h started this AM. May need to decrease IVF. -creatinine 1.10  -K 4.2/Mag 2.1      3) Anemia: H/H 8.0/24.9-stable       CARDIOLOGY ATTENDING  Patient personally seen and examined. All the elements of history and examination were personally performed. Assessment and plan was discussed and agree as written above    He is feeling better today. IV diuresis today. Switch to PO loops on . Resume Coreg as BP allows. I will see back on Monday. Will see PRN over the weekend. Please call if any questions. He has cardiology outpatient appt set up for 18. Jazzmine Flores MD, [facility]        Pmhx includes hx of HTN, DM, CAD s/p CABG, CHF (mod LV dysfunction), heart block s/p pacer, PVD, CVA, recent right hip fx    Echo 17 LVEF 50%. Distal apical septal akinesis. LAmoderately dilated. Mild MR. Aortic valve: The valve was trileaflet. Leaflets exhibited moderate  calcification, reduced mobility, and sclerosis without stenosis. Mild Tr. Severe pulmonary hypertension      Subjective:   Cardiac ROS: Patient denies any chest pain, dyspnea, palpitations, syncope, orthopnea, edema. Previous Cardiac Eval    Review of Systems: No nausea, indigestion, vomiting, pain, cough, sputum. No bleeding. Taking po. Appetite . Objective:     Visit Vitals    /55 (BP 1 Location: Right arm, BP Patient Position: At rest)    Pulse 62    Temp 97.3 °F (36.3 °C)    Resp 18    Wt 171 lb 11.2 oz (77.9 kg)    SpO2 98%    BMI 25.36 kg/m2    O2 Flow Rate (L/min): 4 l/min O2 Device: Nasal cannula    Temp (24hrs), Av.3 °F (36.8 °C), Min:97.3 °F (36.3 °C), Max:100.2 °F (37.9 °C)            190 -  0700  In: 780 [P.O.:780]  Out: 450 [Urine:450]  TELE: Vpaced    General: AAOx2 cooperative, no acute distress. HEENT: Atraumatic. Pink and moist.  Anicteric sclerae. Neck : Supple, no thyromegaly. +JVD  Lungs: Crackles to BLL. exp wheezing. /rhonchi/rales. Heart: Regular rhythm, no murmur, no rubs, no gallops. No JVD. No carotid bruits. Abdomen: Soft, non-distended, non-tender. Extremities: No edema, no clubbing, no cyanosis. No calf tenderness  Neurologic: Grossly intact. Alert and oriented X 3. No acute neurological distress. Psych: Good insight. Not anxious nor agitated. Skin: midline sternal scar unremarkable. Left sided ppm scar unremarkable. Additional comments: None    Care Plan discussed with:    Comments   Patient X    Family      RN     Care Manager                    Consultant:          Data Review:     No lab exists for component: ITNL   Recent Labs      18   1023   CPK  40   CKMB  <1.0   TROIQ  <0.04     Recent Labs      18   0230  18   1018  18   0355   NA  138   --   138   K  4.2   --   3.9   CL  103   --   99   CO2  26   --   30   BUN  25*   --   35*   CREA  1.10   --   1.35*   GLU  195*   --   92   PHOS  3.4   --    --    MG  2.1   --    --    ALB  2.5*  3.0*   --    WBC  18.5*   --   22.7*   HGB  8.0*   --   8.8*   HCT  24.9*   --   27.7*   PLT  316   --   421*     No results for input(s): INR, PTP, APTT in the last 72 hours.     No lab exists for component: INREXT    Medications reviewed  Current Facility-Administered Medications   Medication Dose Route Frequency    bumetanide (BUMEX) injection 1 mg  1 mg IntraVENous BID    sodium chloride (NS) flush 5-10 mL  5-10 mL IntraVENous PRN    piperacillin-tazobactam (ZOSYN) 3.375 g in 0.9% sodium chloride 50 mL IVPB  3.375 g IntraVENous Q8H    albuterol-ipratropium (DUO-NEB) 2.5 MG-0.5 MG/3 ML  3 mL Nebulization Q4H PRN    bisacodyl (DULCOLAX) suppository 10 mg  10 mg Rectal DAILY PRN    donepezil (ARICEPT) tablet 10 mg  10 mg Oral DAILY    finasteride (PROSCAR) tablet 5 mg  5 mg Oral QPM    levothyroxine (SYNTHROID) tablet 125 mcg  125 mcg Oral ACB    pantoprazole (PROTONIX) tablet 40 mg  40 mg Oral ACB    senna-docusate (PERICOLACE) 8.6-50 mg per tablet 1 Tab  1 Tab Oral DAILY    tamsulosin (FLOMAX) capsule 0.4 mg  0.4 mg Oral QHS    traMADol (ULTRAM) tablet 50 mg  50 mg Oral Q6H PRN    0.9% sodium chloride with KCl 20 mEq/L infusion   IntraVENous CONTINUOUS    sodium chloride (NS) flush 5-10 mL  5-10 mL IntraVENous Q8H    sodium chloride (NS) flush 5-10 mL  5-10 mL IntraVENous PRN    acetaminophen (TYLENOL) tablet 650 mg  650 mg Oral Q4H PRN    naloxone (NARCAN) injection 0.4 mg  0.4 mg IntraVENous PRN    diphenhydrAMINE (BENADRYL) injection 12.5 mg  12.5 mg IntraVENous Q4H PRN    ondansetron (ZOFRAN) injection 4 mg  4 mg IntraVENous Q6H PRN    enoxaparin (LOVENOX) injection 40 mg  40 mg SubCUTAneous Q24H    insulin lispro (HUMALOG) injection   SubCUTAneous AC&HS    glucose chewable tablet 16 g  4 Tab Oral PRN    dextrose (D50W) injection syrg 12.5-25 g  12.5-25 g IntraVENous PRN    glucagon (GLUCAGEN) injection 1 mg  1 mg IntraMUSCular PRN         Shyrl Troy, NP

## 2018-01-05 NOTE — PROGRESS NOTES
Pt lives @ [address] [place] [address] . [address] [place] [address] number is [phone]. Pt was not able to give me any of that information due to his AMS. PT & OT are recommending rehab. I am requesting the weekend  to please contact pt.'s daughter with SNF choices and to please send referrals.   Maryjo Wallace  Team B

## 2018-01-05 NOTE — CONSULTS
Bright Lyons MD. [facility]              Patient: Al Ferguson  : 1935      Today's Date: 2018        HISTORY OF PRESENT ILLNESS:     History of Present Illness:  Reason for consult: systolic CHF    Mr. Cony Aquino is a 80 yo hx of HTN, DM, CAD s/p CABG, CHF (mod LV dysfunction), heart block s/p pacer, PVD, CVA, recent right hip fx, presented w/ AMS, severe sepsis, UTI. The patient is a poor historian and history obtained from chart. D. He was discharged to [facility] from [facility] on  after a hospitalization for a right hip fracture. Today, the patient became more confused and lethargic, associated with a fever and leukocytosis. In the ED, WBC was 22.7, lactate 2.0. U/A significant for a UTI. CXR showed \"interval development of interstitial pulmonary edema. \"    Patient tells me he feels \"old\" and then falls asleep - denies SOB. PAST MEDICAL HISTORY:     Past Medical History:   Diagnosis Date    Arthritis     CAD (coronary artery disease)     Cardiomyopathy ([address] 2015    Colon cancer ([address]     COLON    Depression     DEPRESSION    Diabetes ([address]     IDDM    GERD (gastroesophageal reflux disease)     Hypertension     Lung cancer ([address] 2016    Mild aortic stenosis 6/15/2015    Pulmonary hypertension 6/15/2015    PVD (peripheral vascular disease) ([address]     STENT RIGHT GROIN, PAD    S/P CABG x 3 2015    LIMA TO LAD; SVG TO OM; SVG TO OM    Stroke ([address]     2 STROKES    Third degree AV block ([address] 2015    Thyroid disease          Past Surgical History:   Procedure Laterality Date    HX GI      COLON RESECTION    HX HEART CATHETERIZATION  2015    HX PACEMAKER      INS PPM/ICD LED DUAL ONLY  2015         VASCULAR SURGERY PROCEDURE UNLIST      PAD, RIGHT GROIN STENT         MEDICATIONS:     Prior to Admission Medications:   Prior to Admission Medications   Prescriptions Last Dose Informant Patient Reported? Taking? QUEtiapine (SEROQUEL) 100 mg tablet 2018 at Unknown time   No Yes   Sig: Take 0.5 Tabs by mouth daily. At 9AM   acetaminophen (TYLENOL) 325 mg tablet 1/3/2018 at Unknown time   Yes Yes   Sig: Take 650 mg by mouth every four (4) hours as needed for Pain. albuterol-ipratropium (DUO-NEB) 2.5 mg-0.5 mg/3 ml nebu     Yes Yes   Sig: 3 mL by Nebulization route every four (4) hours as needed (shortness of breath). bisacodyl (DULCOLAX) 10 mg suppository     No Yes   Sig: Insert 10 mg into rectum daily as needed. calcium-vitamin D (OYSTER SHELL) 500 mg(1,250mg) -200 unit per tablet 2018 at am   Yes Yes   Sig: Take 1 Tab by mouth three (3) times daily (with meals). carvedilol (COREG) 3.125 mg tablet 2018 at Unknown time Care Giver No Yes   Sig: Take 1 Tab by mouth two (2) times a day. cholecalciferol (VITAMIN D3) 1,000 unit tablet 2018 at Unknown time Care Giver Yes Yes   Sig: Take 3,000 Units by mouth daily. dextran 70-hypromellose (ARTIFICIAL TEARS,[id number],) ophthalmic solution 2018 at am   Yes Yes   Sig: Administer 2 Drops to both eyes two (2) times a day. donepezil (ARICEPT) 10 mg tablet 2018 at Unknown time Care Giver Yes Yes   Sig: Take 10 mg by mouth daily. enoxaparin (LOVENOX) 40 mg/0.4 mL 1/3/2018 at 1800   No Yes   Si.4 mL by SubCUTAneous route daily for 26 days. escitalopram oxalate (LEXAPRO) 20 mg tablet 2018 at Unknown time Care Giver Yes Yes   Sig: Take 20 mg by mouth daily. ferrous sulfate 325 mg (65 mg iron) tablet 2018 at Unknown time Care Giver Yes Yes   Sig: Take 325 mg by mouth daily (with breakfast). finasteride (PROSCAR) 5 mg tablet 1/3/2018 at Unknown time Care Giver Yes Yes   Sig: Take 5 mg by mouth every evening. fluticasone (FLONASE) 50 mcg/actuation nasal spray 2018 at Unknown time Care Giver Yes Yes   Si Sprays by Both Nostrils route daily.    furosemide (LASIX) 40 mg tablet 2018 at Unknown time Care Giver Yes Yes   Sig: Take 40 mg by mouth two (2) times a day. glipiZIDE (GLUCOTROL) 5 mg tablet 1/4/2018 at Unknown time   Yes Yes   Sig: Take 5 mg by mouth Before breakfast and dinner. Do not take if blood sugar is less than 120   guaiFENesin SR (MUCINEX) 600 mg SR tablet 1/4/2018 at am   Yes Yes   Sig: Take 600 mg by mouth two (2) times a day. insulin lispro (HUMALOG) 100 unit/mL injection 1/4/2018 at am   Yes Yes   Sig: by SubCUTAneous route Before breakfast, lunch, dinner and at bedtime. Sliding scale   levothyroxine (SYNTHROID) 125 mcg tablet 1/4/2018 at Unknown time Care Giver Yes Yes   Sig: Take 125 mcg by mouth Daily (before breakfast). One hour prior to breakfast   magnesium gluconate 500 mg (27 mg  elemental) tablet 1/4/2018 at Unknown time Care Giver Yes Yes   Sig: Take 500 mg by mouth daily. omeprazole (PRILOSEC) 20 mg capsule 1/4/2018 at Unknown time Care Giver Yes Yes   Sig: Take 20 mg by mouth Daily (before breakfast). 30 minutes prior to breakfast   senna-docusate (PERICOLACE) 8.6-50 mg per tablet     No Yes   Sig: Take 1 Tab by mouth daily. For constipation. Do not take if you have loose stools   tamsulosin (FLOMAX) 0.4 mg capsule 1/3/2018 at Unknown time Care Giver Yes Yes   Sig: Take 0.4 mg by mouth nightly. traMADol (ULTRAM) 50 mg tablet 1/3/2018 at Unknown time   No Yes   Sig: Take 1 Tab by mouth every six (6) hours as needed. Max Daily Amount: 200 mg.  Do not take if sedated             No Known Allergies          SOCIAL HISTORY:     Social History   Substance Use Topics    Smoking status: Former Smoker     Packs/day: 2.00     Years: 60.00     Types: Cigarettes     Quit date: 6/22/2015    Smokeless tobacco: Never Used    Alcohol use No           FAMILY HISTORY:     Family History   Problem Relation Age of Onset    Heart Disease Mother     Diabetes Mother     Other Father      AMPUTATION LEG FOLLOWING BUG BITE    Other Sister      CEREBRAL ANUERYSM    Heart Disease Brother     Diabetes Sister REVIEW OF SYMPTOMS:     Review of Symptoms:  Constitutional: Negative for fever, chills  HEENT: Negative for nosebleeds, tinnitus, and vision changes. Respiratory: Negative for cough, wheezing  Cardiovascular: Negative for orthopnea, claudication, syncope, and PND. Gastrointestinal: Negative for abdominal pain, diarrhea, melena. Genitourinary: Negative for dysuria  Musculoskeletal: Negative for myalgias. Skin: Negative for rash  Heme: No problems bleeding. Neurological: Negative for speech change and focal weakness. PHYSICAL EXAM:     Physical Exam:  Visit Vitals    /48    Pulse 60    Temp 97.7 °F (36.5 °C)    Resp 17    Wt 163 lb 9.3 oz (74.2 kg)    SpO2 100%    BMI 24.16 kg/m2     Patient in NAD  HEENT:  Hearing intact, non-icteric, normocephalic, atraumatic. Neck Exam: Supple, + probably some JVD at 45 degrees   Lung Exam: + crackles at bases bilat   Cardiac Exam: Regular rate and rhythm with 3/6 SCOTT  Abdomen: Soft, non-tender   Extremities: Moves all ext well. No lower extremity edema.   Psych: drowsy  Neuro - Grossly intact        LABS / OTHER STUDIES:       Recent Results (from the past 24 hour(s))   URINALYSIS W/MICROSCOPIC    Collection Time: 01/03/18 10:10 PM   Result Value Ref Range    Color YELLOW/STRAW      Appearance CLOUDY (A) CLEAR      Specific gravity 1.017 1.003 - 1.030      pH (UA) 5.0 5.0 - 8.0      Protein 30 (A) NEG mg/dL    Glucose NEGATIVE  NEG mg/dL    Ketone NEGATIVE  NEG mg/dL    Bilirubin NEGATIVE  NEG      Blood MODERATE (A) NEG      Urobilinogen 1.0 0.2 - 1.0 EU/dL    Nitrites NEGATIVE  NEG      Leukocyte Esterase LARGE (A) NEG      WBC >100 (H) 0 - 4 /hpf    RBC 0-5 0 - 5 /hpf    Epithelial cells MODERATE (A) FEW /lpf    Bacteria 2+ (A) NEG /hpf   CBC WITH AUTOMATED DIFF    Collection Time: 01/04/18  3:55 AM   Result Value Ref Range    WBC 22.7 (H) 4.1 - 11.1 K/uL    RBC 2.90 (L) 4.10 - 5.70 M/uL    HGB 8.8 (L) 12.1 - 17.0 g/dL    HCT 27.7 (L) 36.6 - 50.3 %    MCV 95.5 80.0 - 99.0 FL    MCH 30.3 26.0 - 34.0 PG    MCHC 31.8 30.0 - 36.5 g/dL    RDW 15.2 (H) 11.5 - 14.5 %    PLATELET 680 (H) 666 - 400 K/uL    NEUTROPHILS 85 (H) 32 - 75 %    LYMPHOCYTES 8 (L) 12 - 49 %    MONOCYTES 7 5 - 13 %    EOSINOPHILS 0 0 - 7 %    BASOPHILS 0 0 - 1 %    ABS. NEUTROPHILS 19.5 (H) 1.8 - 8.0 K/UL    ABS. LYMPHOCYTES 1.7 0.8 - 3.5 K/UL    ABS. MONOCYTES 1.5 (H) 0.0 - 1.0 K/UL    ABS. EOSINOPHILS 0.1 0.0 - 0.4 K/UL    ABS. BASOPHILS 0.0 0.0 - 0.1 K/UL   METABOLIC PANEL, BASIC    Collection Time: 01/04/18  3:55 AM   Result Value Ref Range    Sodium 138 136 - 145 mmol/L    Potassium 3.9 3.5 - 5.1 mmol/L    Chloride 99 97 - 108 mmol/L    CO2 30 21 - 32 mmol/L    Anion gap 9 5 - 15 mmol/L    Glucose 92 65 - 100 mg/dL    BUN 35 (H) 6 - 20 MG/DL    Creatinine 1.35 (H) 0.70 - 1.30 MG/DL    BUN/Creatinine ratio 26 (H) 12 - 20      GFR est AA >60 >60 ml/min/1.73m2    GFR est non-AA 51 (L) >60 ml/min/1.73m2    Calcium 9.1 8.5 - 10.1 MG/DL   LACTIC ACID    Collection Time: 01/04/18 10:18 AM   Result Value Ref Range    Lactic acid 2.0 0.4 - 2.0 MMOL/L   HEPATIC FUNCTION PANEL    Collection Time: 01/04/18 10:18 AM   Result Value Ref Range    Protein, total 7.1 6.4 - 8.2 g/dL    Albumin 3.0 (L) 3.5 - 5.0 g/dL    Globulin 4.1 (H) 2.0 - 4.0 g/dL    A-G Ratio 0.7 (L) 1.1 - 2.2      Bilirubin, total 1.1 (H) 0.2 - 1.0 MG/DL    Bilirubin, direct 0.4 (H) 0.0 - 0.2 MG/DL    Alk.  phosphatase 109 45 - 117 U/L    AST (SGOT) 16 15 - 37 U/L    ALT (SGPT) 28 12 - 78 U/L   CK W/ CKMB & INDEX    Collection Time: 01/04/18 10:23 AM   Result Value Ref Range    CK 40 39 - 308 U/L    CK - MB <1.0 <3.6 NG/ML    CK-MB Index Cannot be calculated 0 - 2.5     NT-PRO BNP    Collection Time: 01/04/18 10:23 AM   Result Value Ref Range    NT pro-BNP 9880 (H) 0 - 450 PG/ML   TROPONIN I    Collection Time: 01/04/18 10:23 AM   Result Value Ref Range    Troponin-I, Qt. <0.04 <0.05 ng/mL   AMMONIA    Collection Time: 01/04/18  1:11 PM   Result Value Ref Range    Ammonia 15 <32 UMOL/L   LACTIC ACID    Collection Time: 01/04/18  3:37 PM   Result Value Ref Range    Lactic acid 1.2 0.4 - 2.0 MMOL/L   GLUCOSE, POC    Collection Time: 01/04/18  4:53 PM   Result Value Ref Range    Glucose (POC) 164 (H) 65 - 100 mg/dL    Performed by Denise Jeans (PCT)              CARDIAC DIAGNOSTICS:     Cardiac Evaluation Includes:    ECHO 6-10-15 LV EF 35 % by Ceja's Biplane technique, mod diffuse HK, mod HK mid anteroseptal and apical septal wall(s). Mild LVH. Mod JILLIAN, MR, mild ASt, LISETH 1.5 cm2 by both continuity equation and planimetry. Mild AR, TR, PASP 60. AV Max/meanPG 16.6/7.6 mmHg. AV Vmax was 2 m/s. Echo 10/13/15 - LVEF 45 % to 50%. There was severe hypokinesis of the basal-mid inferior wall(s). There was dyskinesis of the mid anteroseptal wall(s). Mild-mod MR. Mild AS.      NUKE 6-10-15 ischemia and infarction in inferior wall with moderately reduced EF      The PFTs done at [facility] on 4/24/15 show a FEV1 of 1.41. FVC 56%    Echo 1/4/18 - LVEF 50%. Distal apical septal akinesis. Mod LAE. Mild MR.   AV meanPG was 13.7 mmHg. LISETH (VTI) was 1.6 cm2.  LISETH Vmax was 1.5 cm2.  LVOT meanPG was 2.4 mmHg.    ---> I viewed echo images myself. TDS. LVEF probably around 40-45% with WMA. Mild AS. RV OK. Grade 2 diastology. IVC normal.     CXR 1/4/18 - Enlarged cardiac silhouette, interval development of interstitial pulmonary edema          ASSESSMENT AND PLAN:     Assessment and Plan:  1) Septic UTI   - on Abx     2) Acute on chronic CHF (HFrEF improved)  - I viewed today's echo images myself. It was a technically difficult study. LVEF probably around 40-45% with WMA. Mild AS. RV OK.   - His acute illness is likely primarily from sepsis. - However he likely has decompensated CHF in setting of acute illness.   - Due to hypotension and sepsis he has received IVF today (rather than diuretics).  He seems to be breathing OK with stable O2 sats. Will need to be very careful with any further hydration. As long BP and labs OK tomorrow, would favor starting diuretics tomorrow (or earlier if he should become hypoxic). Brown Faustin MD, [address]  [address], [address] [place] [address]  Ph: [phone]   Ph [phone]

## 2018-01-05 NOTE — NURSE NAVIGATOR
Chart reviewed by Heart Failure Nurse Navigator. Heart Failure database completed. Current Echo with EF 50% with wall thickness at upper limits of normal, LA moderately dilated, mild MR, mild TR, severe pHTN. ACEi/ARB: not indicated. BB: carvedilol 3.125 mg BID. CRT not currently indicated. NYHA Functional Class none assigned. Heart Failure Teach Back in Patient Education. Heart Failure Avoiding Triggers on Discharge Instructions.     Primary Cardiologist: Dr Kelly Leary

## 2018-01-05 NOTE — PROGRESS NOTES
Bedside and Verbal shift change report given to Maricel Guerrero RN (oncoming nurse) by Vicky Rudolph RN (offgoing nurse). Report included the following information SBAR, Kardex, MAR, Accordion, Recent Results and Cardiac Rhythm paced. Previous shift RN started bolus (new order) and I was told pt may be transferred to ICU due to low MAP and difficult to arouse. Primary Nurse Campbell Musa RN and Bird Spencer RN performed a dual skin assessment on this patient No impairment noted. Pt has surgical incision R hip with small scabs and ecchymosis. Ladarius score is 16    2020 hrs:  Order recived to transfer to Step down status. [facility] supervisor notified that [facility] unable to keep pt as we don't have staff for another step down pt.     2110 hrs:  Nsg supervisor on floor. Pt easier to arouse, asking for urinal with 125 ml output, MAP increased to 81. Cont to monitor. Pt will remain tele for now. Nsg supervisor discussed with Dr Rehan Melgar    0300 hrs:  Call from lab re: elev L. A. From 1.4 on 1/4/18 to 2.5 on 1/5/18. Call made to Dr Rehan Melgar. He confirmed that pt getting fluids and ordered repeat LA at 0800 hrs. Bedside and Verbal shift change report given to Breann (oncoming nurse) by Maricel Guerrero (offgoing nurse). Report included the following information SBAR, Kardex, Intake/Output, MAR, Accordion, Recent Results and Cardiac Rhythm paced.

## 2018-01-05 NOTE — PROGRESS NOTES
Problem: Mobility Impaired (Adult and Pediatric)  Goal: *Acute Goals and Plan of Care (Insert Text)  Physical Therapy Goals  Initiated 1/5/2018  1. Patient will move from supine to sit and sit to supine , scoot up and down and roll side to side in bed with modified independence within 7 day(s). 2.  Patient will transfer from bed to chair and chair to bed with modified independence using the least restrictive device within 7 day(s). 3.  Patient will perform sit to stand with modified independence within 7 day(s). 4.  Patient will ambulate with modified independence for 150 feet with the least restrictive device within 7 day(s). physical Therapy EVALUATION  Patient: Erika Keys (82 y.o. male)  Date: 1/5/2018  Primary Diagnosis: Acute encephalopathy        Precautions: fall, s/p right hip fx with intermedullary brenda placement 12/27/2017. WBAT       ASSESSMENT :  Based on the objective data described below, the patient presents with difficulty with ambulation. patient recent discharge from this hospital to [facility] for rehab. Sit on the edge of bed min assist x 2, sit to stand min assist x 2, ambulate with rolling walker around the bed min assist x 2, noted patient posterior lean when standing and during ambulation. OOB to chair as tolerated. performed some active range of motion exercise on both LE all planes. Activated chair alarm and notified nurse who agreed to monitor and assist back to bed when ask. Patient will benefit from skilled intervention to address the above impairments.   Patients rehabilitation potential is considered to be Excellent  Factors which may influence rehabilitation potential include:   []         None noted  [x]         Mental ability/status  [x]         Medical condition  []         Home/family situation and support systems  [x]         Safety awareness  []         Pain tolerance/management  []         Other:      PLAN :  Recommendations and Planned Interventions:  [x] Bed Mobility Training             []    Neuromuscular Re-Education  [x]           Transfer Training                   []    Orthotic/Prosthetic Training  [x]           Gait Training                         []    Modalities  [x]           Therapeutic Exercises           []    Edema Management/Control  [x]           Therapeutic Activities            []    Patient and Family Training/Education  []           Other (comment):    Frequency/Duration: Patient will be followed by physical therapy  5 times a week to address goals. Discharge Recommendations: Rehab  Further Equipment Recommendations for Discharge: TBD     SUBJECTIVE:   Patient stated Gearldine Canal .     OBJECTIVE DATA SUMMARY:   HISTORY:    Past Medical History:   Diagnosis Date    Arthritis     CAD (coronary artery disease)     Cardiomyopathy ([facility] 6/11/2015    Colon cancer ([facility] 1993    COLON    Depression     DEPRESSION    Diabetes ([facility]     IDDM    GERD (gastroesophageal reflux disease)     Hypertension     Lung cancer ([facility] 11/29/2016    Mild aortic stenosis 6/15/2015    Pulmonary hypertension 6/15/2015    PVD (peripheral vascular disease) ([facility]     STENT RIGHT GROIN, PAD    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    LIMA TO LAD; SVG TO OM; SVG TO OM    Stroke ([facility] 1999    2 STROKES    Third degree AV block ([facility] 6/11/2015    Thyroid disease      Past Surgical History:   Procedure Laterality Date    HX GI  1993    COLON RESECTION    HX HEART CATHETERIZATION  6/26/2015    HX PACEMAKER      INS PPM/ICD LED DUAL ONLY  7/2/2015         VASCULAR SURGERY PROCEDURE UNLIST  1999    PAD, RIGHT GROIN STENT     Prior Level of Function/Home Situation: transferred back to ER yesterday from [facility].   Personal factors and/or comorbidities impacting plan of care:          EXAMINATION/PRESENTATION/DECISION MAKING:   Critical Behavior:  Neurologic State: Alert, Confused  Orientation Level: Oriented to person, Oriented to time, Disoriented to situation, Disoriented to place  Cognition: Decreased command following, Decreased attention/concentration  Safety/Judgement: Awareness of environment, Good awareness of safety precautions  Hearing: Auditory  Auditory Impairment: None    Range Of Motion:  AROM: Generally decreased, functional           PROM: Within functional limits           Strength:    Strength: Generally decreased, functional                    Tone & Sensation:                                  Coordination:  Coordination: Within functional limits  Vision:   Tracking: Able to track stimulus in all quadrants w/o difficulty  Diplopia: No  Acuity: Within Defined Limits  Functional Mobility:  Bed Mobility:  Rolling: Minimum assistance; Additional time;Assist x2  Supine to Sit: Minimum assistance; Additional time;Assist x1  Sit to Supine:  (pt remained up at end of tx)  Scooting: Minimum assistance;Assist x1;Additional time  Transfers:  Sit to Stand: Minimum assistance;Assist x2  Stand to Sit: Minimum assistance;Assist x2  Stand Pivot Transfers: Assist x2     Bed to Chair: Minimum assistance;Assist x2; Additional time              Balance:   Sitting: Impaired  Sitting - Static: Good (unsupported)  Sitting - Dynamic: Fair (occasional)  Standing: Impaired  Standing - Static: Poor  Standing - Dynamic : Poor  Ambulation/Gait Training:  Distance (ft): 20 Feet (ft)  Assistive Device: Walker, rolling;Gait belt  Ambulation - Level of Assistance: Minimal assistance;Assist x2     Gait Description (WDL): Exceptions to WDL  Gait Abnormalities: Path deviations; Step to gait        Base of Support: Widened  Stance: Weight shift (leans backwards)  Speed/Kamila: Fluctuations; Pace decreased (<100 feet/min)  Step Length: Right shortened;Left shortened                    Therapeutic Exercises:    Instructed patient to continue active range of motion exercise on both legs while up on chair or on bed.        Functional Measure:  Barthel Index:    Bathin  Bladder: 0  Bowels: 0  Grooming: 0  Dressin  Feedin  Mobility: 0  Stairs: 0  Toilet Use: 0  Transfer (Bed to Chair and Back): 0  Total: 5       Barthel and G-code impairment scale:  Percentage of impairment CH  0% CI  1-19% CJ  20-39% CK  40-59% CL  60-79% CM  80-99% CN  100%   Barthel Score 0-100 100 99-80 79-60 59-40 20-39 1-19   0   Barthel Score 0-20 20 17-19 13-16 9-12 5-8 1-4 0      The Barthel ADL Index: Guidelines  1. The index should be used as a record of what a patient does, not as a record of what a patient could do. 2. The main aim is to establish degree of independence from any help, physical or verbal, however minor and for whatever reason. 3. The need for supervision renders the patient not independent. 4. A patient's performance should be established using the best available evidence. Asking the patient, friends/relatives and nurses are the usual sources, but direct observation and common sense are also important. However direct testing is not needed. 5. Usually the patient's performance over the preceding 24-48 hours is important, but occasionally longer periods will be relevant. 6. Middle categories imply that the patient supplies over 50 per cent of the effort. 7. Use of aids to be independent is allowed. Luba Parra., Barthel, D.W. (3174). Functional evaluation: the Barthel Index. [facility] (14)2. RUBI Corcoran, Bebeto Young., Maryfrances Lush., Xiomara Jamar, [address] [place] [address] (). Measuring the change indisability after inpatient rehabilitation; comparison of the responsiveness of the Barthel Index and Functional Hazard Measure. Journal of Neurology, Neurosurgery, and Psychiatry, 66(4), 421-958. Jose Levin, N.J.A, THELMA LópezJ.ANA, & Trip Bacon, M.A. (2004.) Assessment of post-stroke quality of life in cost-effectiveness studies: The usefulness of the Barthel Index and the EuroQoL-5D. Quality of Life Research, 13, 418-08       G codes:   In compliance with CMSs Claims Based Outcome Reporting, the following G-code set was chosen for this patient based on their primary functional limitation being treated: The outcome measure chosen to determine the severity of the functional limitation was the barthel with a score of 5/100 which was correlated with the impairment scale. ? Mobility - Walking and Moving Around:     - CURRENT STATUS: CM - 80%-99% impaired, limited or restricted    - GOAL STATUS: CL - 60%-79% impaired, limited or restricted    - D/C STATUS:  ---------------To be determined---------------      Physical Therapy Evaluation Charge Determination   History Examination Presentation Decision-Making   MEDIUM  Complexity : 1-2 comorbidities / personal factors will impact the outcome/ POC  MEDIUM Complexity : 3 Standardized tests and measures addressing body structure, function, activity limitation and / or participation in recreation  MEDIUM Complexity : Evolving with changing characteristics  Other outcome measures barthel  HIGH       Based on the above components, the patient evaluation is determined to be of the following complexity level: MEDIUM    Pain:  Pain Scale 1: Numeric (0 - 10)  Pain Intensity 1: 6  Pain Location 1: Back  Pain Orientation 1: Other (comment)  Pain Description 1: Aching  Pain Intervention(s) 1: Repositioned  Activity Tolerance:   Good. Please refer to the flowsheet for vital signs taken during this treatment. After treatment:   [x]         Patient left in no apparent distress sitting up in chair  []         Patient left in no apparent distress in bed  [x]         Call bell left within reach  [x]         Nursing notified  []         Caregiver present  [x]         Chair alarm activated    COMMUNICATION/EDUCATION:   The patients plan of care was discussed with: Registered Nurse and patient. [x]         Fall prevention education was provided and the patient/caregiver indicated understanding.   [x]         Patient/family have participated as able in goal setting and plan of care. [x]         Patient/family agree to work toward stated goals and plan of care. []         Patient understands intent and goals of therapy, but is neutral about his/her participation. []         Patient is unable to participate in goal setting and plan of care. Thank you for this referral.  Erik Roa, [facility].    Time Calculation: 24 mins

## 2018-01-05 NOTE — PROGRESS NOTES
Sang Reyes to assess pt for potential upgrade. Assessment completed upon arrival to [facility]. Pt Drowsy but arouses to voice commands Oriented x 4 MAP Low on monitor. BP cuff Assessed. BP cuff improperly fit to pt arm. New Cuff Applied. Pt removed from Trendelenburg positioning MAP Now Mid 60's-70's. Pt currently receiving bolus ordered by Dr. Gerard Bingham 250 ML over 4 Hrs. Advised Primary RN to call if pt mentation/MAP change. Pt left with bed in low position Head elevated Bolus infusing. Map 72 Call [phone] within reach and report to primary RN. Yolanda Lemus RN

## 2018-01-05 NOTE — PROGRESS NOTES
Ozzy Resendiz [facility] [address], [place], [address] [facility]  [phone]      Medical Progress Note      NAME: Alhaji Stevens   :  1935  MRM:  [id number]    Date/Time: 2018  1:18 PM       Assessment and Plan:     Principal Problem:     82 yo hx of HTN, DM, CAD s/p CABG, sCHF EF 30%, heart block s/p pacer, PVD, CVA, recent right hip fx, presented w/ AMS, severe sepsis, UTI, CHF     1) Acute encephalopathy: could be multifactorial, relating to hypoxia, sepsis, UTI, medications. Improving slowly with treating infection. Continue supportive care. Can hold seroquel for now. Monitor closely     2) Severe sepsis/complicated UTI: 2/4 SIRS on admission with AMS, lactic acidosis. UCx with > 100K GNRs, BCx are NGTD. Hold IVF given overall volume overload, can bolus PRN for BP. Continue IV zosyn for now as we await speciation.    3) Acute on chronic syst CHF: last EF 30%. Has pulm edema on exam and CXR. Still has crackles on exam, BP improved. Cards evaluated, initiated IV diuresis. Monitor BP closely and stop trickle IVF, bolus prn     4) Renal insuff: likely pre-renal. Resolved. Monitor with diuresis     5) HTN/CAD: cont coreg. Unclear why patient is not on a low dose ASA     6) DM type 2: last A1C 7.2%. Hold glipizide. Start SSI     7) Recent right hip fx: s/p repair. Will re-consult ortho to eval.  Cont lovenox for DVT ppx     8) COPD: stable, cont duo nebs prn       Subjective:     Chief Complaint:  Patient seen and examined. Chart reviewed. Patient is more awake. Denies complaints at this time. No SOB or CP    ROS:  (bold if positive, if negative)      Tolerating PT  Tolerating Diet        Objective:     Last 24hrs VS reviewed since prior progress note.  Most recent are:    Visit Vitals    /44 (BP 1 Location: Right arm, BP Patient Position: At rest)    Pulse 60    Temp 97.8 °F (36.6 °C)    Resp 22    Wt 77.9 kg (171 lb 11.2 oz)    SpO2 99%    BMI 25.36 kg/m2 SpO2 Readings from Last 6 Encounters:   01/05/18 99%   12/27/17 98%   08/23/17 91%   02/15/17 98%   10/05/16 92%   06/16/16 95%    O2 Flow Rate (L/min): 4 l/min     Intake/Output Summary (Last 24 hours) at 01/05/18 1318  Last data filed at 01/05/18 0845   Gross per 24 hour   Intake             1130 ml   Output              450 ml   Net              680 ml        Physical Exam:    Gen:  Elderly, frail, chronically ill-appearing, in NAD  HEENT:  Pink conjunctivae, PERRL, hearing intact to voice, moist mucous membranes  Neck:  Supple, without masses, thyroid non-tender  Resp:  No accessory muscle use, bilateral crackles in bases  Card:  No murmurs, normal S1, S2 without thrills, bruits, ++ peripheral edema  Abd:  Soft, non-tender, non-distended, normoactive bowel sounds are present, no palpable organomegaly and no detectable hernias  Lymph:  No cervical or inguinal adenopathy  Musc:  No cyanosis or clubbing  Skin:  No rashes or ulcers, skin turgor is good  Neuro:  Cranial nerves are grossly intact, no focal motor weakness, follows commands appropriately  Psych:  Poor insight, oriented to person, awake/alert    Telemetry reviewed:   normal sinus rhythm  __________________________________________________________________  Medications Reviewed: (see below)  Medications:     Current Facility-Administered Medications   Medication Dose Route Frequency    bumetanide (BUMEX) injection 1 mg  1 mg IntraVENous BID    sodium chloride (NS) flush 5-10 mL  5-10 mL IntraVENous PRN    piperacillin-tazobactam (ZOSYN) 3.375 g in 0.9% sodium chloride 50 mL IVPB  3.375 g IntraVENous Q8H    albuterol-ipratropium (DUO-NEB) 2.5 MG-0.5 MG/3 ML  3 mL Nebulization Q4H PRN    bisacodyl (DULCOLAX) suppository 10 mg  10 mg Rectal DAILY PRN    donepezil (ARICEPT) tablet 10 mg  10 mg Oral DAILY    finasteride (PROSCAR) tablet 5 mg  5 mg Oral QPM    levothyroxine (SYNTHROID) tablet 125 mcg  125 mcg Oral ACB    pantoprazole (PROTONIX) tablet 40 mg  40 mg Oral ACB    senna-docusate (PERICOLACE) 8.6-50 mg per tablet 1 Tab  1 Tab Oral DAILY    tamsulosin (FLOMAX) capsule 0.4 mg  0.4 mg Oral QHS    traMADol (ULTRAM) tablet 50 mg  50 mg Oral Q6H PRN    sodium chloride (NS) flush 5-10 mL  5-10 mL IntraVENous Q8H    sodium chloride (NS) flush 5-10 mL  5-10 mL IntraVENous PRN    acetaminophen (TYLENOL) tablet 650 mg  650 mg Oral Q4H PRN    naloxone (NARCAN) injection 0.4 mg  0.4 mg IntraVENous PRN    diphenhydrAMINE (BENADRYL) injection 12.5 mg  12.5 mg IntraVENous Q4H PRN    ondansetron (ZOFRAN) injection 4 mg  4 mg IntraVENous Q6H PRN    enoxaparin (LOVENOX) injection 40 mg  40 mg SubCUTAneous Q24H    insulin lispro (HUMALOG) injection   SubCUTAneous AC&HS    glucose chewable tablet 16 g  4 Tab Oral PRN    dextrose (D50W) injection syrg 12.5-25 g  12.5-25 g IntraVENous PRN    glucagon (GLUCAGEN) injection 1 mg  1 mg IntraMUSCular PRN        Lab Data Reviewed: (see below)  Lab Review:     Recent Labs      01/05/18   0230  01/04/18   0355   WBC  18.5*  22.7*   HGB  8.0*  8.8*   HCT  24.9*  27.7*   PLT  316  421*     Recent Labs      01/05/18   0230  01/04/18   1018  01/04/18   0355   NA  138   --   138   K  4.2   --   3.9   CL  103   --   99   CO2  26   --   30   GLU  195*   --   92   BUN  25*   --   35*   CREA  1.10   --   1.35*   CA  8.8   --   9.1   MG  2.1   --    --    PHOS  3.4   --    --    ALB  2.5*  3.0*   --    TBILI  0.8  1.1*   --    SGOT  14*  16   --    ALT  22  28   --      Lab Results   Component Value Date/Time    Glucose (POC) 287 01/05/2018 11:47 AM    Glucose (POC) 231 01/05/2018 07:36 AM    Glucose (POC) 135 01/04/2018 10:37 PM    Glucose (POC) 164 01/04/2018 04:53 PM    Glucose (POC) 228 12/27/2017 11:19 AM     No results for input(s): PH, PCO2, PO2, HCO3, FIO2 in the last 72 hours. No results for input(s): INR in the last 72 hours.     No lab exists for component: INREXT  All Micro Results     Procedure Component Value Units Date/Time    CULTURE, BLOOD [[id number]] Collected:  01/04/18 1018    Order Status:  Completed Specimen:  Blood from Blood Updated:  01/05/18 0712     Special Requests: NO SPECIAL REQUESTS        Culture result: NO GROWTH AFTER 20 HOURS       CULTURE, BLOOD [[id number]] Collected:  01/04/18 1018    Order Status:  Completed Specimen:  Blood from Blood Updated:  01/05/18 6536     Special Requests: NO SPECIAL REQUESTS        Culture result: NO GROWTH AFTER 20 HOURS             I have reviewed notes of prior 24hr.     Other pertinent lab: None    Total time spent with patient: 35 min                  Care Plan discussed with: Patient, Care Manager, Nursing Staff and Consultant/Specialist    Discussed:  Care Plan and D/C Planning    Prophylaxis:  Lovenox    Disposition:  SNF/LTC           ___________________________________________________    Attending Physician: Farideh Mercer DO

## 2018-01-05 NOTE — PROGRESS NOTES
Called by nursing regarding low MAP. Drifting down from ER. Even though xray has pulm edema, RR and sats adequate, so I will give 1L NS over 4 hrs, watching for signs of overload. He was just admitted by my partner Dr Marlen Nicholas, and I will change initial bed assignment to stepdown.

## 2018-01-05 NOTE — PROGRESS NOTES
Problem: Self Care Deficits Care Plan (Adult)  Goal: *Acute Goals and Plan of Care (Insert Text)  Occupational Therapy Goals  Initiated 1/5/2018  1. Patient will perform grooming with modified independence within 7 day(s). 2.  Patient will perform upper body dressing and bathing with modified independence within 7 day(s). 3.  Patient will perform lower body dressing and bathing with moderate assistance using adaptive equipment PRN within 7 day(s). 4.  Patient will perform toilet transfers with moderate assistance  within 7 day(s). 5.  Patient will perform all aspects of toileting with moderate assistance  within 7 day(s). 6.  Patient will participate in upper extremity therapeutic exercise/activities with supervision/set-up for 10 minutes within 7 day(s). 7.  Patient will utilize energy conservation techniques during functional activities with verbal cues within 7 day(s). Occupational Therapy EVALUATION  Patient: Bekah Morocho (49 y.o. male)  Date: 1/5/2018  Primary Diagnosis: Acute encephalopathy        Precautions: fall       ASSESSMENT :  Based on the objective data described below, the patient presents with decreased activity tolerance following admission on 1/4 for confusion and increased lethargy. Patient was admitted from [facility]/inpatient rehab where he was receiving rehab after recent admission; He was admitted after fall at home that resulted in R hip fx + IM nailing on 12/22. Prior to hip fx, patient was living at an assisted living facility, was independent with mobility, and needed some help with ADLs. Today, patient is primarily limited by generalized weakness, poor endurance + need for supplemental O2 with activity, impaired balance, and confusion. Patient required min A x1 for bed mobility and min A x2 for ADL transfers, including into the bathroom for toileting. Patient currently requires up to supervision/setup for UB ADLs and max to total A for LB ADLs.   Patient was educated on adaptive ADL techniques, safe transfer technique, and pursed lip breathing and pacing techniques for improved recovery with dyspnea. Patient would benefit from continued skilled OT to progress towards goals and improve overall independence. Patient will benefit from skilled intervention to address the above impairments. Patients rehabilitation potential is considered to be Good  Factors which may influence rehabilitation potential include:   []             None noted  [x]             Mental ability/status-confusion  []             Medical condition  []             Home/family situation and support systems  []             Safety awareness  []             Pain tolerance/management  []             Other:      PLAN :  Recommendations and Planned Interventions:  [x]               Self Care Training                  [x]        Therapeutic Activities  [x]               Functional Mobility Training    []        Cognitive Retraining  [x]               Therapeutic Exercises           [x]        Endurance Activities  [x]               Balance Training                   []        Neuromuscular Re-Education  []               Visual/Perceptual Training     [x]   Home Safety Training  [x]               Patient Education                 [x]        Family Training/Education  []               Other (comment):    Frequency/Duration: Patient will be followed by occupational therapy 5 times a week to address goals. Discharge Recommendations: Rehab  Further Equipment Recommendations for Discharge: none at this time     SUBJECTIVE:   Patient stated I am scared to fall.     OBJECTIVE DATA SUMMARY:   HISTORY:   Past Medical History:   Diagnosis Date    Arthritis     CAD (coronary artery disease)     [facility]2015    [facility] 1993    COLON    Depression     DEPRESSION    Diabetes ([facility]     IDDM    GERD (gastroesophageal reflux disease)     Hypertension     [facility] ([facility] 11/29/2016    Mild aortic stenosis 6/15/2015    Pulmonary hypertension 6/15/2015    PVD (peripheral vascular disease) (HCC)     STENT RIGHT GROIN, PAD    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    LIMA TO LAD; SVG TO OM; SVG TO OM    Stroke [facility] 1999    2 STROKES    Third degree AV block ([facility] 6/11/2015    Thyroid disease      Past Surgical History:   Procedure Laterality Date    HX GI  1993    COLON RESECTION    HX HEART CATHETERIZATION  6/26/2015    HX PACEMAKER      INS PPM/ICD LED DUAL ONLY  7/2/2015         VASCULAR SURGERY PROCEDURE UNLIST  1999    PAD, RIGHT GROIN STENT       Prior Level of Function/Environment/Context: Patient was admitted from [address] [place] [address]. See assessment section for PLOF information. [x]  Right hand dominant   []  Left hand dominant    EXAMINATION OF PERFORMANCE DEFICITS:  Cognitive/Behavioral Status:  Neurologic State: Alert;Confused  Orientation Level: Oriented to person;Oriented to time;Disoriented to situation;Disoriented to place  Cognition: Decreased command following;Decreased attention/concentration  Perception: Appears intact  Perseveration: No perseveration noted  Safety/Judgement: Awareness of environment;Good awareness of safety precautions    Skin: Intact in the uppers    Edema: None noted in the uppers    Hearing: Auditory  Auditory Impairment: None    Vision/Perceptual:    Tracking: Able to track stimulus in all quadrants w/o difficulty    Diplopia: No    Acuity: Within Defined Limits       Range of Motion:  WDL in the uppers    Strength:  Decreased but functional in the uppers    Coordination:  Fine Motor Skills-Upper: Left Intact; Right Intact    Gross Motor Skills-Upper: Left Intact; Right Intact    Tone & Sensation:  Tone: normal  Sensation:intact    Balance:  Sitting: Impaired  Sitting - Static: Good (unsupported)  Sitting - Dynamic: Fair (occasional)  Standing: Impaired  Standing - Static: Poor  Standing - Dynamic : Poor    Functional Mobility and Transfers for ADLs:  Bed Mobility:  Supine to Sit: Minimum assistance; Additional time;Assist x1  Sit to Supine:  (pt remained up at end of tx)  Scooting: Minimum assistance;Assist x1;Additional time    Transfers:  Sit to Stand: Minimum assistance;Assist x2  Stand to Sit: Minimum assistance;Assist x2  Bed to Chair: Minimum assistance;Assist x2; Additional time  Toilet Transfer : Minimum assistance;Assist x2; Additional time    ADL Assessment:  Feeding: Independent    Oral Facial Hygiene/Grooming: Supervision;Setup    Bathing: Maximum assistance    Upper Body Dressing: Supervision;Setup    Lower Body Dressing: Total assistance    Toileting: Maximum assistance    Cognitive Retraining  Safety/Judgement: Awareness of environment;Good awareness of safety precautions    Functional Measure:  Barthel Index:    Bathin  Bladder: 0  Bowels: 0  Groomin  Dressin  Feedin  Mobility: 0  Stairs: 0  Toilet Use: 0  Transfer (Bed to Chair and Back): 0  Total: 5       Barthel and G-code impairment scale:  Percentage of impairment CH  0% CI  1-19% CJ  20-39% CK  40-59% CL  60-79% CM  80-99% CN  100%   Barthel Score 0-100 100 99-80 79-60 59-40 20-39 1-19   0   Barthel Score 0-20 20 17-19 13-16 9-12 5-8 1-4 0      The Barthel ADL Index: Guidelines  1. The index should be used as a record of what a patient does, not as a record of what a patient could do. 2. The main aim is to establish degree of independence from any help, physical or verbal, however minor and for whatever reason. 3. The need for supervision renders the patient not independent. 4. A patient's performance should be established using the best available evidence. Asking the patient, friends/relatives and nurses are the usual sources, but direct observation and common sense are also important. However direct testing is not needed. 5. Usually the patient's performance over the preceding 24-48 hours is important, but occasionally longer periods will be relevant.   6. Middle categories imply that the patient supplies over 50 per cent of the effort. 7. Use of aids to be independent is allowed. Dyanne Dus., Barthel, D.W. (5085). Functional evaluation: the Barthel Index. [address] (14)2. RUBI Ridley, Ever Manzo., Jose Ramon Freitas., [place], [address] (1999). Measuring the change indisability after inpatient rehabilitation; comparison of the responsiveness of the Barthel Index and Functional [place] Measure. Journal of Neurology, Neurosurgery, and Psychiatry, 66(4), 150-106. MARLENI Cadena, MARQUITA López, & Asad Patricio M.A. (2004.) Assessment of post-stroke quality of life in cost-effectiveness studies: The usefulness of the Barthel Index and the EuroQoL-5D. Quality of Life Research, 13, 454-94       G codes: In compliance with CMSs Claims Based Outcome Reporting, the following G-code set was chosen for this patient based on their primary functional limitation being treated: The outcome measure chosen to determine the severity of the functional limitation was the Barthel Index with a score of 5/100 which was correlated with the impairment scale. ? Self Care:     - CURRENT STATUS: CM - 80%-99% impaired, limited or restricted    - GOAL STATUS: CL - 60%-79% impaired, limited or restricted    - D/C STATUS:  ---------------To be determined---------------     Occupational Therapy Evaluation Charge Determination   History Examination Decision-Making   MEDIUM Complexity : Expanded review of history including physical, cognitive and psychosocial  history  MEDIUM Complexity : 3-5 performance deficits relating to physical, cognitive , or psychosocial skils that result in activity limitations and / or participation restrictions MEDIUM Complexity : Patient may present with comorbidities that affect occupational performnce.  Miniml to moderate modification of tasks or assistance (eg, physical or verbal ) with assesment(s) is necessary to enable patient to complete evaluation       Based on the above components, the patient evaluation is determined to be of the following complexity level: MEDIUM  Activity Tolerance:   Patient tolerated eval well. Please refer to the flowsheet for vital signs taken during this treatment. After treatment:   [x] Patient left in no apparent distress sitting up in chair  [] Patient left in no apparent distress in bed  [x] Call bell left within reach  [x] Nursing notified  [] Caregiver present  [x] Chair alarm activated    COMMUNICATION/EDUCATION:   The patients plan of care was discussed with: Physical Therapist, Registered Nurse and patient. .  [x] Home safety education was provided and the patient/caregiver indicated understanding. [x] Patient/family have participated as able in goal setting and plan of care. [x] Patient/family agree to work toward stated goals and plan of care. [] Patient understands intent and goals of therapy, but is neutral about his/her participation. [] Patient is unable to participate in goal setting and plan of care. This patients plan of care is appropriate for delegation to [facility].     Thank you for this referral.  Laura Nunez OTR/L  Time Calculation: 24 mins

## 2018-01-05 NOTE — PROGRESS NOTES
Problem: Dysphagia (Adult)  Goal: *Acute Goals and Plan of Care (Insert Text)  Speech pathology goals  Initiated 1/5/2018  1. Patient will tolerate puree/honey liquid diet with no overt s/s aspiration within 7 days  2. Patient will tolerate trials of mechanical soft solids with no overt s/s aspiration within 7 days  Speech LAnguage Pathology bedside swallow evaluation  Patient: Alhaji Stevens (72 y.o. male)  Date: 1/5/2018  Primary Diagnosis: Acute encephalopathy        Precautions: swallow       ASSESSMENT :  Patient alert, confused. Oriented to person only, and per discussion with [facility] SLP who discussed cognitive status with patient's daughter, this is baseline due to dementia. Patient's O2 sats 100% on 4LNC. Based on the objective data described below, the patient presents with mild-moderate oral dysphagia characterized by prolonged mastication, suspected premature spillage, and delayed posterior propulsion. Patient also with moderate-severe pharyngeal dysphagia characterized by delayed swallow initiation and decreased/weak hyolaryngeal elevation/excursion. Also suspect pharyngeal residue. Patient with cough x1 with honey liquid and with 2/3 trials of mechanical soft solid. Tolerated remaining 4 oz honey liquid and puree trials well with no additional s/s aspiration. Patient is at risk for aspiration secondary to respiratory status, cognitive status, and known pharyngeal dysphagia. MBS completed at [address] [place] [address] on 1/2/18 with the following results, \"Impressions: This patient is a 81year old male who currently presents with mild-moderate oral phase dysphagia and moderate-severe pharyngeal phase dysphagia characterized by prolonged mastication, disorganized oral phase, difficulty with A-P bolus propulsion, piecemeal degluttion, premature spillage to pyriform sinuses, delayed swallow initiation, decreased sensation.  Patient with decreased base of tongue strength, generalized pharyngeal weakness, decreased hyoid protraction, decreased laryngeal elevation and decreased laryngeal closure leading to consistent deep penetration of thin liquids, variable transient & deep penetration of nectar, aspiration of nectar thick liquids, shallow penetration of honey thck liquids x 1, along with pharygneal residue coating base of tongue, in valleculae & pyriform sinuses. Patient not aware of residue & required verbal cues to complete double swallow which only minimally reduced residue. . Patient is at risk for aspiration. PLAN: Diet Texture: Dysphagia II (Mechanical soft). Liquid Consistency: Honey thick consistency. \"    Patient will benefit from skilled intervention to address the above impairments. Patients rehabilitation potential is considered to be Fair  Factors which may influence rehabilitation potential include:   []            None noted  [x]            Mental ability/status  [x]            Medical condition  []            Home/family situation and support systems  [x]            Safety awareness  []            Pain tolerance/management  []            Other:      PLAN :  Recommendations and Planned Interventions:  --Puree/honey liquid diet  --Upright at 90 degrees for all PO intake  --Supervision with PO with cues for double swallow   --Meds crushed in puree  --SLP to follow for diet tolerance and advancement to dysphagia 2 solids as appropriate  Frequency/Duration: Patient will be followed by speech-language pathology 2 times a week to address goals. Discharge Recommendations: To Be Determined     SUBJECTIVE:   Patient stated I don't know re: orientation questions.     OBJECTIVE:     Past Medical History:   Diagnosis Date    Arthritis     CAD (coronary artery disease)     [facility]omy[facility] 6/11/2015    [facility]    COLON    Depression     DEPRESSION    Diabetes ([facility]     IDDM    GERD (gastroesophageal reflux disease)     Hypertension     [facility] 11/29/2016    Mild aortic stenosis 6/15/2015    Pulmonary hypertension 6/15/2015    PVD (peripheral vascular disease) (HCC)     STENT RIGHT GROIN, PAD    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    LIMA TO LAD; SVG TO OM; SVG TO OM    Stroke ([facility] 1999    2 STROKES    Third degree AV block ([facility] 6/11/2015    Thyroid disease      Past Surgical History:   Procedure Laterality Date    HX GI  1993    COLON RESECTION    HX HEART CATHETERIZATION  6/26/2015    HX PACEMAKER      INS PPM/ICD LED DUAL ONLY  7/2/2015         VASCULAR SURGERY PROCEDURE UNLIST  1999    PAD, RIGHT GROIN STENT     Prior Level of Function/Home Situation:      Diet prior to admission: dysphagia 2/honey liquids at [facility] after MBS. Regular/thin prior to initial hospital admission  Current Diet:  Clear liquid (thin)   Cognitive and Communication Status:  Neurologic State: Alert, Confused  Orientation Level: Oriented to person, Disoriented to time, Disoriented to situation, Disoriented to place (baseline)  Cognition: Decreased command following, Decreased attention/concentration  Perception: Appears intact  Perseveration: No perseveration noted  Safety/Judgement: Decreased awareness of environment, Decreased awareness of need for assistance, Decreased awareness of need for safety, Decreased insight into deficits  Oral Assessment:  Oral Assessment  Labial: No impairment  Dentition: Natural;Limited; Extractions (minimal dentition)  Oral Hygiene: moist oral mucosa  Lingual: No impairment  Velum: No impairment  Mandible: No impairment  P.O.  Trials:  Patient Position: upright in bed  Vocal quality prior to P.O.: No impairment  Consistency Presented: Honey thick liquid;Puree;Mechanical soft  How Presented: Self-fed/presented;Cup/sip;Spoon;SLP-fed/presented     Bolus Acceptance: No impairment  Bolus Formation/Control: Impaired  Type of Impairment: Delayed;Mastication;Premature spillage  Propulsion: Delayed (# of seconds)  Oral Residue: None  Initiation of Swallow: Delayed (# of seconds)  Laryngeal Elevation: Decreased  Aspiration Signs/Symptoms: Strong cough; Other (comment) (with 2/3 mech soft trials, 1 honey liquid trial)  Pharyngeal Phase Characteristics: Suspected pharyngeal residue; Other (comment) (groaning with every exhalation)  Effective Modifications: Alternate liquids/solids;Cup/sip;Small sips and bites  Cues for Modifications: Minimal       Oral Phase Severity: Mild-moderate  Pharyngeal Phase Severity : Moderate-severe    NOMS:   The NOMS functional outcome measure was used to quantify this patient's level of swallowing impairment. Based on the NOMS, the patient was determined to be at level 3 for swallow function     G Codes: In compliance with CMSs Claims Based Outcome Reporting, the following G-code set was chosen for this patient based the use of the NOMS functional outcome to quantify this patient's level of swallowing impairment. Using the NOMS, the patient was determined to be at level 3 for swallow function which correlates with the CL= 60-79% level of severity. Based on the objective assessment provided within this note, the current, goal, and discharge g-codes are as follows:    Swallow  Swallowing:   Swallow Current Status CL= 60-79%   Swallow Goal Status CL= 60-79%      NOMS Swallowing Levels:  Level 1 (CN): NPO  Level 2 (CM): NPO but takes consistency in therapy  Level 3 (CL): Takes less than 50% of nutrition p.o. and continues with nonoral feedings; and/or safe with mod cues; and/or max diet restriction  Level 4 (CK): Safe swallow but needs mod cues; and/or mod diet restriction; and/or still requires some nonoral feeding/supplements  Level 5 (CJ): Safe swallow with min diet restriction; and/or needs min cues  Level 6 (CI): Independent with p.o.; rare cues; usually self cues; may need to avoid some foods or needs extra time  Level 7 ([address] [place] [address]): Independent for all p.o.  BERTHA. (2003).  National Outcomes Measurement System (NOMS): Adult Speech-Language Pathology User's Guide. Pain:  Pain Scale 1: Numeric (0 - 10)  Pain Intensity 1: 6  Pain Location 1: Back  After treatment:   []            Patient left in no apparent distress sitting up in chair  [x]            Patient left in no apparent distress in bed  [x]            Call bell left within reach  [x]            Nursing notified  []            Caregiver present  []            Bed alarm activated    COMMUNICATION/EDUCATION:   The patients plan of care including recommendations, planned interventions, and recommended diet changes were discussed with: Registered Nurse. [x]            Patient/family have participated as able in goal setting and plan of care. [x]            Patient/family agree to work toward stated goals and plan of care. []            Patient understands intent and goals of therapy, but is neutral about his/her participation. []            Patient is unable to participate in goal setting and plan of care.     Thank you for this referral.  Nasrin De Anda, SLP  Time Calculation: 14 mins

## 2018-01-05 NOTE — DISCHARGE INSTRUCTIONS
Avoiding Triggers With Heart Failure: Care Instructions  Your Care Instructions    Triggers are anything that make your heart failure flare up. A flare-up is also called \"sudden heart failure\" or \"acute heart failure. \" When you have a flare-up, fluid builds up in your lungs, and you have problems breathing. You might need to go to the hospital. By watching for changes in your condition and avoiding triggers, you can prevent heart failure flare-ups. Follow-up care is a key part of your treatment and safety. Be sure to make and go to all appointments, and call your doctor if you are having problems. It's also a good idea to know your test results and keep a list of the medicines you take. How can you care for yourself at home? Watch for changes in your weight and condition  · Weigh yourself without clothing at the same time each day. Record your weight. Call your doctor if you have sudden weight gain, such as more than 2 to 3 pounds in a day or 5 pounds in a week. (Your doctor may suggest a different range of weight gain.) A sudden weight gain may mean that your heart failure is getting worse. · Keep a daily record of your symptoms. Write down any changes in how you feel, such as new shortness of breath, cough, or problems eating. Also record if your ankles are more swollen than usual and if you feel more tired than usual. Note anything that you ate or did that could have triggered these changes. Limit sodium  Sodium causes your body to hold on to extra water. This may cause your heart failure symptoms to get worse. People get most of their sodium from processed foods. Fast food and restaurant meals also tend to be very high in sodium. · Your doctor may suggest that you limit sodium to 2,000 milligrams (mg) a day or less. That is less than 1 teaspoon of salt a day, including all the salt you eat in cooking or in packaged foods. · Read food labels on cans and food packages.  They tell you how much sodium you get in one serving. Check the serving size. If you eat more than one serving, you are getting more sodium. · Be aware that sodium can come in forms other than salt, including monosodium glutamate (MSG), sodium citrate, and sodium bicarbonate (baking soda). MSG is often added to Asian food. You can sometimes ask for food without MSG or salt. · Slowly reducing salt will help you adjust to the taste. Take the salt shaker off the table. · Flavor your food with garlic, lemon juice, onion, vinegar, herbs, and spices instead of salt. Do not use soy sauce, steak sauce, onion salt, garlic salt, mustard, or ketchup on your food, unless it is labeled \"low-sodium\" or \"low-salt. \"  · Make your own salad dressings, sauces, and ketchup without adding salt. · Use fresh or frozen ingredients, instead of canned ones, whenever you can. Choose low-sodium canned goods. · Eat less processed food and food from restaurants, including fast food. Exercise as directed  Moderate, regular exercise is very good for your heart. It improves your blood flow and helps control your weight. But too much exercise can stress your heart and cause a heart failure flare-up. · Check with your doctor before you start an exercise program.  · Walking is an easy way to get exercise. Start out slowly. Gradually increase the length and pace of your walk. Swimming, riding a bike, and using a treadmill are also good forms of exercise. · When you exercise, watch for signs that your heart is working too hard. You are pushing yourself too hard if you cannot talk while you are exercising. If you become short of breath or dizzy or have chest pain, stop, sit down, and rest.  · Do not exercise when you do not feel well. Take medicines correctly  · Take your medicines exactly as prescribed. Call your doctor if you think you are having a problem with your medicine. · Make a list of all the medicines you take.  Include those prescribed to you by other doctors and any over-the-counter medicines, vitamins, or supplements you take. Take this list with you when you go to any doctor. · Take your medicines at the same time every day. It may help you to post a list of all the medicines you take every day and what time of day you take them. · Make taking your medicine as simple as you can. Plan times to take your medicines when you are doing other things, such as eating a meal or getting ready for bed. This will make it easier to remember to take your medicines. · Get organized. Use helpful tools, such as daily or weekly pill containers. When should you call for help? Call 911 if you have symptoms of sudden heart failure such as:  ? · You have severe trouble breathing. ? · You cough up pink, foamy mucus. ? · You have a new irregular or rapid heartbeat. ?Call your doctor now or seek immediate medical care if:  ? · You have new or increased shortness of breath. ? · You are dizzy or lightheaded, or you feel like you may faint. ? · You have sudden weight gain, such as more than 2 to 3 pounds in a day or 5 pounds in a week. (Your doctor may suggest a different range of weight gain.)   ? · You have increased swelling in your legs, ankles, or feet. ? · You are suddenly so tired or weak that you cannot do your usual activities. ? Watch closely for changes in your health, and be sure to contact your doctor if you develop new symptoms. Where can you learn more? Go to http://sami-darline.info/. Enter T926 in the search box to learn more about \"Avoiding Triggers With Heart Failure: Care Instructions. \"  Current as of: September 21, 2016  Content Version: 11.4  © 7964-4676 [employer]. Care instructions adapted under license by [employer] (which disclaims liability or warranty for this information).  If you have questions about a medical condition or this instruction, always ask your healthcare professional. Romeo Stephens disclaims any warranty or liability for your use of this information.

## 2018-01-06 LAB
ANION GAP SERPL CALC-SCNC: 5 MMOL/L (ref 5–15)
BACTERIA SPEC CULT: ABNORMAL
BASOPHILS # BLD: 0 K/UL (ref 0–0.1)
BASOPHILS NFR BLD: 0 % (ref 0–1)
BUN SERPL-MCNC: 21 MG/DL (ref 6–20)
BUN/CREAT SERPL: 19 (ref 12–20)
CALCIUM SERPL-MCNC: 8.6 MG/DL (ref 8.5–10.1)
CC UR VC: ABNORMAL
CHLORIDE SERPL-SCNC: 103 MMOL/L (ref 97–108)
CO2 SERPL-SCNC: 28 MMOL/L (ref 21–32)
CREAT SERPL-MCNC: 1.12 MG/DL (ref 0.7–1.3)
EOSINOPHIL # BLD: 0.2 K/UL (ref 0–0.4)
EOSINOPHIL NFR BLD: 2 % (ref 0–7)
ERYTHROCYTE [DISTWIDTH] IN BLOOD BY AUTOMATED COUNT: 15.1 % (ref 11.5–14.5)
GLUCOSE BLD STRIP.AUTO-MCNC: 196 MG/DL (ref 65–100)
GLUCOSE BLD STRIP.AUTO-MCNC: 212 MG/DL (ref 65–100)
GLUCOSE BLD STRIP.AUTO-MCNC: 224 MG/DL (ref 65–100)
GLUCOSE BLD STRIP.AUTO-MCNC: 259 MG/DL (ref 65–100)
GLUCOSE SERPL-MCNC: 237 MG/DL (ref 65–100)
HCT VFR BLD AUTO: 24 % (ref 36.6–50.3)
HGB BLD-MCNC: 7.6 G/DL (ref 12.1–17)
LYMPHOCYTES # BLD: 0.7 K/UL (ref 0.8–3.5)
LYMPHOCYTES NFR BLD: 6 % (ref 12–49)
MAGNESIUM SERPL-MCNC: 2 MG/DL (ref 1.6–2.4)
MCH RBC QN AUTO: 31 PG (ref 26–34)
MCHC RBC AUTO-ENTMCNC: 31.7 G/DL (ref 30–36.5)
MCV RBC AUTO: 98 FL (ref 80–99)
MONOCYTES # BLD: 0.6 K/UL (ref 0–1)
MONOCYTES NFR BLD: 5 % (ref 5–13)
NEUTS SEG # BLD: 10.5 K/UL (ref 1.8–8)
NEUTS SEG NFR BLD: 87 % (ref 32–75)
PHOSPHATE SERPL-MCNC: 2.8 MG/DL (ref 2.6–4.7)
PLATELET # BLD AUTO: 344 K/UL (ref 150–400)
POTASSIUM SERPL-SCNC: 4.6 MMOL/L (ref 3.5–5.1)
RBC # BLD AUTO: 2.45 M/UL (ref 4.1–5.7)
RBC MORPH BLD: ABNORMAL
SERVICE CMNT-IMP: ABNORMAL
SODIUM SERPL-SCNC: 136 MMOL/L (ref 136–145)
WBC # BLD AUTO: 12 K/UL (ref 4.1–11.1)

## 2018-01-06 PROCEDURE — 74011250636 HC RX REV CODE- 250/636: Performed by: INTERNAL MEDICINE

## 2018-01-06 PROCEDURE — 82962 GLUCOSE BLOOD TEST: CPT

## 2018-01-06 PROCEDURE — 74011250637 HC RX REV CODE- 250/637: Performed by: INTERNAL MEDICINE

## 2018-01-06 PROCEDURE — 36415 COLL VENOUS BLD VENIPUNCTURE: CPT | Performed by: INTERNAL MEDICINE

## 2018-01-06 PROCEDURE — 85025 COMPLETE CBC W/AUTO DIFF WBC: CPT | Performed by: INTERNAL MEDICINE

## 2018-01-06 PROCEDURE — 84100 ASSAY OF PHOSPHORUS: CPT | Performed by: INTERNAL MEDICINE

## 2018-01-06 PROCEDURE — 83735 ASSAY OF MAGNESIUM: CPT | Performed by: INTERNAL MEDICINE

## 2018-01-06 PROCEDURE — 77010033678 HC OXYGEN DAILY

## 2018-01-06 PROCEDURE — 74011250637 HC RX REV CODE- 250/637: Performed by: SPECIALIST

## 2018-01-06 PROCEDURE — 74011000258 HC RX REV CODE- 258: Performed by: INTERNAL MEDICINE

## 2018-01-06 PROCEDURE — 80048 BASIC METABOLIC PNL TOTAL CA: CPT | Performed by: INTERNAL MEDICINE

## 2018-01-06 PROCEDURE — 74011636637 HC RX REV CODE- 636/637: Performed by: INTERNAL MEDICINE

## 2018-01-06 PROCEDURE — 65660000000 HC RM CCU STEPDOWN

## 2018-01-06 PROCEDURE — 74011000250 HC RX REV CODE- 250: Performed by: SPECIALIST

## 2018-01-06 RX ORDER — CEPHALEXIN 250 MG/1
500 CAPSULE ORAL EVERY 12 HOURS
Status: DISCONTINUED | OUTPATIENT
Start: 2018-01-06 | End: 2018-01-07 | Stop reason: HOSPADM

## 2018-01-06 RX ORDER — QUETIAPINE FUMARATE 25 MG/1
50 TABLET, FILM COATED ORAL DAILY
Status: DISCONTINUED | OUTPATIENT
Start: 2018-01-06 | End: 2018-01-07 | Stop reason: HOSPADM

## 2018-01-06 RX ADMIN — CEPHALEXIN 500 MG: 250 CAPSULE ORAL at 12:00

## 2018-01-06 RX ADMIN — DOCUSATE SODIUM AND SENNOSIDES 1 TABLET: 8.6; 5 TABLET, FILM COATED ORAL at 08:08

## 2018-01-06 RX ADMIN — LEVOTHYROXINE SODIUM 125 MCG: 125 TABLET ORAL at 08:08

## 2018-01-06 RX ADMIN — SODIUM CHLORIDE 3.38 G: 900 INJECTION, SOLUTION INTRAVENOUS at 02:02

## 2018-01-06 RX ADMIN — INSULIN LISPRO 2 UNITS: 100 INJECTION, SOLUTION INTRAVENOUS; SUBCUTANEOUS at 08:10

## 2018-01-06 RX ADMIN — ENOXAPARIN SODIUM 40 MG: 40 INJECTION SUBCUTANEOUS at 17:40

## 2018-01-06 RX ADMIN — Medication 10 ML: at 06:00

## 2018-01-06 RX ADMIN — CARVEDILOL 3.12 MG: 3.12 TABLET, FILM COATED ORAL at 17:41

## 2018-01-06 RX ADMIN — SODIUM CHLORIDE 3.38 G: 900 INJECTION, SOLUTION INTRAVENOUS at 09:00

## 2018-01-06 RX ADMIN — BUMETANIDE 1 MG: 0.25 INJECTION INTRAMUSCULAR; INTRAVENOUS at 17:41

## 2018-01-06 RX ADMIN — CARVEDILOL 3.12 MG: 3.12 TABLET, FILM COATED ORAL at 08:08

## 2018-01-06 RX ADMIN — INSULIN LISPRO 2 UNITS: 100 INJECTION, SOLUTION INTRAVENOUS; SUBCUTANEOUS at 16:57

## 2018-01-06 RX ADMIN — DONEPEZIL HYDROCHLORIDE 10 MG: 5 TABLET, FILM COATED ORAL at 08:08

## 2018-01-06 RX ADMIN — BUMETANIDE 1 MG: 0.25 INJECTION INTRAMUSCULAR; INTRAVENOUS at 08:09

## 2018-01-06 RX ADMIN — QUETIAPINE FUMARATE 50 MG: 25 TABLET ORAL at 08:08

## 2018-01-06 RX ADMIN — Medication 10 ML: at 22:10

## 2018-01-06 RX ADMIN — Medication 10 ML: at 13:10

## 2018-01-06 RX ADMIN — CEPHALEXIN 500 MG: 250 CAPSULE ORAL at 22:39

## 2018-01-06 RX ADMIN — PANTOPRAZOLE SODIUM 40 MG: 40 TABLET, DELAYED RELEASE ORAL at 08:08

## 2018-01-06 RX ADMIN — TAMSULOSIN HYDROCHLORIDE 0.4 MG: 0.4 CAPSULE ORAL at 22:39

## 2018-01-06 RX ADMIN — ESCITALOPRAM OXALATE 20 MG: 10 TABLET ORAL at 17:40

## 2018-01-06 RX ADMIN — FINASTERIDE 5 MG: 5 TABLET, FILM COATED ORAL at 17:40

## 2018-01-06 NOTE — PROGRESS NOTES
Ozzy Barkerelsen [facility]  [address] [facility], [place], [address] [place] [address]  [phone]      Medical Progress Note      NAME: Gael Lisa   :  1935  MRM:  [id number]    Date/Time: 2018  1:18 PM       Assessment and Plan:     Principal Problem:     82 yo hx of HTN, DM, CAD s/p CABG, sCHF EF 30%, heart block s/p pacer, PVD, CVA, recent right hip fx, presented w/ AMS, severe sepsis, UTI, CHF     1) Acute encephalopathy: could be multifactorial, relating to hypoxia, sepsis, UTI, medications. Improving slowly with treating infection. Continue supportive care. Stop lexapro in favor of seroquel as worsening agitation overnight     2) Severe sepsis/complicated UTI: 2/4 SIRS on admission with AMS, lactic acidosis. UCx with > 100K K. Pneumoniae, BCx are NGTD. Stop IV zosyn, switch to keflex (QTc is long and on prolonging agents so will avoid FQ)     3) Acute on chronic syst CHF / Hypoxia: last EF 30%. Has pulm edema on exam and CXR. BP improved. Cards evaluated, IV diuresis through today and then oral tomorrow. Repeat CXR in AM. Wean oxygen as tolerated to keep sats greater than 90%     4) Renal insuff: likely pre-renal. Resolved. Monitor with diuresis     5) HTN/CAD: cont coreg. Unclear why patient is not on a low dose ASA     6) DM type 2: last A1C 7.2%. Hold glipizide. Start SSI     7) Recent right hip fx: s/p repair. Cont lovenox for DVT ppx. Ortho follow-up. Rehab/SNF     8) COPD: stable, cont duo nebs prn       Subjective:     Chief Complaint:  Patient seen and examined. Chart reviewed. Patient is awake but remains confused. He was agitated overnight. ROS:  (bold if positive, if negative)      Tolerating PT  Tolerating Diet        Objective:     Last 24hrs VS reviewed since prior progress note.  Most recent are:    Visit Vitals    /60 (BP 1 Location: Left arm, BP Patient Position: At rest)    Pulse 60    Temp 98 °F (36.7 °C)    Resp 20    Wt 75.8 kg (167 lb)    SpO2 94%  BMI 24.66 kg/m2     SpO2 Readings from Last 6 Encounters:   01/06/18 94%   12/27/17 98%   08/23/17 91%   02/15/17 98%   10/05/16 92%   06/16/16 95%    O2 Flow Rate (L/min): 2 l/min       Intake/Output Summary (Last 24 hours) at 01/06/18 1145  Last data filed at 01/06/18 0659   Gross per 24 hour   Intake              530 ml   Output              925 ml   Net             -395 ml        Physical Exam:    Gen:  Elderly, frail, chronically ill-appearing, in NAD  HEENT:  Pink conjunctivae, PERRL, hearing intact to voice, moist mucous membranes  Neck:  Supple, without masses, thyroid non-tender  Resp:  No accessory muscle use, bilateral crackles in bases  Card:  No murmurs, normal S1, S2 without thrills, bruits, + peripheral edema  Abd:  Soft, non-tender, non-distended, normoactive bowel sounds are present, no palpable organomegaly and no detectable hernias  Lymph:  No cervical or inguinal adenopathy  Musc:  No cyanosis or clubbing  Skin:  No rashes or ulcers, skin turgor is good  Neuro:  Cranial nerves are grossly intact, no focal motor weakness, follows commands appropriately  Psych:  Poor insight, oriented to person, awake/alert    Telemetry reviewed:   normal sinus rhythm  __________________________________________________________________  Medications Reviewed: (see below)  Medications:     Current Facility-Administered Medications   Medication Dose Route Frequency    QUEtiapine (SEROquel) tablet 50 mg  50 mg Oral DAILY    escitalopram oxalate (LEXAPRO) tablet 20 mg  20 mg Oral DAILY    carvedilol (COREG) tablet 3.125 mg  3.125 mg Oral BID WITH MEALS    bumetanide (BUMEX) injection 1 mg  1 mg IntraVENous BID    [START ON 1/7/2018] bumetanide (BUMEX) tablet 1 mg  1 mg Oral DAILY    sodium chloride (NS) flush 5-10 mL  5-10 mL IntraVENous PRN    piperacillin-tazobactam (ZOSYN) 3.375 g in 0.9% sodium chloride 50 mL IVPB  3.375 g IntraVENous Q8H    albuterol-ipratropium (DUO-NEB) 2.5 MG-0.5 MG/3 ML  3 mL Nebulization Q4H PRN    bisacodyl (DULCOLAX) suppository 10 mg  10 mg Rectal DAILY PRN    donepezil (ARICEPT) tablet 10 mg  10 mg Oral DAILY    finasteride (PROSCAR) tablet 5 mg  5 mg Oral QPM    levothyroxine (SYNTHROID) tablet 125 mcg  125 mcg Oral ACB    pantoprazole (PROTONIX) tablet 40 mg  40 mg Oral ACB    senna-docusate (PERICOLACE) 8.6-50 mg per tablet 1 Tab  1 Tab Oral DAILY    tamsulosin (FLOMAX) capsule 0.4 mg  0.4 mg Oral QHS    traMADol (ULTRAM) tablet 50 mg  50 mg Oral Q6H PRN    sodium chloride (NS) flush 5-10 mL  5-10 mL IntraVENous Q8H    sodium chloride (NS) flush 5-10 mL  5-10 mL IntraVENous PRN    acetaminophen (TYLENOL) tablet 650 mg  650 mg Oral Q4H PRN    naloxone (NARCAN) injection 0.4 mg  0.4 mg IntraVENous PRN    diphenhydrAMINE (BENADRYL) injection 12.5 mg  12.5 mg IntraVENous Q4H PRN    ondansetron (ZOFRAN) injection 4 mg  4 mg IntraVENous Q6H PRN    enoxaparin (LOVENOX) injection 40 mg  40 mg SubCUTAneous Q24H    insulin lispro (HUMALOG) injection   SubCUTAneous AC&HS    glucose chewable tablet 16 g  4 Tab Oral PRN    dextrose (D50W) injection syrg 12.5-25 g  12.5-25 g IntraVENous PRN    glucagon (GLUCAGEN) injection 1 mg  1 mg IntraMUSCular PRN        Lab Data Reviewed: (see below)  Lab Review:     Recent Labs      01/06/18   0205  01/05/18   0230  01/04/18   0355   WBC  12.0*  18.5*  22.7*   HGB  7.6*  8.0*  8.8*   HCT  24.0*  24.9*  27.7*   PLT  344  316  421*     Recent Labs      01/06/18   0205  01/05/18   0230  01/04/18   1018  01/04/18   0355   NA  136  138   --   138   K  4.6  4.2   --   3.9   CL  103  103   --   99   CO2  28  26   --   30   GLU  237*  195*   --   92   BUN  21*  25*   --   35*   CREA  1.12  1.10   --   1.35*   CA  8.6  8.8   --   9.1   MG  2.0  2.1   --    --    PHOS  2.8  3.4   --    --    ALB   --   2.5*  3.0*   --    TBILI   --   0.8  1.1*   --    SGOT   --   14*  16   --    ALT   --   22  28   --      Lab Results   Component Value Date/Time Glucose (POC) 196 01/06/2018 11:28 AM    Glucose (POC) 212 01/06/2018 07:47 AM    Glucose (POC) 202 01/05/2018 09:31 PM    Glucose (POC) 196 01/05/2018 04:06 PM    Glucose (POC) 287 01/05/2018 11:47 AM     No results for input(s): PH, PCO2, PO2, HCO3, FIO2 in the last 72 hours. No results for input(s): INR in the last 72 hours. No lab exists for component: [place] [facility]  All Micro Results     Procedure Component Value Units Date/Time    CULTURE, BLOOD [[id number]] Collected:  01/04/18 1018    Order Status:  Completed Specimen:  Blood from Blood Updated:  01/06/18 0723     Special Requests: NO SPECIAL REQUESTS        Culture result: NO GROWTH 2 DAYS       CULTURE, BLOOD [[id number]] Collected:  01/04/18 1018    Order Status:  Completed Specimen:  Blood from Blood Updated:  01/06/18 0723     Special Requests: NO SPECIAL REQUESTS        Culture result: NO GROWTH 2 DAYS             I have reviewed notes of prior 24hr.     Other pertinent lab: None    Total time spent with patient: 35 min                  Care Plan discussed with: Patient, Care Manager and Nursing Staff    Discussed:  Care Plan and D/C Planning    Prophylaxis:  Lovenox    Disposition:  SNF/LTC           ___________________________________________________    Attending Physician: [address] [place] [address],

## 2018-01-06 NOTE — PROGRESS NOTES
2304 Pt got up from bed. Activated bed alarm. Pulled IV out. Notified head nurse, she called supervise for a sitter, but she said she can't help. Bed alarm on, call bell at reach. Pt had a bruise on his right hip.    0235 New IV running. Blood drawn. Bedside and Verbal shift change report given to Catherine Diez RN (oncoming nurse) by Abbey Baugh RN (offgoing nurse). Report included the following information SBAR, Kardex and MAR.

## 2018-01-06 NOTE — PROGRESS NOTES
Problem: Falls - Risk of  Goal: *Absence of Falls  Document Abraham Fall Risk and appropriate interventions in the flowsheet.    Outcome: Progressing Towards Goal  Fall Risk Interventions:  Mobility Interventions: Bed/chair exit alarm, Communicate number of staff needed for ambulation/transfer, Patient to call before getting OOB    Mentation Interventions: Bed/chair exit alarm, Door open when patient unattended, Update white board, More frequent rounding, Reorient patient    Medication Interventions: Bed/chair exit alarm, Patient to call before getting OOB, Teach patient to arise slowly    Elimination Interventions: Bed/chair exit alarm, Call light in reach, Urinal in reach, Patient to call for help with toileting needs    History of Falls Interventions: Bed/chair exit alarm, Door open when patient unattended

## 2018-01-06 NOTE — PROGRESS NOTES
1/6/2018     4:33 PM  CM received acceptance from [facility]. CM informed attending who reported DC is likely for tomorrow. 3:57 PM  CM spoke with pt's daughter, Deja Larry SNF placement. SNF preferences are [place] [id number] and [facility], and [facility] and [address] [place] [address]. CM completed referrals and is awaiting responses.

## 2018-01-06 NOTE — PROGRESS NOTES
Problem: Falls - Risk of  Goal: *Absence of Falls  Document Abraham Fall Risk and appropriate interventions in the flowsheet. Outcome: Progressing Towards Goal  Fall Risk Interventions:  Mobility Interventions: Bed/chair exit alarm    Mentation Interventions: Bed/chair exit alarm    Medication Interventions: Bed/chair exit alarm    Elimination Interventions: Call light in reach    History of Falls Interventions: Bed/chair exit alarm. [place] 0700- Bedside and Verbal shift change report given to jeremy rn (oncoming nurse) by  Nagi Ferro (offgoing nurse). Report included the following information SBAR, Kardex and Recent Results. 1900- Bedside and Verbal shift change report given to JOYCE AGUILAR [facility] RN  (oncoming nurse) by Jessie Medina RN (offgoing nurse). Report included the following information SBAR, Kardex and Recent Results. ..

## 2018-01-06 NOTE — PROGRESS NOTES
Bedside and Verbal shift change report given to Charis Esposito (oncoming nurse) by Roxann Hickey (offgoing nurse). Report included the following information SBAR, Kardex, Procedure Summary, Intake/Output, MAR, Accordion, Recent Results and Med Rec Status.

## 2018-01-07 ENCOUNTER — APPOINTMENT (OUTPATIENT)
Dept: GENERAL RADIOLOGY | Age: 83
DRG: 871 | End: 2018-01-07
Attending: INTERNAL MEDICINE
Payer: MEDICARE

## 2018-01-07 VITALS
DIASTOLIC BLOOD PRESSURE: 67 MMHG | TEMPERATURE: 98 F | HEART RATE: 60 BPM | SYSTOLIC BLOOD PRESSURE: 128 MMHG | HEIGHT: 69 IN | OXYGEN SATURATION: 94 % | BODY MASS INDEX: 24.94 KG/M2 | WEIGHT: 168.38 LBS | RESPIRATION RATE: 19 BRPM

## 2018-01-07 LAB
GLUCOSE BLD STRIP.AUTO-MCNC: 187 MG/DL (ref 65–100)
GLUCOSE BLD STRIP.AUTO-MCNC: 220 MG/DL (ref 65–100)
SERVICE CMNT-IMP: ABNORMAL
SERVICE CMNT-IMP: ABNORMAL

## 2018-01-07 PROCEDURE — 74011250637 HC RX REV CODE- 250/637: Performed by: INTERNAL MEDICINE

## 2018-01-07 PROCEDURE — 82962 GLUCOSE BLOOD TEST: CPT

## 2018-01-07 PROCEDURE — 77010033678 HC OXYGEN DAILY

## 2018-01-07 PROCEDURE — 71045 X-RAY EXAM CHEST 1 VIEW: CPT

## 2018-01-07 PROCEDURE — 74011636637 HC RX REV CODE- 636/637: Performed by: INTERNAL MEDICINE

## 2018-01-07 PROCEDURE — 74011250637 HC RX REV CODE- 250/637: Performed by: SPECIALIST

## 2018-01-07 RX ORDER — CEPHALEXIN 500 MG/1
500 CAPSULE ORAL EVERY 12 HOURS
Qty: 14 CAP | Refills: 0 | Status: SHIPPED | OUTPATIENT
Start: 2018-01-07 | End: 2018-01-14

## 2018-01-07 RX ORDER — BUMETANIDE 1 MG/1
1 TABLET ORAL 2 TIMES DAILY
Status: DISCONTINUED | OUTPATIENT
Start: 2018-01-07 | End: 2018-01-07 | Stop reason: HOSPADM

## 2018-01-07 RX ORDER — BUMETANIDE 1 MG/1
1 TABLET ORAL 2 TIMES DAILY
Qty: 60 TAB | Refills: 0 | Status: ON HOLD | OUTPATIENT
Start: 2018-01-07 | End: 2020-01-01 | Stop reason: SDUPTHER

## 2018-01-07 RX ADMIN — TRAMADOL HYDROCHLORIDE 50 MG: 50 TABLET, FILM COATED ORAL at 09:30

## 2018-01-07 RX ADMIN — INSULIN LISPRO 2 UNITS: 100 INJECTION, SOLUTION INTRAVENOUS; SUBCUTANEOUS at 12:40

## 2018-01-07 RX ADMIN — QUETIAPINE FUMARATE 50 MG: 25 TABLET ORAL at 10:04

## 2018-01-07 RX ADMIN — PANTOPRAZOLE SODIUM 40 MG: 40 TABLET, DELAYED RELEASE ORAL at 08:45

## 2018-01-07 RX ADMIN — BUMETANIDE 1 MG: 1 TABLET ORAL at 09:46

## 2018-01-07 RX ADMIN — CARVEDILOL 3.12 MG: 3.12 TABLET, FILM COATED ORAL at 09:46

## 2018-01-07 RX ADMIN — DONEPEZIL HYDROCHLORIDE 10 MG: 5 TABLET, FILM COATED ORAL at 09:46

## 2018-01-07 RX ADMIN — LEVOTHYROXINE SODIUM 125 MCG: 125 TABLET ORAL at 10:04

## 2018-01-07 RX ADMIN — Medication 10 ML: at 13:39

## 2018-01-07 RX ADMIN — CEPHALEXIN 500 MG: 250 CAPSULE ORAL at 09:46

## 2018-01-07 RX ADMIN — Medication 10 ML: at 06:02

## 2018-01-07 RX ADMIN — DOCUSATE SODIUM AND SENNOSIDES 1 TABLET: 8.6; 5 TABLET, FILM COATED ORAL at 09:45

## 2018-01-07 RX ADMIN — INSULIN LISPRO 2 UNITS: 100 INJECTION, SOLUTION INTRAVENOUS; SUBCUTANEOUS at 00:06

## 2018-01-07 NOTE — DISCHARGE SUMMARY
Physician Discharge Summary     Patient ID:  Britney Contreras  [id number]  81 y.o.  1935    Admit date: 1/4/2018    Discharge date and time: 1/7/2018    Admission Diagnoses: Acute encephalopathy    Discharge Diagnoses:    Principal Diagnosis   Acute encephalopathy                                             Other Diagnoses  Principal Problem:    Acute encephalopathy (1/4/2018)    Active Problems:    Third degree AV block ([address] (6/11/2015)      Cardiomyopathy ([address] (6/11/2015)      Stroke ([address] ()      Overview: 2 STROKES      PVD (peripheral vascular disease) ([address] ()      Overview: STENT RIGHT GROIN, PAD      Diabetes ([address] ()      Overview: IDDM      CAD (coronary artery disease) (6/26/2015)      Chronic systolic heart failure ([address] (9/18/2015)      Hip fracture, right ([address] (12/21/2017)      Severe sepsis ([address] (6/4/2687)      Complicated UTI (urinary tract infection) (1/4/2018)         Hospital Course:         80 yo hx of HTN, DM, CAD s/p CABG, sCHF EF 30%, heart block s/p pacer, PVD, CVA, recent right hip fx, presented w/ AMS, severe sepsis, UTI, CHF      1) Acute encephalopathy: could be multifactorial, relating to hypoxia, sepsis, UTI, medications. Improving slowly with treating infection. Continue supportive care. Continue lexapro and seroquel      2) Severe sepsis/complicated UTI: 2/4 SIRS on admission with AMS, lactic acidosis.  UCx with > 100K K. Pneumoniae, BCx are NGTD. Stopped IV zosyn, switched to keflex (QTc is long and on prolonging agents so will avoid FQ)      3) Acute on chronic syst CHF / Hypoxia: last EF 30%.  Has pulm edema on exam and CXR. Repeat CXR with some improvement. Changed from lasix to bumex 1 mg and dosing BID. Wean oxygen as tolerated to keep sats greater than 90% but may ultimately need oxygen after rehab.      4) Renal insuff: likely pre-renal. Resolved.       5) HTN/CAD: cont coreg.  Unclear why patient is not on a low dose ASA      6) DM type 2: last A1C 7.2%.  Restart glipizide.       7) Recent right hip fx: s/p repair.   Cont lovenox for DVT ppx. Ortho follow-up. Rehab/SNF      8) COPD: stable, cont duo nebs prn    Due to overwhelming co-morbidities and the high mortality of a traumatic hip fracture in an octogenarian, Mr. Marisa Wooten is at exceptionally high risk for re-admission.       PCP: Chelsy Saab MD    Consults: Cardiology and Orthopedic Surgery    Significant Diagnostic Studies: See Hospital Course    Discharged home in improved condition. Discharge Exam:    Visit Vitals    /65 (BP 1 Location: Left arm, BP Patient Position: At rest)    Pulse 60    Temp 97.7 °F (36.5 °C)    Resp 18    Wt 76.4 kg (168 lb 6 oz)    SpO2 93%    BMI 24.86 kg/m2     Physical Exam:     Gen:  Elderly, frail, chronically ill-appearing, in NAD  HEENT:  Pink conjunctivae, PERRL, hearing intact to voice, moist mucous membranes  Neck:  Supple, without masses, thyroid non-tender  Resp:  No accessory muscle use, bilateral crackles in bases  Card:  No murmurs, normal S1, S2 without thrills, bruits, + peripheral edema  Abd:  Soft, non-tender, non-distended, normoactive bowel sounds are present, no palpable organomegaly and no detectable hernias  Lymph:  No cervical or inguinal adenopathy  Musc:  No cyanosis or clubbing  Skin:  No rashes or ulcers, skin turgor is good  Neuro:  Cranial nerves are grossly intact, no focal motor weakness, follows commands appropriately  Psych:  Poor insight, oriented to person, awake/alert    Disposition: SNF    Patient Instructions:   Current Discharge Medication List      START taking these medications    Details   bumetanide (BUMEX) 1 mg tablet Take 1 Tab by mouth two (2) times a day. Qty: 60 Tab, Refills: 0      cephALEXin (KEFLEX) 500 mg capsule Take 1 Cap by mouth every twelve (12) hours for 7 days.   Qty: 14 Cap, Refills: 0         CONTINUE these medications which have NOT CHANGED    Details   acetaminophen (TYLENOL) 325 mg tablet Take 650 mg by mouth every four (4) hours as needed for Pain. calcium-vitamin D (OYSTER SHELL) 500 mg(1,250mg) -200 unit per tablet Take 1 Tab by mouth three (3) times daily (with meals). guaiFENesin SR (MUCINEX) 600 mg SR tablet Take 600 mg by mouth two (2) times a day. insulin lispro (HUMALOG) 100 unit/mL injection by SubCUTAneous route Before breakfast, lunch, dinner and at bedtime. Sliding scale      albuterol-ipratropium (DUO-NEB) 2.5 mg-0.5 mg/3 ml nebu 3 mL by Nebulization route every four (4) hours as needed (shortness of breath). dextran 70-hypromellose (ARTIFICIAL TEARS,[id number],) ophthalmic solution Administer 2 Drops to both eyes two (2) times a day. glipiZIDE (GLUCOTROL) 5 mg tablet Take 5 mg by mouth Before breakfast and dinner. Do not take if blood sugar is less than 120      traMADol (ULTRAM) 50 mg tablet Take 1 Tab by mouth every six (6) hours as needed. Max Daily Amount: 200 mg. Do not take if sedated  Qty: 30 Tab, Refills: 0    Associated Diagnoses: Closed right hip fracture, initial encounter ([address]      bisacodyl (DULCOLAX) 10 mg suppository Insert 10 mg into rectum daily as needed. Qty: 7 Each, Refills: 0      senna-docusate (PERICOLACE) 8.6-50 mg per tablet Take 1 Tab by mouth daily. For constipation. Do not take if you have loose stools  Qty: 30 Tab, Refills: 0      QUEtiapine (SEROQUEL) 100 mg tablet Take 0.5 Tabs by mouth daily. At [facility]: 15 Tab, Refills: 0      enoxaparin (LOVENOX) 40 mg/0.4 mL 0.4 mL by SubCUTAneous route daily for 26 days. Qty: 10.4 mL, Refills: 0      cholecalciferol (VITAMIN D3) 1,000 unit tablet Take 3,000 Units by mouth daily. levothyroxine (SYNTHROID) 125 mcg tablet Take 125 mcg by mouth Daily (before breakfast). One hour prior to breakfast      magnesium gluconate 500 mg (27 mg  elemental) tablet Take 500 mg by mouth daily. omeprazole (PRILOSEC) 20 mg capsule Take 20 mg by mouth Daily (before breakfast).  30 minutes prior to breakfast escitalopram oxalate (LEXAPRO) 20 mg tablet Take 20 mg by mouth daily. donepezil (ARICEPT) 10 mg tablet Take 10 mg by mouth daily. carvedilol (COREG) 3.125 mg tablet Take 1 Tab by mouth two (2) times a day. Qty: 60 Tab, Refills: 0      ferrous sulfate 325 mg (65 mg iron) tablet Take 325 mg by mouth daily (with breakfast). finasteride (PROSCAR) 5 mg tablet Take 5 mg by mouth every evening. fluticasone (FLONASE) 50 mcg/actuation nasal spray 2 Sprays by Both Nostrils route daily. tamsulosin (FLOMAX) 0.4 mg capsule Take 0.4 mg by mouth nightly.          STOP taking these medications       furosemide (LASIX) 40 mg tablet Comments:   Reason for Stopping:             Activity: Activity as tolerated  Diet: Resume previous diet  Wound Care: As directed    Follow-up with PCP in 1-2 weeks, Ortho within 2 weeks    Signed:  Vick Covington DO  1/7/2018  12:04 PM    Greater than 30 mins was spent in coordination, counseling, and execution of this patient's discharge

## 2018-01-07 NOTE — PROGRESS NOTES
Bedside and Verbal shift change report given to Dread Tyler RN (oncoming nurse) by Shaji Randle RN (offgoing nurse). Report included the following information SBAR, Kardex, ED Summary, Intake/Output, Recent Results, Med Rec Status and Cardiac Rhythm V-Paced. Bedside and Verbal shift change report given to Reynaldo Mabry RN (oncoming nurse) by Dread Tyler RN (offgoing nurse). Report included the following information SBAR, Kardex, ED Summary, Intake/Output, Recent Results, Med Rec Status and Cardiac Rhythm V-Paced.

## 2018-01-07 NOTE — PROGRESS NOTES
Problem: Falls - Risk of  Goal: *Absence of Falls  Document Abraham Fall Risk and appropriate interventions in the flowsheet. Outcome: Progressing Towards Goal  Fall Risk Interventions:  Mobility Interventions: Bed/chair exit alarm, OT consult for ADLs, Patient to call before getting OOB, PT Consult for mobility concerns    Mentation Interventions: Adequate sleep, hydration, pain control, Bed/chair exit alarm, Door open when patient unattended, Evaluate medications/consider consulting pharmacy, Gait belt with transfers/ambulation, More frequent rounding, Reorient patient, Room close to nurse's station, Self-releasing belt, Toileting rounds, Update white board    Medication Interventions: Bed/chair exit alarm, Patient to call before getting OOB    Elimination Interventions: Bed/chair exit alarm, Call light in reach, Patient to call for help with toileting needs, Toileting schedule/hourly rounds, Urinal in reach    History of Falls Interventions: Bed/chair exit alarm, Consult care management for discharge planning, Door open when patient unattended, Evaluate medications/consider consulting pharmacy, Investigate reason for fall, Room close to nurse's station, Utilize gait belt for transfer/ambulation. ..    0700- Bedside and Verbal shift change report given to jeremy fuchs  (oncoming nurse) by Bassem Wade  (offgoing nurse). Report included the following information SBAR, Kardex and Recent Results. .. 1440- TRANSFER - OUT REPORT:    Verbal report given to Addison Kimani Hdez LPN(name) on Manish Nails  being transferred to [facility](unit) for routine progression of care       Report consisted of patients Situation, Background, Assessment and   Recommendations(SBAR). Information from the following report(s) SBAR, Kardex and Recent Results was reviewed with the receiving nurse.     Lines:   Peripheral IV 01/06/18 Right Hand (Active)   Site Assessment Clean, dry, & intact 1/7/2018 12:42 PM   Phlebitis Assessment 0 1/7/2018 12:42 PM   Infiltration Assessment 0 1/7/2018 12:42 PM   Dressing Status Clean, dry, & intact 1/7/2018 12:42 PM   Dressing Type Transparent 1/7/2018 12:42 PM   Hub Color/Line Status Blue;Capped;Flushed 1/7/2018 12:42 PM   Action Taken Open ports on tubing capped 1/7/2018 12:42 PM   Alcohol Cap Used Yes 1/7/2018 12:42 PM        Opportunity for questions and clarification was provided. Patient transported Jos[address] 12. ..   O2 @ 2L NC. . liters. .. 1630- Ambulance is here to pick him up. .. Informed tohis drt regarding transfering to the other facility. ...

## 2018-01-07 NOTE — PROGRESS NOTES
1/7/2018  12:27 PM  Pt discharge noted. [facility] ambulance transport will p/u at 4:00pm to [address] [place] [address]. Pt's daughter notified. Nurses, please call report [phone].

## 2018-01-08 ENCOUNTER — TELEPHONE (OUTPATIENT)
Dept: CARDIOLOGY CLINIC | Age: 83
End: 2018-01-08

## 2018-01-08 NOTE — TELEPHONE ENCOUNTER
Danny Espinosa is requesting to cancel upcoming appointment. Patient is currently at [facility] rehab. Appointment 2/21/18 to follow-up.

## 2018-01-08 NOTE — TELEPHONE ENCOUNTER
Pt's daughter, Teri Martini, calling confused in regards to pt's appointment scheduled for this Friday. I spoke with Sienna Quevedo and she said that [facility] called and scheduled this appointment as a hospital follow up. Pt's daughter would like to know what is going to be done exactly at this hospital follow up and if it's necessary. Pt is in a rehab facility post hospital visit and she would like to sort all of this out. She said she would like to speak with [facility]. Please give her a call back @ [phone]. Thanks!   Chauncey Credit

## 2018-01-10 LAB
BACTERIA SPEC CULT: NORMAL
BACTERIA SPEC CULT: NORMAL
SERVICE CMNT-IMP: NORMAL
SERVICE CMNT-IMP: NORMAL

## 2018-01-11 ENCOUNTER — PATIENT OUTREACH (OUTPATIENT)
Dept: CASE MANAGEMENT | Age: 83
End: 2018-01-11

## 2018-01-11 NOTE — PROGRESS NOTES
Community Care Team Documentation for Patient in [facility]  Initial Follow Up       Patient was admitted to [address] [place] [address] from 1/4 to 1/7. Patient was discharged to [facility] on 1/7/18 (date). See previous [facility] Care Team documentation under Patient Outreach. Hospital Discharge diagnosis:  Acute encephalopathy     RRAT score:  34    Advance Medical Directive on file in EMR? DDNR and advance Directive    Total Hospitalizations/ED visits last 6 months? IP - 3; ED - 0    PCP : Juan Osei MD    Per Tatiana Rodgers at SNF, Reviewed [place] back questions to ensure that patient arrived at SNF safely with admission packet in order. Pt being skilled by PT/OT. Currently min assist with sit to stand. On honey thick liquids. Plan to discharge to private adult home/[facility] in [facility]. SNF Attending:  Jessica Fragoso MD     Medications were not reconciled and general patient assessment was not completed during this skilled nursing facility outreach.      FRANCK Bella

## 2018-02-13 NOTE — PROGRESS NOTES
I tried to call daughter back @ [phone] as she had questions. Her son told me she is in the shower. I will call back.   Diego Sousa

## 2018-02-14 ENCOUNTER — HOME HEALTH ADMISSION (OUTPATIENT)
Dept: HOME HEALTH SERVICES | Facility: HOME HEALTH | Age: 83
End: 2018-02-14
Payer: MEDICARE

## 2018-02-15 ENCOUNTER — PATIENT OUTREACH (OUTPATIENT)
Dept: CASE MANAGEMENT | Age: 83
End: 2018-02-15

## 2018-02-15 NOTE — PROGRESS NOTES
Community Care Team Documentation for Patient in [facility]  Discharge Note    Per SNF staff, patient discharged from [facility] ([facility]). See previous [facility] Team notes. PCP : Chelsy Saab MD    Per Tatiana Rodgers at SNF, Therapy last treat day 2/14. Confirmed pt discharged 2/15 to [address] [place] [address] in [place] with [facility]. Community Care Team will sign off at this time. Medications were not reconciled and general patient assessment was not completed during this skilled nursing facility outreach.

## 2018-02-16 ENCOUNTER — HOME CARE VISIT (OUTPATIENT)
Dept: SCHEDULING | Facility: HOME HEALTH | Age: 83
End: 2018-02-16
Payer: MEDICARE

## 2018-02-16 PROCEDURE — 400013 HH SOC

## 2018-02-16 PROCEDURE — 3331090002 HH PPS REVENUE DEBIT

## 2018-02-16 PROCEDURE — 3331090001 HH PPS REVENUE CREDIT

## 2018-02-16 PROCEDURE — G0299 HHS/HOSPICE OF RN EA 15 MIN: HCPCS

## 2018-02-17 PROCEDURE — 3331090002 HH PPS REVENUE DEBIT

## 2018-02-17 PROCEDURE — 3331090001 HH PPS REVENUE CREDIT

## 2018-02-18 PROCEDURE — 3331090002 HH PPS REVENUE DEBIT

## 2018-02-18 PROCEDURE — 3331090001 HH PPS REVENUE CREDIT

## 2018-02-19 ENCOUNTER — HOME CARE VISIT (OUTPATIENT)
Dept: SCHEDULING | Facility: HOME HEALTH | Age: 83
End: 2018-02-19
Payer: MEDICARE

## 2018-02-19 VITALS
SYSTOLIC BLOOD PRESSURE: 110 MMHG | OXYGEN SATURATION: 99 % | TEMPERATURE: 98.1 F | DIASTOLIC BLOOD PRESSURE: 60 MMHG | RESPIRATION RATE: 16 BRPM | HEART RATE: 60 BPM

## 2018-02-19 VITALS
TEMPERATURE: 98.1 F | OXYGEN SATURATION: 99 % | SYSTOLIC BLOOD PRESSURE: 110 MMHG | RESPIRATION RATE: 18 BRPM | HEART RATE: 60 BPM | DIASTOLIC BLOOD PRESSURE: 60 MMHG

## 2018-02-19 PROCEDURE — 3331090002 HH PPS REVENUE DEBIT

## 2018-02-19 PROCEDURE — G0152 HHCP-SERV OF OT,EA 15 MIN: HCPCS

## 2018-02-19 PROCEDURE — 3331090001 HH PPS REVENUE CREDIT

## 2018-02-19 PROCEDURE — G0151 HHCP-SERV OF PT,EA 15 MIN: HCPCS

## 2018-02-20 ENCOUNTER — HOME CARE VISIT (OUTPATIENT)
Dept: SCHEDULING | Facility: HOME HEALTH | Age: 83
End: 2018-02-20
Payer: MEDICARE

## 2018-02-20 PROCEDURE — 3331090002 HH PPS REVENUE DEBIT

## 2018-02-20 PROCEDURE — 3331090001 HH PPS REVENUE CREDIT

## 2018-02-20 PROCEDURE — G0300 HHS/HOSPICE OF LPN EA 15 MIN: HCPCS

## 2018-02-21 ENCOUNTER — HOSPITAL ENCOUNTER (OUTPATIENT)
Dept: LAB | Age: 83
Discharge: HOME | End: 2018-02-21
Payer: MEDICARE

## 2018-02-21 ENCOUNTER — CLINICAL SUPPORT (OUTPATIENT)
Dept: CARDIOLOGY CLINIC | Age: 83
End: 2018-02-21

## 2018-02-21 ENCOUNTER — OFFICE VISIT (OUTPATIENT)
Dept: CARDIOLOGY CLINIC | Age: 83
End: 2018-02-21

## 2018-02-21 VITALS
SYSTOLIC BLOOD PRESSURE: 100 MMHG | OXYGEN SATURATION: 97 % | HEART RATE: 68 BPM | HEIGHT: 69 IN | RESPIRATION RATE: 16 BRPM | BODY MASS INDEX: 22.51 KG/M2 | WEIGHT: 152 LBS | DIASTOLIC BLOOD PRESSURE: 50 MMHG

## 2018-02-21 VITALS
DIASTOLIC BLOOD PRESSURE: 52 MMHG | HEART RATE: 76 BPM | HEIGHT: 69 IN | SYSTOLIC BLOOD PRESSURE: 104 MMHG | WEIGHT: 152.2 LBS | BODY MASS INDEX: 22.54 KG/M2 | RESPIRATION RATE: 20 BRPM

## 2018-02-21 VITALS
WEIGHT: 186 LBS | DIASTOLIC BLOOD PRESSURE: 75 MMHG | OXYGEN SATURATION: 97 % | RESPIRATION RATE: 18 BRPM | HEIGHT: 68 IN | BODY MASS INDEX: 28.19 KG/M2 | HEART RATE: 78 BPM | TEMPERATURE: 97.8 F | SYSTOLIC BLOOD PRESSURE: 126 MMHG

## 2018-02-21 DIAGNOSIS — I10 ESSENTIAL HYPERTENSION: ICD-10-CM

## 2018-02-21 DIAGNOSIS — I25.10 CORONARY ARTERY DISEASE INVOLVING NATIVE CORONARY ARTERY OF NATIVE HEART WITHOUT ANGINA PECTORIS: Primary | ICD-10-CM

## 2018-02-21 DIAGNOSIS — N39.0 URINARY TRACT INFECTION WITHOUT HEMATURIA, SITE UNSPECIFIED: ICD-10-CM

## 2018-02-21 DIAGNOSIS — Z95.0 CARDIAC PACEMAKER IN SITU: Primary | ICD-10-CM

## 2018-02-21 DIAGNOSIS — I50.22 CHRONIC SYSTOLIC HEART FAILURE (HCC): ICD-10-CM

## 2018-02-21 DIAGNOSIS — I44.2 THIRD DEGREE AV BLOCK (HCC): ICD-10-CM

## 2018-02-21 DIAGNOSIS — I35.0 MILD AORTIC STENOSIS: ICD-10-CM

## 2018-02-21 DIAGNOSIS — I42.9 CARDIOMYOPATHY, UNSPECIFIED TYPE (HCC): ICD-10-CM

## 2018-02-21 DIAGNOSIS — I50.22 CHRONIC SYSTOLIC HEART FAILURE (HCC): Primary | ICD-10-CM

## 2018-02-21 PROCEDURE — 87077 CULTURE AEROBIC IDENTIFY: CPT

## 2018-02-21 PROCEDURE — 87086 URINE CULTURE/COLONY COUNT: CPT

## 2018-02-21 PROCEDURE — 3331090001 HH PPS REVENUE CREDIT

## 2018-02-21 PROCEDURE — 87186 SC STD MICRODIL/AGAR DIL: CPT

## 2018-02-21 PROCEDURE — 3331090002 HH PPS REVENUE DEBIT

## 2018-02-21 PROCEDURE — 87088 URINE BACTERIA CULTURE: CPT

## 2018-02-21 PROCEDURE — 81001 URINALYSIS AUTO W/SCOPE: CPT

## 2018-02-21 RX ORDER — DIPHENOXYLATE HYDROCHLORIDE AND ATROPINE SULFATE 2.5; .025 MG/1; MG/1
1 TABLET ORAL
COMMUNITY
End: 2019-02-27

## 2018-02-21 NOTE — PATIENT INSTRUCTIONS
Treatment Plan:  Follow up in 1 year. Contact our office with any concerns. Urine Test: About This Test  What is it? A urine test checks the color, clarity (clear or cloudy), odor, concentration, and acidity (pH) of your urine. It also checks your levels of protein, sugar, blood cells, or other substances in your urine. This test is sometimes called a urinalysis. Why is this test done? A urine test may be done:  · To check for a disease or infection of the urinary tract. The urinary tract includes the kidneys, the tubes that carry urine from the kidneys to the bladder (ureters), and the bladder. It also includes the tube that carries urine from the bladder to outside the body (urethra). · To check the treatment of conditions such as diabetes, kidney stones, a urinary tract infection (UTI), high blood pressure, or some kidney or liver diseases. How can you prepare for the test?  · Before the test, don't eat foods that can change the color of your urine. Examples of these include blackberries, beets, and rhubarb. · Don't do heavy exercise before the test.  · Tell your doctor if you are menstruating or close to starting your period. Your doctor may want to wait to do the test.  · Tell your doctor about all the nonprescription and prescription medicines and herbs or other supplements you take. Some of these can affect the results of this test.  What happens during the test?  A urine test can be done in your doctor's office, clinic, or lab. Or you may be asked to collect a urine sample at home. Then you can take it to the office or lab for testing. Clean-catch midstream urine collection  · Wash your hands before you start. · If the cup you are given has a lid, remove it carefully. Set it down with the inner surface up. Don't touch the inside of the cup with your fingers. · Clean the area around your genitals. ¨ For men: Pull back the foreskin, if present.  Clean the head of your penis with medicated towelettes or swabs. ¨ For women: Spread open the genital folds of skin with one hand. Then use medicated towelettes or swabs in your other hand to clean the area where urine comes out (the urethra). Wipe the area from front to back. · Start urinating into the toilet or urinal. A woman should hold apart the genital folds of skin while she urinates. · After the urine has flowed for several seconds, place the cup into the urine stream. Collect about 2 ounces of urine without stopping your flow of urine. · Don't touch the rim of the cup to your genital area. Don't get toilet paper, pubic hair, stool (feces), menstrual blood, or anything else in the urine sample. · Finish urinating into the toilet or urinal.  · Carefully replace and tighten the lid on the cup, and then return it to the lab. If you are collecting the urine at home and can't get it to the lab in an hour, refrigerate it. Double-voided urine sample collection  This method collects the urine your body is making right now. · Urinate into the toilet or urinal. Don't collect any of this urine. · Drink a large glass of water, and wait about 30 to 40 minutes. · Then get a urine sample. Follow the instructions above for collecting a clean-catch urine sample. · Take the urine sample to the lab. If you are collecting the urine at home and can't get it to the lab in an hour, refrigerate it. Follow-up care is a key part of your treatment and safety. Be sure to make and go to all appointments, and call your doctor if you are having problems. It's also a good idea to keep a list of the medicines you take. Ask your doctor when you can expect to have your test results. Where can you learn more? Go to http://sami-darline.info/. Enter R266 in the search box to learn more about \"Urine Test: About This Test.\"  Current as of: October 14, 2016  Content Version: 11.4  © 9778-9479 [employer], [employer].  Care instructions adapted under license by [employer] (which disclaims liability or warranty for this information). If you have questions about a medical condition or this instruction, always ask your healthcare professional. [address] any warranty or liability for your use of this information.

## 2018-02-21 NOTE — MR AVS SNAPSHOT
[address] [facility] 
[phone] Patient: Garth Galdamez MRN: [id number] EMQ:[id number] Visit Information Date & Time Provider Department Dept. Phone Encounter #  
 2/21/2018  1:20 PM Kristin Rojas MD [facility] [phone] [id number] Your Appointments 8/29/2018 10:40 AM  
ESTABLISHED PATIENT with Kristin Rojas MD  
[facility] ([facility] SCHEDULING) Appt Note: 6 mo fu  
 [address] [facility] [address]  
[phone]  
  
   
 [address] [address]  
  
    
 2/27/2019 10:40 AM  
ESTABLISHED PATIENT with Rebecca Balderrama MD  
[facility] ([facility]) Appt Note: annual visit w/ device check [address] [facility] [address]  
[phone]  
  
   
 [address] [address]  
  
    
 2/27/2019 10:45 AM  
PROCEDURE with PACEMAKER, STFRANCES  
[facility] ([facility] SCHEDULING) Appt Note: pacemaker and 1 yr follow up with dr ramirez [address] [facility]  
[phone]  
  
   
 [address] [address]  
  
    
  
 5/29/2018 10:00 AM  
REMOTE OFFICE VISIT with Cuco Oliveira [facility] ([facility] SCHEDULING) Appt Note: carelink ppi/stf  
 [address] [facility] [address]  
[phone]  
  
   
 [address] [address]  
  
    
 9/6/2018 10:15 AM  
REMOTE OFFICE VISIT with Cuco Oliveira [facility] ([facility] SCHEDULING) Appt Note: carelink ppi/stf  
 [address] [facility]  
[phone]  
  
    
 12/13/2018 10:15 AM  
 REMOTE OFFICE VISIT with Carolynn Salamanca [facility] ([facility] SCHEDULING) Appt Note: carelink ppi/stf  
 [address] [facility] [address] [facility]  
[phone] Upcoming Health Maintenance Date Due  
 LIPID PANEL Q1 1935 FOOT EXAM Q1 8/24/1945 MICROALBUMIN Q1 8/24/1946 EYE EXAM RETINAL OR DILATED Q1 8/24/1946 DTaP/Tdap/Td series (1 - Tdap) 8/24/1957 ZOSTER VACCINE AGE 60> 6/24/1996 GLAUCOMA SCREENING Q2Y 8/24/2001 MEDICARE YEARLY EXAM 8/24/2001 Pneumococcal 65+ High/Highest Risk (2 of 2 - PCV13) 7/6/2016 HEMOGLOBIN A1C Q6M 7/5/2018 Allergies as of 2/21/2018  Review Complete On: 2/21/2018 By: Jenita Brittle No Known Allergies Current Immunizations  Reviewed on 9/18/2015 Name Date Influenza Vaccine (Quad) PF 12/28/2017 12:31 PM, 9/23/2015  4:08 PM  
 Influenza Vaccine Split 10/3/2011  1:39 PM  
 Pneumococcal Polysaccharide (PPSV-23) 7/6/2015  3:03 PM  
  
 Not reviewed this visit Vitals BP Pulse Resp Height(growth percentile) Weight(growth percentile) SpO2  
 100/50 (BP 1 Location: Left arm, BP Patient Position: Sitting) 68 16 5' 9\" (1.753 m) 152 lb (68.9 kg) 97% BMI Smoking Status 22.45 kg/m2 Former Smoker BMI and BSA Data Body Mass Index Body Surface Area  
 22.45 kg/m 2 1.83 m 2 Preferred Pharmacy Pharmacy Name Phone [address] [facility] [phone] Your Updated Medication List  
  
   
This list is accurate as of 2/21/18  1:59 PM.  Always use your most recent med list.  
  
  
  
  
 acetaminophen 325 mg tablet Commonly known as:  TYLENOL Take 650 mg by mouth every four (4) hours as needed for Pain. albuterol-ipratropium 2.5 mg-0.5 mg/3 ml Nebu Commonly known as:  DUO-NEB  
3 mL by Nebulization route every four (4) hours as needed (shortness of breath). ARTIFICIAL TEARS(PHST40-HROIO) ophthalmic solution Generic drug:  dextran 70-hypromellose Administer 2 Drops to both eyes two (2) times a day. bisacodyl 10 mg suppository Commonly known as:  DULCOLAX Insert 10 mg into rectum daily as needed. bumetanide 1 mg tablet Commonly known as:  [employer] Take 1 Tab by mouth two (2) times a day. calcium-vitamin D 500 mg(1,250mg) -200 unit per tablet Commonly known as:  OYSTER SHELL Take 1 Tab by mouth three (3) times daily (with meals). carvedilol 3.125 mg tablet Commonly known as:  Warren Sierra Take 1 Tab by mouth two (2) times a day. diphenoxylate-atropine 2.5-0.025 mg per tablet Commonly known as:  LOMOTIL Take 1 Tab by mouth daily as needed for Diarrhea. donepezil 10 mg tablet Commonly known as:  ARICEPT Take 10 mg by mouth daily. escitalopram oxalate 20 mg tablet Commonly known as:  Ky Lemmings Take 20 mg by mouth daily. ferrous sulfate 325 mg (65 mg iron) tablet Take 325 mg by mouth daily (with breakfast). finasteride 5 mg tablet Commonly known as:  PROSCAR Take 5 mg by mouth every evening. fluticasone 50 mcg/actuation nasal spray Commonly known as:  Shayne Remedies 2 Sprays by Both Nostrils route daily. glipiZIDE 5 mg tablet Commonly known as:  Milly Dumont Take 5 mg by mouth Before breakfast and dinner. Do not take if blood sugar is less than 120  
  
 guaiFENesin  mg SR tablet Commonly known as:  Armando & Armando Take 600 mg by mouth two (2) times a day. HumaLOG U-100 Insulin 100 unit/mL injection Generic drug:  insulin lispro  
by SubCUTAneous route Before breakfast, lunch, dinner and at bedtime. Sliding scale  
  
 levothyroxine 125 mcg tablet Commonly known as:  SYNTHROID Take 125 mcg by mouth Daily (before breakfast). One hour prior to breakfast  
  
 magnesium gluconate 500 mg (27 mg  elemental) tablet Take 500 mg by mouth daily. MULTIVITAMIN AND MINERALS PO Take  by mouth daily. omeprazole 20 mg capsule Commonly known as:  PRILOSEC Take 20 mg by mouth Daily (before breakfast). 30 minutes prior to breakfast  
  
 QUEtiapine 100 mg tablet Commonly known as:  SEROquel Take 0.5 Tabs by mouth daily. At 9AM  
  
 senna-docusate 8.6-50 mg per tablet Commonly known as:  Toy Forth Take 1 Tab by mouth daily. For constipation. Do not take if you have loose stools  
  
 tamsulosin 0.4 mg capsule Commonly known as:  FLOMAX Take 0.4 mg by mouth nightly. traMADol 50 mg tablet Commonly known as:  ULTRAM  
Take 1 Tab by mouth every six (6) hours as needed. Max Daily Amount: 200 mg. Do not take if sedated VITAMIN D3 1,000 unit tablet Generic drug:  cholecalciferol Take 3,000 Units by mouth daily. To-Do List   
 02/23/2018 To Be Determined Appointment with Kale Rutledge at [place] [address]  
  
 02/26/2018 To Be Determined Appointment with Kale Rutledge at [place] [address]  
  
 03/01/2018 To Be Determined Appointment with Kale Rutledge at [place] [address]  
  
 03/07/2018 To Be Determined Appointment with Kale Rutledge at [place] [address]  
  
 03/14/2018 To Be Determined Appointment with Rollin Eisenmenger, RN at [place] [address]  
  
 05/07/2018 11:00 AM  
  Appointment with [facility] 1 at [facility] ([phone]) NON-CONTRAST STUDY: 1. Bring any non [facility] facility films/images pertaining to the area of interest with you on the day of appointment. 2. Check in at registration at least 30 minutes before appt time unless you were instructed to do otherwise. 3. If you have to drink oral contrast please pick it up any weekday prior to your appointment, if you cannot please check in 2 hrs  before appt time. Patient Instructions Return in 6 months for follow up Please provide this summary of care documentation to your next provider. Your primary care clinician is listed as Diana Bermudez. If you have any questions after today's visit, please call [phone].

## 2018-02-21 NOTE — PROGRESS NOTES
Chelsea Reese     1935       Jaxson Mancuso MD, [facility]  Date of Visit-2/21/2018   PCP is Ting Wyamn MD   [facility]  [facility]  HPI:  Chelsea Reese is a 81 y.o. male   CHF systolic follow up 6 months. Admitted in January with encephalopathy due to urosepsis. Pt had a previous hip fracture in December and went to rehab. Echo done 1/4/18 showed an EF of 50% with distal septal akinesis, PA pressures were high at 70. The pt states that he fell and fractured his hip because he lost his balance and not from a syncopal episode. He denies having cardiac issues while staying in the hospital. They are unsure if he has another UTI and just had lab work done. He denies having any trouble laying flat at night in terms of his breathing. Lab Results   Component Value Date/Time    Hemoglobin (POC) 11.6 (L) 02/13/2016 02:26 PM    HGB 7.6 (L) 01/06/2018 02:05 AM     Lab Results   Component Value Date/Time    Creatinine (POC) 1.0 10/30/2017 02:16 PM    Creatinine 1.12 01/06/2018 02:05 AM         Denies chest pain, edema, syncope or shortness of breath at rest, has no tachycardia, palpitations or sense of arrhythmia. Gets meds filled at [address]  Assessment/Plan:    Overall compensated CV status   In October visit was on Lasix, Coreg   Has not been on statin or aspirin,not very clear when they were held by chart review,will discuss with family  Would hold both for now until Hgb is clearly improved + hx of ICH, the statin can start next visit  Had urosepsis with lactic acidosis recently     1. CAD with CABG in 2015 ---no angina  2. CHF systolic &  diastolic, EF 08-38% asymptomatic, NYHA class I.   --bumex,Coreg at low dose   3. Pulmonary HTN significant in hospital --continue diuretic  4. Mild AS with murmur   5. Third degree AV block -pacer checks  6. HTN-runs lower now at 100  7. COPD-improved  8.  Anemic at dc in Jan  Fell broke hip in 12-21-17, UTI 1-4-18    Future Appointments  Date Time Provider Preethi Armenta   5/7/2018 11:00 AM [facility] 1 [facility]   5/29/2018 10:00 AM REMOTE1, MATTHEW [facility] SCHED   8/29/2018 10:40 AM Geovanna Webber MD [facility] SCHED   9/6/2018 10:15 AM REMOTE1, CASTRO [facility] SCHED   12/13/2018 10:15 AM REMOTE1, CASTRO [facility] SCHED   2/27/2019 10:40 AM Alejandro Dave MD [facility] SCHED   2/27/2019 10:45 AM PACEMAKER, VALERY [facility] SCHED      Patient Instructions   Return in 6 months for follow up     Key CAD CHF Meds             bumetanide (BUMEX) 1 mg tablet  (Taking) Take 1 Tab by mouth two (2) times a day. carvedilol (COREG) 3.125 mg tablet  (Taking) Take 1 Tab by mouth two (2) times a day. Impression: No diagnosis found. Cardiac History:   Echo 1/4/17 Echo 1/4/17 LVEF 50%. Distal apical septal akinesis. LAmoderately dilated. Mild MR. Aortic valve: The valve was trileaflet. Leaflets exhibited moderate  calcification, reduced mobility, and sclerosis without stenosis. Mild Tr. Severe pulmonary hypertension    ECHO 6-10-15 LV EF 35 % by Ceja's Biplane technique, mod diffuse HK, mod HK mid anteroseptal and apical septal wall(s). Mild LVH. Mod JILLIAN, MR, mild ASt, LISETH 1.5 cm2 by both continuity equation and planimetry. Mild AR, TR, PASP 60. AV Max/meanPG 16.6/7.6 mmHg. AV Vmax was 2 m/s. NUKE 6-10-15 ischemia and infarction in inferior wall with moderately reduced EF, see report  Holter showed high level AV block so underwent testing leading to cath and then CABG    [facility] done at [facility] on 4/24/15 show a FEV1 of 1.41. FVC 56%    He has a history of colon cancer, anxiety, diabetes, depression, emphysema, hypertension, hypothyroidism. 7/2/15: [employer] Pacemaker per Dr. Tegan Rudolph as noted above. . A complete cardiac and respiratory are reviewed and negative except as above ; Resp-denies wheezing  or productive cough,.  Const- No unusual weight loss or fever; Neuro-no recent seizure or CVA ; GI- No BRBPR, abdom pain, bloating ; - no  hematuria   see supplement sheet, initialed and to be scanned by staff  Past Medical History:   Diagnosis Date    Arthritis     CAD (coronary artery disease)     Cardiomyopathy ([facility] 6/11/2015    Colon cancer ([facility] 1993    COLON    Depression     DEPRESSION    Diabetes ([facility]     IDDM    GERD (gastroesophageal reflux disease)     Hypertension     Lung cancer ([facility] 11/29/2016    Mild aortic stenosis 6/15/2015    Pulmonary hypertension 6/15/2015    PVD (peripheral vascular disease) ([facility]     STENT RIGHT GROIN, PAD    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    LIMA TO LAD; SVG TO OM; SVG TO OM    Stroke ([facility] 1999    2 STROKES    Third degree AV block ([facility] 6/11/2015    Thyroid disease       Social Hx= reports that he quit smoking about 2 years ago. His smoking use included Cigarettes. He has a 120.00 pack-year smoking history. He has never used smokeless tobacco. He reports that he does not drink alcohol or use illicit drugs. Exam and Labs:  /50 (BP 1 Location: Left arm, BP Patient Position: Sitting)  Pulse 68  Resp 16  Ht 5' 9\" (1.753 m)  Wt 152 lb (68.9 kg)  SpO2 97%  BMI 22.45 kg/[id number]:  NAD, comfortable  Head: NC,AT. Eyes: No scleral icterus. Neck:  Neck supple. No JVD present. Throat: moist mucous membranes. Chest: Effort normal & normal respiratory excursion . Neurological: alert, conversant and oriented . Skin: Skin is not cold. No obvious systemic rash noted. Not diaphoretic. No erythema. Psychiatric:  Grossly normal mood and affect. Behavior appears normal. Extremities:  no clubbing or cyanosis. Abdomen: non distended    Lungs:breath sounds normal. No stridor. distress, wheezes or  Rales. Heart:Regular rate and rhythm with a 2/6 systolic murmur normal S1, S2, no rubs, clicks or gallops , PMI non displaced. Edema: Edema is none.   No results found for: CHOL, CHOLX, CHLST, CHOLV, HDL, LDL, LDLC, DLDLP, TGLX, TRIGL, TRIGP, CHHD, CHHDX  Lab Results   Component Value Date/Time    Sodium 136 01/06/2018 02:05 AM    Potassium 4.6 01/06/2018 02:05 AM    Chloride 103 01/06/2018 02:05 AM    CO2 28 01/06/2018 02:05 AM    Anion gap 5 01/06/2018 02:05 AM    Glucose 237 (H) 01/06/2018 02:05 AM    BUN 21 (H) 01/06/2018 02:05 AM    Creatinine 1.12 01/06/2018 02:05 AM    BUN/Creatinine ratio 19 01/06/2018 02:05 AM    GFR est AA >60 01/06/2018 02:05 AM    GFR est non-AA >60 01/06/2018 02:05 AM    Calcium 8.6 01/06/2018 02:05 AM      Wt Readings from Last 3 Encounters:   02/21/18 152 lb (68.9 kg)   02/21/18 152 lb 3.2 oz (69 kg)   01/07/18 168 lb 6 oz (76.4 kg)      BP Readings from Last 3 Encounters:   02/21/18 100/50   02/21/18 104/52   02/19/18 110/60      Current Outpatient Prescriptions   Medication Sig    diphenoxylate-atropine (LOMOTIL) 2.5-0.025 mg per tablet Take 1 Tab by mouth daily as needed for Diarrhea.  bumetanide (BUMEX) 1 mg tablet Take 1 Tab by mouth two (2) times a day.  acetaminophen (TYLENOL) 325 mg tablet Take 650 mg by mouth every four (4) hours as needed for Pain.  calcium-vitamin D (OYSTER SHELL) 500 mg(1,250mg) -200 unit per tablet Take 1 Tab by mouth three (3) times daily (with meals).  guaiFENesin SR (MUCINEX) 600 mg SR tablet Take 600 mg by mouth two (2) times a day.  albuterol-ipratropium (DUO-NEB) 2.5 mg-0.5 mg/3 ml nebu 3 mL by Nebulization route every four (4) hours as needed (shortness of breath).  dextran 70-hypromellose (ARTIFICIAL TEARS,[id number],) ophthalmic solution Administer 2 Drops to both eyes two (2) times a day.  glipiZIDE (GLUCOTROL) 5 mg tablet Take 5 mg by mouth Before breakfast and dinner. Do not take if blood sugar is less than 120    bisacodyl (DULCOLAX) 10 mg suppository Insert 10 mg into rectum daily as needed.  senna-docusate (PERICOLACE) 8.6-50 mg per tablet Take 1 Tab by mouth daily. For constipation.  Do not take if you have loose stools    QUEtiapine (SEROQUEL) 100 mg tablet Take 0.5 Tabs by mouth daily. At 9AM    cholecalciferol (VITAMIN D3) 1,000 unit tablet Take 3,000 Units by mouth daily.  levothyroxine (SYNTHROID) 125 mcg tablet Take 125 mcg by mouth Daily (before breakfast). One hour prior to breakfast    magnesium gluconate 500 mg (27 mg  elemental) tablet Take 500 mg by mouth daily.  omeprazole (PRILOSEC) 20 mg capsule Take 20 mg by mouth Daily (before breakfast). 30 minutes prior to breakfast    escitalopram oxalate (LEXAPRO) 20 mg tablet Take 20 mg by mouth daily.  donepezil (ARICEPT) 10 mg tablet Take 10 mg by mouth daily.  carvedilol (COREG) 3.125 mg tablet Take 1 Tab by mouth two (2) times a day.  ferrous sulfate 325 mg (65 mg iron) tablet Take 325 mg by mouth daily (with breakfast).  finasteride (PROSCAR) 5 mg tablet Take 5 mg by mouth every evening.  fluticasone (FLONASE) 50 mcg/actuation nasal spray 2 Sprays by Both Nostrils route daily.  tamsulosin (FLOMAX) 0.4 mg capsule Take 0.4 mg by mouth nightly.  MULTIVIT WITH IRON,MINERALS (MULTIVITAMIN AND MINERALS PO) Take  by mouth daily.  insulin lispro (HUMALOG) 100 unit/mL injection by SubCUTAneous route Before breakfast, lunch, dinner and at bedtime. Sliding scale    traMADol (ULTRAM) 50 mg tablet Take 1 Tab by mouth every six (6) hours as needed. Max Daily Amount: 200 mg. Do not take if sedated     No current facility-administered medications for this visit. Impression see above.       Written by Sarai Hull, as dictated by Bonnie Dumont MD.

## 2018-02-21 NOTE — PROGRESS NOTES
Visit Vitals    /52 (BP 1 Location: Left arm, BP Patient Position: Sitting)    Pulse 76    Resp 20    Ht 5' 9\" (1.753 m)    Wt 152 lb 3.2 oz (69 kg)    BMI 22.48 kg/m2

## 2018-02-21 NOTE — PROGRESS NOTES
HISTORY OF PRESENTING ILLNESS      Bekah Morocho is a 81 y.o. male with CAD/CABG, PPM, COPD, CVA, DM, colon cancer, HTN and fluctuating cardiomyopathy here for follow up of his PPM. PPM interrogation today demonstrated normal device functioning; he is PPM dependent. He had a mechanical fall in Dec 2017 and sustained a hip fracture. He reports dysuria and is concerned about recurrent UTI as he has had several UTI's recently.         ACTIVE PROBLEM LIST     Patient Active Problem List    Diagnosis Date Noted    Acute encephalopathy 01/04/2018    Severe sepsis ([address] 58/28/7743    Complicated UTI (urinary tract infection) 01/04/2018    Hip fracture, right ([address] 12/21/2017    Recurrent left pleural effusion 11/24/2015    Chronic respiratory failure ([address] 11/24/2015    Iron deficiency anemia 11/24/2015    Counseling regarding advanced care planning and goals of care 11/23/2015    Dementia 11/20/2015    Ex-smoker 09/24/2015    Chronic systolic heart failure ([address] 09/18/2015    ICH (intracerebral hemorrhage) ([address] 08/10/2015    Pacemaker 07/02/2015    S/P CABG x 3 06/29/2015    Tobacco use 06/28/2015    Poor historian 06/28/2015    Hypothyroidism 06/28/2015    Diabetes mellitus ([address] 06/28/2015    Cerebrovascular accident ([address] 06/28/2015    Chronic obstructive pulmonary disease ([address] 06/28/2015    Malignant neoplasm of colon ([address] 06/28/2015    Other nonspecific abnormal finding of lung field 06/28/2015    Gastroesophageal reflux disease 06/28/2015    CAD (coronary artery disease) 06/26/2015    Pulmonary hypertension 06/15/2015    Mild aortic stenosis 06/15/2015    Third degree AV block ([address] 06/11/2015    Cardiomyopathy ([address] 06/11/2015    Second degree AV block 06/11/2015    Stroke ([address]     Hypertension     PVD (peripheral vascular disease) ([address]     Diabetes ([address]     Lumbar stenosis 09/29/2011           PAST MEDICAL HISTORY     Past Medical History:   Diagnosis Date    Arthritis     CAD (coronary artery disease)     Cardiomyopathy ([facility] 6/11/2015    Colon cancer ([facility] 1993    COLON    Depression     DEPRESSION    Diabetes ([facility]     IDDM    GERD (gastroesophageal reflux disease)     Hypertension     [facility] ([facility] 11/29/2016    Mild aortic stenosis 6/15/2015    Pulmonary hypertension 6/15/2015    PVD (peripheral vascular disease) ([facility]     STENT RIGHT GROIN, PAD    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    LIMA TO LAD; SVG TO OM; SVG TO OM    Stroke ([facility] 1999    2 STROKES    Third degree AV block ([facility] 6/11/2015    Thyroid disease            PAST SURGICAL HISTORY     Past Surgical History:   Procedure Laterality Date    HX GI  1993    COLON RESECTION    HX HEART CATHETERIZATION  6/26/2015    HX PACEMAKER      INS PPM/ICD LED DUAL ONLY  7/2/2015         VASCULAR SURGERY PROCEDURE UNLIST  1999    PAD, RIGHT GROIN STENT          ALLERGIES     No Known Allergies       FAMILY HISTORY     Family History   Problem Relation Age of Onset    Heart Disease Mother     Diabetes Mother     Other Father      AMPUTATION LEG FOLLOWING BUG BITE    Other Sister      CEREBRAL ANUERYSM    Heart Disease Brother     Diabetes Sister     negative for cardiac disease       SOCIAL HISTORY     Social History     Social History    Marital status:      Spouse name: N/A    Number of children: N/A    Years of education: N/A     Social History Main Topics    Smoking status: Former Smoker     Packs/day: 2.00     Years: 60.00     Types: Cigarettes     Quit date: 6/22/2015    Smokeless tobacco: Never Used    Alcohol use No    Drug use: No    Sexual activity: Not Currently     Partners: Female     Other Topics Concern    None     Social History Narrative         MEDICATIONS     Current Outpatient Prescriptions   Medication Sig    diphenoxylate-atropine (LOMOTIL) 2.5-0.025 mg per tablet Take 1 Tab by mouth daily as needed for Diarrhea.     MULTIVIT WITH IRON,MINERALS (MULTIVITAMIN AND MINERALS PO) Take  by mouth daily.  bumetanide (BUMEX) 1 mg tablet Take 1 Tab by mouth two (2) times a day.  acetaminophen (TYLENOL) 325 mg tablet Take 650 mg by mouth every four (4) hours as needed for Pain.  calcium-vitamin D (OYSTER SHELL) 500 mg(1,250mg) -200 unit per tablet Take 1 Tab by mouth three (3) times daily (with meals).  guaiFENesin SR (MUCINEX) 600 mg SR tablet Take 600 mg by mouth two (2) times a day.  albuterol-ipratropium (DUO-NEB) 2.5 mg-0.5 mg/3 ml nebu 3 mL by Nebulization route every four (4) hours as needed (shortness of breath).  dextran 70-hypromellose (ARTIFICIAL TEARS,[id number],) ophthalmic solution Administer 2 Drops to both eyes two (2) times a day.  glipiZIDE (GLUCOTROL) 5 mg tablet Take 5 mg by mouth Before breakfast and dinner. Do not take if blood sugar is less than 120    bisacodyl (DULCOLAX) 10 mg suppository Insert 10 mg into rectum daily as needed.  senna-docusate (PERICOLACE) 8.6-50 mg per tablet Take 1 Tab by mouth daily. For constipation. Do not take if you have loose stools    QUEtiapine (SEROQUEL) 100 mg tablet Take 0.5 Tabs by mouth daily. At 9AM    cholecalciferol (VITAMIN D3) 1,000 unit tablet Take 3,000 Units by mouth daily.  levothyroxine (SYNTHROID) 125 mcg tablet Take 125 mcg by mouth Daily (before breakfast). One hour prior to breakfast    magnesium gluconate 500 mg (27 mg  elemental) tablet Take 500 mg by mouth daily.  omeprazole (PRILOSEC) 20 mg capsule Take 20 mg by mouth Daily (before breakfast). 30 minutes prior to breakfast    escitalopram oxalate (LEXAPRO) 20 mg tablet Take 20 mg by mouth daily.  donepezil (ARICEPT) 10 mg tablet Take 10 mg by mouth daily.  carvedilol (COREG) 3.125 mg tablet Take 1 Tab by mouth two (2) times a day.  ferrous sulfate 325 mg (65 mg iron) tablet Take 325 mg by mouth daily (with breakfast).  finasteride (PROSCAR) 5 mg tablet Take 5 mg by mouth every evening.     fluticasone (FLONASE) 50 mcg/actuation nasal spray 2 Sprays by Both Nostrils route daily.  tamsulosin (FLOMAX) 0.4 mg capsule Take 0.4 mg by mouth nightly.  insulin lispro (HUMALOG) 100 unit/mL injection by SubCUTAneous route Before breakfast, lunch, dinner and at bedtime. Sliding scale    traMADol (ULTRAM) 50 mg tablet Take 1 Tab by mouth every six (6) hours as needed. Max Daily Amount: 200 mg. Do not take if sedated     No current facility-administered medications for this visit. I have reviewed the nurses notes, vitals, problem list, allergy list, medical history, family, social history and medications. REVIEW OF SYMPTOMS      General: Pt denies excessive weight gain or loss. Pt is able to conduct ADL's  HEENT: Denies blurred vision, headaches, hearing loss, epistaxis and difficulty swallowing. Respiratory: Denies cough, congestion, shortness of breath, FRANCOIS, wheezing or stridor. Cardiovascular: Denies precordial pain, palpitations, edema or PND  Gastrointestinal: Denies poor appetite, indigestion, abdominal pain or blood in stool  Genitourinary: Denies hematuria, dysuria, increased urinary frequency  Musculoskeletal: Denies joint pain or swelling from muscles or joints  Neurologic: Denies tremor, paresthesias, headache, or sensory motor disturbance  Psychiatric: Denies confusion, insomnia, depression  Integumentray: Denies rash, itching or ulcers. Hematologic: Denies easy bruising, bleeding       PHYSICAL EXAMINATION      Vitals:    02/21/18 1100   BP: 104/52   Pulse: 76   Resp: 20   Weight: 152 lb 3.2 oz (69 kg)   Height: 5' 9\" (1.753 m)     General: Well developed, in no acute distress. HEENT: No jaundice, oral mucosa moist, no oral ulcers  Neck: Supple, no stiffness, no lymphadenopathy, supple  Heart:  Normal S1/S2 negative S3 or S4.  Regular, no murmur, gallop or rub, no jugular venous distention  Respiratory: Clear bilaterally x 4, no wheezing or rales  Abdomen:   Soft, non-tender, bowel sounds are active.   Extremities:  No edema, normal cap refill, no cyanosis. Musculoskeletal: No clubbing, no deformities  Neuro: A&Ox3, speech clear, gait stable, cooperative, no focal neurologic deficits  Skin: Skin color is normal. No rashes or lesions. Non diaphoretic, moist.  Vascular: 2+ pulses symmetric in all extremities       DIAGNOSTIC DATA      EKG:        LABORATORY DATA      Lab Results   Component Value Date/Time    WBC 12.0 (H) 01/06/2018 02:05 AM    Hemoglobin (POC) 11.6 (L) 02/13/2016 02:26 PM    HGB 7.6 (L) 01/06/2018 02:05 AM    Hematocrit (POC) 34 (L) 02/13/2016 02:26 PM    HCT 24.0 (L) 01/06/2018 02:05 AM    PLATELET 111 16/33/2726 02:05 AM    MCV 98.0 01/06/2018 02:05 AM      Lab Results   Component Value Date/Time    Sodium 136 01/06/2018 02:05 AM    Potassium 4.6 01/06/2018 02:05 AM    Chloride 103 01/06/2018 02:05 AM    CO2 28 01/06/2018 02:05 AM    Anion gap 5 01/06/2018 02:05 AM    Glucose 237 (H) 01/06/2018 02:05 AM    BUN 21 (H) 01/06/2018 02:05 AM    Creatinine 1.12 01/06/2018 02:05 AM    BUN/Creatinine ratio 19 01/06/2018 02:05 AM    GFR est AA >60 01/06/2018 02:05 AM    GFR est non-AA >60 01/06/2018 02:05 AM    Calcium 8.6 01/06/2018 02:05 AM    Bilirubin, total 0.8 01/05/2018 02:30 AM    AST (SGOT) 14 (L) 01/05/2018 02:30 AM    Alk. phosphatase 89 01/05/2018 02:30 AM    Protein, total 6.0 (L) 01/05/2018 02:30 AM    Albumin 2.5 (L) 01/05/2018 02:30 AM    Globulin 3.5 01/05/2018 02:30 AM    A-G Ratio 0.7 (L) 01/05/2018 02:30 AM    ALT (SGPT) 22 01/05/2018 02:30 AM           ASSESSMENT      1. PPM  2. COPD on home O2  3. CAD  4. CABG  5. CVA  6. DM  7. Colon cancer       PLAN     Continue following device in device clinic. Will obtain UA to evaluate for recurrent UTI at patient's request.        FOLLOW-UP     1 year      Thank you, Cecilia Anthony MD for allowing me to participate in the care of this extraordinarily pleasant male.  Please do not hesitate to contact me for further questions/concerns.          Jay Mckeon MD  Cardiac Electrophysiology / Cardiology    [address] [facility]  [address], [place] [address]  [address]    Derick Turner  [phone] / [phone] Fax   [phone] / [phone] Fax

## 2018-02-22 PROCEDURE — 3331090002 HH PPS REVENUE DEBIT

## 2018-02-22 PROCEDURE — 3331090001 HH PPS REVENUE CREDIT

## 2018-02-23 ENCOUNTER — HOME CARE VISIT (OUTPATIENT)
Dept: HOME HEALTH SERVICES | Facility: HOME HEALTH | Age: 83
End: 2018-02-23
Payer: MEDICARE

## 2018-02-23 ENCOUNTER — HOME CARE VISIT (OUTPATIENT)
Dept: SCHEDULING | Facility: HOME HEALTH | Age: 83
End: 2018-02-23
Payer: MEDICARE

## 2018-02-23 PROCEDURE — 3331090002 HH PPS REVENUE DEBIT

## 2018-02-23 PROCEDURE — 3331090003 HH PPS REVENUE ADJ

## 2018-02-23 PROCEDURE — 3331090001 HH PPS REVENUE CREDIT

## 2018-02-24 PROCEDURE — 3331090001 HH PPS REVENUE CREDIT

## 2018-02-24 PROCEDURE — 3331090002 HH PPS REVENUE DEBIT

## 2018-02-25 LAB
APPEARANCE UR: ABNORMAL
BACTERIA #/AREA URNS HPF: ABNORMAL /[HPF]
BACTERIA UR CULT: ABNORMAL
BILIRUB UR QL STRIP: NEGATIVE
CASTS URNS MICRO: ABNORMAL
CASTS URNS QL MICRO: PRESENT /LPF
COLOR UR: YELLOW
EPI CELLS #/AREA URNS HPF: ABNORMAL /HPF
GLUCOSE UR QL: ABNORMAL
HGB UR QL STRIP: ABNORMAL
KETONES UR QL STRIP: NEGATIVE
LEUKOCYTE ESTERASE UR QL STRIP: ABNORMAL
MICRO URNS: ABNORMAL
MUCOUS THREADS URNS QL MICRO: PRESENT
NITRITE UR QL STRIP: NEGATIVE
PH UR STRIP: 6 [PH] (ref 5–7.5)
PROT UR QL STRIP: ABNORMAL
RBC #/AREA URNS HPF: ABNORMAL /HPF
SP GR UR: 1.01 (ref 1–1.03)
URINALYSIS REFLEX, 377202: ABNORMAL
UROBILINOGEN UR STRIP-MCNC: 0.2 MG/DL (ref 0.2–1)
WBC #/AREA URNS HPF: >30 /HPF

## 2018-02-25 PROCEDURE — 3331090001 HH PPS REVENUE CREDIT

## 2018-02-25 PROCEDURE — 3331090002 HH PPS REVENUE DEBIT

## 2018-02-26 ENCOUNTER — TELEPHONE (OUTPATIENT)
Dept: CARDIOLOGY CLINIC | Age: 83
End: 2018-02-26

## 2018-02-26 PROCEDURE — 3331090001 HH PPS REVENUE CREDIT

## 2018-02-26 PROCEDURE — 3331090002 HH PPS REVENUE DEBIT

## 2018-02-26 NOTE — PROGRESS NOTES
Medications removed from listing per Dr. Corbin Payer. Not current medications as reported by patient.     Tramadol  Insulin

## 2018-02-26 NOTE — TELEPHONE ENCOUNTER
Called patient's daughter. Informed her of positive UTI results. Ms. Ricks Her stated Mr. Sejal Duran is currently in the [facility], admitted and given IV antibiotics as the infection moved to his bloodstream.  Was told it was not sepsis. He is currently still in the hospital and now being administered PO antibiotics. Asked me to inform Dr. Bailey Goldsmith of patient's status.

## 2018-02-27 PROCEDURE — 3331090002 HH PPS REVENUE DEBIT

## 2018-02-27 PROCEDURE — 3331090001 HH PPS REVENUE CREDIT

## 2018-02-28 PROCEDURE — 3331090002 HH PPS REVENUE DEBIT

## 2018-02-28 PROCEDURE — 3331090001 HH PPS REVENUE CREDIT

## 2018-03-01 PROCEDURE — 3331090002 HH PPS REVENUE DEBIT

## 2018-03-01 PROCEDURE — 3331090001 HH PPS REVENUE CREDIT

## 2018-03-02 PROCEDURE — 3331090001 HH PPS REVENUE CREDIT

## 2018-03-02 PROCEDURE — 3331090002 HH PPS REVENUE DEBIT

## 2018-03-03 PROCEDURE — 3331090001 HH PPS REVENUE CREDIT

## 2018-03-03 PROCEDURE — 3331090002 HH PPS REVENUE DEBIT

## 2018-03-04 PROCEDURE — 3331090002 HH PPS REVENUE DEBIT

## 2018-03-04 PROCEDURE — 3331090001 HH PPS REVENUE CREDIT

## 2018-03-05 PROCEDURE — 3331090002 HH PPS REVENUE DEBIT

## 2018-03-05 PROCEDURE — 3331090001 HH PPS REVENUE CREDIT

## 2018-03-06 PROCEDURE — 3331090001 HH PPS REVENUE CREDIT

## 2018-03-06 PROCEDURE — 3331090002 HH PPS REVENUE DEBIT

## 2018-03-07 PROCEDURE — 3331090001 HH PPS REVENUE CREDIT

## 2018-03-07 PROCEDURE — 3331090002 HH PPS REVENUE DEBIT

## 2018-03-08 PROCEDURE — 3331090001 HH PPS REVENUE CREDIT

## 2018-03-08 PROCEDURE — 3331090002 HH PPS REVENUE DEBIT

## 2018-03-09 PROCEDURE — 3331090002 HH PPS REVENUE DEBIT

## 2018-03-09 PROCEDURE — 3331090001 HH PPS REVENUE CREDIT

## 2018-03-10 PROCEDURE — 3331090001 HH PPS REVENUE CREDIT

## 2018-03-10 PROCEDURE — 3331090002 HH PPS REVENUE DEBIT

## 2018-03-11 PROCEDURE — 3331090001 HH PPS REVENUE CREDIT

## 2018-03-11 PROCEDURE — 3331090002 HH PPS REVENUE DEBIT

## 2018-03-12 PROCEDURE — 3331090001 HH PPS REVENUE CREDIT

## 2018-03-12 PROCEDURE — 3331090002 HH PPS REVENUE DEBIT

## 2018-03-13 PROCEDURE — 3331090002 HH PPS REVENUE DEBIT

## 2018-03-13 PROCEDURE — 3331090001 HH PPS REVENUE CREDIT

## 2018-03-14 PROCEDURE — 3331090002 HH PPS REVENUE DEBIT

## 2018-03-14 PROCEDURE — 3331090001 HH PPS REVENUE CREDIT

## 2018-03-15 PROCEDURE — 3331090001 HH PPS REVENUE CREDIT

## 2018-03-15 PROCEDURE — 3331090002 HH PPS REVENUE DEBIT

## 2018-03-16 PROCEDURE — 3331090002 HH PPS REVENUE DEBIT

## 2018-03-16 PROCEDURE — 3331090001 HH PPS REVENUE CREDIT

## 2018-03-17 PROCEDURE — 3331090002 HH PPS REVENUE DEBIT

## 2018-03-17 PROCEDURE — 3331090001 HH PPS REVENUE CREDIT

## 2018-03-18 PROCEDURE — 3331090002 HH PPS REVENUE DEBIT

## 2018-03-18 PROCEDURE — 3331090001 HH PPS REVENUE CREDIT

## 2018-03-19 PROCEDURE — 3331090002 HH PPS REVENUE DEBIT

## 2018-03-19 PROCEDURE — 3331090001 HH PPS REVENUE CREDIT

## 2018-03-20 PROCEDURE — 3331090001 HH PPS REVENUE CREDIT

## 2018-03-20 PROCEDURE — 3331090002 HH PPS REVENUE DEBIT

## 2018-03-21 PROCEDURE — 3331090001 HH PPS REVENUE CREDIT

## 2018-03-21 PROCEDURE — 3331090002 HH PPS REVENUE DEBIT

## 2018-03-22 PROCEDURE — 3331090001 HH PPS REVENUE CREDIT

## 2018-03-22 PROCEDURE — 3331090002 HH PPS REVENUE DEBIT

## 2018-03-23 PROCEDURE — 3331090002 HH PPS REVENUE DEBIT

## 2018-03-23 PROCEDURE — 3331090001 HH PPS REVENUE CREDIT

## 2018-03-24 PROCEDURE — 3331090002 HH PPS REVENUE DEBIT

## 2018-03-24 PROCEDURE — 3331090001 HH PPS REVENUE CREDIT

## 2018-03-25 PROCEDURE — 3331090002 HH PPS REVENUE DEBIT

## 2018-03-25 PROCEDURE — 3331090001 HH PPS REVENUE CREDIT

## 2018-03-26 PROCEDURE — 3331090002 HH PPS REVENUE DEBIT

## 2018-03-26 PROCEDURE — 3331090001 HH PPS REVENUE CREDIT

## 2018-03-27 PROCEDURE — 3331090001 HH PPS REVENUE CREDIT

## 2018-03-27 PROCEDURE — 3331090002 HH PPS REVENUE DEBIT

## 2018-03-28 PROCEDURE — 3331090002 HH PPS REVENUE DEBIT

## 2018-03-28 PROCEDURE — 3331090001 HH PPS REVENUE CREDIT

## 2018-03-29 PROCEDURE — 3331090001 HH PPS REVENUE CREDIT

## 2018-03-29 PROCEDURE — 3331090002 HH PPS REVENUE DEBIT

## 2018-03-30 PROCEDURE — 3331090002 HH PPS REVENUE DEBIT

## 2018-03-30 PROCEDURE — 3331090001 HH PPS REVENUE CREDIT

## 2018-03-31 PROCEDURE — 3331090002 HH PPS REVENUE DEBIT

## 2018-03-31 PROCEDURE — 3331090001 HH PPS REVENUE CREDIT

## 2018-04-01 PROCEDURE — 3331090001 HH PPS REVENUE CREDIT

## 2018-04-01 PROCEDURE — 3331090002 HH PPS REVENUE DEBIT

## 2018-04-02 PROCEDURE — 3331090001 HH PPS REVENUE CREDIT

## 2018-04-02 PROCEDURE — 3331090002 HH PPS REVENUE DEBIT

## 2018-04-03 PROCEDURE — 3331090002 HH PPS REVENUE DEBIT

## 2018-04-03 PROCEDURE — 3331090001 HH PPS REVENUE CREDIT

## 2018-04-04 PROCEDURE — 3331090002 HH PPS REVENUE DEBIT

## 2018-04-04 PROCEDURE — 3331090001 HH PPS REVENUE CREDIT

## 2018-04-05 PROCEDURE — 3331090001 HH PPS REVENUE CREDIT

## 2018-04-05 PROCEDURE — 3331090002 HH PPS REVENUE DEBIT

## 2018-04-06 PROCEDURE — 3331090001 HH PPS REVENUE CREDIT

## 2018-04-06 PROCEDURE — 3331090002 HH PPS REVENUE DEBIT

## 2018-04-07 PROCEDURE — 3331090001 HH PPS REVENUE CREDIT

## 2018-04-07 PROCEDURE — 3331090002 HH PPS REVENUE DEBIT

## 2018-04-08 PROCEDURE — 3331090001 HH PPS REVENUE CREDIT

## 2018-04-08 PROCEDURE — 3331090002 HH PPS REVENUE DEBIT

## 2018-04-09 PROCEDURE — 3331090001 HH PPS REVENUE CREDIT

## 2018-04-09 PROCEDURE — 3331090002 HH PPS REVENUE DEBIT

## 2018-04-10 PROCEDURE — 3331090001 HH PPS REVENUE CREDIT

## 2018-04-10 PROCEDURE — 3331090002 HH PPS REVENUE DEBIT

## 2018-04-11 PROCEDURE — 3331090002 HH PPS REVENUE DEBIT

## 2018-04-11 PROCEDURE — 3331090001 HH PPS REVENUE CREDIT

## 2018-04-12 PROCEDURE — 3331090002 HH PPS REVENUE DEBIT

## 2018-04-12 PROCEDURE — 3331090001 HH PPS REVENUE CREDIT

## 2018-04-13 PROCEDURE — 3331090002 HH PPS REVENUE DEBIT

## 2018-04-13 PROCEDURE — 3331090001 HH PPS REVENUE CREDIT

## 2018-04-14 PROCEDURE — 3331090001 HH PPS REVENUE CREDIT

## 2018-04-14 PROCEDURE — 3331090002 HH PPS REVENUE DEBIT

## 2018-04-15 PROCEDURE — 3331090002 HH PPS REVENUE DEBIT

## 2018-04-15 PROCEDURE — 3331090001 HH PPS REVENUE CREDIT

## 2018-04-16 PROCEDURE — 3331090002 HH PPS REVENUE DEBIT

## 2018-04-16 PROCEDURE — 3331090001 HH PPS REVENUE CREDIT

## 2018-05-07 ENCOUNTER — HOSPITAL ENCOUNTER (OUTPATIENT)
Dept: CT IMAGING | Age: 83
Discharge: HOME | End: 2018-05-07
Attending: INTERNAL MEDICINE
Payer: MEDICARE

## 2018-05-07 DIAGNOSIS — R91.1 PULMONARY NODULE, LEFT: ICD-10-CM

## 2018-05-07 PROCEDURE — 71250 CT THORAX DX C-: CPT

## 2018-08-29 ENCOUNTER — OFFICE VISIT (OUTPATIENT)
Dept: CARDIOLOGY CLINIC | Age: 83
End: 2018-08-29

## 2018-08-29 VITALS
WEIGHT: 140 LBS | OXYGEN SATURATION: 95 % | DIASTOLIC BLOOD PRESSURE: 50 MMHG | SYSTOLIC BLOOD PRESSURE: 112 MMHG | HEIGHT: 68 IN | RESPIRATION RATE: 16 BRPM | HEART RATE: 68 BPM | BODY MASS INDEX: 21.22 KG/M2

## 2018-08-29 DIAGNOSIS — I42.9 CARDIOMYOPATHY, UNSPECIFIED TYPE (HCC): ICD-10-CM

## 2018-08-29 DIAGNOSIS — I25.10 CORONARY ARTERY DISEASE INVOLVING NATIVE CORONARY ARTERY OF NATIVE HEART WITHOUT ANGINA PECTORIS: ICD-10-CM

## 2018-08-29 DIAGNOSIS — I44.2 THIRD DEGREE AV BLOCK (HCC): ICD-10-CM

## 2018-08-29 DIAGNOSIS — I50.22 CHRONIC SYSTOLIC HEART FAILURE (HCC): Primary | ICD-10-CM

## 2018-08-29 DIAGNOSIS — Z95.1 S/P CABG X 3: ICD-10-CM

## 2018-08-29 DIAGNOSIS — I10 ESSENTIAL HYPERTENSION: ICD-10-CM

## 2018-08-29 DIAGNOSIS — I27.20 PULMONARY HYPERTENSION (HCC): ICD-10-CM

## 2018-08-29 DIAGNOSIS — I35.0 MILD AORTIC STENOSIS: ICD-10-CM

## 2018-08-29 NOTE — MR AVS SNAPSHOT
[address] [facility] [address] [facility] 
[phone] Patient: Milan Quintana MRN: [id number] Visit Information Date & Time Provider Department Dept. Phone Encounter #  
 8/29/2018 10:40 AM Tiny Real MD [facility] [phone] [id number] Your Appointments 2/27/2019 10:40 AM  
ESTABLISHED PATIENT with Albino Watson MD  
[facility] ([facility]) Appt Note: annual visit w/ device check [address] [facility] [address]  
[phone]  
  
   
 [address] [facility] [address] [address]  
  
    
 2/27/2019 10:45 AM  
PROCEDURE with PACEMAKERVALERY  
[facility] ([facility] SCHEDULING) Appt Note: pacemaker and 1 yr follow up with dr ramirez [address] [facility]  
[phone]  
  
   
 [address] [facility] [address] [address]  
  
    
 2/27/2019 11:20 AM  
ESTABLISHED PATIENT with Tiny Real MD  
[facility] ([place] [facility]) Appt Note: 6 mo fu; 6 mo fu  
 [address] [place] [id number]  
[phone]  
  
   
 [address] [facility] [address] [facility] [address]  
  
    
  
 9/6/2018 10:15 AM  
REMOTE OFFICE VISIT with Lul Pablo [facility] ([facility] SCHEDULING) Appt Note: carelink ppi/stf  
 [address] [facility] [address]  
[phone]  
  
   
 [address] [facility] [address] [address]  
  
    
 12/13/2018 10:15 AM  
REMOTE OFFICE VISIT with Lul Pablo [facility] ([facility] SCHEDULING) Appt Note: carelink ppi/stf  
 [address] [facility]  
[phone] Upcoming Health Maintenance  Date Due  
 LIPID PANEL Q1 1935 FOOT EXAM Q1 8/24/1945 MICROALBUMIN Q1 8/24/1946 EYE EXAM RETINAL OR DILATED Q1 8/24/1946 DTaP/Tdap/Td series (1 - Tdap) 8/24/1957 ZOSTER VACCINE AGE 60> 6/24/1996 GLAUCOMA SCREENING Q2Y 8/24/2001 Pneumococcal 65+ High/Highest Risk (2 of 2 - PCV13) 7/6/2016 MEDICARE YEARLY EXAM 3/14/2018 HEMOGLOBIN A1C Q6M 7/5/2018 Influenza Age 5 to Adult 8/1/2018 Allergies as of 8/29/2018  Review Complete On: 8/29/2018 By: Rox Hilario No Known Allergies Current Immunizations  Reviewed on 9/18/2015 Name Date Influenza Vaccine (Quad) PF 12/28/2017 12:31 PM, 9/23/2015  4:08 PM  
 Influenza Vaccine Split 10/3/2011  1:39 PM  
 Pneumococcal Polysaccharide (PPSV-23) 7/6/2015  3:03 PM  
  
 Not reviewed this visit Vitals BP Pulse Resp Height(growth percentile) Weight(growth percentile) SpO2  
 112/50 (BP 1 Location: Left arm, BP Patient Position: Sitting) 68 16 5' 8\" (1.727 m) 140 lb (63.5 kg) 95% BMI Smoking Status 21.29 kg/m2 Former Smoker BMI and BSA Data Body Mass Index Body Surface Area  
 21.29 kg/m 2 1.75 m 2 Preferred Pharmacy Pharmacy Name Phone [address] [facility] [phone] Your Updated Medication List  
  
   
This list is accurate as of 8/29/18 11:21 AM.  Always use your most recent med list.  
  
  
  
  
 acetaminophen 325 mg tablet Commonly known as:  TYLENOL Take 650 mg by mouth every four (4) hours as needed for Pain. albuterol-ipratropium 2.5 mg-0.5 mg/3 ml Nebu Commonly known as:  DUO-NEB  
3 mL by Nebulization route every four (4) hours as needed (shortness of breath). ARTIFICIAL TEARS([id number]) ophthalmic solution Generic drug:  dextran 70-hypromellose Administer 2 Drops to both eyes two (2) times a day. bisacodyl 10 mg suppository Commonly known as:  DULCOLAX Insert 10 mg into rectum daily as needed. bumetanide 1 mg tablet Commonly known as:  [place]to Take 1 Tab by mouth two (2) times a day. calcium-vitamin D 500 mg(1,250mg) -200 unit per tablet Commonly known as:  OYSTER SHELL Take 1 Tab by mouth three (3) times daily (with meals). carvedilol 3.125 mg tablet Commonly known as:  Kathlean Due Take 1 Tab by mouth two (2) times a day. diphenoxylate-atropine 2.5-0.025 mg per tablet Commonly known as:  LOMOTIL Take 1 Tab by mouth daily as needed for Diarrhea. donepezil 10 mg tablet Commonly known as:  ARICEPT Take 10 mg by mouth daily. escitalopram oxalate 20 mg tablet Commonly known as:  Jose Dub Take 20 mg by mouth daily. ferrous sulfate 325 mg (65 mg iron) tablet Take 325 mg by mouth daily (with breakfast). finasteride 5 mg tablet Commonly known as:  PROSCAR Take 5 mg by mouth every evening. fluticasone 50 mcg/actuation nasal spray Commonly known as:  Alric Eaves 2 Sprays by Both Nostrils route daily. glipiZIDE 5 mg tablet Commonly known as:  Elizabeth Korbel Take 5 mg by mouth Before breakfast and dinner. Do not take if blood sugar is less than 120  
  
 guaiFENesin  mg SR tablet Commonly known as:  Armando & Armando Take 600 mg by mouth two (2) times a day. levothyroxine 125 mcg tablet Commonly known as:  SYNTHROID Take 125 mcg by mouth Daily (before breakfast). One hour prior to breakfast  
  
 magnesium gluconate 500 mg (27 mg  elemental) tablet Take 500 mg by mouth daily. MULTIVITAMIN AND MINERALS PO Take  by mouth daily. omeprazole 20 mg capsule Commonly known as:  PRILOSEC Take 20 mg by mouth Daily (before breakfast). 30 minutes prior to breakfast  
  
 QUEtiapine 100 mg tablet Commonly known as:  SEROquel Take 0.5 Tabs by mouth daily. At 9AM  
  
 senna-docusate 8.6-50 mg per tablet Commonly known as:  Zelphia Clonts Take 1 Tab by mouth daily. For constipation. Do not take if you have loose stools tamsulosin 0.4 mg capsule Commonly known as:  FLOMAX Take 0.4 mg by mouth nightly. VITAMIN D3 1,000 unit tablet Generic drug:  cholecalciferol Take 3,000 Units by mouth daily. Please provide this summary of care documentation to your next provider. Your primary care clinician is listed as Catilyn Covarrubias. If you have any questions after today's visit, please call [phone].

## 2018-08-29 NOTE — PROGRESS NOTES
David Theodore     1935       Jaxson Schumacher MD, [facility]  Date of Visit-8/29/2018   PCP is Dwana Rinne, MD   [facility]  [facility]  HPI:  Davdi Theodore is a 81 y.o. male   CV ROS negative  Mild  Leg ache in joints when walking    Follow up with CHF,CAD  6 months. CAD with CABG in 2015. Weight is down 12 lbs. Pt is here with a family member. Overall the pt states he is doing well. Pt states that he feels heavy on the left side of his chest.    Pt had a UTI sepsis and hospitalized from Feb 22nd - Feb 27th and was in [place]. They think the weight went down then and has been stable since  Denies chest pain, edema, syncope or shortness of breath at rest, has no tachycardia, palpitations or sense of arrhythmia. Compared to few years ago, seems to be eating more and mentally more alert    Assessment/Plan:     1. CHF systolic/diastolic chronic    EF 24-39%    ---bumex, Coreg  ---he's well compensated NYHA class I - II  --  Key CAD CHF Meds             bumetanide (BUMEX) 1 mg tablet  (Taking) Take 1 Tab by mouth two (2) times a day. carvedilol (COREG) 3.125 mg tablet  (Taking) Take 1 Tab by mouth two (2) times a day. 2. CAD no angina, CABG 2015  --off ASA due to bleeding  --off statin  Will restart asa 81 mg day and lipitor 10 mg q hs    3. Mild AS with murmur, no change    4. Third degree AV block. ---implant 2015  Pacer check in Feb with 99% RV pacing, normal fx for pacer    5. PA HTN continue diuretic  6. HTN stable  BP Readings from Last 3 Encounters:   08/29/18 112/50   02/21/18 100/50   02/21/18 104/52     7. COPD no acute wheezig  8.  Anemia chronic and at dc in Jan-fu [facility] or [facility]  Lab Results   Component Value Date/Time    Hemoglobin (POC) 11.6 (L) 02/13/2016 02:26 PM    HGB 7.6 (L) 01/06/2018 02:05 AM      Follow up in 6 months  Future Appointments  Date Time Provider Preethi Armenta   9/6/2018 10:15 AM REMOTE1, MATTHEW [facility] SCHED   12/13/2018 10:15 AM REMOTE1, MATTHEW [facility] SCHED   2/27/2019 10:40 AM Kulwant Hemphill MD [facility] SCHED   2/27/2019 10:45 AM PACEMAKERVALERY [facility] SCHED   2/27/2019 11:20 AM Janet Douglas MD [facility] SCHED              Impression:   1. Chronic systolic heart failure ([address]    2. Coronary artery disease involving native coronary artery of native heart without angina pectoris    3. Cardiomyopathy, unspecified type ([address]    4. Essential hypertension    5. Third degree AV block ([address]    6. Mild aortic stenosis    7. S/P CABG x 3    8. Pulmonary hypertension       Cardiac History:   Echo 1/4/17 Echo 1/4/17 LVEF 50%. Distal apical septal akinesis. LAmoderately dilated. Mild MR. Aortic valve: The valve was trileaflet. Leaflets exhibited moderate  calcification, reduced mobility, and sclerosis without stenosis. Mild Tr. Severe pulmonary hypertension    ECHO 6-10-15 LV EF 35 % by Ceja's Biplane technique, mod diffuse HK, mod HK mid anteroseptal and apical septal wall(s). Mild LVH. Mod JILLIAN, MR, mild ASt, LISETH 1.5 cm2 by both continuity equation and planimetry. Mild AR, TR, PASP 60. AV Max/meanPG 16.6/7.6 mmHg. AV Vmax was 2 m/s. NUKE 6-10-15 ischemia and infarction in inferior wall with moderately reduced EF, see report  Holter showed high level AV block so underwent testing leading to cath and then CABG    [facility] on [facility]24[facility]15 show a FEV1 of 1.41. FVC 56%    He has a history of colon cancer, anxiety, diabetes, depression, emphysema, hypertension, hypothyroidism. 7/2/15: [employer] Pacemaker per Dr. Dusty Alexis as noted above. . A complete cardiac and respiratory are reviewed and negative except as above ; Resp-denies wheezing  or productive cough,.  Const- No unusual weight loss or fever; Neuro-no recent seizure or CVA ; GI- No BRBPR, abdom pain, bloating ; - no  hematuria   see supplement sheet, initialed and to be scanned by staff  Past Medical History:   Diagnosis Date    Arthritis     CAD (coronary artery disease)     Cardiomyopathy ([facility] 6/11/2015    Colon cancer ([facility] 1993    COLON    Depression     DEPRESSION    Diabetes ([facility]     IDDM    GERD (gastroesophageal reflux disease)     Hypertension     Lung cancer ([facility] 11/29/2016    Mild aortic stenosis 6/15/2015    Pulmonary hypertension ([facility] 6/15/2015    PVD (peripheral vascular disease) ([facility]     STENT RIGHT GROIN, PAD    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    LIMA TO LAD; SVG TO OM; SVG TO OM    Stroke ([facility] 1999    2 STROKES    Third degree AV block ([facility] 6/11/2015    Thyroid disease       Social Hx= reports that he quit smoking about 3 years ago. His smoking use included Cigarettes. He has a 120.00 pack-year smoking history. He has never used smokeless tobacco. He reports that he does not drink alcohol or use illicit drugs. Exam and Labs:  /50 (BP 1 Location: Left arm, BP Patient Position: Sitting)  Pulse 68  Resp 16  Ht 5' 8\" (1.727 m)  Wt 140 lb (63.5 kg)  SpO2 95%  BMI 21.29 kg/m2     Constitutional:  NAD, comfortable  Head: NC,AT. Eyes: No scleral icterus. Neck:  Neck supple. No JVD present. Throat: moist mucous membranes. Chest: Effort normal & normal respiratory excursion . Neurological: alert, conversant and oriented . Skin: Skin is not cold. No obvious systemic rash noted. Not diaphoretic. No erythema. Psychiatric:  Grossly normal mood and affect. Behavior appears normal. Extremities:  no clubbing or cyanosis. Abdomen: non distended    Lungs:breath sounds normal. No stridor. distress, wheezes or  Rales. DCWTX:6/3 systolic murmur normal rate, regular rhythm, normal S1, S2, rubs, clicks or gallops , PMI non displaced. Edema: Edema is none.     Lab Results   Component Value Date/Time    Sodium 136 01/06/2018 02:05 AM    Potassium 4.6 01/06/2018 02:05 AM    Chloride 103 01/06/2018 02:05 AM    CO2 28 01/06/2018 02:05 AM Anion gap 5 01/06/2018 02:05 AM    Glucose 237 (H) 01/06/2018 02:05 AM    BUN 21 (H) 01/06/2018 02:05 AM    Creatinine 1.12 01/06/2018 02:05 AM    BUN/Creatinine ratio 19 01/06/2018 02:05 AM    GFR est AA >60 01/06/2018 02:05 AM    GFR est non-AA >60 01/06/2018 02:05 AM    Calcium 8.6 01/06/2018 02:05 AM      Wt Readings from Last 3 Encounters:   02/21/18 152 lb (68.9 kg)   02/21/18 152 lb 3.2 oz (69 kg)   02/21/18 186 lb (84.4 kg)      BP Readings from Last 3 Encounters:   02/21/18 100/50   02/21/18 104/52   02/19/18 110/60      Current Outpatient Prescriptions   Medication Sig    diphenoxylate-atropine (LOMOTIL) 2.5-0.025 mg per tablet Take 1 Tab by mouth daily as needed for Diarrhea.  MULTIVIT WITH IRON,MINERALS (MULTIVITAMIN AND MINERALS PO) Take  by mouth daily.  bumetanide (BUMEX) 1 mg tablet Take 1 Tab by mouth two (2) times a day.  acetaminophen (TYLENOL) 325 mg tablet Take 650 mg by mouth every four (4) hours as needed for Pain.  calcium-vitamin D (OYSTER SHELL) 500 mg(1,250mg) -200 unit per tablet Take 1 Tab by mouth three (3) times daily (with meals).  guaiFENesin SR (MUCINEX) 600 mg SR tablet Take 600 mg by mouth two (2) times a day.  albuterol-ipratropium (DUO-NEB) 2.5 mg-0.5 mg/3 ml nebu 3 mL by Nebulization route every four (4) hours as needed (shortness of breath).  dextran 70-hypromellose (ARTIFICIAL TEARS,[id number],) ophthalmic solution Administer 2 Drops to both eyes two (2) times a day.  glipiZIDE (GLUCOTROL) 5 mg tablet Take 5 mg by mouth Before breakfast and dinner. Do not take if blood sugar is less than 120    bisacodyl (DULCOLAX) 10 mg suppository Insert 10 mg into rectum daily as needed.  senna-docusate (PERICOLACE) 8.6-50 mg per tablet Take 1 Tab by mouth daily. For constipation. Do not take if you have loose stools    QUEtiapine (SEROQUEL) 100 mg tablet Take 0.5 Tabs by mouth daily.  At 9AM    cholecalciferol (VITAMIN D3) 1,000 unit tablet Take 3,000 Units by mouth daily.  levothyroxine (SYNTHROID) 125 mcg tablet Take 125 mcg by mouth Daily (before breakfast). One hour prior to breakfast    magnesium gluconate 500 mg (27 mg  elemental) tablet Take 500 mg by mouth daily.  omeprazole (PRILOSEC) 20 mg capsule Take 20 mg by mouth Daily (before breakfast). 30 minutes prior to breakfast    escitalopram oxalate (LEXAPRO) 20 mg tablet Take 20 mg by mouth daily.  donepezil (ARICEPT) 10 mg tablet Take 10 mg by mouth daily.  carvedilol (COREG) 3.125 mg tablet Take 1 Tab by mouth two (2) times a day.  ferrous sulfate 325 mg (65 mg iron) tablet Take 325 mg by mouth daily (with breakfast).  finasteride (PROSCAR) 5 mg tablet Take 5 mg by mouth every evening.  fluticasone (FLONASE) 50 mcg/actuation nasal spray 2 Sprays by Both Nostrils route daily.  tamsulosin (FLOMAX) 0.4 mg capsule Take 0.4 mg by mouth nightly. No current facility-administered medications for this visit. Impression see above.       Written by Stephen Galvan, as dictated by Arlen Tsang MD.

## 2018-08-30 ENCOUNTER — TELEPHONE (OUTPATIENT)
Dept: CARDIOLOGY CLINIC | Age: 83
End: 2018-08-30

## 2018-08-30 DIAGNOSIS — I25.10 CORONARY ARTERY DISEASE INVOLVING NATIVE CORONARY ARTERY OF NATIVE HEART WITHOUT ANGINA PECTORIS: Primary | ICD-10-CM

## 2018-08-30 RX ORDER — GUAIFENESIN 100 MG/5ML
81 LIQUID (ML) ORAL DAILY
Qty: 90 TAB | Refills: 3 | Status: CANCELLED | OUTPATIENT
Start: 2018-08-30

## 2018-08-30 RX ORDER — GUAIFENESIN 100 MG/5ML
81 LIQUID (ML) ORAL DAILY
Qty: 90 TAB | Refills: 3 | Status: SHIPPED | OUTPATIENT
Start: 2018-08-30

## 2018-08-30 RX ORDER — ATORVASTATIN CALCIUM 10 MG/1
10 TABLET, FILM COATED ORAL
Qty: 90 TAB | Refills: 3 | Status: CANCELLED | OUTPATIENT
Start: 2018-08-30

## 2018-08-30 RX ORDER — ATORVASTATIN CALCIUM 10 MG/1
10 TABLET, FILM COATED ORAL
Qty: 90 TAB | Refills: 3 | Status: SHIPPED | OUTPATIENT
Start: 2018-08-30

## 2018-08-30 NOTE — TELEPHONE ENCOUNTER
Mrs. Pawan Goode was calling in regards to a voicemail she received from a nurse Raza Valadez.    Phone: Myrnaidalmiskeiko Santacruz [phone]

## 2018-08-30 NOTE — TELEPHONE ENCOUNTER
Spoke to patient's daughter, Fausto Delarosa. Name noted on permission to release information. Informed of addition of aspirin 81 mg daily and lipitor 10 mg daily. Labs to be obtained 1 week prior to February follow up visit. Copy of signed prescriptions and lab requisition mailed to patient's daughter. Called Dr. Kane Daniel's office at [place]  [phone] or [phone] ext. [phone] regarding medications. Recent progress noted and addendum regarding aspirin and lipitor faxed to  [phone]. Confirmation received.

## 2018-08-30 NOTE — TELEPHONE ENCOUNTER
----- Message from Dari Briones MD sent at 8/30/2018  9:37 AM EDT -----  Call facility and start Aspirin 81 mg daily and Lipitor 10 mg q hs  Check lipids, hgb and bmp one week prior to next OV

## 2018-12-13 ENCOUNTER — OFFICE VISIT (OUTPATIENT)
Dept: CARDIOLOGY CLINIC | Age: 83
End: 2018-12-13

## 2018-12-13 DIAGNOSIS — Z95.0 CARDIAC PACEMAKER IN SITU: Primary | ICD-10-CM

## 2019-01-01 ENCOUNTER — PATIENT OUTREACH (OUTPATIENT)
Dept: CASE MANAGEMENT | Age: 84
End: 2019-01-01

## 2019-01-01 ENCOUNTER — HOSPITAL ENCOUNTER (EMERGENCY)
Age: 84
Discharge: HOME | End: 2019-12-23
Attending: STUDENT IN AN ORGANIZED HEALTH CARE EDUCATION/TRAINING PROGRAM | Admitting: STUDENT IN AN ORGANIZED HEALTH CARE EDUCATION/TRAINING PROGRAM
Payer: MEDICARE

## 2019-01-01 ENCOUNTER — OFFICE VISIT (OUTPATIENT)
Dept: CARDIOLOGY CLINIC | Age: 84
End: 2019-01-01

## 2019-01-01 ENCOUNTER — APPOINTMENT (OUTPATIENT)
Dept: GENERAL RADIOLOGY | Age: 84
End: 2019-01-01
Attending: STUDENT IN AN ORGANIZED HEALTH CARE EDUCATION/TRAINING PROGRAM
Payer: MEDICARE

## 2019-01-01 ENCOUNTER — APPOINTMENT (OUTPATIENT)
Dept: VASCULAR SURGERY | Age: 84
End: 2019-01-01
Attending: STUDENT IN AN ORGANIZED HEALTH CARE EDUCATION/TRAINING PROGRAM
Payer: MEDICARE

## 2019-01-01 ENCOUNTER — HOSPITAL ENCOUNTER (EMERGENCY)
Dept: GENERAL RADIOLOGY | Age: 84
Discharge: HOME | End: 2019-12-23
Attending: STUDENT IN AN ORGANIZED HEALTH CARE EDUCATION/TRAINING PROGRAM
Payer: MEDICARE

## 2019-01-01 VITALS
SYSTOLIC BLOOD PRESSURE: 104 MMHG | HEIGHT: 70 IN | BODY MASS INDEX: 18.75 KG/M2 | RESPIRATION RATE: 16 BRPM | HEART RATE: 72 BPM | OXYGEN SATURATION: 92 % | WEIGHT: 131 LBS | DIASTOLIC BLOOD PRESSURE: 58 MMHG

## 2019-01-01 VITALS
HEIGHT: 70 IN | SYSTOLIC BLOOD PRESSURE: 130 MMHG | RESPIRATION RATE: 18 BRPM | DIASTOLIC BLOOD PRESSURE: 62 MMHG | TEMPERATURE: 98.1 F | OXYGEN SATURATION: 99 % | BODY MASS INDEX: 18.75 KG/M2 | HEART RATE: 70 BPM | WEIGHT: 131 LBS

## 2019-01-01 DIAGNOSIS — Z95.0 CARDIAC PACEMAKER IN SITU: Primary | ICD-10-CM

## 2019-01-01 DIAGNOSIS — Z95.0 PACEMAKER: ICD-10-CM

## 2019-01-01 DIAGNOSIS — I27.20 PULMONARY HYPERTENSION (HCC): ICD-10-CM

## 2019-01-01 DIAGNOSIS — I25.10 CORONARY ARTERY DISEASE INVOLVING NATIVE CORONARY ARTERY OF NATIVE HEART WITHOUT ANGINA PECTORIS: ICD-10-CM

## 2019-01-01 DIAGNOSIS — S90.02XA CONTUSION OF LEFT ANKLE, INITIAL ENCOUNTER: ICD-10-CM

## 2019-01-01 DIAGNOSIS — I35.0 MILD AORTIC STENOSIS: ICD-10-CM

## 2019-01-01 DIAGNOSIS — I44.2 THIRD DEGREE AV BLOCK (HCC): ICD-10-CM

## 2019-01-01 DIAGNOSIS — Z95.1 S/P CABG X 3: ICD-10-CM

## 2019-01-01 DIAGNOSIS — I63.9 CEREBROVASCULAR ACCIDENT (CVA), UNSPECIFIED MECHANISM (HCC): ICD-10-CM

## 2019-01-01 DIAGNOSIS — I10 ESSENTIAL HYPERTENSION: ICD-10-CM

## 2019-01-01 DIAGNOSIS — I50.22 CHRONIC SYSTOLIC CONGESTIVE HEART FAILURE (HCC): ICD-10-CM

## 2019-01-01 DIAGNOSIS — L03.116 CELLULITIS OF LEFT LOWER EXTREMITY: Primary | ICD-10-CM

## 2019-01-01 DIAGNOSIS — I65.23 BILATERAL CAROTID ARTERY STENOSIS: Primary | ICD-10-CM

## 2019-01-01 LAB
ANION GAP SERPL CALC-SCNC: 3 MMOL/L (ref 5–15)
BASOPHILS # BLD: 0 K/UL (ref 0–0.1)
BASOPHILS NFR BLD: 0 % (ref 0–1)
BUN SERPL-MCNC: 27 MG/DL (ref 6–20)
BUN/CREAT SERPL: 26 (ref 12–20)
CALCIUM SERPL-MCNC: 9.3 MG/DL (ref 8.5–10.1)
CHLORIDE SERPL-SCNC: 101 MMOL/L (ref 97–108)
CO2 SERPL-SCNC: 33 MMOL/L (ref 21–32)
COMMENT, HOLDF: NORMAL
CREAT SERPL-MCNC: 1.02 MG/DL (ref 0.7–1.3)
DIFFERENTIAL METHOD BLD: ABNORMAL
EOSINOPHIL # BLD: 0.3 K/UL (ref 0–0.4)
EOSINOPHIL NFR BLD: 4 % (ref 0–7)
ERYTHROCYTE [DISTWIDTH] IN BLOOD BY AUTOMATED COUNT: 15.8 % (ref 11.5–14.5)
GLUCOSE SERPL-MCNC: 59 MG/DL (ref 65–100)
HCT VFR BLD AUTO: 29.4 % (ref 36.6–50.3)
HGB BLD-MCNC: 9.2 G/DL (ref 12.1–17)
IMM GRANULOCYTES # BLD AUTO: 0 K/UL (ref 0–0.04)
IMM GRANULOCYTES NFR BLD AUTO: 0 % (ref 0–0.5)
LYMPHOCYTES # BLD: 1.4 K/UL (ref 0.8–3.5)
LYMPHOCYTES NFR BLD: 16 % (ref 12–49)
MCH RBC QN AUTO: 29.9 PG (ref 26–34)
MCHC RBC AUTO-ENTMCNC: 31.3 G/DL (ref 30–36.5)
MCV RBC AUTO: 95.5 FL (ref 80–99)
MONOCYTES # BLD: 0.9 K/UL (ref 0–1)
MONOCYTES NFR BLD: 10 % (ref 5–13)
NEUTS SEG # BLD: 6 K/UL (ref 1.8–8)
NEUTS SEG NFR BLD: 70 % (ref 32–75)
NRBC # BLD: 0 K/UL (ref 0–0.01)
NRBC BLD-RTO: 0 PER 100 WBC
PLATELET # BLD AUTO: 229 K/UL (ref 150–400)
PMV BLD AUTO: 9.8 FL (ref 8.9–12.9)
POTASSIUM SERPL-SCNC: 3.9 MMOL/L (ref 3.5–5.1)
RBC # BLD AUTO: 3.08 M/UL (ref 4.1–5.7)
SAMPLES BEING HELD,HOLD: NORMAL
SODIUM SERPL-SCNC: 137 MMOL/L (ref 136–145)
WBC # BLD AUTO: 8.6 K/UL (ref 4.1–11.1)

## 2019-01-01 PROCEDURE — 73130 X-RAY EXAM OF HAND: CPT

## 2019-01-01 PROCEDURE — 85025 COMPLETE CBC W/AUTO DIFF WBC: CPT

## 2019-01-01 PROCEDURE — 73610 X-RAY EXAM OF ANKLE: CPT

## 2019-01-01 PROCEDURE — 73630 X-RAY EXAM OF FOOT: CPT

## 2019-01-01 PROCEDURE — 80048 BASIC METABOLIC PNL TOTAL CA: CPT

## 2019-01-01 PROCEDURE — 73590 X-RAY EXAM OF LOWER LEG: CPT

## 2019-01-01 PROCEDURE — 93971 EXTREMITY STUDY: CPT

## 2019-01-01 PROCEDURE — 99285 EMERGENCY DEPT VISIT HI MDM: CPT

## 2019-01-01 PROCEDURE — 36415 COLL VENOUS BLD VENIPUNCTURE: CPT

## 2019-01-01 RX ORDER — CEPHALEXIN 500 MG/1
500 CAPSULE ORAL 4 TIMES DAILY
Qty: 28 CAP | Refills: 0 | Status: SHIPPED | OUTPATIENT
Start: 2019-01-01 | End: 2019-01-01

## 2019-02-14 LAB
BUN SERPL-MCNC: 26 MG/DL (ref 8–27)
BUN/CREAT SERPL: 23 (ref 10–24)
CALCIUM SERPL-MCNC: 9.6 MG/DL (ref 8.6–10.2)
CHLORIDE SERPL-SCNC: 99 MMOL/L (ref 96–106)
CHOLEST SERPL-MCNC: 103 MG/DL (ref 100–199)
CO2 SERPL-SCNC: 28 MMOL/L (ref 20–29)
CREAT SERPL-MCNC: 1.12 MG/DL (ref 0.76–1.27)
GLUCOSE SERPL-MCNC: 103 MG/DL (ref 65–99)
HCT VFR BLD AUTO: 35 % (ref 37.5–51)
HDLC SERPL-MCNC: 42 MG/DL
HGB BLD-MCNC: 10.9 G/DL (ref 13–17.7)
INTERPRETATION, 910389: NORMAL
LDLC SERPL CALC-MCNC: 46 MG/DL (ref 0–99)
POTASSIUM SERPL-SCNC: 4.7 MMOL/L (ref 3.5–5.2)
SODIUM SERPL-SCNC: 144 MMOL/L (ref 134–144)
TRIGL SERPL-MCNC: 77 MG/DL (ref 0–149)
VLDLC SERPL CALC-MCNC: 15 MG/DL (ref 5–40)

## 2019-02-19 NOTE — TELEPHONE ENCOUNTER
Labs look good, renal fx and LDL are fine  No longer as anemic, best # in some time for Hgb  Future Appointments  2/27/2019  10:40 AM   Roverto Alva MD          [facility]          [facility] SCHED  2/27/2019  10:45 AM   PACEMAKER, STFRMISAEL       [facility]          [facility] SCHED  2/27/2019  11:20 AM   Nenita Piña MD       [address] [place] [address],[address]

## 2019-02-20 ENCOUNTER — TELEPHONE (OUTPATIENT)
Dept: CARDIOLOGY CLINIC | Age: 84
End: 2019-02-20

## 2019-02-20 NOTE — TELEPHONE ENCOUNTER
Two patient identifiers verified. Per MD patient's daughter Elba Pelayo (on HIPAA) called and given results of recent lab work. Elba Pelayo verbalized understanding and denies any further questions or concerns at this time.

## 2019-02-27 ENCOUNTER — OFFICE VISIT (OUTPATIENT)
Dept: CARDIOLOGY CLINIC | Age: 84
End: 2019-02-27

## 2019-02-27 ENCOUNTER — CLINICAL SUPPORT (OUTPATIENT)
Dept: CARDIOLOGY CLINIC | Age: 84
End: 2019-02-27

## 2019-02-27 VITALS
HEART RATE: 72 BPM | WEIGHT: 134 LBS | SYSTOLIC BLOOD PRESSURE: 100 MMHG | OXYGEN SATURATION: 94 % | BODY MASS INDEX: 20.31 KG/M2 | DIASTOLIC BLOOD PRESSURE: 44 MMHG | HEIGHT: 68 IN | RESPIRATION RATE: 16 BRPM

## 2019-02-27 VITALS
RESPIRATION RATE: 16 BRPM | HEART RATE: 72 BPM | HEIGHT: 68 IN | WEIGHT: 134.2 LBS | SYSTOLIC BLOOD PRESSURE: 100 MMHG | OXYGEN SATURATION: 94 % | BODY MASS INDEX: 20.34 KG/M2 | DIASTOLIC BLOOD PRESSURE: 44 MMHG

## 2019-02-27 DIAGNOSIS — Z95.0 CARDIAC PACEMAKER IN SITU: Primary | ICD-10-CM

## 2019-02-27 DIAGNOSIS — I63.9 CEREBROVASCULAR ACCIDENT (CVA), UNSPECIFIED MECHANISM (HCC): ICD-10-CM

## 2019-02-27 DIAGNOSIS — I44.2 THIRD DEGREE AV BLOCK (HCC): Primary | ICD-10-CM

## 2019-02-27 DIAGNOSIS — Z95.1 S/P CABG X 3: ICD-10-CM

## 2019-02-27 DIAGNOSIS — I10 ESSENTIAL HYPERTENSION: ICD-10-CM

## 2019-02-27 DIAGNOSIS — Z95.0 PACEMAKER: ICD-10-CM

## 2019-02-27 DIAGNOSIS — I25.10 CORONARY ARTERY DISEASE INVOLVING NATIVE CORONARY ARTERY OF NATIVE HEART WITHOUT ANGINA PECTORIS: ICD-10-CM

## 2019-02-27 DIAGNOSIS — I44.2 THIRD DEGREE AV BLOCK (HCC): ICD-10-CM

## 2019-02-27 DIAGNOSIS — I50.22 CHRONIC SYSTOLIC HEART FAILURE (HCC): Primary | ICD-10-CM

## 2019-02-27 DIAGNOSIS — I27.20 PULMONARY HYPERTENSION (HCC): ICD-10-CM

## 2019-02-27 DIAGNOSIS — I35.0 MILD AORTIC STENOSIS: ICD-10-CM

## 2019-02-27 RX ORDER — NYSTATIN 100000 [USP'U]/G
POWDER TOPICAL 4 TIMES DAILY
COMMUNITY
End: 2020-01-01

## 2019-02-27 RX ORDER — DOCUSATE SODIUM 100 MG/1
100 CAPSULE, LIQUID FILLED ORAL
COMMUNITY
End: 2020-01-01

## 2019-02-27 RX ORDER — LEVOTHYROXINE SODIUM 150 UG/1
150 TABLET ORAL
COMMUNITY

## 2019-02-27 RX ORDER — SERTRALINE HYDROCHLORIDE 100 MG/1
200 TABLET, FILM COATED ORAL DAILY
COMMUNITY

## 2019-02-27 RX ORDER — KETOCONAZOLE 20 MG/ML
SHAMPOO TOPICAL DAILY
COMMUNITY
End: 2020-01-01

## 2019-02-27 NOTE — PROGRESS NOTES
Room # 2 Denies any cardiac complaints at this time. Visit Vitals /44 (BP 1 Location: Left arm, BP Patient Position: Sitting) Pulse 72 Resp 16 Ht 5' 8\" (1.727 m) Wt 134 lb 3.2 oz (60.9 kg) SpO2 94% BMI 20.41 kg/m²

## 2019-02-27 NOTE — PROGRESS NOTES
HISTORY OF PRESENTING ILLNESS Geovanna Gómez is a 81 y.o. male with CAD/CABG, PPM, COPD, CVA, DM, colon cancer, HTN and fluctuating cardiomyopathy here for follow up of his PPM. PPM interrogation today demonstrated normal device functioning; he is PPM dependent. ACTIVE PROBLEM LIST Patient Active Problem List  
 Diagnosis Date Noted  Acute encephalopathy 01/04/2018  Severe sepsis ([address] 01/04/2018  Complicated UTI (urinary tract infection) 01/04/2018  Hip fracture, right ([address] 12/21/2017  Recurrent left pleural effusion 11/24/2015  Chronic respiratory failure ([address] 11/24/2015  Iron deficiency anemia 11/24/2015  Counseling regarding advanced care planning and goals of care 11/23/2015  Dementia 11/20/2015  Ex-smoker 09/24/2015  Chronic systolic heart failure ([address] 09/18/2015  ICH (intracerebral hemorrhage) ([address] 08/10/2015  Pacemaker 07/02/2015  S/P CABG x 3 06/29/2015  Tobacco use 06/28/2015  Poor historian 06/28/2015  Hypothyroidism 06/28/2015  Diabetes mellitus ([address] 06/28/2015  Cerebrovascular accident ([address] 06/28/2015  Chronic obstructive pulmonary disease ([address] 06/28/2015  Malignant neoplasm of colon ([address] 06/28/2015  Other nonspecific abnormal finding of lung field 06/28/2015  Gastroesophageal reflux disease 06/28/2015  CAD (coronary artery disease) 06/26/2015  Pulmonary hypertension 06/15/2015  Mild aortic stenosis 06/15/2015  Third degree AV block ([address] 06/11/2015  Cardiomyopathy ([address] 06/11/2015  Second degree AV block 06/11/2015  Stroke ([address]  Hypertension  PVD (peripheral vascular disease) ([address]  Diabetes ([address]  Lumbar stenosis 09/29/2011 PAST MEDICAL HISTORY Past Medical History:  
Diagnosis Date  Arthritis  CAD (coronary artery disease)  Cardiomyopathy ([address] 6/11/2015  Colon cancer ([address] 1993 COLON  
 Depression  DEPRESSION  
  Diabetes ([facility] IDDM  GERD (gastroesophageal reflux disease)  Hypertension  Lung cancer ([facility] 11/29/2016  Mild aortic stenosis 6/15/2015  Pulmonary hypertension ([facility] 6/15/2015  PVD (peripheral vascular disease) ([facility] STENT RIGHT GROIN, PAD  
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  
 Stroke ([facility] 1999  
 2 STROKES  Third degree AV block ([facility] 6/11/2015  Thyroid disease PAST SURGICAL HISTORY Past Surgical History:  
Procedure Laterality Date [address] COLON RESECTION  
 HX HEART CATHETERIZATION  6/26/2015  HX PACEMAKER    
 INS PPM/ICD LED DUAL ONLY  7/2/2015 [address] PAD, RIGHT GROIN STENT ALLERGIES No Known Allergies FAMILY HISTORY Family History Problem Relation Age of Onset  Heart Disease Mother  Diabetes Mother  Other Father AMPUTATION LEG FOLLOWING BUG BITE  
 Other Sister CEREBRAL ANUERYSM  
 Heart Disease Brother  Diabetes Sister   
 negative for cardiac disease SOCIAL HISTORY Social History Socioeconomic History  Marital status:  Spouse name: Not on file  Number of children: Not on file  Years of education: Not on file  Highest education level: Not on file Tobacco Use  Smoking status: Former Smoker Packs/day: 2.00 Years: 60.00 Pack years: 120.00 Types: Cigarettes Last attempt to quit: 6/22/2015 Years since quitting: 3.6  Smokeless tobacco: Never Used Substance and Sexual Activity  Alcohol use: No  
  Alcohol/week: 0.0 oz  Drug use: No  
 Sexual activity: Not Currently Partners: Female MEDICATIONS Current Outpatient Medications Medication Sig  
 aspirin 81 mg chewable tablet Take 1 Tab by mouth daily.  atorvastatin (LIPITOR) 10 mg tablet Take 1 Tab by mouth nightly.  diphenoxylate-atropine (LOMOTIL) 2.5-0.025 mg per tablet Take 1 Tab by mouth daily as needed for Diarrhea.  MULTIVIT WITH IRON,MINERALS (MULTIVITAMIN AND MINERALS PO) Take  by mouth daily.  bumetanide (BUMEX) 1 mg tablet Take 1 Tab by mouth two (2) times a day.  acetaminophen (TYLENOL) 325 mg tablet Take 650 mg by mouth every four (4) hours as needed for Pain.  calcium-vitamin D (OYSTER SHELL) 500 mg(1,250mg) -200 unit per tablet Take 1 Tab by mouth three (3) times daily (with meals).  guaiFENesin SR (MUCINEX) 600 mg SR tablet Take 600 mg by mouth two (2) times a day.  albuterol-ipratropium (DUO-NEB) 2.5 mg-0.5 mg/3 ml nebu 3 mL by Nebulization route every four (4) hours as needed (shortness of breath).  dextran 70-hypromellose (ARTIFICIAL TEARS,[id number],) ophthalmic solution Administer 2 Drops to both eyes two (2) times a day.  glipiZIDE (GLUCOTROL) 5 mg tablet Take 5 mg by mouth Before breakfast and dinner. Do not take if blood sugar is less than 120  
 bisacodyl (DULCOLAX) 10 mg suppository Insert 10 mg into rectum daily as needed.  senna-docusate (PERICOLACE) 8.6-50 mg per tablet Take 1 Tab by mouth daily. For constipation. Do not take if you have loose stools  QUEtiapine (SEROQUEL) 100 mg tablet Take 0.5 Tabs by mouth daily. At 9AM  
 cholecalciferol (VITAMIN D3) 1,000 unit tablet Take 3,000 Units by mouth daily.  levothyroxine (SYNTHROID) 125 mcg tablet Take 125 mcg by mouth Daily (before breakfast). One hour prior to breakfast  
 magnesium gluconate 500 mg (27 mg  elemental) tablet Take 500 mg by mouth daily.  omeprazole (PRILOSEC) 20 mg capsule Take 20 mg by mouth Daily (before breakfast). 30 minutes prior to breakfast  
 escitalopram oxalate (LEXAPRO) 20 mg tablet Take 20 mg by mouth daily.  donepezil (ARICEPT) 10 mg tablet Take 10 mg by mouth daily.  carvedilol (COREG) 3.125 mg tablet Take 1 Tab by mouth two (2) times a day.  ferrous sulfate 325 mg (65 mg iron) tablet Take 325 mg by mouth daily (with breakfast).  finasteride (PROSCAR) 5 mg tablet Take 5 mg by mouth every evening.  fluticasone (FLONASE) 50 mcg/actuation nasal spray 2 Sprays by Both Nostrils route daily.  tamsulosin (FLOMAX) 0.4 mg capsule Take 0.4 mg by mouth nightly. No current facility-administered medications for this visit. I have reviewed the nurses notes, vitals, problem list, allergy list, medical history, family, social history and medications. REVIEW OF SYMPTOMS General: Pt denies excessive weight gain or loss. Pt is able to conduct ADL's HEENT: Denies blurred vision, headaches, hearing loss, epistaxis and difficulty swallowing. Respiratory: Denies cough, congestion, shortness of breath, FRANCOIS, wheezing or stridor. Cardiovascular: Denies precordial pain, palpitations, edema or PND Gastrointestinal: Denies poor appetite, indigestion, abdominal pain or blood in stool Genitourinary: Denies hematuria, dysuria, increased urinary frequency Musculoskeletal: Denies joint pain or swelling from muscles or joints Neurologic: Denies tremor, paresthesias, headache, or sensory motor disturbance Psychiatric: Denies confusion, insomnia, depression Integumentray: Denies rash, itching or ulcers. Hematologic: Denies easy bruising, bleeding PHYSICAL EXAMINATION There were no vitals filed for this visit. General: Well developed, in no acute distress. HEENT: No jaundice, oral mucosa moist, no oral ulcers Neck: Supple, no stiffness, no lymphadenopathy, supple Heart:  Normal S1/S2 negative S3 or S4. Regular, no murmur, gallop or rub, no jugular venous distention Respiratory: Clear bilaterally x 4, no wheezing or rales Abdomen:   Soft, non-tender, bowel sounds are active.  
Extremities:  No edema, normal cap refill, no cyanosis. Musculoskeletal: No clubbing, no deformities Neuro: A&Ox3, speech clear, gait stable, cooperative, no focal neurologic deficits Skin: Skin color is normal. No rashes or lesions. Non diaphoretic, moist. 
Vascular: 2+ pulses symmetric in all extremities DIAGNOSTIC DATA EKG: Ventricular paced rhythm LABORATORY DATA Lab Results Component Value Date/Time WBC 12.0 (H) 01/06/2018 02:05 AM  
 Hemoglobin (POC) 11.6 (L) 02/13/2016 02:26 PM  
 HGB 10.9 (L) 02/13/2019 10:22 AM  
 Hematocrit (POC) 34 (L) 02/13/2016 02:26 PM  
 HCT 35.0 (L) 02/13/2019 10:22 AM  
 PLATELET 267 33/10/9988 02:05 AM  
 MCV 98.0 01/06/2018 02:05 AM  
  
Lab Results Component Value Date/Time Sodium 144 02/13/2019 10:22 AM  
 Potassium 4.7 02/13/2019 10:22 AM  
 Chloride 99 02/13/2019 10:22 AM  
 CO2 28 02/13/2019 10:22 AM  
 Anion gap 5 01/06/2018 02:05 AM  
 Glucose 103 (H) 02/13/2019 10:22 AM  
 BUN 26 02/13/2019 10:22 AM  
 Creatinine 1.12 02/13/2019 10:22 AM  
 BUN/Creatinine ratio 23 02/13/2019 10:22 AM  
 GFR est AA 70 02/13/2019 10:22 AM  
 GFR est non-AA 61 02/13/2019 10:22 AM  
 Calcium 9.6 02/13/2019 10:22 AM  
 Bilirubin, total 0.8 01/05/2018 02:30 AM  
 AST (SGOT) 14 (L) 01/05/2018 02:30 AM  
 Alk. phosphatase 89 01/05/2018 02:30 AM  
 Protein, total 6.0 (L) 01/05/2018 02:30 AM  
 Albumin 2.5 (L) 01/05/2018 02:30 AM  
 Globulin 3.5 01/05/2018 02:30 AM  
 A-G Ratio 0.7 (L) 01/05/2018 02:30 AM  
 ALT (SGPT) 22 01/05/2018 02:30 AM  
  
 
 
 ASSESSMENT 1. PPM 
2. COPD on home O2 3. CAD 4. CABG 5. CVA 6. DM 
7. Colon cancer PLAN Continue monitoring device clinic Thank you, Zoe Hillman MD and Dr. Palmira Flynn for allowing me to participate in the care of this extraordinarily pleasant male. Please do not hesitate to contact me for further questions/concerns. Regino Rosario MD 
Cardiac Electrophysiology / Cardiology [address] [place] [address] 
 [address] [place] [address], [address] [place] [address], [address]    BerkeleyDerick jose 
[phone] / [phone] Fax   [phone] / [phone] Fax

## 2019-02-27 NOTE — CONSULTS
ORTHO CONSULT    Subjective:     Date of Consultation:  January 4, 2018    Referring Physician:  Dr. Lea Garber is a 81 y.o. male POD 15 R hip IM nail s/p R Fem neck fx. Pt. Hospitalized today for sepsis, UTI, Pulmonary edema, AMS. Sent here from [facility] where he was doing rehab s/p R hip surgery with Dr. Evon Pastor. Pt. Non-verbal at this time.     Patient Active Problem List    Diagnosis Date Noted    Acute encephalopathy 01/04/2018    Severe sepsis ([address] 12/10/8281    Complicated UTI (urinary tract infection) 01/04/2018    Hip fracture, right ([address] 12/21/2017    Recurrent left pleural effusion 11/24/2015    Chronic respiratory failure ([address] 11/24/2015    Iron deficiency anemia 11/24/2015    Counseling regarding advanced care planning and goals of care 11/23/2015    Dementia 11/20/2015    Ex-smoker 09/24/2015    Chronic systolic heart failure ([address] 09/18/2015    ICH (intracerebral hemorrhage) ([address] 08/10/2015    Pacemaker 07/02/2015    S/P CABG x 3 06/29/2015    Tobacco use 06/28/2015    Poor historian 06/28/2015    Hypothyroidism 06/28/2015    Diabetes mellitus ([address] 06/28/2015    Cerebrovascular accident ([address] 06/28/2015    Chronic obstructive pulmonary disease ([address] 06/28/2015    Malignant neoplasm of colon ([address] 06/28/2015    Other nonspecific abnormal finding of lung field 06/28/2015    Gastroesophageal reflux disease 06/28/2015    CAD (coronary artery disease) 06/26/2015    Pulmonary hypertension 06/15/2015    Mild aortic stenosis 06/15/2015    Third degree AV block ([address] 06/11/2015    Cardiomyopathy ([address] 06/11/2015    Second degree AV block 06/11/2015    Stroke ([address]     Hypertension     PVD (peripheral vascular disease) ([address]     Diabetes ([address]     Lumbar stenosis 09/29/2011     Family History   Problem Relation Age of Onset    Heart Disease Mother     Diabetes Mother     Other Father      AMPUTATION LEG FOLLOWING BUG BITE    Other Sister      CEREBRAL Sent in   CARINAYSM    Heart Disease Brother     Diabetes Sister       Social History   Substance Use Topics    Smoking status: Former Smoker     Packs/day: 2.00     Years: 60.00     Types: Cigarettes     Quit date: 6/22/2015    Smokeless tobacco: Never Used    Alcohol use No     Past Medical History:   Diagnosis Date    Arthritis     CAD (coronary artery disease)     Cardiomyopathy ([facility] 6/11/2015    Colon cancer ([facility] 1993    COLON    Depression     DEPRESSION    Diabetes ([facility]     IDDM    GERD (gastroesophageal reflux disease)     Hypertension     Lung cancer ([facility] 11/29/2016    Mild aortic stenosis 6/15/2015    Pulmonary hypertension 6/15/2015    PVD (peripheral vascular disease) ([facility]     STENT RIGHT GROIN, PAD    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    LIMA TO LAD; SVG TO OM; SVG TO OM    Stroke ([facility] 1999    2 STROKES    Third degree AV block ([facility] 6/11/2015    Thyroid disease       Past Surgical History:   Procedure Laterality Date    HX GI  1993    COLON RESECTION    HX HEART CATHETERIZATION  6/26/2015    HX PACEMAKER      INS PPM/ICD LED DUAL ONLY  7/2/2015         VASCULAR SURGERY PROCEDURE UNLIST  1999    PAD, RIGHT GROIN STENT      Prior to Admission medications    Medication Sig Start Date End Date Taking? Authorizing Provider   glipiZIDE (GLUCOTROL) 5 mg tablet Take 1 Tab by mouth two (2) times a day. Do not take if blood sugar is less than 120 12/27/17   Jonas Saravia MD   traMADol Rick Hun) 50 mg tablet Take 1 Tab by mouth every six (6) hours as needed. Max Daily Amount: 200 mg. Do not take if sedated 12/27/17   Jonas Saravia MD   bisacodyl (DULCOLAX) 10 mg suppository Insert 10 mg into rectum daily as needed. 12/27/17   Jonas Saravia MD   senna-docusate (PERICOLACE) 8.6-50 mg per tablet Take 1 Tab by mouth daily. For constipation.  Do not take if you have loose stools 12/27/17   Jonas Saravia MD   albuterol-ipratropium (DUO-NEB) 2.5 mg-0.5 mg/3 ml nebu 3 mL by Nebulization route every six (6) hours as needed. Indications: 1 inhalation 12/27/17   Whitney Nava MD   QUEtiapine (SEROQUEL) 100 mg tablet Take 0.5 Tabs by mouth daily. At [facility] 12/26/17   Dell Ashford MD   enoxaparin (LOVENOX) 40 mg/0.4 mL 0.4 mL by SubCUTAneous route daily for 26 days. 12/26/17 1/21/18  Dell Ashford MD   cholecalciferol (VITAMIN D3) 1,000 unit tablet Take 3,000 Units by mouth daily. Historical Provider   levothyroxine (SYNTHROID) 125 mcg tablet Take 125 mcg by mouth Daily (before breakfast). One hour prior to breakfast    Historical Provider   magnesium gluconate 500 mg (27 mg  elemental) tablet Take 500 mg by mouth daily. Historical Provider   Carboxymethylcellulose-Glycern (REFRESH OPTIVE) 0.5-0.9 % drop Administer 1 Drop to both eyes as needed (dry eye). Historical Provider   diphenoxylate-atropine (LOMOTIL) 2.5-0.025 mg per tablet Take 1 Tab by mouth four (4) times daily as needed for Diarrhea. Historical Provider   omeprazole (PRILOSEC) 20 mg capsule Take 20 mg by mouth Daily (before breakfast). 30 minutes prior to breakfast    Historical Provider   escitalopram oxalate (LEXAPRO) 20 mg tablet Take 20 mg by mouth daily. Historical Provider   furosemide (LASIX) 40 mg tablet Take 40 mg by mouth two (2) times a day. Historical Provider   ketoconazole (NIZORAL) 2 % shampoo Apply  to affected area daily as needed for Itching. Historical Provider   donepezil (ARICEPT) 10 mg tablet Take 10 mg by mouth daily. Historical Provider   carvedilol (COREG) 3.125 mg tablet Take 1 Tab by mouth two (2) times a day. 11/24/15   Bobo Abdul MD   ferrous sulfate 325 mg (65 mg iron) tablet Take 325 mg by mouth daily (with breakfast). Historical Provider   finasteride (PROSCAR) 5 mg tablet Take 5 mg by mouth every evening. Historical Provider   fluticasone (FLONASE) 50 mcg/actuation nasal spray 2 Sprays by Both Nostrils route daily as needed.     Historical Provider   tamsulosin (FLOMAX) 0.4 mg capsule Take 0.4 mg by mouth nightly. Historical Provider     Current Facility-Administered Medications   Medication Dose Route Frequency    sodium chloride (NS) flush 5-10 mL  5-10 mL IntraVENous PRN    0.9% sodium chloride infusion 2,226 mL  30 mL/kg IntraVENous CONTINUOUS    piperacillin-tazobactam (ZOSYN) 3.375 g in 0.9% sodium chloride 50 mL IVPB  3.375 g IntraVENous Q8H     Current Outpatient Prescriptions   Medication Sig    glipiZIDE (GLUCOTROL) 5 mg tablet Take 1 Tab by mouth two (2) times a day. Do not take if blood sugar is less than 120    traMADol (ULTRAM) 50 mg tablet Take 1 Tab by mouth every six (6) hours as needed. Max Daily Amount: 200 mg. Do not take if sedated    bisacodyl (DULCOLAX) 10 mg suppository Insert 10 mg into rectum daily as needed.  senna-docusate (PERICOLACE) 8.6-50 mg per tablet Take 1 Tab by mouth daily. For constipation. Do not take if you have loose stools    albuterol-ipratropium (DUO-NEB) 2.5 mg-0.5 mg/3 ml nebu 3 mL by Nebulization route every six (6) hours as needed. Indications: 1 inhalation    QUEtiapine (SEROQUEL) 100 mg tablet Take 0.5 Tabs by mouth daily. At 9AM    enoxaparin (LOVENOX) 40 mg/0.4 mL 0.4 mL by SubCUTAneous route daily for 26 days.  cholecalciferol (VITAMIN D3) 1,000 unit tablet Take 3,000 Units by mouth daily.  levothyroxine (SYNTHROID) 125 mcg tablet Take 125 mcg by mouth Daily (before breakfast). One hour prior to breakfast    magnesium gluconate 500 mg (27 mg  elemental) tablet Take 500 mg by mouth daily.  Carboxymethylcellulose-Glycern (REFRESH OPTIVE) 0.5-0.9 % drop Administer 1 Drop to both eyes as needed (dry eye).  diphenoxylate-atropine (LOMOTIL) 2.5-0.025 mg per tablet Take 1 Tab by mouth four (4) times daily as needed for Diarrhea.  omeprazole (PRILOSEC) 20 mg capsule Take 20 mg by mouth Daily (before breakfast). 30 minutes prior to breakfast    escitalopram oxalate (LEXAPRO) 20 mg tablet Take 20 mg by mouth daily.     furosemide (LASIX) 40 mg tablet Take 40 mg by mouth two (2) times a day.  ketoconazole (NIZORAL) 2 % shampoo Apply  to affected area daily as needed for Itching.  donepezil (ARICEPT) 10 mg tablet Take 10 mg by mouth daily.  carvedilol (COREG) 3.125 mg tablet Take 1 Tab by mouth two (2) times a day.  ferrous sulfate 325 mg (65 mg iron) tablet Take 325 mg by mouth daily (with breakfast).  finasteride (PROSCAR) 5 mg tablet Take 5 mg by mouth every evening.  fluticasone (FLONASE) 50 mcg/actuation nasal spray 2 Sprays by Both Nostrils route daily as needed.  tamsulosin (FLOMAX) 0.4 mg capsule Take 0.4 mg by mouth nightly. No Known Allergies     Review of Systems:  A comprehensive review of systems was negative except for that written in the HPI. Objective:     Patient Vitals for the past 8 hrs:   BP Temp Pulse Resp SpO2 Weight   18 1245 (!) 97/38 - 61 - 100 % -   18 1108 - - - - 100 % -   18 1100 127/50 - 60 - 100 % -   18 1045 (!) 120/39 - 60 - 100 % -   18 1030 119/42 - 61 - 100 % -   18 1018 - - - - - 74.2 kg (163 lb 9.3 oz)   18 1015 124/41 100.2 °F (37.9 °C) 68 24 100 % -     Temp (24hrs), Av.2 °F (37.9 °C), Min:100.2 °F (37.9 °C), Max:100.2 °F (37.9 °C)        EXAM: Pt. Wakes to verbal stimuli but unable to answer questions. No grimacing with rotation of the R hip. Lateral incision sites are healing well, no erythema, no fluctuance on exam.   Motor/sensory intact distally R foot, pedal pulses = BLE's. X-ray from 18 of R hip shows short IM nail in place, no dislocation.       Data Review   Recent Results (from the past 24 hour(s))   URINALYSIS W/MICROSCOPIC    Collection Time: 18 10:10 PM   Result Value Ref Range    Color YELLOW/STRAW      Appearance CLOUDY (A) CLEAR      Specific gravity 1.017 1.003 - 1.030      pH (UA) 5.0 5.0 - 8.0      Protein 30 (A) NEG mg/dL    Glucose NEGATIVE  NEG mg/dL    Ketone NEGATIVE  NEG mg/dL    Bilirubin NEGATIVE  NEG      Blood MODERATE (A) NEG      Urobilinogen 1.0 0.2 - 1.0 EU/dL    Nitrites NEGATIVE  NEG      Leukocyte Esterase LARGE (A) NEG      WBC >100 (H) 0 - 4 /hpf    RBC 0-5 0 - 5 /hpf    Epithelial cells MODERATE (A) FEW /lpf    Bacteria 2+ (A) NEG /hpf   CBC WITH AUTOMATED DIFF    Collection Time: 01/04/18  3:55 AM   Result Value Ref Range    WBC 22.7 (H) 4.1 - 11.1 K/uL    RBC 2.90 (L) 4.10 - 5.70 M/uL    HGB 8.8 (L) 12.1 - 17.0 g/dL    HCT 27.7 (L) 36.6 - 50.3 %    MCV 95.5 80.0 - 99.0 FL    MCH 30.3 26.0 - 34.0 PG    MCHC 31.8 30.0 - 36.5 g/dL    RDW 15.2 (H) 11.5 - 14.5 %    PLATELET 548 (H) 620 - 400 K/uL    NEUTROPHILS 85 (H) 32 - 75 %    LYMPHOCYTES 8 (L) 12 - 49 %    MONOCYTES 7 5 - 13 %    EOSINOPHILS 0 0 - 7 %    BASOPHILS 0 0 - 1 %    ABS. NEUTROPHILS 19.5 (H) 1.8 - 8.0 K/UL    ABS. LYMPHOCYTES 1.7 0.8 - 3.5 K/UL    ABS. MONOCYTES 1.5 (H) 0.0 - 1.0 K/UL    ABS. EOSINOPHILS 0.1 0.0 - 0.4 K/UL    ABS. BASOPHILS 0.0 0.0 - 0.1 K/UL   METABOLIC PANEL, BASIC    Collection Time: 01/04/18  3:55 AM   Result Value Ref Range    Sodium 138 136 - 145 mmol/L    Potassium 3.9 3.5 - 5.1 mmol/L    Chloride 99 97 - 108 mmol/L    CO2 30 21 - 32 mmol/L    Anion gap 9 5 - 15 mmol/L    Glucose 92 65 - 100 mg/dL    BUN 35 (H) 6 - 20 MG/DL    Creatinine 1.35 (H) 0.70 - 1.30 MG/DL    BUN/Creatinine ratio 26 (H) 12 - 20      GFR est AA >60 >60 ml/min/1.73m2    GFR est non-AA 51 (L) >60 ml/min/1.73m2    Calcium 9.1 8.5 - 10.1 MG/DL   LACTIC ACID    Collection Time: 01/04/18 10:18 AM   Result Value Ref Range    Lactic acid 2.0 0.4 - 2.0 MMOL/L   HEPATIC FUNCTION PANEL    Collection Time: 01/04/18 10:18 AM   Result Value Ref Range    Protein, total 7.1 6.4 - 8.2 g/dL    Albumin 3.0 (L) 3.5 - 5.0 g/dL    Globulin 4.1 (H) 2.0 - 4.0 g/dL    A-G Ratio 0.7 (L) 1.1 - 2.2      Bilirubin, total 1.1 (H) 0.2 - 1.0 MG/DL    Bilirubin, direct 0.4 (H) 0.0 - 0.2 MG/DL    Alk.  phosphatase 109 45 - 117 U/L    AST (SGOT) 16 15 - 37 U/L ALT (SGPT) 28 12 - 78 U/L   CK W/ CKMB & INDEX    Collection Time: 01/04/18 10:23 AM   Result Value Ref Range    CK 40 39 - 308 U/L    CK - MB <1.0 <3.6 NG/ML    CK-MB Index Cannot be calculated 0 - 2.5     NT-PRO BNP    Collection Time: 01/04/18 10:23 AM   Result Value Ref Range    NT pro-BNP 9880 (H) 0 - 450 PG/ML   TROPONIN I    Collection Time: 01/04/18 10:23 AM   Result Value Ref Range    Troponin-I, Qt. <0.04 <0.05 ng/mL         Assessment/Plan:     S/p R hip IM nail      No signs of infected surgical site or joint. With source of UTI I see no need for further diagnostics of the R hip. May resume PT WBAT when ok with Medicine. Follow up outpatient with Dr. Melquiades Correia in 3-4 weeks. Reconsult as needed. Dr. Melquiades Correia agrees with plan. Thank you for allowing us to take part in this patients care.       Kyler Lopez PA-C  Orthopaedic Surgery VAL Lopez PA-C

## 2019-02-27 NOTE — PROGRESS NOTES
Chief Complaint   Patient presents with    Follow-up     6 months     Visit Vitals  /44   Pulse 72   Resp 16   Ht 5' 8\" (1.727 m)   Wt 134 lb (60.8 kg)   SpO2 94%   BMI 20.37 kg/m²     No cardiac complaints at this time.

## 2019-02-27 NOTE — PROGRESS NOTES
Geovanna Gómez     1935       Jaxson Jones MD, Formerly [facility]  Date of Visit-2/27/2019   PCP is Lupe Guerra MD   [facility] [place]  [facility]  HPI:  Geovanna Gómez is a 81 y.o. male   F/u of CAD, pacer, CHF. Overall the pt states he is doing well. Pt lives at nursing home. Pt is present with a family member. Pt's family member adds that pt's eyes have odd coloration, and she noticed pt's eye change this morning. Denies chest pain, edema, syncope or shortness of breath at rest, has no tachycardia, palpitations or sense of arrhythmia. EKG: Ventricular paced. Assessment/Plan:     1. CHF systolic/diastolic chronic    EF 53-72%    ---bumex, Coreg  ---he's well compensated NYHA class I - II    2. CAD no angina, CABG 2015  --was off ASA due to bleeding  --off statin  asa 81 mg day and lipitor 10 mg q hs     3. Mild AS with murmur, no change     4. Third degree AV block. ---implant 2015  Pacer check in Feb with 99% RV pacing, normal fx for pacer     5. PA HTN continue diuretic  6. HTN stable  BP Readings from Last 3 Encounters:   02/27/19 100/44   02/27/19 100/44   08/29/18 112/50     7. COPD with no acute wheezing   8. Anemia chronic. Lab Results   Component Value Date/Time    Hemoglobin (POC) 11.6 (L) 02/13/2016 02:26 PM    HGB 10.9 (L) 02/13/2019 10:22 AM     F/u in 6 months. Impression:   1. Chronic systolic heart failure ([address]    2. Coronary artery disease involving native coronary artery of native heart without angina pectoris    3. Mild aortic stenosis    4. Third degree AV block ([address]    5. Pulmonary hypertension    6. Essential hypertension    7. S/P CABG x 3    8. Pacemaker    9. Cerebrovascular accident (CVA), unspecified mechanism ([address]       Cardiac History:   Echo 1/4/17 Echo 1/4/17 LVEF 50%. Distal apical septal akinesis. LAmoderately dilated. Mild MR. Aortic valve: The valve was trileaflet.  Leaflets exhibited moderate  calcification, reduced mobility, and sclerosis without stenosis. Mild Tr. Severe pulmonary hypertension    ECHO 6-10-15 LV EF 35 % by Ceja's Biplane technique, mod diffuse HK, mod HK mid anteroseptal and apical septal wall(s). Mild LVH. Mod JILLIAN, MR, mild ASt, LISETH 1.5 cm2 by both continuity equation and planimetry. Mild AR, TR, PASP 60. AV Max/meanPG 16.6/7.6 mmHg. AV Vmax was 2 m/s. NUKE 6-10-15 ischemia and infarction in inferior wall with moderately reduced EF, see report  Holter showed high level AV block so underwent testing leading to cath and then CABG    The PFTs done at [facility] on 4/24/15 show a FEV1 of 1.41. FVC 56%    He has a history of colon cancer, anxiety, diabetes, depression, emphysema, hypertension, hypothyroidism. 7/2/15: [employer] Pacemaker per Dr. Anton Rich as noted above. . A complete cardiac and respiratory are reviewed and negative except as above ; Resp-denies wheezing  or productive cough,. Const- No unusual weight loss or fever; Neuro-no recent seizure or CVA ; GI- No BRBPR, abdom pain, bloating ; - no  hematuria   see supplement sheet, initialed and to be scanned by staff  Past Medical History:   Diagnosis Date    Arthritis     CAD (coronary artery disease)     Cardiomyopathy ([address] 6/11/2015    Colon cancer ([address] 1993    COLON    Depression     DEPRESSION    Diabetes ([address]     IDDM    GERD (gastroesophageal reflux disease)     Hypertension     Lung cancer ([address] 11/29/2016    Mild aortic stenosis 6/15/2015    Pulmonary hypertension ([address] 6/15/2015    PVD (peripheral vascular disease) ([address]     STENT RIGHT GROIN, PAD    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    LIMA TO LAD; SVG TO OM; SVG TO OM    Stroke ([address] 1999    2 STROKES    Third degree AV block ([address] 6/11/2015    Thyroid disease       Social Hx= reports that he quit smoking about 3 years ago. His smoking use included cigarettes. He has a 120.00 pack-year smoking history.  he has never used smokeless tobacco. He reports that he does not drink alcohol or use drugs. Exam and Labs: There were no vitals taken for this visit. Constitutional:  NAD, comfortable  Head: NC,AT. Eyes: No scleral icterus. Neck:  Neck supple. No JVD present. Throat: moist mucous membranes. Chest: Effort normal & normal respiratory excursion . Neurological: alert, conversant and oriented . Skin: Skin is not cold. No obvious systemic rash noted. Not diaphoretic. No erythema. Psychiatric:  Grossly normal mood and affect. Behavior appears normal. Extremities:  no clubbing or cyanosis. Abdomen: non distended    Lungs:breath sounds normal. No stridor. distress, wheezes or  Rales. Heart: normal rate, regular rhythm, normal S1, S2, no murmurs, rubs, clicks or gallops , PMI non displaced. Edema: Edema is none. Lab Results   Component Value Date/Time    Cholesterol, total 103 02/13/2019 10:22 AM    HDL Cholesterol 42 02/13/2019 10:22 AM    LDL, calculated 46 02/13/2019 10:22 AM    Triglyceride 77 02/13/2019 10:22 AM     Lab Results   Component Value Date/Time    Sodium 144 02/13/2019 10:22 AM    Potassium 4.7 02/13/2019 10:22 AM    Chloride 99 02/13/2019 10:22 AM    CO2 28 02/13/2019 10:22 AM    Anion gap 5 01/06/2018 02:05 AM    Glucose 103 (H) 02/13/2019 10:22 AM    BUN 26 02/13/2019 10:22 AM    Creatinine 1.12 02/13/2019 10:22 AM    BUN/Creatinine ratio 23 02/13/2019 10:22 AM    GFR est AA 70 02/13/2019 10:22 AM    GFR est non-AA 61 02/13/2019 10:22 AM    Calcium 9.6 02/13/2019 10:22 AM      Wt Readings from Last 3 Encounters:   02/27/19 134 lb 3.2 oz (60.9 kg)   08/29/18 140 lb (63.5 kg)   02/21/18 152 lb (68.9 kg)      BP Readings from Last 3 Encounters:   08/29/18 112/50   02/21/18 100/50   02/21/18 104/52      Current Outpatient Medications   Medication Sig    aspirin 81 mg chewable tablet Take 1 Tab by mouth daily.  atorvastatin (LIPITOR) 10 mg tablet Take 1 Tab by mouth nightly.     diphenoxylate-atropine (LOMOTIL) 2.5-0.025 mg per tablet Take 1 Tab by mouth daily as needed for Diarrhea.  MULTIVIT WITH IRON,MINERALS (MULTIVITAMIN AND MINERALS PO) Take  by mouth daily.  bumetanide (BUMEX) 1 mg tablet Take 1 Tab by mouth two (2) times a day.  acetaminophen (TYLENOL) 325 mg tablet Take 650 mg by mouth every four (4) hours as needed for Pain.  calcium-vitamin D (OYSTER SHELL) 500 mg(1,250mg) -200 unit per tablet Take 1 Tab by mouth three (3) times daily (with meals).  guaiFENesin SR (MUCINEX) 600 mg SR tablet Take 600 mg by mouth two (2) times a day.  albuterol-ipratropium (DUO-NEB) 2.5 mg-0.5 mg/3 ml nebu 3 mL by Nebulization route every four (4) hours as needed (shortness of breath).  dextran 70-hypromellose (ARTIFICIAL TEARS,[id number],) ophthalmic solution Administer 2 Drops to both eyes two (2) times a day.  glipiZIDE (GLUCOTROL) 5 mg tablet Take 5 mg by mouth Before breakfast and dinner. Do not take if blood sugar is less than 120    bisacodyl (DULCOLAX) 10 mg suppository Insert 10 mg into rectum daily as needed.  senna-docusate (PERICOLACE) 8.6-50 mg per tablet Take 1 Tab by mouth daily. For constipation. Do not take if you have loose stools    QUEtiapine (SEROQUEL) 100 mg tablet Take 0.5 Tabs by mouth daily. At 9AM    cholecalciferol (VITAMIN D3) 1,000 unit tablet Take 3,000 Units by mouth daily.  levothyroxine (SYNTHROID) 125 mcg tablet Take 125 mcg by mouth Daily (before breakfast). One hour prior to breakfast    magnesium gluconate 500 mg (27 mg  elemental) tablet Take 500 mg by mouth daily.  omeprazole (PRILOSEC) 20 mg capsule Take 20 mg by mouth Daily (before breakfast). 30 minutes prior to breakfast    escitalopram oxalate (LEXAPRO) 20 mg tablet Take 20 mg by mouth daily.  donepezil (ARICEPT) 10 mg tablet Take 10 mg by mouth daily.  carvedilol (COREG) 3.125 mg tablet Take 1 Tab by mouth two (2) times a day.     ferrous sulfate 325 mg (65 mg iron) tablet Take 325 mg by mouth daily (with breakfast).  finasteride (PROSCAR) 5 mg tablet Take 5 mg by mouth every evening.  fluticasone (FLONASE) 50 mcg/actuation nasal spray 2 Sprays by Both Nostrils route daily.  tamsulosin (FLOMAX) 0.4 mg capsule Take 0.4 mg by mouth nightly. No current facility-administered medications for this visit. Impression see above.       Written by Tash Moore, as dictated by Sandra Molina MD.

## 2019-06-04 ENCOUNTER — HOSPITAL ENCOUNTER (OUTPATIENT)
Dept: GENERAL RADIOLOGY | Age: 84
Discharge: HOME | End: 2019-06-04
Payer: MEDICARE

## 2019-06-04 DIAGNOSIS — J44.9 COPD (CHRONIC OBSTRUCTIVE PULMONARY DISEASE) (HCC): ICD-10-CM

## 2019-06-04 PROCEDURE — 71046 X-RAY EXAM CHEST 2 VIEWS: CPT

## 2019-06-06 ENCOUNTER — OFFICE VISIT (OUTPATIENT)
Dept: CARDIOLOGY CLINIC | Age: 84
End: 2019-06-06

## 2019-06-06 DIAGNOSIS — Z95.0 CARDIAC PACEMAKER IN SITU: Primary | ICD-10-CM

## 2019-06-25 ENCOUNTER — TELEPHONE (OUTPATIENT)
Dept: CARDIOLOGY CLINIC | Age: 84
End: 2019-06-25

## 2019-06-25 DIAGNOSIS — I25.10 CORONARY ARTERY DISEASE INVOLVING NATIVE CORONARY ARTERY OF NATIVE HEART WITHOUT ANGINA PECTORIS: ICD-10-CM

## 2019-06-25 DIAGNOSIS — I50.22 CHRONIC SYSTOLIC CONGESTIVE HEART FAILURE (HCC): ICD-10-CM

## 2019-06-25 DIAGNOSIS — I35.0 MILD AORTIC STENOSIS: Primary | ICD-10-CM

## 2019-06-25 NOTE — TELEPHONE ENCOUNTER
Patient's daughter, Kaylen Wong, stated that the patient's eye doctor, Dr. Katelynn De Los Santos, recommended that he have a carotid doppler and she would like to know if that is something that can be done at the next visit. Please advise.     Phone #: [phone]  Thanks

## 2019-06-27 NOTE — TELEPHONE ENCOUNTER
Verified patient with two types of identifiers. Spoke to patient's daughter and let her know he is scheduled for carotids prior to his September follow up. Patient's daughter verbalized understanding and will call with any other questions.

## 2019-06-29 ENCOUNTER — HOSPITAL ENCOUNTER (OUTPATIENT)
Dept: LAB | Age: 84
Discharge: HOME | End: 2019-06-29
Payer: MEDICARE

## 2019-06-29 LAB
GRAM STN SPEC: NORMAL
GRAM STN SPEC: NORMAL
SERVICE CMNT-IMP: NORMAL

## 2019-06-29 PROCEDURE — 87077 CULTURE AEROBIC IDENTIFY: CPT

## 2019-06-29 PROCEDURE — 87102 FUNGUS ISOLATION CULTURE: CPT

## 2019-06-29 PROCEDURE — 87205 SMEAR GRAM STAIN: CPT

## 2019-06-29 PROCEDURE — 87186 SC STD MICRODIL/AGAR DIL: CPT

## 2019-06-29 PROCEDURE — 87070 CULTURE OTHR SPECIMN AEROBIC: CPT

## 2019-06-30 LAB
BACTERIA SPEC CULT: ABNORMAL
BACTERIA SPEC CULT: ABNORMAL
SERVICE CMNT-IMP: ABNORMAL

## 2019-07-02 ENCOUNTER — HOSPITAL ENCOUNTER (OUTPATIENT)
Age: 84
Setting detail: OUTPATIENT SURGERY
LOS: 1 days | Discharge: HOME | End: 2019-07-02
Attending: OPHTHALMOLOGY | Admitting: OPHTHALMOLOGY
Payer: MEDICARE

## 2019-07-02 ENCOUNTER — ANESTHESIA (OUTPATIENT)
Dept: SURGERY | Age: 84
End: 2019-07-02
Payer: MEDICARE

## 2019-07-02 ENCOUNTER — ANESTHESIA EVENT (OUTPATIENT)
Dept: SURGERY | Age: 84
End: 2019-07-02
Payer: MEDICARE

## 2019-07-02 VITALS
HEART RATE: 62 BPM | TEMPERATURE: 97.5 F | SYSTOLIC BLOOD PRESSURE: 110 MMHG | OXYGEN SATURATION: 97 % | BODY MASS INDEX: 17.9 KG/M2 | DIASTOLIC BLOOD PRESSURE: 59 MMHG | RESPIRATION RATE: 15 BRPM | HEIGHT: 70 IN | WEIGHT: 125 LBS

## 2019-07-02 LAB
GLUCOSE BLD STRIP.AUTO-MCNC: 78 MG/DL (ref 65–100)
SERVICE CMNT-IMP: NORMAL

## 2019-07-02 PROCEDURE — 88307 TISSUE EXAM BY PATHOLOGIST: CPT

## 2019-07-02 PROCEDURE — 77030018846 HC SOL IRR STRL H20 ICUM -A: Performed by: OPHTHALMOLOGY

## 2019-07-02 PROCEDURE — 76060000035 HC ANESTHESIA 2 TO 2.5 HR: Performed by: OPHTHALMOLOGY

## 2019-07-02 PROCEDURE — 77030003029 HC SUT VCRL J&J -B: Performed by: OPHTHALMOLOGY

## 2019-07-02 PROCEDURE — 77030008684 HC TU ET CUF COVD -B: Performed by: ANESTHESIOLOGY

## 2019-07-02 PROCEDURE — 74011250636 HC RX REV CODE- 250/636: Performed by: ANESTHESIOLOGY

## 2019-07-02 PROCEDURE — 76010000131 HC OR TIME 2 TO 2.5 HR: Performed by: OPHTHALMOLOGY

## 2019-07-02 PROCEDURE — 76210000020 HC REC RM PH II FIRST 0.5 HR: Performed by: OPHTHALMOLOGY

## 2019-07-02 PROCEDURE — 77030040356 HC CORD BPLR FRCP COVD -A: Performed by: OPHTHALMOLOGY

## 2019-07-02 PROCEDURE — 77030026438 HC STYL ET INTUB CARD -A: Performed by: ANESTHESIOLOGY

## 2019-07-02 PROCEDURE — 88312 SPECIAL STAINS GROUP 1: CPT

## 2019-07-02 PROCEDURE — 74011000250 HC RX REV CODE- 250

## 2019-07-02 PROCEDURE — 77030025940: Performed by: OPHTHALMOLOGY

## 2019-07-02 PROCEDURE — 77030018836 HC SOL IRR NACL ICUM -A: Performed by: OPHTHALMOLOGY

## 2019-07-02 PROCEDURE — 74011250637 HC RX REV CODE- 250/637: Performed by: ANESTHESIOLOGY

## 2019-07-02 PROCEDURE — 77030012998: Performed by: OPHTHALMOLOGY

## 2019-07-02 PROCEDURE — 76210000006 HC OR PH I REC 0.5 TO 1 HR: Performed by: OPHTHALMOLOGY

## 2019-07-02 PROCEDURE — 74011000250 HC RX REV CODE- 250: Performed by: OPHTHALMOLOGY

## 2019-07-02 PROCEDURE — 77030013079 HC BLNKT BAIR HGGR 3M -A: Performed by: ANESTHESIOLOGY

## 2019-07-02 PROCEDURE — 77030032490 HC SLV COMPR SCD KNE COVD -B: Performed by: OPHTHALMOLOGY

## 2019-07-02 PROCEDURE — 77030002888 HC SUT CHRMC J&J -A: Performed by: OPHTHALMOLOGY

## 2019-07-02 PROCEDURE — 77030020782 HC GWN BAIR PAWS FLX 3M -B

## 2019-07-02 PROCEDURE — 74011250636 HC RX REV CODE- 250/636: Performed by: OPHTHALMOLOGY

## 2019-07-02 PROCEDURE — 82962 GLUCOSE BLOOD TEST: CPT

## 2019-07-02 PROCEDURE — 77030002975 HC SUT PLN J&J -B: Performed by: OPHTHALMOLOGY

## 2019-07-02 PROCEDURE — 77030006689 HC BLD OPHTH BVR BD -A: Performed by: OPHTHALMOLOGY

## 2019-07-02 PROCEDURE — 74011250636 HC RX REV CODE- 250/636

## 2019-07-02 PROCEDURE — 77030011283 HC ELECTRD NDL COVD -A: Performed by: OPHTHALMOLOGY

## 2019-07-02 RX ORDER — SODIUM CHLORIDE, SODIUM LACTATE, POTASSIUM CHLORIDE, CALCIUM CHLORIDE 600; 310; 30; 20 MG/100ML; MG/100ML; MG/100ML; MG/100ML
125 INJECTION, SOLUTION INTRAVENOUS CONTINUOUS
Status: DISCONTINUED | OUTPATIENT
Start: 2019-07-02 | End: 2019-07-03 | Stop reason: HOSPADM

## 2019-07-02 RX ORDER — MIDAZOLAM HYDROCHLORIDE 1 MG/ML
1 INJECTION, SOLUTION INTRAMUSCULAR; INTRAVENOUS AS NEEDED
Status: DISCONTINUED | OUTPATIENT
Start: 2019-07-02 | End: 2019-07-02 | Stop reason: HOSPADM

## 2019-07-02 RX ORDER — DIPHENHYDRAMINE HYDROCHLORIDE 50 MG/ML
12.5 INJECTION, SOLUTION INTRAMUSCULAR; INTRAVENOUS AS NEEDED
Status: DISCONTINUED | OUTPATIENT
Start: 2019-07-02 | End: 2019-07-02 | Stop reason: HOSPADM

## 2019-07-02 RX ORDER — FENTANYL CITRATE 50 UG/ML
50 INJECTION, SOLUTION INTRAMUSCULAR; INTRAVENOUS AS NEEDED
Status: DISCONTINUED | OUTPATIENT
Start: 2019-07-02 | End: 2019-07-02 | Stop reason: HOSPADM

## 2019-07-02 RX ORDER — SODIUM CHLORIDE 0.9 % (FLUSH) 0.9 %
5-40 SYRINGE (ML) INJECTION AS NEEDED
Status: DISCONTINUED | OUTPATIENT
Start: 2019-07-02 | End: 2019-07-02 | Stop reason: HOSPADM

## 2019-07-02 RX ORDER — OXYCODONE HYDROCHLORIDE 5 MG/1
5 TABLET ORAL AS NEEDED
Status: DISCONTINUED | OUTPATIENT
Start: 2019-07-02 | End: 2019-07-03 | Stop reason: HOSPADM

## 2019-07-02 RX ORDER — NEOSTIGMINE METHYLSULFATE 1 MG/ML
INJECTION INTRAVENOUS AS NEEDED
Status: DISCONTINUED | OUTPATIENT
Start: 2019-07-02 | End: 2019-07-02 | Stop reason: HOSPADM

## 2019-07-02 RX ORDER — ONDANSETRON 2 MG/ML
4 INJECTION INTRAMUSCULAR; INTRAVENOUS AS NEEDED
Status: DISCONTINUED | OUTPATIENT
Start: 2019-07-02 | End: 2019-07-03 | Stop reason: HOSPADM

## 2019-07-02 RX ORDER — LIDOCAINE HYDROCHLORIDE 20 MG/ML
INJECTION, SOLUTION EPIDURAL; INFILTRATION; INTRACAUDAL; PERINEURAL AS NEEDED
Status: DISCONTINUED | OUTPATIENT
Start: 2019-07-02 | End: 2019-07-02 | Stop reason: HOSPADM

## 2019-07-02 RX ORDER — ROCURONIUM BROMIDE 10 MG/ML
INJECTION, SOLUTION INTRAVENOUS AS NEEDED
Status: DISCONTINUED | OUTPATIENT
Start: 2019-07-02 | End: 2019-07-02 | Stop reason: HOSPADM

## 2019-07-02 RX ORDER — GLYCOPYRROLATE 0.2 MG/ML
INJECTION INTRAMUSCULAR; INTRAVENOUS AS NEEDED
Status: DISCONTINUED | OUTPATIENT
Start: 2019-07-02 | End: 2019-07-02 | Stop reason: HOSPADM

## 2019-07-02 RX ORDER — SODIUM CHLORIDE 0.9 % (FLUSH) 0.9 %
5-40 SYRINGE (ML) INJECTION EVERY 8 HOURS
Status: DISCONTINUED | OUTPATIENT
Start: 2019-07-02 | End: 2019-07-02 | Stop reason: HOSPADM

## 2019-07-02 RX ORDER — PROPOFOL 10 MG/ML
INJECTION, EMULSION INTRAVENOUS AS NEEDED
Status: DISCONTINUED | OUTPATIENT
Start: 2019-07-02 | End: 2019-07-02 | Stop reason: HOSPADM

## 2019-07-02 RX ORDER — MORPHINE SULFATE 10 MG/ML
2 INJECTION, SOLUTION INTRAMUSCULAR; INTRAVENOUS
Status: DISCONTINUED | OUTPATIENT
Start: 2019-07-02 | End: 2019-07-03 | Stop reason: HOSPADM

## 2019-07-02 RX ORDER — LIDOCAINE HYDROCHLORIDE 10 MG/ML
0.5 INJECTION, SOLUTION EPIDURAL; INFILTRATION; INTRACAUDAL; PERINEURAL AS NEEDED
Status: DISCONTINUED | OUTPATIENT
Start: 2019-07-02 | End: 2019-07-02 | Stop reason: HOSPADM

## 2019-07-02 RX ORDER — LIDOCAINE HYDROCHLORIDE AND EPINEPHRINE 20; 5 MG/ML; UG/ML
INJECTION, SOLUTION EPIDURAL; INFILTRATION; INTRACAUDAL; PERINEURAL AS NEEDED
Status: DISCONTINUED | OUTPATIENT
Start: 2019-07-02 | End: 2019-07-02 | Stop reason: HOSPADM

## 2019-07-02 RX ORDER — FENTANYL CITRATE 50 UG/ML
25 INJECTION, SOLUTION INTRAMUSCULAR; INTRAVENOUS
Status: DISCONTINUED | OUTPATIENT
Start: 2019-07-02 | End: 2019-07-03 | Stop reason: HOSPADM

## 2019-07-02 RX ORDER — ONDANSETRON 2 MG/ML
INJECTION INTRAMUSCULAR; INTRAVENOUS AS NEEDED
Status: DISCONTINUED | OUTPATIENT
Start: 2019-07-02 | End: 2019-07-02 | Stop reason: HOSPADM

## 2019-07-02 RX ORDER — SODIUM CHLORIDE, SODIUM LACTATE, POTASSIUM CHLORIDE, CALCIUM CHLORIDE 600; 310; 30; 20 MG/100ML; MG/100ML; MG/100ML; MG/100ML
INJECTION, SOLUTION INTRAVENOUS
Status: DISCONTINUED | OUTPATIENT
Start: 2019-07-02 | End: 2019-07-02 | Stop reason: HOSPADM

## 2019-07-02 RX ORDER — DEXTROSE, SODIUM CHLORIDE, SODIUM LACTATE, POTASSIUM CHLORIDE, AND CALCIUM CHLORIDE 5; .6; .31; .03; .02 G/100ML; G/100ML; G/100ML; G/100ML; G/100ML
125 INJECTION, SOLUTION INTRAVENOUS CONTINUOUS
Status: DISCONTINUED | OUTPATIENT
Start: 2019-07-02 | End: 2019-07-02 | Stop reason: HOSPADM

## 2019-07-02 RX ORDER — ACETAMINOPHEN 325 MG/1
650 TABLET ORAL ONCE
Status: COMPLETED | OUTPATIENT
Start: 2019-07-02 | End: 2019-07-02

## 2019-07-02 RX ORDER — MIDAZOLAM HYDROCHLORIDE 1 MG/ML
1 INJECTION, SOLUTION INTRAMUSCULAR; INTRAVENOUS
Status: DISCONTINUED | OUTPATIENT
Start: 2019-07-02 | End: 2019-07-03 | Stop reason: HOSPADM

## 2019-07-02 RX ORDER — BUPIVACAINE HYDROCHLORIDE AND EPINEPHRINE 5; 5 MG/ML; UG/ML
INJECTION, SOLUTION EPIDURAL; INTRACAUDAL; PERINEURAL AS NEEDED
Status: DISCONTINUED | OUTPATIENT
Start: 2019-07-02 | End: 2019-07-02 | Stop reason: HOSPADM

## 2019-07-02 RX ORDER — SODIUM CHLORIDE, SODIUM LACTATE, POTASSIUM CHLORIDE, CALCIUM CHLORIDE 600; 310; 30; 20 MG/100ML; MG/100ML; MG/100ML; MG/100ML
125 INJECTION, SOLUTION INTRAVENOUS CONTINUOUS
Status: DISCONTINUED | OUTPATIENT
Start: 2019-07-02 | End: 2019-07-02 | Stop reason: HOSPADM

## 2019-07-02 RX ORDER — SODIUM CHLORIDE 0.9 % (FLUSH) 0.9 %
5-40 SYRINGE (ML) INJECTION AS NEEDED
Status: DISCONTINUED | OUTPATIENT
Start: 2019-07-02 | End: 2019-07-03 | Stop reason: HOSPADM

## 2019-07-02 RX ORDER — SODIUM CHLORIDE 0.9 % (FLUSH) 0.9 %
5-40 SYRINGE (ML) INJECTION EVERY 8 HOURS
Status: DISCONTINUED | OUTPATIENT
Start: 2019-07-02 | End: 2019-07-03 | Stop reason: HOSPADM

## 2019-07-02 RX ADMIN — GLYCOPYRROLATE 0.2 MG: 0.2 INJECTION INTRAMUSCULAR; INTRAVENOUS at 20:03

## 2019-07-02 RX ADMIN — PROPOFOL 50 MG: 10 INJECTION, EMULSION INTRAVENOUS at 19:50

## 2019-07-02 RX ADMIN — PROPOFOL 100 MG: 10 INJECTION, EMULSION INTRAVENOUS at 18:28

## 2019-07-02 RX ADMIN — SODIUM CHLORIDE, SODIUM LACTATE, POTASSIUM CHLORIDE, AND CALCIUM CHLORIDE 125 ML/HR: 600; 310; 30; 20 INJECTION, SOLUTION INTRAVENOUS at 18:11

## 2019-07-02 RX ADMIN — NEOSTIGMINE METHYLSULFATE 2 MG: 1 INJECTION INTRAVENOUS at 20:03

## 2019-07-02 RX ADMIN — LIDOCAINE HYDROCHLORIDE 60 MG: 20 INJECTION, SOLUTION EPIDURAL; INFILTRATION; INTRACAUDAL; PERINEURAL at 18:28

## 2019-07-02 RX ADMIN — ONDANSETRON 4 MG: 2 INJECTION INTRAMUSCULAR; INTRAVENOUS at 20:03

## 2019-07-02 RX ADMIN — SODIUM CHLORIDE, SODIUM LACTATE, POTASSIUM CHLORIDE, CALCIUM CHLORIDE: 600; 310; 30; 20 INJECTION, SOLUTION INTRAVENOUS at 18:21

## 2019-07-02 RX ADMIN — ROCURONIUM BROMIDE 30 MG: 10 INJECTION, SOLUTION INTRAVENOUS at 18:28

## 2019-07-02 RX ADMIN — ACETAMINOPHEN 650 MG: 325 TABLET ORAL at 18:11

## 2019-07-02 NOTE — ANESTHESIA PREPROCEDURE EVALUATION
Anesthetic History No history of anesthetic complications Review of Systems / Medical History Patient summary reviewed and pertinent labs reviewed Pulmonary COPD Shortness of breath Comments: Hx of smoking Chromic respiratory failure NEELAM Mass S/P radiation Neuro/Psych CVA Dementia Cardiovascular Hypertension Valvular problems/murmurs CHF Dysrhythmias Pacemaker, CAD, PAD and CABG Comments: Mild Aortic Stenosis Chronic systolic dysfunction EF 40% (? date) 3rd degree heart block (PM) Lower ext stents GI/Hepatic/Renal 
  
GERD Renal disease: CRI Comments: Stage 3 CKD Endo/Other Diabetes: type 2 Hypothyroidism Arthritis, cancer and anemia Other Findings Physical Exam 
 
Airway Mallampati: II 
TM Distance: 4 - 6 cm Neck ROM: normal range of motion Mouth opening: Normal 
 
 Cardiovascular Rhythm: regular Rate: normal 
 
 
 
 Dental 
 
 
Comments: Only 2 lower teeth Pulmonary Breath sounds clear to auscultation Abdominal 
 
 
 
 Other Findings Anesthetic Plan ASA: 4 Anesthesia type: general 
 
 
 
 
 
Anesthetic plan and risks discussed with: Patient and Son / Daughter

## 2019-07-03 NOTE — DISCHARGE INSTRUCTIONS
Leave eye patch in place. Follow up with Dr. Albert Miller this Friday as scheduled. Written instructions and contact information provided in a handout. Take over the counter tylenol/ motrin if you are able to tolerate for pain/discomfort. Call surgeon if pain is not controlled.     Instructions Following Ambulatory Surgery    Activity  · As tolerated and directed by your doctor  · Bathe or shower as directed by your doctor    Diet  · Clear liquids until no nausea or vomiting; then light diet for the first day  · Advance to regular diet on second day, unless your doctor orders otherwise  · If nausea and vomiting continues, call your doctor    Pain  · Take pain medication as directed by your doctor  ·  Call your doctor if pain is NOT relieved by medication  · DO NOT take aspirin or blood thinners until directed by your doctor        Follow-Up Phone Calls  · Will be made nursing staff  · If you have any problems, call your doctor as needed    Call your doctor if  · Excessive bleeding that does not stop after holding mild pressure over the area  · Temperature of 101 degrees F or above  · Redness,excessive swelling or bruising, and/or green or yellow, smelly discharge from incision    After Anesthesia  · For the first 24 hours: DO NOT Drive, Drink alcoholic beverages, or Make important decisions  · Be aware of dizziness following anesthesia and while taking pain medication

## 2019-07-03 NOTE — ANESTHESIA POSTPROCEDURE EVALUATION
Procedure(s): ENUCLEATION WITH IMPLANT RIGHT EYE/ OP. general 
 
<BSHSIANPOST> Vitals Value Taken Time /59 7/2/2019  9:15 PM  
Temp 36.4 °C (97.5 °F) 7/2/2019  9:10 PM  
Pulse 62 7/2/2019  9:15 PM  
Resp 15 7/2/2019  9:15 PM  
SpO2 97 % 7/2/2019  9:15 PM

## 2019-07-03 NOTE — OP NOTES
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 
  
PROCEDURE:  
1) Enucleation with placement of silicone implant with attachment of extraocular muscles 2) Temporary tarsorrhaphy, left eye 
  
POST-OP DIAGNOSIS: same   
  
SURGEON: Jory Brink MD 
  
ANESTHESIA: General 
  
COMPLICATIONS: None   
  
SPECIMEN: Right eye IMPLANTS: Silicone 68EO orbital implant 
  
ESTIMATED BLOOD LOSS: 10cc 
  
INDICATION FOR PROCEDURE: Corneal ulceration and endopthalmitis of the right eye with perforation and expulsion of intraocular contents with NLP vision. Decision for enucleation was made to reduce risk of sympathetic ophthalmia and risk of spread of infection. 
    
DESCRIPTION OF PROCEDURE:  
  
The surgical eye was confirmed with the patient in the holding area, the patients medical record, and the consent. After the patient was anesthetized with general anesthesia, the eyes were examined and the surgical eye was again confirmed on examination and in the chart/consent with the surgical team.  Tape and shield was placed over the non-surgical eye. Local anesthetic 2% lidocaine with epinephrine was used for the retrobulbar block and subconjunctival block.  
  
The right side of the face was prepped/draped in a sterile fashion. An eyelid speculum was used to retract the eyelids. Usha scissors were used to make the 360 degree peridomy incision and Christian's scissors were used to dissect tenons and conjunctiva from the scleral wall in all 4 quadrants. A [place] muscle hook was then used to isolate the medial rectus muscle. A q tip was used to clean off the muscle. A double armed 6-0 vicryl suture was passed partial thickness through the medial rectus and a second smaller full thickness pass was made on each side and locked to secure the muscle.  Usha scissors were used to sever the muscle proximal to the suture. A bulldog clamp was used to isolate the suture ends. The exact same procedure was done to isolate the other 3 rectus muscles. Muscle hooks were used to isolate the superior and inferior oblique which were clamped and cut free from the globe. A curved clamp was then used to clamp the optic nerve. A curved enucleation scissors was then used to sever the optic nerve as far back into the orbit as possible. The residual adhesions were then severed and the globe was extracted from the orbit and sent for pathology. Epinephrine soaked sponges were inserted into the orbit and pressure was applied to achieve hemostasis for several minutes. The sponges were removed and the orbit was rinsed with vancomycin antibiotic solution. A 50QM silicone implant was soaked in the antibiotic solution and was then placed in the socket. Adson forceps were used to retract the tenons and conjunctival tissue and the implant was injected into the orbit. The horizontal rectus muscles were sutured to each other using the vicryl tags. The vertical rectus muscles were then sutured to the horizontal rectus muscles. Interrupted buried 6-0 vicryl sutures were then placed to close the tenons tissue over the implant. The conjunctiva was then closed using a running 6-0 chromic gut suture. The speculum was removed. Antibiotic ointment was placed on the backside of the conformer and it was placed over the conjunctiva in the fornix. The eye was cleaned using a wet and dry 4x4. A 4-0 silk suture was used to create a temporary tarsorrhaphy in a running fashion to prevent extrusion of the conformer. Additional antibiotic ointment was placed and the eye was patched.  The procedure tolerated the procedure well without any complications and returned to the recovery room in stable condition. 
  
The patient tolerated the procedure well without any complications and returned to the recovery room in stable condition. 
  
 Postoperative care and discharge medication counseling was discussed with the patient's daughter prior to discharge. Written instructions and contact information was provided.  
 
  
COMPLICATIONS: No complications were encountered.

## 2019-07-29 LAB
BACTERIA SPEC CULT: NORMAL
SERVICE CMNT-IMP: NORMAL

## 2019-09-04 NOTE — Clinical Note
9/4/19 Patient: Petra Rea YOB: 1935 Date of Visit: 9/4/2019 Danyell March MD 
[address] [id number] [place] Facsimile: [phone] Dear Danyell March MD, Thank you for referring Mr. Yousuf Maddox to [facility] for evaluation. My notes for this consultation are attached. If you have questions, please do not hesitate to call me. I look forward to following your patient along with you.  
 
 
Sincerely, 
 
Ketty Dawson MD

## 2019-09-04 NOTE — PROGRESS NOTES
Visit Vitals  /58 (BP 1 Location: Right arm, BP Patient Position: Sitting)   Pulse 72   Resp 16   Ht 5' 10\" (1.778 m)   Wt 131 lb (59.4 kg)   SpO2 92%   BMI 18.80 kg/m²

## 2019-09-04 NOTE — PROGRESS NOTES
Joan Oviedo     1935       Jaxson Mendosa MD, [facility]  Date of Visit-9/4/2019   PCP is Derrick Ansari MD   [facility] [place]  [facility]  HPI:  Joan Oviedo is a 81 y.o. male   F/u of CAD with CABG, AS, CHF, COPD. Had seen his eye doctor, Dr. Mike Roy, who recommended carotids. Carotids today show mild on the right, lower moderate on the left. Pacer check 6/7/19 remote paced rhythm. Pt is in a wheelchair. Pt is present with his daughter. Pt was seeing the optometrist for bleeding in his right eye and got strep bacteria in it, so the optometrist removed the right eye. Heart-wise pt is feeling well. Pt denies any blood in his stool or urine. Denies chest pain, edema, syncope or shortness of breath at rest, has no tachycardia, palpitations or sense of arrhythmia. Assessment/Plan:     1. CHF systolic/diastolic chronic    EF 51-65%    ---bumex, Coreg  ---he's well compensated NYHA class I - II     Key CAD CHF Meds             aspirin 81 mg chewable tablet (Taking) Take 1 Tab by mouth daily. atorvastatin (LIPITOR) 10 mg tablet (Taking) Take 1 Tab by mouth nightly. bumetanide (BUMEX) 1 mg tablet (Taking) Take 1 Tab by mouth two (2) times a day. carvedilol (COREG) 3.125 mg tablet (Taking) Take 1 Tab by mouth two (2) times a day. 2. CAD no angina, CABG 2015  -- continue ASA and statin   asa 81 mg day and lipitor 10 mg q hs     3. Mild AS with murmur, no change     4. Third degree AV block.    ---implant 2015  Pacer check in June with 99% RV pacing, normal fx for pacer     5. PA HTN continue diuretic    6. HTN stable      BP Readings from Last 3 Encounters:   02/27/19 100/44   02/27/19 100/44   08/29/18 112/50      7. COPD with no acute wheezing     8. Anemia chronic.             Lab Results   Component Value Date/Time     Hemoglobin (POC) 11.6 (L) 02/13/2016 02:26 PM     HGB 10.9 (L) 02/13/2019 10:22 AM        F/u in 6 months  Future Appointments   Date Time Provider Preethi Shannoni   12/17/2019  9:15 AM REMOTE1, MATTHEW CAVSF SAMANTHA SCHED   3/18/2020 10:00 AM Alia Alcocer MD [address] [facility]      Patient Instructions   You will be scheduled for a 6 month follow up. Impression:   1. Bilateral carotid artery stenosis    2. Chronic systolic congestive heart failure ([address]    3. Coronary artery disease involving native coronary artery of native heart without angina pectoris    4. Mild aortic stenosis    5. Third degree AV block (HCC)    6. Pulmonary hypertension    7. Essential hypertension    8. S/P CABG x 3    9. Pacemaker    10. Cerebrovascular accident (CVA), unspecified mechanism ([address]       Cardiac History:   Echo 1/4/17 Echo 1/4/17 LVEF 50%. Distal apical septal akinesis. LAmoderately dilated. Mild MR. Aortic valve: The valve was trileaflet. Leaflets exhibited moderate  calcification, reduced mobility, and sclerosis without stenosis. Mild Tr. Severe pulmonary hypertension    ECHO 6-10-15 LV EF 35 % by Ceja's Biplane technique, mod diffuse HK, mod HK mid anteroseptal and apical septal wall(s). Mild LVH. Mod JILLIAN, MR, mild ASt, LISETH 1.5 cm2 by both continuity equation and planimetry. Mild AR, TR, PASP 60. AV Max/meanPG 16.6/7.6 mmHg. AV Vmax was 2 m/s. NUKE 6-10-15 ischemia and infarction in inferior wall with moderately reduced EF, see report  Holter showed high level AV block so underwent testing leading to cath and then CABG    [facility] PFTs done [facility] on 4[facility]24/15 show a FEV1 of 1.41. FVC 56%    He has a history of colon cancer, anxiety, diabetes, depression, emphysema, hypertension, hypothyroidism. 7/2/15: [employer] Pacemaker per Dr. Shey Bell as noted above. . A complete cardiac and respiratory are reviewed and negative except as above ; Resp-denies wheezing  or productive cough,.  Const- No unusual weight loss or fever; Neuro-no recent seizure or CVA ; GI- No BRBPR, abdom pain, bloating ; - no  hematuria   see supplement sheet, initialed and to be scanned by staff  Past Medical History:   Diagnosis Date    Arthritis     CAD (coronary artery disease)     Cardiomyopathy ([facility] 6/11/2015    Colon cancer ([facility] 1993    COLON    Depression     DEPRESSION    Diabetes ([facility]     IDDM    GERD (gastroesophageal reflux disease)     Hypertension     Lung cancer ([facility] 11/29/2016    Mild aortic stenosis 6/15/2015    Pulmonary hypertension ([facility] 6/15/2015    PVD (peripheral vascular disease) ([facility]     STENT RIGHT GROIN, PAD    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    LIMA TO LAD; SVG TO OM; SVG TO OM    Stroke ([facility] 1999    2 STROKES    Third degree AV block ([facility] 6/11/2015    Thyroid disease       Social Hx= reports that he quit smoking about 4 years ago. His smoking use included cigarettes. He has a 120.00 pack-year smoking history. He has never used smokeless tobacco. He reports that he does not drink alcohol or use drugs. Exam and Labs:  /58 (BP 1 Location: Right arm, BP Patient Position: Sitting)   Pulse 72   Resp 16   Ht 5' 10\" (1.778 m)   Wt 131 lb (59.4 kg)   SpO2 92%   BMI 18.80 kg/m² Constitutional:  NAD, comfortable  Head: NC,AT. Eyes: No scleral icterus. Neck:  Neck supple. No JVD present. Throat: moist mucous membranes. Chest: Effort normal & normal respiratory excursion . Neurological: alert, conversant and oriented . Skin: Skin is not cold. No obvious systemic rash noted. Not diaphoretic. No erythema. Psychiatric:  Grossly normal mood and affect. Behavior appears normal. Extremities:  no clubbing or cyanosis. Abdomen: non distended    Lungs:breath sounds normal. No stridor. distress, wheezes or  Rales. Heart:1-2/6 SCOTT normal rate, regular rhythm, normal S1, S2, no rubs, clicks or gallops , PMI non displaced. Edema: Edema is none.   Lab Results   Component Value Date/Time    Cholesterol, total 103 02/13/2019 10:22 AM    HDL Cholesterol 42 02/13/2019 10:22 AM LDL, calculated 46 02/13/2019 10:22 AM    Triglyceride 77 02/13/2019 10:22 AM     Lab Results   Component Value Date/Time    Sodium 144 02/13/2019 10:22 AM    Potassium 4.7 02/13/2019 10:22 AM    Chloride 99 02/13/2019 10:22 AM    CO2 28 02/13/2019 10:22 AM    Anion gap 5 01/06/2018 02:05 AM    Glucose 103 (H) 02/13/2019 10:22 AM    BUN 26 02/13/2019 10:22 AM    Creatinine 1.12 02/13/2019 10:22 AM    BUN/Creatinine ratio 23 02/13/2019 10:22 AM    GFR est AA 70 02/13/2019 10:22 AM    GFR est non-AA 61 02/13/2019 10:22 AM    Calcium 9.6 02/13/2019 10:22 AM      Wt Readings from Last 3 Encounters:   09/04/19 131 lb (59.4 kg)   09/04/19 130 lb 12.8 oz (59.3 kg)   07/02/19 125 lb (56.7 kg)      BP Readings from Last 3 Encounters:   09/04/19 104/58   09/04/19 104/58   07/02/19 110/59      Current Outpatient Medications   Medication Sig    difenoxin-atropine 1-0.025 mg tab Take 1 Tab by mouth daily as needed.  glipiZIDE (GLUCOTROL) 10 mg tablet Take 10 mg by mouth every morning.  levothyroxine (SYNTHROID) 137 mcg tablet Take  by mouth Daily (before breakfast).  sertraline (ZOLOFT) 100 mg tablet Take 200 mg by mouth daily.  ketoconazole (NIZORAL) 2 % topical cream Apply  to affected area daily.  nystatin (MYCOSTATIN) powder Apply  to affected area four (4) times daily.  docusate sodium (STOOL SOFTENER) 100 mg capsule Take 100 mg by mouth daily as needed for Constipation.  aspirin 81 mg chewable tablet Take 1 Tab by mouth daily.  atorvastatin (LIPITOR) 10 mg tablet Take 1 Tab by mouth nightly.  MULTIVIT WITH IRON,MINERALS (MULTIVITAMIN AND MINERALS PO) Take  by mouth daily.  bumetanide (BUMEX) 1 mg tablet Take 1 Tab by mouth two (2) times a day.  acetaminophen (TYLENOL) 325 mg tablet Take 650 mg by mouth every four (4) hours as needed for Pain.  guaiFENesin SR (MUCINEX) 600 mg SR tablet Take 600 mg by mouth two (2) times a day.     albuterol-ipratropium (DUO-NEB) 2.5 mg-0.5 mg/3 ml nebu 3 mL by Nebulization route every four (4) hours as needed (shortness of breath).  senna-docusate (PERICOLACE) 8.6-50 mg per tablet Take 1 Tab by mouth daily. For constipation. Do not take if you have loose stools    cholecalciferol (VITAMIN D3) 1,000 unit tablet Take 3,000 Units by mouth daily.  magnesium gluconate 500 mg (27 mg  elemental) tablet Take 500 mg by mouth daily.  omeprazole (PRILOSEC) 20 mg capsule Take 20 mg by mouth Daily (before breakfast). 30 minutes prior to breakfast    donepezil (ARICEPT) 10 mg tablet Take 10 mg by mouth daily.  carvedilol (COREG) 3.125 mg tablet Take 1 Tab by mouth two (2) times a day.  ferrous sulfate 325 mg (65 mg iron) tablet Take 325 mg by mouth daily (with breakfast).  finasteride (PROSCAR) 5 mg tablet Take 5 mg by mouth every evening.  fluticasone (FLONASE) 50 mcg/actuation nasal spray 2 Sprays by Both Nostrils route daily.  tamsulosin (FLOMAX) 0.4 mg capsule Take 0.4 mg by mouth nightly.  carbamide peroxide (DEBROX) 6.5 % otic solution Administer 5 Drops into each ear as needed.  calcium-vitamin D (OYSTER SHELL) 500 mg(1,250mg) -200 unit per tablet Take 1 Tab by mouth three (3) times daily (with meals).  dextran 70-hypromellose (ARTIFICIAL TEARS,[id number],) ophthalmic solution Administer 2 Drops to both eyes two (2) times a day.  bisacodyl (DULCOLAX) 10 mg suppository Insert 10 mg into rectum daily as needed. No current facility-administered medications for this visit. Impression see above.       Written by Gus Dotson, as dictated by Gerardo Ferraro MD.

## 2019-10-15 NOTE — PROGRESS NOTES
Nurse navigator note - cardiology Incoming call from Fausto Isaura, Mr. Bright Fernández daughter - she said he is in a new facility in [facility] - for dementia patients - She said he had been in a nice facility - for about 3 years - but his behavior was disruptive and he had to leave. Her dad has dementia, but exhibits behaviors that appear to be manipulative and disruptive, and inappropriate, as well - per his daughter - she is concerned that he will 'get kicked out of where he is currently' b/c of his behaviors. Fausto Delarosa is asking if she can come to cardiology less often - he is now on 6 month visits - He has remote pacer checks - and needs pacer checks periodically. He gets care at the [place]. Fausto Delarosa said he has adv dir and dDNR - -he was seen by palliative medicine in the past.  
 
Advised that I will check with provider - re: need for follow up - and frequency - and with pacer clinic. Certainly consolidation of care - to reduce trips out - would help his daughter. Plan - does pt meet palliative - At Home team visit protocol? Is the [place] enlisting similar services? Options for housing for Mr. Sophie Landaverde - with his dementia and behavior issues - Note to pacer clinic and cardiologist - His daughter has had struggles with this for multiple years - where he is lodged and cared for - and his behaviors - She said 'his dementia is worse'. Janna Coppola RN , CH[facility], [facility] Transitions of [place] [phone]

## 2019-11-07 NOTE — PROGRESS NOTES
Nurse navigator note - cardiology Incoming call from pt's daughter - She has called previously asking if she needs to bring her dad to the office for follow up - as he has mental health issues, and is debilitated - so it's hard for her to get him out to appts - He has very poor hearing, and loss of vision in 1 eye - He had a fall last week - general debility. This information was shared with the providers - The consensus was that the patient could send in remotes for monitoring his device - He was seen by Dr. Leon Downs in September 2019 - with stable regimen. He is scheduled to come back in 6 months - in March 2020. His daughter said a team from the [place] is going to be seeing him - he gets his meds from the [place] - So she would like for them to assume care -  
 
NN discussed with daughter that primary goal is for pt to have comprehensive care - if the [place] team can do that - they can transition -  
Pt can see specialty, as needed. However, we cannot continue to prescribe if pt is not seen in the office. She will discuss with At Home team : 
Cardiology meds are stable -  
Coreg 3.125 mg bid Bumex 1 mg bid ASA 81 every day Atorvastatin 10 every day - hs These could be managed by At Home team - if patient remains stable -  
 
Plan: Daughter to discuss comprehensive management with At [facility] [address] 
 Pt's caretaker to send remotes monthly - Will cancel 2020 March appt - Note to provider team. 
 
Martín Baird RN , [facility], [facility] Care transitions team 
[phone]

## 2019-12-23 NOTE — ED NOTES
Bedside and Verbal shift change report given to [address] [place] [address] (oncoming nurse) by Zaria POE (offgoing nurse). Report included the following information SBAR, Kardex, ED Summary and Recent Results. Pt resting quietly in bed w/ caregiver @ bedside from group home. Attempted to ask her questions regarding pt's medical hx, she wasn't sure. However she did have a medication list that has been scanned into chart & stated pt did take his medications today.

## 2019-12-24 NOTE — ED NOTES
Introduced self as primary RN. Pt having doppler of LE done at this time. Bed locked and in low position with call bell within reach.

## 2019-12-24 NOTE — ED NOTES
9:33 PM 
Change of shift. Care of patient taken over from Dr. Higgins Come to Dr. Pancho Orlando; H&P reviewed, bedside handoff complete. PROGRESS NOTE: 
9:33 PM 
Reviewed x-ray results, which were normal. Pt discharged. The patient's results have been reviewed with them and/or available family. Patient and/or family verbally conveyed their understanding and agreement of the patient's signs, symptoms, diagnosis, treatment and prognosis and additionally agree to follow up as recommended in the discharge instructions or to return to the Emergency Room should their condition change prior to their follow-up appointment. The patient/family verbally agrees with the care-plan and verbally conveys that all of their questions have been answered. The discharge instructions have also been provided to the patient and/or family with some educational information regarding the patient's diagnosis as well a list of reasons why the patient would want to return to the ER prior to their follow-up appointment, should their condition change.

## 2019-12-24 NOTE — ED PROVIDER NOTES
Patient is an 60-year-old male presenting to the emergency department from his group home with left leg redness. Patient is unable to tell me when he started with the redness. He is unable to tell me what happened. He denies any complaints at this time although he is not a reliable historian. Past Medical History:  
Diagnosis Date  Arthritis  CAD (coronary artery disease)  Cardiomy[facility] ([facility] 6/11/2015  [facility] ([facility] 1993 COLON  
 Depression DEPRESSION  
 Diabetes ([facility] IDDM  GERD (gastroesophageal reflux disease)  Hypertension  Lung cancer ([facility] 11/29/2016  Mild aortic stenosis 6/15/2015  Pulmonary hypertension ([facility] 6/15/2015  PVD (peripheral vascular disease) ([facility] STENT RIGHT GROIN, PAD  
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  
 Stroke ([facility] 1999  
 2 STROKES  Third degree AV block ([facility] 6/11/2015  Thyroid disease Past Surgical History:  
Pro[facility]  
 HX HEART CATHETERIZATION  6/26/2015  HX PACEMAKER    
 INS PPM/ICD LED DUAL ONLY  7/2/2015 [address] PAD, RIGHT GROIN STENT Family History:  
Problem Relation Age of Onset  Heart Disease Mother  Diabetes Mother  Other Father AMPUTATION LEG FOLLOWING BUG BITE  
 Other Sister CEREBRAL ANUERYSM  
 Heart Disease Brother  Diabetes Sister Social History Socioeconomic History  Marital status:  Spouse name: Not on file  Number of children: Not on file  Years of education: Not on file  Highest education level: Not on file Occupational History  Not on file Social Needs  Financial resource strain: Not on file  Food insecurity:  
  Worry: Not on file Inability: Not on file  Transportation needs:  
  Medical: Not on file Non-medical: Not on file Tobacco Use  
  Smoking status: Former Smoker Packs/day: 2.00 Years: 60.00 Pack years: 120.00 Types: Cigarettes Last attempt to quit: 2015 Years since quittin.5  Smokeless tobacco: Never Used Substance and Sexual Activity  Alcohol use: No  
  Alcohol/week: 0.0 standard drinks  Drug use: No  
 Sexual activity: Not Currently Partners: Female Lifestyle  Physical activity:  
  Days per week: Not on file Minutes per session: Not on file  Stress: Not on file Relationships  Social connections:  
  Talks on phone: Not on file Gets together: Not on file Attends Yazidism service: Not on file Active member of club or organization: Not on file Attends meetings of clubs or organizations: Not on file Relationship status: Not on file  Intimate partner violence:  
  Fear of current or ex partner: Not on file Emotionally abused: Not on file Physically abused: Not on file Forced sexual activity: Not on file Other Topics Concern  Not on file Social History Narrative  Not on file ALLERGIES: Terazosin Review of Systems Constitutional: Positive for fever. Respiratory: Negative for shortness of breath. Cardiovascular: Negative for chest pain. Musculoskeletal: Positive for arthralgias. Negative for back pain. Skin: Positive for rash. Neurological: Negative for weakness and numbness. Hematological: Does not bruise/bleed easily. Vitals:  
 19 1723 19 1845 BP: 145/77 120/75 Pulse: 70 Resp: 20 Temp: 97.7 °F (36.5 °C) SpO2: 95% 100% Weight: 59.4 kg (131 lb) Height: 5' 10\" (1.778 m) Physical Exam 
Vitals signs and nursing note reviewed. Constitutional:   
   General: He is not in acute distress. Appearance: He is well-developed. HENT:  
   Head: Normocephalic and atraumatic.   
Eyes:  
   Conjunctiva/sclera: Conjunctivae normal.  
 Pupils: Pupils are equal, round, and reactive to light. Neck: Musculoskeletal: Normal range of motion and neck supple. Cardiovascular:  
   Rate and Rhythm: Normal rate and regular rhythm. Heart sounds: Normal heart sounds. No murmur. No friction rub. No gallop. Pulmonary:  
   Effort: Pulmonary effort is normal. No respiratory distress. Breath sounds: Normal breath sounds. No wheezing or rales. Abdominal:  
   General: Bowel sounds are normal. There is no distension. Palpations: Abdomen is soft. Tenderness: There is no tenderness. There is no guarding or rebound. Musculoskeletal: Normal range of motion. Skin: 
   General: Skin is warm and dry. Capillary Refill: Capillary refill takes less than 2 seconds. Comments: LLE: 
There is trace swelling to the LLE with pedal edema Palpable DP pulse There is a 3cm x 5cm area of erythema to the medial distal leg without abscess/crepitus or drainage. It is not hot. No streaking. There is tenderness to the ankle but full ROM. There is bruising to the pedal aspect of the foot. Neurological:  
   Mental Status: He is alert. Comments: Awake and alert, seems a bit confused at times Labs Reviewed:  
No significant leukocytosis Imaging Reviewed: DVT study of left lower extremity negative X-ray of the tibia negative Course: 
Daughter at bedside. Discussed with her. Reports he is on asa but no other blood thinners. Also reports frequent falls but unclear if he had one recently. On reevaluation patient seems to have pain to the medial malleolus and daughter is also concerned that he has swelling to the dorsum of both hands possibly from previous falls. I will x-ray these MDM: 
81-year-old male here with erythema to the left medial shin region. Is unclear if this is an injury versus an early skin infection.   It does seem to be a little bit painful but patient is really unable to give me any sort of history. Unclear if he had trauma. Imaging of the tibia and duplex of the leg were negative. Pending either a x-rays of the left ankle and foot. No fever or leukocytosis to suggest systemic infection. Given the erythematous skin changes and history of diabetes I will treated with Keflex in case this is an early skin infection forming. He was signed out at end of my shift to Dr. Edrick Closs to follow-up on foot/ankle as well as hand films.  
 
Walker Parsons, DO

## 2019-12-24 NOTE — DISCHARGE INSTRUCTIONS
RETURN IF WORSENING PAIN, CHANGE IN COLOR, CHANGE IN TEMPERATURE, OR LOSS OF SENSATION IN THE AFFECTED EXTREMITY    RETURN IF FEVER

## 2020-01-01 ENCOUNTER — TELEPHONE (OUTPATIENT)
Dept: CARDIOLOGY CLINIC | Age: 85
End: 2020-01-01

## 2020-01-01 ENCOUNTER — APPOINTMENT (OUTPATIENT)
Dept: GENERAL RADIOLOGY | Age: 85
DRG: 291 | End: 2020-01-01
Attending: NURSE PRACTITIONER
Payer: MEDICARE

## 2020-01-01 ENCOUNTER — HOSPICE ADMISSION (OUTPATIENT)
Dept: HOSPICE | Facility: HOSPICE | Age: 85
End: 2020-01-01
Payer: MEDICARE

## 2020-01-01 ENCOUNTER — HOSPITAL ENCOUNTER (OUTPATIENT)
Age: 85
Setting detail: OBSERVATION
Discharge: HOSPICE | End: 2020-07-25
Attending: EMERGENCY MEDICINE | Admitting: INTERNAL MEDICINE
Payer: MEDICARE

## 2020-01-01 ENCOUNTER — HOME CARE VISIT (OUTPATIENT)
Dept: HOSPICE | Facility: HOSPICE | Age: 85
End: 2020-01-01
Payer: MEDICARE

## 2020-01-01 ENCOUNTER — APPOINTMENT (OUTPATIENT)
Dept: GENERAL RADIOLOGY | Age: 85
DRG: 291 | End: 2020-01-01
Attending: FAMILY MEDICINE
Payer: MEDICARE

## 2020-01-01 ENCOUNTER — VIRTUAL VISIT (OUTPATIENT)
Dept: CARDIOLOGY CLINIC | Age: 85
End: 2020-01-01

## 2020-01-01 ENCOUNTER — APPOINTMENT (OUTPATIENT)
Dept: GENERAL RADIOLOGY | Age: 85
End: 2020-01-01
Attending: EMERGENCY MEDICINE
Payer: MEDICARE

## 2020-01-01 ENCOUNTER — HOME CARE VISIT (OUTPATIENT)
Dept: SCHEDULING | Facility: HOME HEALTH | Age: 85
End: 2020-01-01
Payer: MEDICARE

## 2020-01-01 ENCOUNTER — APPOINTMENT (OUTPATIENT)
Dept: GENERAL RADIOLOGY | Age: 85
DRG: 291 | End: 2020-01-01
Attending: STUDENT IN AN ORGANIZED HEALTH CARE EDUCATION/TRAINING PROGRAM
Payer: MEDICARE

## 2020-01-01 ENCOUNTER — APPOINTMENT (OUTPATIENT)
Dept: GENERAL RADIOLOGY | Age: 85
DRG: 291 | End: 2020-01-01
Attending: INTERNAL MEDICINE
Payer: MEDICARE

## 2020-01-01 ENCOUNTER — APPOINTMENT (OUTPATIENT)
Dept: NON INVASIVE DIAGNOSTICS | Age: 85
DRG: 291 | End: 2020-01-01
Attending: INTERNAL MEDICINE
Payer: MEDICARE

## 2020-01-01 ENCOUNTER — HOSPITAL ENCOUNTER (INPATIENT)
Age: 85
LOS: 8 days | Discharge: NURSING, REHAB OR LONG-TERM CARE | DRG: 291 | End: 2020-05-26
Attending: STUDENT IN AN ORGANIZED HEALTH CARE EDUCATION/TRAINING PROGRAM | Admitting: INTERNAL MEDICINE
Payer: MEDICARE

## 2020-01-01 VITALS
TEMPERATURE: 98.7 F | OXYGEN SATURATION: 94 % | DIASTOLIC BLOOD PRESSURE: 56 MMHG | HEART RATE: 60 BPM | RESPIRATION RATE: 22 BRPM | SYSTOLIC BLOOD PRESSURE: 106 MMHG

## 2020-01-01 VITALS
HEART RATE: 59 BPM | SYSTOLIC BLOOD PRESSURE: 112 MMHG | BODY MASS INDEX: 16.98 KG/M2 | OXYGEN SATURATION: 98 % | WEIGHT: 118.61 LBS | RESPIRATION RATE: 16 BRPM | DIASTOLIC BLOOD PRESSURE: 64 MMHG | TEMPERATURE: 97.6 F | HEIGHT: 70 IN

## 2020-01-01 VITALS
HEART RATE: 60 BPM | OXYGEN SATURATION: 82 % | HEART RATE: 60 BPM | RESPIRATION RATE: 24 BRPM | TEMPERATURE: 98 F | RESPIRATION RATE: 22 BRPM

## 2020-01-01 VITALS
DIASTOLIC BLOOD PRESSURE: 40 MMHG | SYSTOLIC BLOOD PRESSURE: 70 MMHG | HEART RATE: 90 BPM | RESPIRATION RATE: 30 BRPM | OXYGEN SATURATION: 92 %

## 2020-01-01 VITALS
TEMPERATURE: 97.6 F | BODY MASS INDEX: 18.11 KG/M2 | DIASTOLIC BLOOD PRESSURE: 45 MMHG | HEART RATE: 60 BPM | OXYGEN SATURATION: 96 % | WEIGHT: 126.5 LBS | RESPIRATION RATE: 16 BRPM | HEIGHT: 70 IN | SYSTOLIC BLOOD PRESSURE: 109 MMHG

## 2020-01-01 DIAGNOSIS — R53.81 DEBILITY: ICD-10-CM

## 2020-01-01 DIAGNOSIS — Z95.0 CARDIAC PACEMAKER IN SITU: ICD-10-CM

## 2020-01-01 DIAGNOSIS — I50.23 ACUTE ON CHRONIC SYSTOLIC CHF (CONGESTIVE HEART FAILURE) (HCC): Primary | ICD-10-CM

## 2020-01-01 DIAGNOSIS — J90 PLEURAL EFFUSION, RIGHT: ICD-10-CM

## 2020-01-01 DIAGNOSIS — R09.02 HYPOXIA: ICD-10-CM

## 2020-01-01 DIAGNOSIS — I25.10 CORONARY ARTERY DISEASE INVOLVING NATIVE CORONARY ARTERY OF NATIVE HEART WITHOUT ANGINA PECTORIS: ICD-10-CM

## 2020-01-01 DIAGNOSIS — Z95.1 S/P CABG X 3: ICD-10-CM

## 2020-01-01 DIAGNOSIS — Z71.89 GOALS OF CARE, COUNSELING/DISCUSSION: ICD-10-CM

## 2020-01-01 DIAGNOSIS — I35.0 MILD AORTIC STENOSIS: ICD-10-CM

## 2020-01-01 DIAGNOSIS — R77.8 ELEVATED TROPONIN: ICD-10-CM

## 2020-01-01 DIAGNOSIS — I50.9 ACUTE ON CHRONIC CONGESTIVE HEART FAILURE, UNSPECIFIED HEART FAILURE TYPE (HCC): ICD-10-CM

## 2020-01-01 DIAGNOSIS — I50.22 CHRONIC SYSTOLIC CONGESTIVE HEART FAILURE (HCC): Primary | ICD-10-CM

## 2020-01-01 DIAGNOSIS — G30.8 ALZHEIMER'S DISEASE OF OTHER ONSET WITH BEHAVIORAL DISTURBANCE: Primary | ICD-10-CM

## 2020-01-01 DIAGNOSIS — I50.9 HEART FAILURE, NYHA CLASS 4 (HCC): ICD-10-CM

## 2020-01-01 DIAGNOSIS — F02.818 ALZHEIMER'S DISEASE OF OTHER ONSET WITH BEHAVIORAL DISTURBANCE: Primary | ICD-10-CM

## 2020-01-01 DIAGNOSIS — I65.23 BILATERAL CAROTID ARTERY STENOSIS: ICD-10-CM

## 2020-01-01 DIAGNOSIS — F03.91 DEMENTIA WITH BEHAVIORAL DISTURBANCE, UNSPECIFIED DEMENTIA TYPE: Chronic | ICD-10-CM

## 2020-01-01 DIAGNOSIS — R09.02 HYPOXIA: Primary | ICD-10-CM

## 2020-01-01 LAB
ALBUMIN SERPL-MCNC: 2.5 G/DL (ref 3.5–5)
ALBUMIN SERPL-MCNC: 2.8 G/DL (ref 3.5–5)
ALBUMIN SERPL-MCNC: 2.9 G/DL (ref 3.5–5)
ALBUMIN SERPL-MCNC: 3.1 G/DL (ref 3.5–5)
ALBUMIN SERPL-MCNC: 3.1 G/DL (ref 3.5–5)
ALBUMIN/GLOB SERPL: 0.6 {RATIO} (ref 1.1–2.2)
ALBUMIN/GLOB SERPL: 0.7 {RATIO} (ref 1.1–2.2)
ALBUMIN/GLOB SERPL: 0.8 {RATIO} (ref 1.1–2.2)
ALP SERPL-CCNC: 72 U/L (ref 45–117)
ALP SERPL-CCNC: 78 U/L (ref 45–117)
ALP SERPL-CCNC: 82 U/L (ref 45–117)
ALP SERPL-CCNC: 88 U/L (ref 45–117)
ALP SERPL-CCNC: 91 U/L (ref 45–117)
ALP SERPL-CCNC: 92 U/L (ref 45–117)
ALP SERPL-CCNC: 94 U/L (ref 45–117)
ALT SERPL-CCNC: 12 U/L (ref 12–78)
ALT SERPL-CCNC: 201 U/L (ref 12–78)
ALT SERPL-CCNC: 378 U/L (ref 12–78)
ALT SERPL-CCNC: 480 U/L (ref 12–78)
ALT SERPL-CCNC: 631 U/L (ref 12–78)
ALT SERPL-CCNC: 681 U/L (ref 12–78)
ALT SERPL-CCNC: 759 U/L (ref 12–78)
ANION GAP SERPL CALC-SCNC: 1 MMOL/L (ref 5–15)
ANION GAP SERPL CALC-SCNC: 1 MMOL/L (ref 5–15)
ANION GAP SERPL CALC-SCNC: 2 MMOL/L (ref 5–15)
ANION GAP SERPL CALC-SCNC: 3 MMOL/L (ref 5–15)
ANION GAP SERPL CALC-SCNC: 5 MMOL/L (ref 5–15)
ANION GAP SERPL CALC-SCNC: 5 MMOL/L (ref 5–15)
ANION GAP SERPL CALC-SCNC: 7 MMOL/L (ref 5–15)
APPEARANCE UR: CLEAR
APTT PPP: 32 SEC (ref 22.1–32)
APTT PPP: 32.3 SEC (ref 22.1–32)
APTT PPP: 33.5 SEC (ref 22.1–32)
APTT PPP: 33.8 SEC (ref 22.1–32)
APTT PPP: 33.9 SEC (ref 22.1–32)
APTT PPP: 35 SEC (ref 22.1–32)
AST SERPL-CCNC: 118 U/L (ref 15–37)
AST SERPL-CCNC: 19 U/L (ref 15–37)
AST SERPL-CCNC: 223 U/L (ref 15–37)
AST SERPL-CCNC: 40 U/L (ref 15–37)
AST SERPL-CCNC: 406 U/L (ref 15–37)
AST SERPL-CCNC: 614 U/L (ref 15–37)
AST SERPL-CCNC: 770 U/L (ref 15–37)
ATRIAL RATE: 0 BPM
AV VELOCITY RATIO: 0.36
AV VTI RATIO: 0.3
B PERT DNA SPEC QL NAA+PROBE: NOT DETECTED
BASOPHILS # BLD: 0 K/UL (ref 0–0.1)
BASOPHILS NFR BLD: 0 % (ref 0–1)
BASOPHILS NFR BLD: 1 % (ref 0–1)
BILIRUB SERPL-MCNC: 1 MG/DL (ref 0.2–1)
BILIRUB SERPL-MCNC: 1.1 MG/DL (ref 0.2–1)
BILIRUB SERPL-MCNC: 1.1 MG/DL (ref 0.2–1)
BILIRUB SERPL-MCNC: 1.2 MG/DL (ref 0.2–1)
BILIRUB SERPL-MCNC: 1.2 MG/DL (ref 0.2–1)
BILIRUB SERPL-MCNC: 1.3 MG/DL (ref 0.2–1)
BILIRUB SERPL-MCNC: 1.4 MG/DL (ref 0.2–1)
BILIRUB UR QL: NEGATIVE
BNP SERPL-MCNC: ABNORMAL PG/ML
BORDETELLA PARAPERTUSSIS PCR, BORPAR: NOT DETECTED
BUN SERPL-MCNC: 20 MG/DL (ref 6–20)
BUN SERPL-MCNC: 23 MG/DL (ref 6–20)
BUN SERPL-MCNC: 26 MG/DL (ref 6–20)
BUN SERPL-MCNC: 31 MG/DL (ref 6–20)
BUN SERPL-MCNC: 37 MG/DL (ref 6–20)
BUN SERPL-MCNC: 37 MG/DL (ref 6–20)
BUN/CREAT SERPL: 21 (ref 12–20)
BUN/CREAT SERPL: 26 (ref 12–20)
BUN/CREAT SERPL: 28 (ref 12–20)
BUN/CREAT SERPL: 28 (ref 12–20)
BUN/CREAT SERPL: 29 (ref 12–20)
BUN/CREAT SERPL: 30 (ref 12–20)
BUN/CREAT SERPL: 31 (ref 12–20)
BUN/CREAT SERPL: 32 (ref 12–20)
BUN/CREAT SERPL: 34 (ref 12–20)
C PNEUM DNA SPEC QL NAA+PROBE: NOT DETECTED
CALCIUM SERPL-MCNC: 8.4 MG/DL (ref 8.5–10.1)
CALCIUM SERPL-MCNC: 8.8 MG/DL (ref 8.5–10.1)
CALCIUM SERPL-MCNC: 8.9 MG/DL (ref 8.5–10.1)
CALCIUM SERPL-MCNC: 8.9 MG/DL (ref 8.5–10.1)
CALCIUM SERPL-MCNC: 9 MG/DL (ref 8.5–10.1)
CALCIUM SERPL-MCNC: 9.1 MG/DL (ref 8.5–10.1)
CALCULATED R AXIS, ECG10: -84 DEGREES
CALCULATED T AXIS, ECG11: 93 DEGREES
CHLORIDE SERPL-SCNC: 100 MMOL/L (ref 97–108)
CHLORIDE SERPL-SCNC: 102 MMOL/L (ref 97–108)
CHLORIDE SERPL-SCNC: 96 MMOL/L (ref 97–108)
CHLORIDE SERPL-SCNC: 97 MMOL/L (ref 97–108)
CHLORIDE SERPL-SCNC: 98 MMOL/L (ref 97–108)
CHLORIDE SERPL-SCNC: 98 MMOL/L (ref 97–108)
CHLORIDE SERPL-SCNC: 99 MMOL/L (ref 97–108)
CO2 SERPL-SCNC: 29 MMOL/L (ref 21–32)
CO2 SERPL-SCNC: 31 MMOL/L (ref 21–32)
CO2 SERPL-SCNC: 33 MMOL/L (ref 21–32)
CO2 SERPL-SCNC: 33 MMOL/L (ref 21–32)
CO2 SERPL-SCNC: 36 MMOL/L (ref 21–32)
CO2 SERPL-SCNC: 37 MMOL/L (ref 21–32)
CO2 SERPL-SCNC: 39 MMOL/L (ref 21–32)
CO2 SERPL-SCNC: 39 MMOL/L (ref 21–32)
CO2 SERPL-SCNC: 40 MMOL/L (ref 21–32)
COLOR UR: NORMAL
COMMENT, HOLDF: NORMAL
CREAT SERPL-MCNC: 0.67 MG/DL (ref 0.7–1.3)
CREAT SERPL-MCNC: 0.81 MG/DL (ref 0.7–1.3)
CREAT SERPL-MCNC: 0.84 MG/DL (ref 0.7–1.3)
CREAT SERPL-MCNC: 0.9 MG/DL (ref 0.7–1.3)
CREAT SERPL-MCNC: 0.92 MG/DL (ref 0.7–1.3)
CREAT SERPL-MCNC: 0.95 MG/DL (ref 0.7–1.3)
CREAT SERPL-MCNC: 1.03 MG/DL (ref 0.7–1.3)
CREAT SERPL-MCNC: 1.32 MG/DL (ref 0.7–1.3)
CREAT SERPL-MCNC: 1.45 MG/DL (ref 0.7–1.3)
D DIMER PPP FEU-MCNC: 12.11 MG/L FEU (ref 0–0.65)
D DIMER PPP FEU-MCNC: 12.28 MG/L FEU (ref 0–0.65)
D DIMER PPP FEU-MCNC: 12.54 MG/L FEU (ref 0–0.65)
D DIMER PPP FEU-MCNC: 3.34 MG/L FEU (ref 0–0.65)
D DIMER PPP FEU-MCNC: 4.3 MG/L FEU (ref 0–0.65)
D DIMER PPP FEU-MCNC: 6.12 MG/L FEU (ref 0–0.65)
DIAGNOSIS, 93000: NORMAL
DIFFERENTIAL METHOD BLD: ABNORMAL
ECHO AO ROOT DIAM: 3.25 CM
ECHO AV AREA PEAK VELOCITY: 1.1 CM2
ECHO AV AREA VTI: 1 CM2
ECHO AV AREA/BSA PEAK VELOCITY: 0.7 CM2/M2
ECHO AV AREA/BSA VTI: 0.6 CM2/M2
ECHO AV MEAN GRADIENT: 9.5 MMHG
ECHO AV MEAN VELOCITY: 1.43 M/S
ECHO AV PEAK GRADIENT: 17 MMHG
ECHO AV PEAK VELOCITY: 206.39 CM/S
ECHO AV VTI: 41.35 CM
ECHO EST RA PRESSURE: 15 MMHG
ECHO LA AREA 4C: 22 CM2
ECHO LA MAJOR AXIS: 4.41 CM
ECHO LA TO AORTIC ROOT RATIO: 1.36
ECHO LA VOL 2C: 85.23 ML (ref 18–58)
ECHO LA VOL 4C: 63.1 ML (ref 18–58)
ECHO LA VOL BP: 78.1 ML (ref 18–58)
ECHO LA VOL/BSA BIPLANE: 44.2 ML/M2 (ref 16–28)
ECHO LA VOLUME INDEX A2C: 48.23 ML/M2 (ref 16–28)
ECHO LA VOLUME INDEX A4C: 35.71 ML/M2 (ref 16–28)
ECHO LV EDV A2C: 160.7 ML
ECHO LV EDV A4C: 142.3 ML
ECHO LV EDV BP: 154.1 ML (ref 67–155)
ECHO LV EDV INDEX A4C: 80.5 ML/M2
ECHO LV EDV INDEX BP: 87.2 ML/M2
ECHO LV EDV NDEX A2C: 90.9 ML/M2
ECHO LV EDV TEICHHOLZ: 94 ML
ECHO LV EJECTION FRACTION A2C: 39 %
ECHO LV EJECTION FRACTION A4C: 42 %
ECHO LV EJECTION FRACTION BIPLANE: 39.7 % (ref 55–100)
ECHO LV ESV A2C: 97.7 ML
ECHO LV ESV A4C: 82.6 ML
ECHO LV ESV BP: 93 ML (ref 22–58)
ECHO LV ESV INDEX A2C: 55.3 ML/M2
ECHO LV ESV INDEX A4C: 46.7 ML/M2
ECHO LV ESV INDEX BP: 52.6 ML/M2
ECHO LV ESV TEICHHOLZ: 58.23 ML
ECHO LV INTERNAL DIMENSION DIASTOLIC: 5.72 CM (ref 4.2–5.9)
ECHO LV INTERNAL DIMENSION SYSTOLIC: 4.65 CM
ECHO LV IVSD: 0.74 CM (ref 0.6–1)
ECHO LV MASS 2D: 184.5 G (ref 88–224)
ECHO LV MASS INDEX 2D: 104.4 G/M2 (ref 49–115)
ECHO LV POSTERIOR WALL DIASTOLIC: 0.77 CM (ref 0.6–1)
ECHO LVOT DIAM: 2 CM
ECHO LVOT PEAK GRADIENT: 2.1 MMHG
ECHO LVOT PEAK VELOCITY: 73.31 CM/S
ECHO LVOT SV: 42.9 ML
ECHO LVOT VTI: 13.61 CM
ECHO MV A VELOCITY: 47.41 CM/S
ECHO MV AREA PHT: 3.7 CM2
ECHO MV E DECELERATION TIME (DT): 203.6 MS
ECHO MV E VELOCITY: 126.75 CM/S
ECHO MV E/A RATIO: 2.67
ECHO MV PRESSURE HALF TIME (PHT): 59 MS
ECHO PULMONARY ARTERY SYSTOLIC PRESSURE (PASP): 57.9 MMHG
ECHO PV MAX VELOCITY: 103.77 CM/S
ECHO PV PEAK GRADIENT: 4.3 MMHG
ECHO RIGHT VENTRICULAR SYSTOLIC PRESSURE (RVSP): 57.9 MMHG
ECHO TV REGURGITANT MAX VELOCITY: 327.55 CM/S
ECHO TV REGURGITANT PEAK GRADIENT: 42.9 MMHG
EOSINOPHIL # BLD: 0.2 K/UL (ref 0–0.4)
EOSINOPHIL # BLD: 0.3 K/UL (ref 0–0.4)
EOSINOPHIL NFR BLD: 2 % (ref 0–7)
EOSINOPHIL NFR BLD: 3 % (ref 0–7)
EOSINOPHIL NFR BLD: 4 % (ref 0–7)
EOSINOPHIL NFR BLD: 4 % (ref 0–7)
ERYTHROCYTE [DISTWIDTH] IN BLOOD BY AUTOMATED COUNT: 17.2 % (ref 11.5–14.5)
ERYTHROCYTE [DISTWIDTH] IN BLOOD BY AUTOMATED COUNT: 17.3 % (ref 11.5–14.5)
ERYTHROCYTE [DISTWIDTH] IN BLOOD BY AUTOMATED COUNT: 17.3 % (ref 11.5–14.5)
ERYTHROCYTE [DISTWIDTH] IN BLOOD BY AUTOMATED COUNT: 17.5 % (ref 11.5–14.5)
ERYTHROCYTE [DISTWIDTH] IN BLOOD BY AUTOMATED COUNT: 17.5 % (ref 11.5–14.5)
ERYTHROCYTE [DISTWIDTH] IN BLOOD BY AUTOMATED COUNT: 17.7 % (ref 11.5–14.5)
ERYTHROCYTE [DISTWIDTH] IN BLOOD BY AUTOMATED COUNT: 17.7 % (ref 11.5–14.5)
ERYTHROCYTE [DISTWIDTH] IN BLOOD BY AUTOMATED COUNT: 18.8 % (ref 11.5–14.5)
ERYTHROCYTE [DISTWIDTH] IN BLOOD BY AUTOMATED COUNT: 18.8 % (ref 11.5–14.5)
EST. AVERAGE GLUCOSE BLD GHB EST-MCNC: 140 MG/DL
FERRITIN SERPL-MCNC: 527 NG/ML (ref 26–388)
FIBRINOGEN PPP-MCNC: 310 MG/DL (ref 200–475)
FIBRINOGEN PPP-MCNC: 314 MG/DL (ref 200–475)
FIBRINOGEN PPP-MCNC: 321 MG/DL (ref 200–475)
FIBRINOGEN PPP-MCNC: 324 MG/DL (ref 200–475)
FIBRINOGEN PPP-MCNC: 328 MG/DL (ref 200–475)
FIBRINOGEN PPP-MCNC: 351 MG/DL (ref 200–475)
FLUAV H1 2009 PAND RNA SPEC QL NAA+PROBE: NOT DETECTED
FLUAV H1 RNA SPEC QL NAA+PROBE: NOT DETECTED
FLUAV H3 RNA SPEC QL NAA+PROBE: NOT DETECTED
FLUAV SUBTYP SPEC NAA+PROBE: NOT DETECTED
FLUBV RNA SPEC QL NAA+PROBE: NOT DETECTED
GLOBULIN SER CALC-MCNC: 3.7 G/DL (ref 2–4)
GLOBULIN SER CALC-MCNC: 3.8 G/DL (ref 2–4)
GLOBULIN SER CALC-MCNC: 3.8 G/DL (ref 2–4)
GLOBULIN SER CALC-MCNC: 3.9 G/DL (ref 2–4)
GLOBULIN SER CALC-MCNC: 4.1 G/DL (ref 2–4)
GLOBULIN SER CALC-MCNC: 4.1 G/DL (ref 2–4)
GLOBULIN SER CALC-MCNC: 4.3 G/DL (ref 2–4)
GLUCOSE BLD STRIP.AUTO-MCNC: 102 MG/DL (ref 65–100)
GLUCOSE BLD STRIP.AUTO-MCNC: 106 MG/DL (ref 65–100)
GLUCOSE BLD STRIP.AUTO-MCNC: 108 MG/DL (ref 65–100)
GLUCOSE BLD STRIP.AUTO-MCNC: 110 MG/DL (ref 65–100)
GLUCOSE BLD STRIP.AUTO-MCNC: 112 MG/DL (ref 65–100)
GLUCOSE BLD STRIP.AUTO-MCNC: 117 MG/DL (ref 65–100)
GLUCOSE BLD STRIP.AUTO-MCNC: 119 MG/DL (ref 65–100)
GLUCOSE BLD STRIP.AUTO-MCNC: 121 MG/DL (ref 65–100)
GLUCOSE BLD STRIP.AUTO-MCNC: 121 MG/DL (ref 65–100)
GLUCOSE BLD STRIP.AUTO-MCNC: 122 MG/DL (ref 65–100)
GLUCOSE BLD STRIP.AUTO-MCNC: 125 MG/DL (ref 65–100)
GLUCOSE BLD STRIP.AUTO-MCNC: 129 MG/DL (ref 65–100)
GLUCOSE BLD STRIP.AUTO-MCNC: 129 MG/DL (ref 65–100)
GLUCOSE BLD STRIP.AUTO-MCNC: 135 MG/DL (ref 65–100)
GLUCOSE BLD STRIP.AUTO-MCNC: 141 MG/DL (ref 65–100)
GLUCOSE BLD STRIP.AUTO-MCNC: 148 MG/DL (ref 65–100)
GLUCOSE BLD STRIP.AUTO-MCNC: 148 MG/DL (ref 65–100)
GLUCOSE BLD STRIP.AUTO-MCNC: 150 MG/DL (ref 65–100)
GLUCOSE BLD STRIP.AUTO-MCNC: 152 MG/DL (ref 65–100)
GLUCOSE BLD STRIP.AUTO-MCNC: 159 MG/DL (ref 65–100)
GLUCOSE BLD STRIP.AUTO-MCNC: 165 MG/DL (ref 65–100)
GLUCOSE BLD STRIP.AUTO-MCNC: 165 MG/DL (ref 65–100)
GLUCOSE BLD STRIP.AUTO-MCNC: 168 MG/DL (ref 65–100)
GLUCOSE BLD STRIP.AUTO-MCNC: 170 MG/DL (ref 65–100)
GLUCOSE BLD STRIP.AUTO-MCNC: 175 MG/DL (ref 65–100)
GLUCOSE BLD STRIP.AUTO-MCNC: 179 MG/DL (ref 65–100)
GLUCOSE BLD STRIP.AUTO-MCNC: 182 MG/DL (ref 65–100)
GLUCOSE BLD STRIP.AUTO-MCNC: 184 MG/DL (ref 65–100)
GLUCOSE BLD STRIP.AUTO-MCNC: 188 MG/DL (ref 65–100)
GLUCOSE BLD STRIP.AUTO-MCNC: 190 MG/DL (ref 65–100)
GLUCOSE BLD STRIP.AUTO-MCNC: 192 MG/DL (ref 65–100)
GLUCOSE BLD STRIP.AUTO-MCNC: 204 MG/DL (ref 65–100)
GLUCOSE BLD STRIP.AUTO-MCNC: 210 MG/DL (ref 65–100)
GLUCOSE BLD STRIP.AUTO-MCNC: 211 MG/DL (ref 65–100)
GLUCOSE BLD STRIP.AUTO-MCNC: 213 MG/DL (ref 65–100)
GLUCOSE BLD STRIP.AUTO-MCNC: 215 MG/DL (ref 65–100)
GLUCOSE BLD STRIP.AUTO-MCNC: 239 MG/DL (ref 65–100)
GLUCOSE BLD STRIP.AUTO-MCNC: 241 MG/DL (ref 65–100)
GLUCOSE BLD STRIP.AUTO-MCNC: 253 MG/DL (ref 65–100)
GLUCOSE BLD STRIP.AUTO-MCNC: 262 MG/DL (ref 65–100)
GLUCOSE BLD STRIP.AUTO-MCNC: 314 MG/DL (ref 65–100)
GLUCOSE BLD STRIP.AUTO-MCNC: 54 MG/DL (ref 65–100)
GLUCOSE BLD STRIP.AUTO-MCNC: 58 MG/DL (ref 65–100)
GLUCOSE BLD STRIP.AUTO-MCNC: 76 MG/DL (ref 65–100)
GLUCOSE BLD STRIP.AUTO-MCNC: 90 MG/DL (ref 65–100)
GLUCOSE BLD STRIP.AUTO-MCNC: 95 MG/DL (ref 65–100)
GLUCOSE SERPL-MCNC: 111 MG/DL (ref 65–100)
GLUCOSE SERPL-MCNC: 111 MG/DL (ref 65–100)
GLUCOSE SERPL-MCNC: 118 MG/DL (ref 65–100)
GLUCOSE SERPL-MCNC: 123 MG/DL (ref 65–100)
GLUCOSE SERPL-MCNC: 124 MG/DL (ref 65–100)
GLUCOSE SERPL-MCNC: 132 MG/DL (ref 65–100)
GLUCOSE SERPL-MCNC: 142 MG/DL (ref 65–100)
GLUCOSE SERPL-MCNC: 73 MG/DL (ref 65–100)
GLUCOSE SERPL-MCNC: 89 MG/DL (ref 65–100)
GLUCOSE UR STRIP.AUTO-MCNC: NEGATIVE MG/DL
HADV DNA SPEC QL NAA+PROBE: NOT DETECTED
HAV IGM SER QL: NONREACTIVE
HBA1C MFR BLD: 6.5 % (ref 4–5.6)
HBV CORE IGM SER QL: NONREACTIVE
HBV SURFACE AG SER QL: 0.69 INDEX
HBV SURFACE AG SER QL: NEGATIVE
HCOV 229E RNA SPEC QL NAA+PROBE: NOT DETECTED
HCOV HKU1 RNA SPEC QL NAA+PROBE: NOT DETECTED
HCOV NL63 RNA SPEC QL NAA+PROBE: NOT DETECTED
HCOV OC43 RNA SPEC QL NAA+PROBE: NOT DETECTED
HCT VFR BLD AUTO: 29.4 % (ref 36.6–50.3)
HCT VFR BLD AUTO: 30.2 % (ref 36.6–50.3)
HCT VFR BLD AUTO: 30.4 % (ref 36.6–50.3)
HCT VFR BLD AUTO: 30.7 % (ref 36.6–50.3)
HCT VFR BLD AUTO: 31.1 % (ref 36.6–50.3)
HCT VFR BLD AUTO: 31.6 % (ref 36.6–50.3)
HCT VFR BLD AUTO: 32.9 % (ref 36.6–50.3)
HCT VFR BLD AUTO: 33.4 % (ref 36.6–50.3)
HCT VFR BLD AUTO: 36.8 % (ref 36.6–50.3)
HCV AB SERPL QL IA: NONREACTIVE
HCV COMMENT,HCGAC: NORMAL
HGB BLD-MCNC: 10.1 G/DL (ref 12.1–17)
HGB BLD-MCNC: 10.1 G/DL (ref 12.1–17)
HGB BLD-MCNC: 10.8 G/DL (ref 12.1–17)
HGB BLD-MCNC: 9.1 G/DL (ref 12.1–17)
HGB BLD-MCNC: 9.2 G/DL (ref 12.1–17)
HGB BLD-MCNC: 9.4 G/DL (ref 12.1–17)
HGB BLD-MCNC: 9.6 G/DL (ref 12.1–17)
HGB BLD-MCNC: 9.6 G/DL (ref 12.1–17)
HGB BLD-MCNC: 9.7 G/DL (ref 12.1–17)
HGB UR QL STRIP: NEGATIVE
HMPV RNA SPEC QL NAA+PROBE: NOT DETECTED
HPIV1 RNA SPEC QL NAA+PROBE: NOT DETECTED
HPIV2 RNA SPEC QL NAA+PROBE: NOT DETECTED
HPIV3 RNA SPEC QL NAA+PROBE: NOT DETECTED
HPIV4 RNA SPEC QL NAA+PROBE: NOT DETECTED
IMM GRANULOCYTES # BLD AUTO: 0 K/UL (ref 0–0.04)
IMM GRANULOCYTES NFR BLD AUTO: 0 % (ref 0–0.5)
IMM GRANULOCYTES NFR BLD AUTO: 1 % (ref 0–0.5)
INR PPP: 1.3 (ref 0.9–1.1)
INR PPP: 1.4 (ref 0.9–1.1)
INR PPP: 1.5 (ref 0.9–1.1)
INR PPP: 1.7 (ref 0.9–1.1)
KETONES UR QL STRIP.AUTO: NEGATIVE MG/DL
LDH SERPL L TO P-CCNC: 324 U/L (ref 85–241)
LEUKOCYTE ESTERASE UR QL STRIP.AUTO: NEGATIVE
LVFS 2D: 18.67 %
LVOT MG: 1.03 MMHG
LVOT MV: 0.46 CM/S
LVSV (MOD BI): 35.67 ML
LVSV (MOD SINGLE 4C): 34.85 ML
LVSV (MOD SINGLE): 36.78 ML
LVSV (TEICH): 35.77 ML
LYMPHOCYTES # BLD: 0.6 K/UL (ref 0.8–3.5)
LYMPHOCYTES # BLD: 0.8 K/UL (ref 0.8–3.5)
LYMPHOCYTES # BLD: 1 K/UL (ref 0.8–3.5)
LYMPHOCYTES # BLD: 1.1 K/UL (ref 0.8–3.5)
LYMPHOCYTES # BLD: 1.2 K/UL (ref 0.8–3.5)
LYMPHOCYTES NFR BLD: 12 % (ref 12–49)
LYMPHOCYTES NFR BLD: 12 % (ref 12–49)
LYMPHOCYTES NFR BLD: 13 % (ref 12–49)
LYMPHOCYTES NFR BLD: 13 % (ref 12–49)
LYMPHOCYTES NFR BLD: 14 % (ref 12–49)
LYMPHOCYTES NFR BLD: 16 % (ref 12–49)
LYMPHOCYTES NFR BLD: 6 % (ref 12–49)
M PNEUMO DNA SPEC QL NAA+PROBE: NOT DETECTED
MAGNESIUM SERPL-MCNC: 2 MG/DL (ref 1.6–2.4)
MAGNESIUM SERPL-MCNC: 2.2 MG/DL (ref 1.6–2.4)
MCH RBC QN AUTO: 28.6 PG (ref 26–34)
MCH RBC QN AUTO: 28.8 PG (ref 26–34)
MCH RBC QN AUTO: 29 PG (ref 26–34)
MCH RBC QN AUTO: 29.1 PG (ref 26–34)
MCH RBC QN AUTO: 29.2 PG (ref 26–34)
MCH RBC QN AUTO: 29.2 PG (ref 26–34)
MCH RBC QN AUTO: 29.3 PG (ref 26–34)
MCH RBC QN AUTO: 29.4 PG (ref 26–34)
MCH RBC QN AUTO: 29.9 PG (ref 26–34)
MCHC RBC AUTO-ENTMCNC: 29.3 G/DL (ref 30–36.5)
MCHC RBC AUTO-ENTMCNC: 30.1 G/DL (ref 30–36.5)
MCHC RBC AUTO-ENTMCNC: 30.2 G/DL (ref 30–36.5)
MCHC RBC AUTO-ENTMCNC: 30.4 G/DL (ref 30–36.5)
MCHC RBC AUTO-ENTMCNC: 30.6 G/DL (ref 30–36.5)
MCHC RBC AUTO-ENTMCNC: 30.7 G/DL (ref 30–36.5)
MCHC RBC AUTO-ENTMCNC: 31.2 G/DL (ref 30–36.5)
MCHC RBC AUTO-ENTMCNC: 31.3 G/DL (ref 30–36.5)
MCHC RBC AUTO-ENTMCNC: 31.6 G/DL (ref 30–36.5)
MCV RBC AUTO: 100 FL (ref 80–99)
MCV RBC AUTO: 92.7 FL (ref 80–99)
MCV RBC AUTO: 93 FL (ref 80–99)
MCV RBC AUTO: 93.7 FL (ref 80–99)
MCV RBC AUTO: 94.9 FL (ref 80–99)
MCV RBC AUTO: 95 FL (ref 80–99)
MCV RBC AUTO: 95 FL (ref 80–99)
MCV RBC AUTO: 95.1 FL (ref 80–99)
MCV RBC AUTO: 98.8 FL (ref 80–99)
MONOCYTES # BLD: 0.6 K/UL (ref 0–1)
MONOCYTES # BLD: 0.8 K/UL (ref 0–1)
MONOCYTES # BLD: 0.9 K/UL (ref 0–1)
MONOCYTES # BLD: 0.9 K/UL (ref 0–1)
MONOCYTES NFR BLD: 10 % (ref 5–13)
MONOCYTES NFR BLD: 11 % (ref 5–13)
MONOCYTES NFR BLD: 11 % (ref 5–13)
MONOCYTES NFR BLD: 8 % (ref 5–13)
MONOCYTES NFR BLD: 9 % (ref 5–13)
MV DEC SLOPE: 6.23
NEUTS SEG # BLD: 4.5 K/UL (ref 1.8–8)
NEUTS SEG # BLD: 5.3 K/UL (ref 1.8–8)
NEUTS SEG # BLD: 5.6 K/UL (ref 1.8–8)
NEUTS SEG # BLD: 6.1 K/UL (ref 1.8–8)
NEUTS SEG # BLD: 6.2 K/UL (ref 1.8–8)
NEUTS SEG # BLD: 7.3 K/UL (ref 1.8–8)
NEUTS SEG # BLD: 7.8 K/UL (ref 1.8–8)
NEUTS SEG NFR BLD: 69 % (ref 32–75)
NEUTS SEG NFR BLD: 72 % (ref 32–75)
NEUTS SEG NFR BLD: 74 % (ref 32–75)
NEUTS SEG NFR BLD: 76 % (ref 32–75)
NEUTS SEG NFR BLD: 84 % (ref 32–75)
NITRITE UR QL STRIP.AUTO: NEGATIVE
NRBC # BLD: 0 K/UL (ref 0–0.01)
NRBC BLD-RTO: 0 PER 100 WBC
PH UR STRIP: 5.5 [PH] (ref 5–8)
PHOSPHATE SERPL-MCNC: 4 MG/DL (ref 2.6–4.7)
PLATELET # BLD AUTO: 106 K/UL (ref 150–400)
PLATELET # BLD AUTO: 128 K/UL (ref 150–400)
PLATELET # BLD AUTO: 137 K/UL (ref 150–400)
PLATELET # BLD AUTO: 150 K/UL (ref 150–400)
PLATELET # BLD AUTO: 161 K/UL (ref 150–400)
PLATELET # BLD AUTO: 163 K/UL (ref 150–400)
PLATELET # BLD AUTO: 167 K/UL (ref 150–400)
PLATELET # BLD AUTO: 226 K/UL (ref 150–400)
PLATELET # BLD AUTO: 233 K/UL (ref 150–400)
PMV BLD AUTO: 10.7 FL (ref 8.9–12.9)
PMV BLD AUTO: 10.9 FL (ref 8.9–12.9)
PMV BLD AUTO: 11.1 FL (ref 8.9–12.9)
PMV BLD AUTO: 11.1 FL (ref 8.9–12.9)
PMV BLD AUTO: 11.6 FL (ref 8.9–12.9)
PMV BLD AUTO: 12.5 FL (ref 8.9–12.9)
PMV BLD AUTO: 9.9 FL (ref 8.9–12.9)
POTASSIUM SERPL-SCNC: 3.6 MMOL/L (ref 3.5–5.1)
POTASSIUM SERPL-SCNC: 3.7 MMOL/L (ref 3.5–5.1)
POTASSIUM SERPL-SCNC: 3.9 MMOL/L (ref 3.5–5.1)
POTASSIUM SERPL-SCNC: 3.9 MMOL/L (ref 3.5–5.1)
POTASSIUM SERPL-SCNC: 4 MMOL/L (ref 3.5–5.1)
POTASSIUM SERPL-SCNC: 4 MMOL/L (ref 3.5–5.1)
POTASSIUM SERPL-SCNC: 4.4 MMOL/L (ref 3.5–5.1)
PROT SERPL-MCNC: 6.5 G/DL (ref 6.4–8.2)
PROT SERPL-MCNC: 6.6 G/DL (ref 6.4–8.2)
PROT SERPL-MCNC: 6.6 G/DL (ref 6.4–8.2)
PROT SERPL-MCNC: 6.8 G/DL (ref 6.4–8.2)
PROT SERPL-MCNC: 6.8 G/DL (ref 6.4–8.2)
PROT SERPL-MCNC: 7.2 G/DL (ref 6.4–8.2)
PROT SERPL-MCNC: 7.2 G/DL (ref 6.4–8.2)
PROT UR STRIP-MCNC: NEGATIVE MG/DL
PROTHROMBIN TIME: 13.4 SEC (ref 9–11.1)
PROTHROMBIN TIME: 13.6 SEC (ref 9–11.1)
PROTHROMBIN TIME: 13.6 SEC (ref 9–11.1)
PROTHROMBIN TIME: 14.1 SEC (ref 9–11.1)
PROTHROMBIN TIME: 14.6 SEC (ref 9–11.1)
PROTHROMBIN TIME: 16.9 SEC (ref 9–11.1)
Q-T INTERVAL, ECG07: 548 MS
QRS DURATION, ECG06: 176 MS
QTC CALCULATION (BEZET), ECG08: 556 MS
RBC # BLD AUTO: 3.17 M/UL (ref 4.1–5.7)
RBC # BLD AUTO: 3.18 M/UL (ref 4.1–5.7)
RBC # BLD AUTO: 3.23 M/UL (ref 4.1–5.7)
RBC # BLD AUTO: 3.27 M/UL (ref 4.1–5.7)
RBC # BLD AUTO: 3.32 M/UL (ref 4.1–5.7)
RBC # BLD AUTO: 3.33 M/UL (ref 4.1–5.7)
RBC # BLD AUTO: 3.38 M/UL (ref 4.1–5.7)
RBC # BLD AUTO: 3.46 M/UL (ref 4.1–5.7)
RBC # BLD AUTO: 3.68 M/UL (ref 4.1–5.7)
RBC MORPH BLD: ABNORMAL
RSV RNA SPEC QL NAA+PROBE: NOT DETECTED
RV+EV RNA SPEC QL NAA+PROBE: NOT DETECTED
SAMPLES BEING HELD,HOLD: NORMAL
SARS-COV-2, COV2: NOT DETECTED
SERVICE CMNT-IMP: ABNORMAL
SERVICE CMNT-IMP: NORMAL
SODIUM SERPL-SCNC: 132 MMOL/L (ref 136–145)
SODIUM SERPL-SCNC: 136 MMOL/L (ref 136–145)
SODIUM SERPL-SCNC: 137 MMOL/L (ref 136–145)
SODIUM SERPL-SCNC: 137 MMOL/L (ref 136–145)
SODIUM SERPL-SCNC: 138 MMOL/L (ref 136–145)
SODIUM SERPL-SCNC: 138 MMOL/L (ref 136–145)
SODIUM SERPL-SCNC: 139 MMOL/L (ref 136–145)
SODIUM SERPL-SCNC: 140 MMOL/L (ref 136–145)
SODIUM SERPL-SCNC: 140 MMOL/L (ref 136–145)
SP GR UR REFRACTOMETRY: 1 (ref 1–1.03)
SP1: NORMAL
SP2: NORMAL
SP3: NORMAL
SPECIMEN SOURCE, FCOV2M: NORMAL
THERAPEUTIC RANGE,PTTT: ABNORMAL SECS (ref 58–77)
THERAPEUTIC RANGE,PTTT: NORMAL SECS (ref 58–77)
TROPONIN I SERPL-MCNC: 0.12 NG/ML
TROPONIN I SERPL-MCNC: 0.15 NG/ML
TROPONIN I SERPL-MCNC: 0.17 NG/ML
TROPONIN I SERPL-MCNC: 0.19 NG/ML
UROBILINOGEN UR QL STRIP.AUTO: 1 EU/DL (ref 0.2–1)
VENTRICULAR RATE, ECG03: 62 BPM
WBC # BLD AUTO: 6.3 K/UL (ref 4.1–11.1)
WBC # BLD AUTO: 7 K/UL (ref 4.1–11.1)
WBC # BLD AUTO: 7.7 K/UL (ref 4.1–11.1)
WBC # BLD AUTO: 7.8 K/UL (ref 4.1–11.1)
WBC # BLD AUTO: 8.4 K/UL (ref 4.1–11.1)
WBC # BLD AUTO: 8.4 K/UL (ref 4.1–11.1)
WBC # BLD AUTO: 9.3 K/UL (ref 4.1–11.1)
WBC # BLD AUTO: 9.4 K/UL (ref 4.1–11.1)
WBC # BLD AUTO: 9.6 K/UL (ref 4.1–11.1)

## 2020-01-01 PROCEDURE — 97530 THERAPEUTIC ACTIVITIES: CPT

## 2020-01-01 PROCEDURE — 92611 MOTION FLUOROSCOPY/SWALLOW: CPT

## 2020-01-01 PROCEDURE — 82962 GLUCOSE BLOOD TEST: CPT

## 2020-01-01 PROCEDURE — 80053 COMPREHEN METABOLIC PANEL: CPT

## 2020-01-01 PROCEDURE — 93306 TTE W/DOPPLER COMPLETE: CPT

## 2020-01-01 PROCEDURE — 77010033678 HC OXYGEN DAILY

## 2020-01-01 PROCEDURE — 74011250637 HC RX REV CODE- 250/637: Performed by: INTERNAL MEDICINE

## 2020-01-01 PROCEDURE — 96375 TX/PRO/DX INJ NEW DRUG ADDON: CPT

## 2020-01-01 PROCEDURE — 80048 BASIC METABOLIC PNL TOTAL CA: CPT

## 2020-01-01 PROCEDURE — 85610 PROTHROMBIN TIME: CPT

## 2020-01-01 PROCEDURE — G0299 HHS/HOSPICE OF RN EA 15 MIN: HCPCS

## 2020-01-01 PROCEDURE — 85027 COMPLETE CBC AUTOMATED: CPT

## 2020-01-01 PROCEDURE — 94760 N-INVAS EAR/PLS OXIMETRY 1: CPT

## 2020-01-01 PROCEDURE — 93005 ELECTROCARDIOGRAM TRACING: CPT

## 2020-01-01 PROCEDURE — 74011250637 HC RX REV CODE- 250/637: Performed by: SPECIALIST

## 2020-01-01 PROCEDURE — G0156 HHCP-SVS OF AIDE,EA 15 MIN: HCPCS

## 2020-01-01 PROCEDURE — 85384 FIBRINOGEN ACTIVITY: CPT

## 2020-01-01 PROCEDURE — 77030040393 HC DRSG OPTIFOAM GENT MDII -B

## 2020-01-01 PROCEDURE — 85379 FIBRIN DEGRADATION QUANT: CPT

## 2020-01-01 PROCEDURE — A6250 SKIN SEAL PROTECT MOISTURIZR: HCPCS

## 2020-01-01 PROCEDURE — 84484 ASSAY OF TROPONIN QUANT: CPT

## 2020-01-01 PROCEDURE — 71045 X-RAY EXAM CHEST 1 VIEW: CPT

## 2020-01-01 PROCEDURE — 96372 THER/PROPH/DIAG INJ SC/IM: CPT

## 2020-01-01 PROCEDURE — 65660000000 HC RM CCU STEPDOWN

## 2020-01-01 PROCEDURE — 74011000250 HC RX REV CODE- 250: Performed by: INTERNAL MEDICINE

## 2020-01-01 PROCEDURE — 80074 ACUTE HEPATITIS PANEL: CPT

## 2020-01-01 PROCEDURE — 96376 TX/PRO/DX INJ SAME DRUG ADON: CPT

## 2020-01-01 PROCEDURE — 99218 HC RM OBSERVATION: CPT

## 2020-01-01 PROCEDURE — 36415 COLL VENOUS BLD VENIPUNCTURE: CPT

## 2020-01-01 PROCEDURE — 0651 HSPC ROUTINE HOME CARE

## 2020-01-01 PROCEDURE — 83036 HEMOGLOBIN GLYCOSYLATED A1C: CPT

## 2020-01-01 PROCEDURE — 77030040361 HC SLV COMPR DVT MDII -B

## 2020-01-01 PROCEDURE — A4927 NON-STERILE GLOVES: HCPCS

## 2020-01-01 PROCEDURE — 74011636637 HC RX REV CODE- 636/637: Performed by: INTERNAL MEDICINE

## 2020-01-01 PROCEDURE — 83880 ASSAY OF NATRIURETIC PEPTIDE: CPT

## 2020-01-01 PROCEDURE — 87635 SARS-COV-2 COVID-19 AMP PRB: CPT

## 2020-01-01 PROCEDURE — 85025 COMPLETE CBC W/AUTO DIFF WBC: CPT

## 2020-01-01 PROCEDURE — 97535 SELF CARE MNGMENT TRAINING: CPT

## 2020-01-01 PROCEDURE — 77030040831 HC BAG URINE DRNG MDII -A

## 2020-01-01 PROCEDURE — 94761 N-INVAS EAR/PLS OXIMETRY MLT: CPT

## 2020-01-01 PROCEDURE — 74011250636 HC RX REV CODE- 250/636: Performed by: STUDENT IN AN ORGANIZED HEALTH CARE EDUCATION/TRAINING PROGRAM

## 2020-01-01 PROCEDURE — HOSPICE MEDICATION HC HH HOSPICE MEDICATION

## 2020-01-01 PROCEDURE — 74011250636 HC RX REV CODE- 250/636: Performed by: INTERNAL MEDICINE

## 2020-01-01 PROCEDURE — A9270 NON-COVERED ITEM OR SERVICE: HCPCS

## 2020-01-01 PROCEDURE — 99285 EMERGENCY DEPT VISIT HI MDM: CPT

## 2020-01-01 PROCEDURE — 97116 GAIT TRAINING THERAPY: CPT

## 2020-01-01 PROCEDURE — 85730 THROMBOPLASTIN TIME PARTIAL: CPT

## 2020-01-01 PROCEDURE — 74011000250 HC RX REV CODE- 250: Performed by: SPECIALIST

## 2020-01-01 PROCEDURE — 77030041247 HC PROTECTOR HEEL HEELMEDIX MDII -B

## 2020-01-01 PROCEDURE — 83735 ASSAY OF MAGNESIUM: CPT

## 2020-01-01 PROCEDURE — 74230 X-RAY XM SWLNG FUNCJ C+: CPT

## 2020-01-01 PROCEDURE — 96374 THER/PROPH/DIAG INJ IV PUSH: CPT

## 2020-01-01 PROCEDURE — 97165 OT EVAL LOW COMPLEX 30 MIN: CPT

## 2020-01-01 PROCEDURE — 82728 ASSAY OF FERRITIN: CPT

## 2020-01-01 PROCEDURE — 71046 X-RAY EXAM CHEST 2 VIEWS: CPT

## 2020-01-01 PROCEDURE — 92526 ORAL FUNCTION THERAPY: CPT

## 2020-01-01 PROCEDURE — HHS10554 SHAMPOO/BODY WASH 8 OZ ALOE VESTA

## 2020-01-01 PROCEDURE — 0100U RESPIRATORY PANEL,PCR,NASOPHARYNGEAL: CPT

## 2020-01-01 PROCEDURE — 97161 PT EVAL LOW COMPLEX 20 MIN: CPT

## 2020-01-01 PROCEDURE — 3336500001 HSPC ELECTION

## 2020-01-01 PROCEDURE — 3331090004 HSPC SERVICE INTENSITY ADD-ON

## 2020-01-01 PROCEDURE — T4523 ADULT SIZE BRIEF/DIAPER LG: HCPCS

## 2020-01-01 PROCEDURE — T4541 LARGE DISPOSABLE UNDERPAD: HCPCS

## 2020-01-01 PROCEDURE — 77030027138 HC INCENT SPIROMETER -A

## 2020-01-01 PROCEDURE — 81003 URINALYSIS AUTO W/O SCOPE: CPT

## 2020-01-01 PROCEDURE — 83615 LACTATE (LD) (LDH) ENZYME: CPT

## 2020-01-01 PROCEDURE — A9286 ANY HYGIENIC ITEM, DEVICE: HCPCS

## 2020-01-01 PROCEDURE — 92610 EVALUATE SWALLOWING FUNCTION: CPT

## 2020-01-01 PROCEDURE — 84100 ASSAY OF PHOSPHORUS: CPT

## 2020-01-01 RX ORDER — SERTRALINE HYDROCHLORIDE 50 MG/1
200 TABLET, FILM COATED ORAL DAILY
Status: DISCONTINUED | OUTPATIENT
Start: 2020-01-01 | End: 2020-01-01 | Stop reason: HOSPADM

## 2020-01-01 RX ORDER — AMOXICILLIN 250 MG
1 CAPSULE ORAL DAILY
Status: DISCONTINUED | OUTPATIENT
Start: 2020-01-01 | End: 2020-01-01 | Stop reason: HOSPADM

## 2020-01-01 RX ORDER — FINASTERIDE 5 MG/1
5 TABLET, FILM COATED ORAL EVERY EVENING
Status: DISCONTINUED | OUTPATIENT
Start: 2020-01-01 | End: 2020-01-01 | Stop reason: HOSPADM

## 2020-01-01 RX ORDER — ATORVASTATIN CALCIUM 10 MG/1
10 TABLET, FILM COATED ORAL
Status: DISCONTINUED | OUTPATIENT
Start: 2020-01-01 | End: 2020-01-01 | Stop reason: HOSPADM

## 2020-01-01 RX ORDER — DOCUSATE SODIUM 100 MG/1
100 CAPSULE, LIQUID FILLED ORAL
Status: DISCONTINUED | OUTPATIENT
Start: 2020-01-01 | End: 2020-01-01 | Stop reason: HOSPADM

## 2020-01-01 RX ORDER — LANOLIN ALCOHOL/MO/W.PET/CERES
3 CREAM (GRAM) TOPICAL
COMMUNITY
End: 2020-01-01

## 2020-01-01 RX ORDER — FAMOTIDINE 20 MG/1
20 TABLET, FILM COATED ORAL 2 TIMES DAILY
Status: DISCONTINUED | OUTPATIENT
Start: 2020-01-01 | End: 2020-01-01 | Stop reason: HOSPADM

## 2020-01-01 RX ORDER — LORAZEPAM 2 MG/ML
0.5 CONCENTRATE ORAL
Qty: 30 ML | Refills: 0 | Status: SHIPPED | OUTPATIENT
Start: 2020-01-01

## 2020-01-01 RX ORDER — MAGNESIUM SULFATE 100 %
4 CRYSTALS MISCELLANEOUS AS NEEDED
Status: DISCONTINUED | OUTPATIENT
Start: 2020-01-01 | End: 2020-01-01 | Stop reason: HOSPADM

## 2020-01-01 RX ORDER — TAMSULOSIN HYDROCHLORIDE 0.4 MG/1
0.4 CAPSULE ORAL
Status: DISCONTINUED | OUTPATIENT
Start: 2020-01-01 | End: 2020-01-01 | Stop reason: HOSPADM

## 2020-01-01 RX ORDER — LISINOPRIL 2.5 MG/1
2.5 TABLET ORAL
Qty: 30 TAB | Refills: 0 | Status: SHIPPED | OUTPATIENT
Start: 2020-01-01 | End: 2020-01-01

## 2020-01-01 RX ORDER — SODIUM CHLORIDE 0.9 % (FLUSH) 0.9 %
5-40 SYRINGE (ML) INJECTION AS NEEDED
Status: DISCONTINUED | OUTPATIENT
Start: 2020-01-01 | End: 2020-01-01 | Stop reason: HOSPADM

## 2020-01-01 RX ORDER — LISINOPRIL 5 MG/1
2.5 TABLET ORAL
Status: DISCONTINUED | OUTPATIENT
Start: 2020-01-01 | End: 2020-01-01 | Stop reason: HOSPADM

## 2020-01-01 RX ORDER — BUMETANIDE 1 MG/1
1 TABLET ORAL 2 TIMES DAILY
Status: DISCONTINUED | OUTPATIENT
Start: 2020-01-01 | End: 2020-01-01 | Stop reason: HOSPADM

## 2020-01-01 RX ORDER — CARVEDILOL 3.12 MG/1
3.12 TABLET ORAL 2 TIMES DAILY
Status: DISCONTINUED | OUTPATIENT
Start: 2020-01-01 | End: 2020-01-01 | Stop reason: HOSPADM

## 2020-01-01 RX ORDER — LORATADINE 10 MG/1
10 TABLET ORAL DAILY
COMMUNITY
End: 2020-01-01

## 2020-01-01 RX ORDER — ONDANSETRON 2 MG/ML
4 INJECTION INTRAMUSCULAR; INTRAVENOUS
Status: DISCONTINUED | OUTPATIENT
Start: 2020-01-01 | End: 2020-01-01 | Stop reason: HOSPADM

## 2020-01-01 RX ORDER — BUMETANIDE 1 MG/1
1 TABLET ORAL 2 TIMES DAILY
Qty: 60 TAB | Refills: 0 | Status: SHIPPED | OUTPATIENT
Start: 2020-01-01 | End: 2020-01-01

## 2020-01-01 RX ORDER — ONDANSETRON 4 MG/1
4 TABLET, ORALLY DISINTEGRATING ORAL
Status: DISCONTINUED | OUTPATIENT
Start: 2020-01-01 | End: 2020-01-01 | Stop reason: HOSPADM

## 2020-01-01 RX ORDER — ENOXAPARIN SODIUM 100 MG/ML
40 INJECTION SUBCUTANEOUS DAILY
Status: DISCONTINUED | OUTPATIENT
Start: 2020-01-01 | End: 2020-01-01 | Stop reason: HOSPADM

## 2020-01-01 RX ORDER — BUMETANIDE 0.5 MG/1
1 TABLET ORAL 2 TIMES DAILY
COMMUNITY
Start: 2020-01-01 | End: 2020-01-01

## 2020-01-01 RX ORDER — ACETAMINOPHEN 325 MG/1
650 TABLET ORAL
Status: DISCONTINUED | OUTPATIENT
Start: 2020-01-01 | End: 2020-01-01 | Stop reason: HOSPADM

## 2020-01-01 RX ORDER — ACETAMINOPHEN 650 MG/1
650 SUPPOSITORY RECTAL
Status: DISCONTINUED | OUTPATIENT
Start: 2020-01-01 | End: 2020-01-01 | Stop reason: HOSPADM

## 2020-01-01 RX ORDER — PANTOPRAZOLE SODIUM 40 MG/1
40 TABLET, DELAYED RELEASE ORAL
Status: DISCONTINUED | OUTPATIENT
Start: 2020-01-01 | End: 2020-01-01 | Stop reason: HOSPADM

## 2020-01-01 RX ORDER — LEVOTHYROXINE SODIUM 150 UG/1
150 TABLET ORAL
Status: DISCONTINUED | OUTPATIENT
Start: 2020-01-01 | End: 2020-01-01 | Stop reason: HOSPADM

## 2020-01-01 RX ORDER — GUAIFENESIN 100 MG/5ML
81 LIQUID (ML) ORAL DAILY
Status: DISCONTINUED | OUTPATIENT
Start: 2020-01-01 | End: 2020-01-01 | Stop reason: HOSPADM

## 2020-01-01 RX ORDER — DEXTROSE 50 % IN WATER (D50W) INTRAVENOUS SYRINGE
25-50 AS NEEDED
Status: DISCONTINUED | OUTPATIENT
Start: 2020-01-01 | End: 2020-01-01 | Stop reason: HOSPADM

## 2020-01-01 RX ORDER — SODIUM CHLORIDE 0.9 % (FLUSH) 0.9 %
5-40 SYRINGE (ML) INJECTION EVERY 8 HOURS
Status: DISCONTINUED | OUTPATIENT
Start: 2020-01-01 | End: 2020-01-01 | Stop reason: HOSPADM

## 2020-01-01 RX ORDER — IPRATROPIUM BROMIDE 0.5 MG/2.5ML
0.5 SOLUTION RESPIRATORY (INHALATION)
COMMUNITY

## 2020-01-01 RX ORDER — DONEPEZIL HYDROCHLORIDE 5 MG/1
10 TABLET, FILM COATED ORAL DAILY
Status: DISCONTINUED | OUTPATIENT
Start: 2020-01-01 | End: 2020-01-01 | Stop reason: HOSPADM

## 2020-01-01 RX ORDER — SENNOSIDES 8.6 MG/1
1 TABLET ORAL DAILY
COMMUNITY

## 2020-01-01 RX ORDER — BUMETANIDE 0.25 MG/ML
1 INJECTION INTRAMUSCULAR; INTRAVENOUS 2 TIMES DAILY
Status: DISCONTINUED | OUTPATIENT
Start: 2020-01-01 | End: 2020-01-01

## 2020-01-01 RX ORDER — DEXTROSE 50 % IN WATER (D50W) INTRAVENOUS SYRINGE
12.5-25 AS NEEDED
Status: DISCONTINUED | OUTPATIENT
Start: 2020-01-01 | End: 2020-01-01 | Stop reason: HOSPADM

## 2020-01-01 RX ORDER — DEXTROSE MONOHYDRATE 100 MG/ML
0-250 INJECTION, SOLUTION INTRAVENOUS AS NEEDED
Status: DISCONTINUED | OUTPATIENT
Start: 2020-01-01 | End: 2020-01-01 | Stop reason: CLARIF

## 2020-01-01 RX ORDER — BUMETANIDE 0.25 MG/ML
1 INJECTION INTRAMUSCULAR; INTRAVENOUS EVERY 12 HOURS
Status: DISCONTINUED | OUTPATIENT
Start: 2020-01-01 | End: 2020-01-01

## 2020-01-01 RX ORDER — FUROSEMIDE 10 MG/ML
40 INJECTION INTRAMUSCULAR; INTRAVENOUS
Status: COMPLETED | OUTPATIENT
Start: 2020-01-01 | End: 2020-01-01

## 2020-01-01 RX ORDER — BUMETANIDE 1 MG/1
1 TABLET ORAL 2 TIMES DAILY
Status: DISCONTINUED | OUTPATIENT
Start: 2020-01-01 | End: 2020-01-01

## 2020-01-01 RX ORDER — IPRATROPIUM BROMIDE AND ALBUTEROL SULFATE 2.5; .5 MG/3ML; MG/3ML
3 SOLUTION RESPIRATORY (INHALATION)
Status: DISCONTINUED | OUTPATIENT
Start: 2020-01-01 | End: 2020-01-01 | Stop reason: HOSPADM

## 2020-01-01 RX ORDER — PROCHLORPERAZINE EDISYLATE 5 MG/ML
10 INJECTION INTRAMUSCULAR; INTRAVENOUS
Status: DISCONTINUED | OUTPATIENT
Start: 2020-01-01 | End: 2020-01-01 | Stop reason: HOSPADM

## 2020-01-01 RX ORDER — INSULIN LISPRO 100 [IU]/ML
INJECTION, SOLUTION INTRAVENOUS; SUBCUTANEOUS
Status: DISCONTINUED | OUTPATIENT
Start: 2020-01-01 | End: 2020-01-01 | Stop reason: HOSPADM

## 2020-01-01 RX ORDER — POLYETHYLENE GLYCOL 3350 17 G/17G
17 POWDER, FOR SOLUTION ORAL DAILY PRN
Status: DISCONTINUED | OUTPATIENT
Start: 2020-01-01 | End: 2020-01-01 | Stop reason: HOSPADM

## 2020-01-01 RX ORDER — MAGNESIUM SULFATE 100 %
15 CRYSTALS MISCELLANEOUS AS NEEDED
COMMUNITY

## 2020-01-01 RX ORDER — LISINOPRIL 5 MG/1
2.5 TABLET ORAL DAILY
COMMUNITY

## 2020-01-01 RX ORDER — LISINOPRIL 2.5 MG/1
2.5 TABLET ORAL
Qty: 30 TAB | Refills: 0 | Status: SHIPPED
Start: 2020-01-01 | End: 2020-01-01

## 2020-01-01 RX ORDER — GLIPIZIDE 5 MG/1
5 TABLET ORAL DAILY
COMMUNITY

## 2020-01-01 RX ORDER — FUROSEMIDE 10 MG/ML
20 INJECTION INTRAMUSCULAR; INTRAVENOUS ONCE
Status: COMPLETED | OUTPATIENT
Start: 2020-01-01 | End: 2020-01-01

## 2020-01-01 RX ADMIN — FINASTERIDE 5 MG: 5 TABLET, FILM COATED ORAL at 17:54

## 2020-01-01 RX ADMIN — PANTOPRAZOLE SODIUM 40 MG: 40 TABLET, DELAYED RELEASE ORAL at 06:47

## 2020-01-01 RX ADMIN — ASPIRIN 81 MG 81 MG: 81 TABLET ORAL at 08:21

## 2020-01-01 RX ADMIN — TAMSULOSIN HYDROCHLORIDE 0.4 MG: 0.4 CAPSULE ORAL at 22:52

## 2020-01-01 RX ADMIN — ACETAMINOPHEN 650 MG: 325 TABLET ORAL at 23:02

## 2020-01-01 RX ADMIN — ASPIRIN 81 MG 81 MG: 81 TABLET ORAL at 09:13

## 2020-01-01 RX ADMIN — DONEPEZIL HYDROCHLORIDE 10 MG: 5 TABLET, FILM COATED ORAL at 09:12

## 2020-01-01 RX ADMIN — ASPIRIN 81 MG 81 MG: 81 TABLET ORAL at 08:41

## 2020-01-01 RX ADMIN — INSULIN LISPRO 2 UNITS: 100 INJECTION, SOLUTION INTRAVENOUS; SUBCUTANEOUS at 18:16

## 2020-01-01 RX ADMIN — FAMOTIDINE 20 MG: 20 TABLET, FILM COATED ORAL at 08:41

## 2020-01-01 RX ADMIN — PANTOPRAZOLE SODIUM 40 MG: 40 TABLET, DELAYED RELEASE ORAL at 06:31

## 2020-01-01 RX ADMIN — BUMETANIDE 1 MG: 0.25 INJECTION INTRAMUSCULAR; INTRAVENOUS at 06:26

## 2020-01-01 RX ADMIN — LEVOTHYROXINE SODIUM 150 MCG: 0.15 TABLET ORAL at 05:04

## 2020-01-01 RX ADMIN — INSULIN LISPRO 4 UNITS: 100 INJECTION, SOLUTION INTRAVENOUS; SUBCUTANEOUS at 17:00

## 2020-01-01 RX ADMIN — FINASTERIDE 5 MG: 5 TABLET, FILM COATED ORAL at 17:39

## 2020-01-01 RX ADMIN — CARVEDILOL 3.12 MG: 3.12 TABLET, FILM COATED ORAL at 08:41

## 2020-01-01 RX ADMIN — ATORVASTATIN CALCIUM 10 MG: 10 TABLET, FILM COATED ORAL at 21:38

## 2020-01-01 RX ADMIN — LISINOPRIL 2.5 MG: 5 TABLET ORAL at 21:01

## 2020-01-01 RX ADMIN — Medication 10 ML: at 21:38

## 2020-01-01 RX ADMIN — INSULIN LISPRO 3 UNITS: 100 INJECTION, SOLUTION INTRAVENOUS; SUBCUTANEOUS at 17:06

## 2020-01-01 RX ADMIN — CARVEDILOL 3.12 MG: 3.12 TABLET, FILM COATED ORAL at 17:54

## 2020-01-01 RX ADMIN — BUMETANIDE 1 MG: 1 TABLET ORAL at 17:06

## 2020-01-01 RX ADMIN — CARVEDILOL 3.12 MG: 3.12 TABLET, FILM COATED ORAL at 17:36

## 2020-01-01 RX ADMIN — ASPIRIN 81 MG 81 MG: 81 TABLET ORAL at 08:22

## 2020-01-01 RX ADMIN — Medication 10 ML: at 21:46

## 2020-01-01 RX ADMIN — ENOXAPARIN SODIUM 40 MG: 40 INJECTION SUBCUTANEOUS at 09:13

## 2020-01-01 RX ADMIN — CARVEDILOL 3.12 MG: 3.12 TABLET, FILM COATED ORAL at 10:07

## 2020-01-01 RX ADMIN — TAMSULOSIN HYDROCHLORIDE 0.4 MG: 0.4 CAPSULE ORAL at 21:26

## 2020-01-01 RX ADMIN — FAMOTIDINE 20 MG: 20 TABLET, FILM COATED ORAL at 17:33

## 2020-01-01 RX ADMIN — BUMETANIDE 1 MG: 0.25 INJECTION INTRAMUSCULAR; INTRAVENOUS at 17:39

## 2020-01-01 RX ADMIN — CARVEDILOL 3.12 MG: 3.12 TABLET, FILM COATED ORAL at 17:06

## 2020-01-01 RX ADMIN — SERTRALINE HYDROCHLORIDE 200 MG: 50 TABLET, FILM COATED ORAL at 08:58

## 2020-01-01 RX ADMIN — Medication 10 ML: at 06:47

## 2020-01-01 RX ADMIN — LEVOTHYROXINE SODIUM 150 MCG: 0.15 TABLET ORAL at 06:45

## 2020-01-01 RX ADMIN — PANTOPRAZOLE SODIUM 40 MG: 40 TABLET, DELAYED RELEASE ORAL at 05:17

## 2020-01-01 RX ADMIN — BUMETANIDE 1 MG: 0.25 INJECTION INTRAMUSCULAR; INTRAVENOUS at 17:52

## 2020-01-01 RX ADMIN — DOCUSATE SODIUM 50MG AND SENNOSIDES 8.6MG 1 TABLET: 8.6; 5 TABLET, FILM COATED ORAL at 09:56

## 2020-01-01 RX ADMIN — FAMOTIDINE 20 MG: 20 TABLET, FILM COATED ORAL at 18:55

## 2020-01-01 RX ADMIN — INSULIN LISPRO 1 UNITS: 100 INJECTION, SOLUTION INTRAVENOUS; SUBCUTANEOUS at 21:51

## 2020-01-01 RX ADMIN — DONEPEZIL HYDROCHLORIDE 10 MG: 5 TABLET, FILM COATED ORAL at 09:10

## 2020-01-01 RX ADMIN — ENOXAPARIN SODIUM 40 MG: 40 INJECTION SUBCUTANEOUS at 08:58

## 2020-01-01 RX ADMIN — BUMETANIDE 1 MG: 0.25 INJECTION INTRAMUSCULAR; INTRAVENOUS at 06:21

## 2020-01-01 RX ADMIN — TAMSULOSIN HYDROCHLORIDE 0.4 MG: 0.4 CAPSULE ORAL at 20:51

## 2020-01-01 RX ADMIN — TAMSULOSIN HYDROCHLORIDE 0.4 MG: 0.4 CAPSULE ORAL at 22:11

## 2020-01-01 RX ADMIN — Medication 10 ML: at 18:18

## 2020-01-01 RX ADMIN — BUMETANIDE 1 MG: 1 TABLET ORAL at 09:12

## 2020-01-01 RX ADMIN — Medication 10 ML: at 06:26

## 2020-01-01 RX ADMIN — FINASTERIDE 5 MG: 5 TABLET, FILM COATED ORAL at 18:18

## 2020-01-01 RX ADMIN — CARVEDILOL 3.12 MG: 3.12 TABLET, FILM COATED ORAL at 09:10

## 2020-01-01 RX ADMIN — Medication 10 ML: at 06:55

## 2020-01-01 RX ADMIN — PANTOPRAZOLE SODIUM 40 MG: 40 TABLET, DELAYED RELEASE ORAL at 05:42

## 2020-01-01 RX ADMIN — SERTRALINE HYDROCHLORIDE 200 MG: 50 TABLET ORAL at 09:57

## 2020-01-01 RX ADMIN — Medication 10 ML: at 14:51

## 2020-01-01 RX ADMIN — BUMETANIDE 1 MG: 0.25 INJECTION INTRAMUSCULAR; INTRAVENOUS at 05:29

## 2020-01-01 RX ADMIN — Medication 10 ML: at 22:52

## 2020-01-01 RX ADMIN — Medication 10 ML: at 18:20

## 2020-01-01 RX ADMIN — DONEPEZIL HYDROCHLORIDE 10 MG: 5 TABLET, FILM COATED ORAL at 08:22

## 2020-01-01 RX ADMIN — LEVOTHYROXINE SODIUM 150 MCG: 0.15 TABLET ORAL at 08:21

## 2020-01-01 RX ADMIN — CARVEDILOL 3.12 MG: 3.12 TABLET, FILM COATED ORAL at 17:03

## 2020-01-01 RX ADMIN — DONEPEZIL HYDROCHLORIDE 10 MG: 5 TABLET, FILM COATED ORAL at 08:20

## 2020-01-01 RX ADMIN — BUMETANIDE 1 MG: 0.25 INJECTION INTRAMUSCULAR; INTRAVENOUS at 05:03

## 2020-01-01 RX ADMIN — Medication 10 ML: at 22:00

## 2020-01-01 RX ADMIN — DOCUSATE SODIUM 50MG AND SENNOSIDES 8.6MG 1 TABLET: 8.6; 5 TABLET, FILM COATED ORAL at 08:58

## 2020-01-01 RX ADMIN — FINASTERIDE 5 MG: 5 TABLET, FILM COATED ORAL at 17:06

## 2020-01-01 RX ADMIN — CARVEDILOL 3.12 MG: 3.12 TABLET, FILM COATED ORAL at 17:26

## 2020-01-01 RX ADMIN — LEVOTHYROXINE SODIUM 150 MCG: 0.15 TABLET ORAL at 05:17

## 2020-01-01 RX ADMIN — DOCUSATE SODIUM 50MG AND SENNOSIDES 8.6MG 1 TABLET: 8.6; 5 TABLET, FILM COATED ORAL at 08:41

## 2020-01-01 RX ADMIN — LEVOTHYROXINE SODIUM 150 MCG: 0.15 TABLET ORAL at 07:01

## 2020-01-01 RX ADMIN — DOCUSATE SODIUM 50MG AND SENNOSIDES 8.6MG 1 TABLET: 8.6; 5 TABLET, FILM COATED ORAL at 09:10

## 2020-01-01 RX ADMIN — DONEPEZIL HYDROCHLORIDE 10 MG: 5 TABLET, FILM COATED ORAL at 09:57

## 2020-01-01 RX ADMIN — ASPIRIN 81 MG 81 MG: 81 TABLET ORAL at 09:09

## 2020-01-01 RX ADMIN — ATORVASTATIN CALCIUM 10 MG: 10 TABLET, FILM COATED ORAL at 21:26

## 2020-01-01 RX ADMIN — ASPIRIN 81 MG 81 MG: 81 TABLET ORAL at 10:08

## 2020-01-01 RX ADMIN — Medication 10 ML: at 22:33

## 2020-01-01 RX ADMIN — ATORVASTATIN CALCIUM 10 MG: 10 TABLET, FILM COATED ORAL at 21:46

## 2020-01-01 RX ADMIN — Medication 10 ML: at 18:43

## 2020-01-01 RX ADMIN — ATORVASTATIN CALCIUM 10 MG: 10 TABLET, FILM COATED ORAL at 21:48

## 2020-01-01 RX ADMIN — BUMETANIDE 1 MG: 0.25 INJECTION INTRAMUSCULAR; INTRAVENOUS at 17:28

## 2020-01-01 RX ADMIN — DONEPEZIL HYDROCHLORIDE 10 MG: 5 TABLET, FILM COATED ORAL at 08:40

## 2020-01-01 RX ADMIN — PANTOPRAZOLE SODIUM 40 MG: 40 TABLET, DELAYED RELEASE ORAL at 06:55

## 2020-01-01 RX ADMIN — SERTRALINE HYDROCHLORIDE 200 MG: 50 TABLET ORAL at 08:22

## 2020-01-01 RX ADMIN — DONEPEZIL HYDROCHLORIDE 10 MG: 5 TABLET, FILM COATED ORAL at 10:07

## 2020-01-01 RX ADMIN — Medication 10 ML: at 05:42

## 2020-01-01 RX ADMIN — BUMETANIDE 1 MG: 0.25 INJECTION INTRAMUSCULAR; INTRAVENOUS at 10:49

## 2020-01-01 RX ADMIN — TAMSULOSIN HYDROCHLORIDE 0.4 MG: 0.4 CAPSULE ORAL at 21:52

## 2020-01-01 RX ADMIN — ASPIRIN 81 MG 81 MG: 81 TABLET ORAL at 10:23

## 2020-01-01 RX ADMIN — INSULIN LISPRO 3 UNITS: 100 INJECTION, SOLUTION INTRAVENOUS; SUBCUTANEOUS at 17:33

## 2020-01-01 RX ADMIN — LEVOTHYROXINE SODIUM 150 MCG: 0.15 TABLET ORAL at 06:55

## 2020-01-01 RX ADMIN — SERTRALINE HYDROCHLORIDE 200 MG: 50 TABLET, FILM COATED ORAL at 08:40

## 2020-01-01 RX ADMIN — Medication 10 ML: at 21:01

## 2020-01-01 RX ADMIN — ATORVASTATIN CALCIUM 10 MG: 10 TABLET, FILM COATED ORAL at 22:52

## 2020-01-01 RX ADMIN — BUMETANIDE 1 MG: 0.25 INJECTION INTRAMUSCULAR; INTRAVENOUS at 17:33

## 2020-01-01 RX ADMIN — Medication 10 ML: at 21:48

## 2020-01-01 RX ADMIN — FAMOTIDINE 20 MG: 20 TABLET, FILM COATED ORAL at 09:12

## 2020-01-01 RX ADMIN — FAMOTIDINE 20 MG: 20 TABLET, FILM COATED ORAL at 08:58

## 2020-01-01 RX ADMIN — LEVOTHYROXINE SODIUM 150 MCG: 0.15 TABLET ORAL at 06:31

## 2020-01-01 RX ADMIN — LISINOPRIL 2.5 MG: 5 TABLET ORAL at 21:28

## 2020-01-01 RX ADMIN — INSULIN LISPRO 2 UNITS: 100 INJECTION, SOLUTION INTRAVENOUS; SUBCUTANEOUS at 12:35

## 2020-01-01 RX ADMIN — TAMSULOSIN HYDROCHLORIDE 0.4 MG: 0.4 CAPSULE ORAL at 21:38

## 2020-01-01 RX ADMIN — PANTOPRAZOLE SODIUM 40 MG: 40 TABLET, DELAYED RELEASE ORAL at 06:45

## 2020-01-01 RX ADMIN — LISINOPRIL 2.5 MG: 5 TABLET ORAL at 21:47

## 2020-01-01 RX ADMIN — ATORVASTATIN CALCIUM 10 MG: 10 TABLET, FILM COATED ORAL at 21:01

## 2020-01-01 RX ADMIN — BUMETANIDE 1 MG: 1 TABLET ORAL at 18:18

## 2020-01-01 RX ADMIN — FUROSEMIDE 40 MG: 10 INJECTION, SOLUTION INTRAMUSCULAR; INTRAVENOUS at 21:47

## 2020-01-01 RX ADMIN — CARVEDILOL 3.12 MG: 3.12 TABLET, FILM COATED ORAL at 17:39

## 2020-01-01 RX ADMIN — SERTRALINE HYDROCHLORIDE 200 MG: 50 TABLET ORAL at 08:20

## 2020-01-01 RX ADMIN — FINASTERIDE 5 MG: 5 TABLET, FILM COATED ORAL at 18:17

## 2020-01-01 RX ADMIN — FINASTERIDE 5 MG: 5 TABLET, FILM COATED ORAL at 17:26

## 2020-01-01 RX ADMIN — TAMSULOSIN HYDROCHLORIDE 0.4 MG: 0.4 CAPSULE ORAL at 20:54

## 2020-01-01 RX ADMIN — BUMETANIDE 1 MG: 1 TABLET ORAL at 18:28

## 2020-01-01 RX ADMIN — ATORVASTATIN CALCIUM 10 MG: 10 TABLET, FILM COATED ORAL at 21:52

## 2020-01-01 RX ADMIN — Medication 10 ML: at 21:29

## 2020-01-01 RX ADMIN — SERTRALINE HYDROCHLORIDE 200 MG: 50 TABLET ORAL at 09:09

## 2020-01-01 RX ADMIN — CARVEDILOL 3.12 MG: 3.12 TABLET, FILM COATED ORAL at 08:20

## 2020-01-01 RX ADMIN — CARVEDILOL 3.12 MG: 3.12 TABLET, FILM COATED ORAL at 09:57

## 2020-01-01 RX ADMIN — Medication 10 ML: at 13:12

## 2020-01-01 RX ADMIN — DONEPEZIL HYDROCHLORIDE 10 MG: 5 TABLET, FILM COATED ORAL at 10:23

## 2020-01-01 RX ADMIN — INSULIN LISPRO 2 UNITS: 100 INJECTION, SOLUTION INTRAVENOUS; SUBCUTANEOUS at 18:17

## 2020-01-01 RX ADMIN — TAMSULOSIN HYDROCHLORIDE 0.4 MG: 0.4 CAPSULE ORAL at 21:29

## 2020-01-01 RX ADMIN — ENOXAPARIN SODIUM 40 MG: 40 INJECTION SUBCUTANEOUS at 08:38

## 2020-01-01 RX ADMIN — Medication 10 ML: at 15:55

## 2020-01-01 RX ADMIN — Medication 10 ML: at 05:16

## 2020-01-01 RX ADMIN — Medication 10 ML: at 06:45

## 2020-01-01 RX ADMIN — CARVEDILOL 3.12 MG: 3.12 TABLET, FILM COATED ORAL at 08:22

## 2020-01-01 RX ADMIN — LISINOPRIL 2.5 MG: 5 TABLET ORAL at 21:26

## 2020-01-01 RX ADMIN — SERTRALINE HYDROCHLORIDE 200 MG: 50 TABLET, FILM COATED ORAL at 09:12

## 2020-01-01 RX ADMIN — BUMETANIDE 1 MG: 0.25 INJECTION INTRAMUSCULAR; INTRAVENOUS at 18:15

## 2020-01-01 RX ADMIN — Medication 10 ML: at 22:11

## 2020-01-01 RX ADMIN — DOCUSATE SODIUM 50MG AND SENNOSIDES 8.6MG 1 TABLET: 8.6; 5 TABLET, FILM COATED ORAL at 10:08

## 2020-01-01 RX ADMIN — ASPIRIN 81 MG 81 MG: 81 TABLET ORAL at 08:58

## 2020-01-01 RX ADMIN — SERTRALINE HYDROCHLORIDE 200 MG: 50 TABLET ORAL at 09:10

## 2020-01-01 RX ADMIN — Medication 10 ML: at 05:30

## 2020-01-01 RX ADMIN — CARVEDILOL 3.12 MG: 3.12 TABLET, FILM COATED ORAL at 09:13

## 2020-01-01 RX ADMIN — SERTRALINE HYDROCHLORIDE 200 MG: 50 TABLET ORAL at 10:08

## 2020-01-01 RX ADMIN — INSULIN LISPRO 2 UNITS: 100 INJECTION, SOLUTION INTRAVENOUS; SUBCUTANEOUS at 13:01

## 2020-01-01 RX ADMIN — LEVOTHYROXINE SODIUM 150 MCG: 0.15 TABLET ORAL at 06:26

## 2020-01-01 RX ADMIN — PANTOPRAZOLE SODIUM 40 MG: 40 TABLET, DELAYED RELEASE ORAL at 05:04

## 2020-01-01 RX ADMIN — PANTOPRAZOLE SODIUM 40 MG: 40 TABLET, DELAYED RELEASE ORAL at 08:20

## 2020-01-01 RX ADMIN — ATORVASTATIN CALCIUM 10 MG: 10 TABLET, FILM COATED ORAL at 22:33

## 2020-01-01 RX ADMIN — BUMETANIDE 1 MG: 0.25 INJECTION INTRAMUSCULAR; INTRAVENOUS at 09:54

## 2020-01-01 RX ADMIN — BUMETANIDE 1 MG: 0.25 INJECTION INTRAMUSCULAR; INTRAVENOUS at 05:16

## 2020-01-01 RX ADMIN — ASPIRIN 81 MG 81 MG: 81 TABLET ORAL at 09:10

## 2020-01-01 RX ADMIN — ATORVASTATIN CALCIUM 10 MG: 10 TABLET, FILM COATED ORAL at 22:11

## 2020-01-01 RX ADMIN — ATORVASTATIN CALCIUM 10 MG: 10 TABLET, FILM COATED ORAL at 21:29

## 2020-01-01 RX ADMIN — DOCUSATE SODIUM 50MG AND SENNOSIDES 8.6MG 1 TABLET: 8.6; 5 TABLET, FILM COATED ORAL at 08:22

## 2020-01-01 RX ADMIN — BUMETANIDE 1 MG: 0.25 INJECTION INTRAMUSCULAR; INTRAVENOUS at 06:55

## 2020-01-01 RX ADMIN — ASPIRIN 81 MG 81 MG: 81 TABLET ORAL at 09:58

## 2020-01-01 RX ADMIN — FINASTERIDE 5 MG: 5 TABLET, FILM COATED ORAL at 18:55

## 2020-01-01 RX ADMIN — LEVOTHYROXINE SODIUM 150 MCG: 0.15 TABLET ORAL at 05:42

## 2020-01-01 RX ADMIN — INSULIN LISPRO 2 UNITS: 100 INJECTION, SOLUTION INTRAVENOUS; SUBCUTANEOUS at 17:51

## 2020-01-01 RX ADMIN — SERTRALINE HYDROCHLORIDE 200 MG: 50 TABLET ORAL at 10:23

## 2020-01-01 RX ADMIN — DONEPEZIL HYDROCHLORIDE 10 MG: 5 TABLET, FILM COATED ORAL at 08:58

## 2020-01-01 RX ADMIN — CARVEDILOL 3.12 MG: 3.12 TABLET, FILM COATED ORAL at 09:09

## 2020-01-01 RX ADMIN — Medication 10 ML: at 05:03

## 2020-01-01 RX ADMIN — CARVEDILOL 3.12 MG: 3.12 TABLET, FILM COATED ORAL at 10:23

## 2020-01-01 RX ADMIN — BUMETANIDE 1 MG: 1 TABLET ORAL at 08:40

## 2020-01-01 RX ADMIN — BUMETANIDE 1 MG: 1 TABLET ORAL at 10:08

## 2020-01-01 RX ADMIN — Medication 10 ML: at 06:31

## 2020-01-01 RX ADMIN — CARVEDILOL 3.12 MG: 3.12 TABLET, FILM COATED ORAL at 18:29

## 2020-01-01 RX ADMIN — PANTOPRAZOLE SODIUM 40 MG: 40 TABLET, DELAYED RELEASE ORAL at 08:22

## 2020-01-01 RX ADMIN — DOCUSATE SODIUM 50MG AND SENNOSIDES 8.6MG 1 TABLET: 8.6; 5 TABLET, FILM COATED ORAL at 10:23

## 2020-01-01 RX ADMIN — LEVOTHYROXINE SODIUM 150 MCG: 0.15 TABLET ORAL at 08:23

## 2020-01-01 RX ADMIN — FINASTERIDE 5 MG: 5 TABLET, FILM COATED ORAL at 17:33

## 2020-01-01 RX ADMIN — BUMETANIDE 1 MG: 1 TABLET ORAL at 09:10

## 2020-01-01 RX ADMIN — Medication 10 ML: at 15:10

## 2020-01-01 RX ADMIN — CARVEDILOL 3.12 MG: 3.12 TABLET, FILM COATED ORAL at 18:18

## 2020-01-01 RX ADMIN — DOCUSATE SODIUM 50MG AND SENNOSIDES 8.6MG 1 TABLET: 8.6; 5 TABLET, FILM COATED ORAL at 08:20

## 2020-01-01 RX ADMIN — FUROSEMIDE 20 MG: 10 INJECTION, SOLUTION INTRAMUSCULAR; INTRAVENOUS at 17:32

## 2020-01-01 RX ADMIN — TAMSULOSIN HYDROCHLORIDE 0.4 MG: 0.4 CAPSULE ORAL at 21:46

## 2020-01-01 RX ADMIN — FAMOTIDINE 20 MG: 20 TABLET, FILM COATED ORAL at 18:29

## 2020-01-01 RX ADMIN — Medication 20 ML: at 14:00

## 2020-01-01 RX ADMIN — PANTOPRAZOLE SODIUM 40 MG: 40 TABLET, DELAYED RELEASE ORAL at 06:27

## 2020-01-01 RX ADMIN — Medication 10 ML: at 21:52

## 2020-01-01 RX ADMIN — LEVOTHYROXINE SODIUM 150 MCG: 0.15 TABLET ORAL at 06:47

## 2020-01-01 RX ADMIN — INSULIN LISPRO 2 UNITS: 100 INJECTION, SOLUTION INTRAVENOUS; SUBCUTANEOUS at 13:17

## 2020-01-01 RX ADMIN — TAMSULOSIN HYDROCHLORIDE 0.4 MG: 0.4 CAPSULE ORAL at 21:01

## 2020-01-01 RX ADMIN — BUMETANIDE 1 MG: 1 TABLET ORAL at 17:03

## 2020-01-01 RX ADMIN — DOCUSATE SODIUM 50MG AND SENNOSIDES 8.6MG 1 TABLET: 8.6; 5 TABLET, FILM COATED ORAL at 09:09

## 2020-01-01 RX ADMIN — FINASTERIDE 5 MG: 5 TABLET, FILM COATED ORAL at 17:03

## 2020-01-01 RX ADMIN — ACETAMINOPHEN 650 MG: 325 TABLET ORAL at 06:00

## 2020-01-01 RX ADMIN — Medication 10 ML: at 14:00

## 2020-01-01 RX ADMIN — FINASTERIDE 5 MG: 5 TABLET, FILM COATED ORAL at 18:29

## 2020-01-01 RX ADMIN — FINASTERIDE 5 MG: 5 TABLET, FILM COATED ORAL at 17:36

## 2020-01-01 RX ADMIN — PANTOPRAZOLE SODIUM 40 MG: 40 TABLET, DELAYED RELEASE ORAL at 07:01

## 2020-01-01 RX ADMIN — CARVEDILOL 3.12 MG: 3.12 TABLET, FILM COATED ORAL at 08:58

## 2020-01-01 RX ADMIN — DOCUSATE SODIUM 50MG AND SENNOSIDES 8.6MG 1 TABLET: 8.6; 5 TABLET, FILM COATED ORAL at 09:12

## 2020-01-01 RX ADMIN — DONEPEZIL HYDROCHLORIDE 10 MG: 5 TABLET, FILM COATED ORAL at 09:09

## 2020-05-18 PROBLEM — E11.9 TYPE 2 DIABETES MELLITUS WITHOUT COMPLICATION (HCC): Chronic | Status: ACTIVE | Noted: 2020-01-01

## 2020-05-18 PROBLEM — K75.9 HEPATITIS: Status: ACTIVE | Noted: 2020-01-01

## 2020-05-18 PROBLEM — I50.23 ACUTE ON CHRONIC SYSTOLIC CHF (CONGESTIVE HEART FAILURE) (HCC): Status: ACTIVE | Noted: 2020-01-01

## 2020-05-18 NOTE — ED NOTES
Bedside and Verbal shift change report given to Eddy RN (oncoming nurse) by Zaria RN (offgoing nurse). Report included the following information SBAR, Kardex, ED Summary, Intake/Output, Recent Results and Cardiac Rhythm Paced.

## 2020-05-18 NOTE — ED TRIAGE NOTES
Pt arrives via EMS from SNF in [place]. Staff informed EMS pt has been unable to ambulate to BR as normal, is fatigued, and that SBP >200.  @ this time. Pt has hx of dementia, CHF. Pt presents w/ wet cough.

## 2020-05-19 NOTE — PROGRESS NOTES
Problem: Self Care Deficits Care Plan (Adult) Goal: *Acute Goals and Plan of Care (Insert Text) Description:  
FUNCTIONAL STATUS PRIOR TO ADMISSION: The patient was functional at the wheelchair level and required assist for most ADLs. Patient is a poor historian therefore background information obtained from the chart. HOME SUPPORT: The patient lived at an adult group home. Occupational Therapy Goals Initiated 5/19/2020 1. Patient will perform grooming with supervision/set-up within 7 day(s). 2.  Patient will perform upper body dressing and bathing with supervision/set-up within 7 day(s). 3.  Patient will perform lower body dressing and bathing with moderate assistance  within 7 day(s). 4.  Patient will perform toilet transfers to [facility] with moderate assistance x1 within 7 day(s). 5.  Patient will perform all aspects of toileting with moderate assistance  within 7 day(s). 6.  Patient will participate in upper extremity therapeutic exercise/activities with supervision/set-up for 10 minutes within 7 day(s). Outcome: Progressing Towards Goal 
 OCCUPATIONAL THERAPY EVALUATION Patient: Mariana Reaves (80 y.o. male) Date: 5/19/2020 Primary Diagnosis: Acute on chronic systolic CHF (congestive heart failure) ([facility] [I50.23] Precautions: fall, droplet plus ASSESSMENT Based on the objective data described below, the patient presents with decreased activity tolerance, generalized weakness, impaired balance, impaired vision (vision in L eye only), and poor cognition following admission on 5/18 for CHF with c/o weakness and SOB. Patient has a history significant for dementia and oriented to self only. He is very hard of hearing and increased difficulty with hearing d/t modified airborne room. Patient performed transfers with up max A x2 for bed mobility and mod A x2 for stand-pivot to the chair.   Patient presents with generally flexed, rigid tone that negatively impacts his ability to achieve a full upright stance. Patient engaged in ADLs with fair tolerance. He needs assist, setup, and prompting for all ADLs. Patient was received and remained on 2L O2 with SpO2 remaining >90% before, during, and after activity. Education provided regarding pursed lip breathing and pacing techniques. Patient would benefit from continued skilled OT to progress towards goals and improve overall independence. Current Level of Function Impacting Discharge (ADLs/self-care): Patient requires min A x2 for rolling, max A x2 to come to sitting EOB, and mod A x2 for stand-pivot transfers to the chair using rolling walker. Functional Outcome Measure: The patient scored Total: 10/100 on the Barthel Index outcome measure. Other factors to consider for discharge: COVID-19 rule out Patient will benefit from skilled therapy intervention to address the above noted impairments. PLAN : 
Recommendations and Planned Interventions: self care training, functional mobility training, therapeutic exercise, balance training, therapeutic activities, endurance activities, patient education, home safety training, and family training/education Frequency/Duration: Patient will be followed by occupational therapy 3 times a week to address goals. Recommendation for discharge: (in order for the patient to meet his/her long term goals) To be determined: Return to the group home vs. SNF pending progress and level of assist the facility can provide This discharge recommendation: 
Has been made in collaboration with the attending provider and/or case management IF patient discharges home will need the following DME: none SUBJECTIVE:  
Patient stated I need someone to hold me.  OBJECTIVE DATA SUMMARY:  
HISTORY:  
Past Medical History:  
Diagnosis Date Arthritis CAD (coronary artery disease) Cardiomyopathy ([facility] 6/11/2015 Colon cancer ([facility] 1993 COLON Depression DEPRESSION Diabetes ([facility] IDDM  
 GERD (gastroesophageal reflux disease) Hypertension Lung cancer ([facility] 11/29/2016 Mild aortic stenosis 6/15/2015 Pulmonary hypertension ([facility] 6/15/2015 PVD (peripheral vascular disease) ([facility] STENT RIGHT GROIN, PAD  
 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 LIMA TO LAD; SVG TO OM; SVG TO OM Stroke [facility]) 1999  
 2 STROKES Third degree AV block ([facility] 6/11/2015 Thyroid disease Past Surgical History:  
Procedure Laterality Date [facility] COLON RESECTION  
 HX HEART CATHETERIZATION  6/26/2015 HX PACEMAKER INS PPM/ICD LED DUAL ONLY  7/2/2015 Saeid PAD, RIGHT GROIN STENT Expanded or extensive additional review of patient history:  
 
Home Situation Home Environment: Skilled nursing facility One/Two Story Residence: One story Living Alone: Yes Support Systems: Skilled nursing facility, Family member(s) Patient Expects to be Discharged to[de-identified] Skilled nursing facility Current DME Used/Available at Home: Adaptive bathing aides, Grab bars Hand dominance: Right EXAMINATION OF PERFORMANCE DEFICITS: 
Cognitive/Behavioral Status: 
Neurologic State: Alert Orientation Level: Disoriented to situation;Disoriented to time;Oriented to person;Disoriented to place Cognition: Impaired decision making; Follows commands;Poor safety awareness;Memory loss Perception: Cues to maintain midline in sitting;Cues to maintain midline in standing Perseveration: No perseveration noted Safety/Judgement: Awareness of environment Skin: Intact in the uppers Edema: None noted in the uppers Hearing: Auditory Auditory Impairment: Hard of hearing, bilateral 
 
Vision/Perceptual:   
Tracking: (vision in L eye only) Visual Fields: Difficulty detecting stimulus in right lower quadrant; Difficulty detecting stimulus in right upper quadrant; Difficulty detecting stimulus  in right lateral quadrant Acuity: Impaired near vision; Impaired far vision Range of Motion: 
AROM: Generally decreased, functional in the uppers PROM: Within functional limits Strength: 
Strength: Generally decreased, functional in the uppers Coordination: 
Fine Motor Skills-Upper: Left Impaired;Right Impaired Gross Motor Skills-Upper: Left Impaired;Right Impaired Tone & Sensation: 
Tone: normal 
Sensation: intact Balance: 
Sitting: Impaired;High guard Sitting - Static: Fair (occasional) Sitting - Dynamic: Poor (constant support) Standing: Impaired;Pull to stand; With support Standing - Static: Constant support;Poor Standing - Dynamic : Constant support;Poor Functional Mobility and Transfers for ADLs: 
Bed Mobility: 
Rolling: Assist x2;Minimum assistance Supine to Sit: Assist x2; Additional time;Maximum assistance Sit to Supine: (pt remained up at end of tx) Scooting: Maximum assistance;Assist x2; Additional time Transfers: 
Sit to Stand: Moderate assistance; Additional time;Assist x2 Stand to Sit: Moderate assistance; Additional time;Assist x2 Bed to Chair: Moderate assistance; Additional time;Assist x2 ADL Assessment: 
Feeding: Minimum assistance Oral Facial Hygiene/Grooming: Maximum assistance Bathing: Total assistance Upper Body Dressing: Maximum assistance Lower Body Dressing: Total assistance Toileting: Total assistance Cognitive Retraining Safety/Judgement: Awareness of environment Functional Measure: 
Barthel Index: 
 
Bathin Bladder: 0 Bowels: 0 Groomin Dressin Feedin Mobility: 0 Stairs: 0 Toilet Use: 0 Transfer (Bed to Chair and Back): 5 Total: 10/100 The Barthel ADL Index: Guidelines 1. The index should be used as a record of what a patient does, not as a record of what a patient could do. 2. The main aim is to establish degree of independence from any help, physical or verbal, however minor and for whatever reason. 3. The need for supervision renders the patient not independent. 4. A patient's performance should be established using the best available evidence. Asking the patient, friends/relatives and nurses are the usual sources, but direct observation and common sense are also important. However direct testing is not needed. 5. Usually the patient's performance over the preceding 24-48 hours is important, but occasionally longer periods will be relevant. 6. Middle categories imply that the patient supplies over 50 per cent of the effort. 7. Use of aids to be independent is allowed. Kristian Allen., Barthel, D.W. (0083). Functional evaluation: the Barthel Index. [facility] (14)2. RUBI Austin, Robson Laughlin., Tawanna Ormond., [place], [facility] (1999). Measuring the change indisability after inpatient rehabilitation; comparison of the responsiveness of the Barthel Index and Functional [place] Measure. Journal of Neurology, Neurosurgery, and Psychiatry, 66(4), 450-723. Liana Baugh, N.J.A, MARQUITA López, & Phoebe Dior M.A. (2004.) Assessment of post-stroke quality of life in cost-effectiveness studies: The usefulness of the Barthel Index and the EuroQoL-5D. [facility], 13, [phone] Occupational Therapy Evaluation Charge Determination History Examination Decision-Making LOW Complexity : Brief history review  LOW Complexity : 1-3 performance deficits relating to physical, cognitive , or psychosocial skils that result in activity limitations and / or participation restrictions  LOW Complexity : No comorbidities that affect functional and no verbal or physical assistance needed to complete eval tasks Based on the above components, the patient evaluation is determined to be of the following complexity level: LOW Activity Tolerance:  
Fair Please refer to the flowsheet for vital signs taken during this treatment. After treatment patient left in no apparent distress:   
Sitting in chair and Call bell within reach COMMUNICATION/EDUCATION:  
The patients plan of care was discussed with: Physical therapist, Registered nurse, and patient. Home safety education was provided and the patient/caregiver indicated understanding., Patient/family have participated as able in goal setting and plan of care. , and Patient/family agree to work toward stated goals and plan of care. This patients plan of care is appropriate for delegation to [facility]. Thank you for this referral. 
Neil Bullock OTR/L Time Calculation: 41 mins

## 2020-05-19 NOTE — PROGRESS NOTES
Admission Medication Reconciliation: 
 
Comments/Recommendations: 
-Medication history obtained via med list from assisted living facility Medications added: Loratadine Medications removed: Lomotil, magnesium gluconate Medications changed: Levothyroxine from 137 mcg to 150 mcg, ketoconazole cream to ketoconazole shampoo with different directions Information obtained from: Med list from assisted living facility from May 2020 Significant PMH/Disease States:  
Past Medical History:  
Diagnosis Date Arthritis CAD (coronary artery disease) Cardiomyopathy ([facility] 6/11/2015 Colon cancer ([facility] 1993 COLON Depression DEPRESSION Diabetes ([facility] IDDM  
 GERD (gastroesophageal reflux disease) Hypertension Lung cancer ([facility] 11/29/2016 Mild aortic stenosis 6/15/2015 Pulmonary hypertension ([facility] 6/15/2015 PVD (peripheral vascular disease) ([facility] STENT RIGHT GROIN, PAD  
 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 LIMA TO LAD; SVG TO OM; SVG TO OM Stroke [facility]) 1999  
 2 STROKES Third degree AV block ([facility] 6/11/2015 Thyroid disease Chief Complaint for this Admission: Chief Complaint Patient presents with Hypertension Fatigue Allergies:  Terazosin Prior to Admission Medications:  
Prior to Admission Medications Prescriptions Last Dose Informant Patient Reported? Taking? MULTIVIT WITH IRON,MINERALS (MULTIVITAMIN AND MINERALS PO) 5/17/2020 at AM  Yes Yes Sig: Take 1 Tab by mouth daily. acetaminophen (TYLENOL) 325 mg tablet 5/11/2020 at Unknown time  Yes Yes Sig: Take 650 mg by mouth every four (4) hours as needed for Pain. albuterol-ipratropium (DUO-NEB) 2.5 mg-0.5 mg/3 ml nebu 5/11/2020 at Unknown time  Yes Yes Sig: 3 mL by Nebulization route every four (4) hours as needed (shortness of breath). aspirin 81 mg chewable tablet 5/17/2020 at AM  No Yes Sig: Take 1 Tab by mouth daily. atorvastatin (LIPITOR) 10 mg tablet 2020 at bedtime  No Yes Sig: Take 1 Tab by mouth nightly. bisacodyl (DULCOLAX) 10 mg suppository 2020 at Unknown time  No Yes Sig: Insert 10 mg into rectum daily as needed. bumetanide (BUMEX) 1 mg tablet 2020 at PM  No Yes Sig: Take 1 Tab by mouth two (2) times a day. calcium-vitamin D (OYSTER SHELL) 500 mg(1,250mg) -200 unit per tablet 2020 at PM  Yes Yes Sig: Take 1 Tab by mouth three (3) times daily (with meals). carbamide peroxide (DEBROX) 6.5 % otic solution 2020 at 8pm  Yes Yes Sig: Administer 5 Drops into each ear four (4) times daily. 8am, noon, 6pm, 8pm  
carvedilol (COREG) 3.125 mg tablet 2020 at PM Care Giver No Yes Sig: Take 1 Tab by mouth two (2) times a day. cholecalciferol (VITAMIN D3) 1,000 unit tablet 2020 at [address] [place] [address] Yes Yes Sig: Take 3,000 Units by mouth daily. dextran 70-hypromellose (ARTIFICIAL TEARS,AFBJ95-[id number],) ophthalmic solution 2020 at PM  Yes Yes Sig: Administer 2 Drops to both eyes two (2) times a day. docusate sodium (STOOL SOFTENER) 100 mg capsule 2020 at Unknown time  Yes Yes Sig: Take 100 mg by mouth daily as needed for Constipation. donepezil (ARICEPT) 10 mg tablet 2020 at [address] [place] [address] Yes Yes Sig: Take 10 mg by mouth daily. ferrous sulfate 325 mg (65 mg iron) tablet 2020 at AM Care Giver Yes Yes Sig: Take 325 mg by mouth daily (with breakfast). finasteride (PROSCAR) 5 mg tablet 2020 at evening Care Giver Yes Yes Sig: Take 5 mg by mouth every evening. fluticasone (FLONASE) 50 mcg/actuation nasal spray 2020 at AM Care Giver Yes Yes Si Sprays by Both Nostrils route daily. glipiZIDE (GLUCOTROL) 10 mg tablet 2020 at AM  Yes Yes Sig: Take 5 mg by mouth every morning. guaiFENesin SR (MUCINEX) 600 mg SR tablet 2020 at PM  Yes Yes Sig: Take 600 mg by mouth two (2) times a day. ketoconazole (NIZORAL) 2 % shampoo 5/16/2020  Yes Yes Sig: Apply  to affected area daily. Apply to scalp 2 days per week - Wednesday and saturday  
levothyroxine (SYNTHROID) 150 mcg tablet 5/17/2020 at AM  Yes Yes Sig: Take 150 mcg by mouth Daily (before breakfast). loratadine (CLARITIN) 10 mg tablet 5/17/2020 at AM  Yes Yes Sig: Take 10 mg by mouth daily. melatonin 3 mg tablet 5/17/2020 at bedtime  Yes Yes Sig: Take 3 mg by mouth nightly. omeprazole (PRILOSEC) 20 mg capsule 5/17/2020 at [address] [place] [address] Yes Yes Sig: Take 20 mg by mouth Daily (before breakfast). 30 minutes prior to breakfast  
senna-docusate (PERICOLACE) 8.6-50 mg per tablet 5/17/2020 at AM  No Yes Sig: Take 1 Tab by mouth daily. For constipation. Do not take if you have loose stools  
sertraline (ZOLOFT) 100 mg tablet 5/17/2020 at AM  Yes Yes Sig: Take 200 mg by mouth daily. tamsulosin (FLOMAX) 0.4 mg capsule 5/17/2020 at bedtime Care Giver Yes Yes Sig: Take 0.4 mg by mouth nightly. Facility-Administered Medications: None Thank you, 
Meseret Pino, PHARMD

## 2020-05-19 NOTE — CONSULTS
Zeke Mckee MD 
 
 
Patient: Mervin Aviles : 1935 Today's Date: 2020 HISTORY OF PRESENT ILLNESS:  
 
History of Present Illness: 
 
Mervin Aviles is a 81 y.o. male PMH CAD s/p CABG x3, cardiomyopathy, colon cancer, depression, IDDM, GERD, HTN, lung cancer, aortic stenosis, pulm HTN, PVD, stroke, thryoid disease presenting for weakness. Lives at an assisted living facility who reported he has been unusually weak for the past several days. Patient has a history of dementia and is a poor historian. Patient denies chest pain or shortness of breath over the phone. Echo tech who is currently in room states he is not coughing. PAST MEDICAL HISTORY:  
 
Past Medical History:  
Diagnosis Date  Arthritis  CAD (coronary artery disease)  Cardiomyopathy ([address] 2015  Colon cancer ([address]  COLON  
 Depression DEPRESSION  
 Diabetes ([address] IDDM  GERD (gastroesophageal reflux disease)  Hypertension  Lung cancer ([address] 2016  Mild aortic stenosis 6/15/2015  Pulmonary hypertension ([address] 6/15/2015  PVD (peripheral vascular disease) ([address] STENT RIGHT GROIN, PAD  
 S/P CABG x 3 2015 LIMA TO LAD; SVG TO OM; SVG TO OM  
 Stroke ([address]   
 2 STROKES  Third degree AV block ([address] 2015  Thyroid disease Past Surgical History:  
Procedure Laterality Date [facility] [address] COLON RESECTION  
 HX HEART CATHETERIZATION  2015  HX PACEMAKER    
 INS PPM/ICD LED DUAL ONLY  2015 [address] PAD, RIGHT GROIN STENT  
 
MEDICATIONS:  
 
Current Facility-Administered Medications Medication Dose Route Frequency Provider Last Rate Last Dose  aspirin chewable tablet 81 mg  81 mg Oral DAILY Calli Austin MD   81 mg at 20 6972  
 atorvastatin (LIPITOR) tablet 10 mg  10 mg Oral QHS Calli Austin MD      
  carvediloL (COREG) tablet 3.125 mg  3.125 mg Oral BID Leia Sherman MD   3.125 mg at 05/19/20 1354  donepeziL (ARICEPT) tablet 10 mg  10 mg Oral DAILY Leia Sherman MD   10 mg at 05/19/20 2117  finasteride (PROSCAR) tablet 5 mg  5 mg Oral QPM Leia Sherman MD      
 levothyroxine (SYNTHROID) tablet 150 mcg  150 mcg Oral ACB Leia Sherman MD   150 mcg at 05/19/20 0823  
 pantoprazole (PROTONIX) tablet 40 mg  40 mg Oral ACB Leia Sherman MD   40 mg at 05/19/20 8170  sertraline (ZOLOFT) tablet 200 mg  200 mg Oral DAILY Leia Sherman MD   200 mg at 05/19/20 2597  tamsulosin (FLOMAX) capsule 0.4 mg  0.4 mg Oral QHS Leia Sherman MD      
 senna-docusate (PERICOLACE) 8.6-50 mg per tablet 1 Tab  1 Tab Oral DAILY Leia Sherman MD   1 Tab at 05/19/20 8922  sodium chloride (NS) flush 5-40 mL  5-40 mL IntraVENous Q8H Leia Sherman MD   10 mL at 05/18/20 2200  
 sodium chloride (NS) flush 5-40 mL  5-40 mL IntraVENous PRN Leia Sherman MD      
 acetaminophen (TYLENOL) tablet 650 mg  650 mg Oral Q4H PRN Leia Sherman MD      
 prochlorperazine (COMPAZINE) injection 10 mg  10 mg IntraVENous Q6H PRN Leia Sherman MD      
 insulin lispro (HUMALOG) injection   SubCUTAneous QID WITH MEALS Leia Sherman MD   Stopped at 05/18/20 2200  
 glucose chewable tablet 16 g  4 Tab Oral PRN Leia Sherman MD      
 glucagon [facility]) injection 1 mg  1 mg IntraMUSCular PRN Leia Sherman MD      
 dextrose 10% infusion 0-250 mL  0-250 mL IntraVENous PRN Leia Sherman MD      
 bumetanide Rebeca Small) injection 1 mg  1 mg IntraVENous Q12H Leia Sherman MD   1 mg at 05/19/20 0591 Allergies Allergen Reactions  Terazosin Unknown (comments) SOCIAL HISTORY:  
 
Social History Tobacco Use  Smoking status: Former Smoker Packs/day: 2.00 Years: 60.00 Pack years: 120.00 Types: Cigarettes Last attempt to quit: 6/22/2015 Years since quittin.9  Smokeless tobacco: Never Used Substance Use Topics  Alcohol use: No  
  Alcohol/week: 0.0 standard drinks  Drug use: No  
 
FAMILY HISTORY:  
 
Family History Problem Relation Age of Onset  Heart Disease Mother  Diabetes Mother  Other Father AMPUTATION LEG FOLLOWING BUG BITE  
 Other Sister CEREBRAL ANUERYSM  
 Heart Disease Brother  Diabetes Sister REVIEW OF SYMPTOMS:  
 
Review of Symptoms: No shortness of breath or chest pain. Difficult to obtain over extreme difficulty hearing. PHYSICAL EXAM:  
 
Physical Exam: 
Visit Vitals /64 (BP Patient Position: At rest) Pulse 60 Temp 98.3 °F (36.8 °C) Resp 18 Ht 5' 10\" (1.778 m) Wt 135 lb 1.6 oz (61.3 kg) SpO2 92% BMI 19.38 kg/m² Patient appears generally well, mood and affect are appropriate and pleasant. HEENT: Hard of hearing, missing R eye Neck Exam: Supple Lung Exam: Crackles half way up R side, crackles at lower left lobe Cardiac Exam: regular rhythm, systolic murmur 2/6 Abdomen: Soft, non-tender Extremities: Moves all ext well. Psych: Appropriate affect Neuro - Grossly intact LABS / OTHER STUDIES:  
 
Recent Results (from the past 24 hour(s)) CBC WITH AUTOMATED DIFF Collection Time: 20  7:51 PM  
Result Value Ref Range WBC 9.4 4.1 - 11.1 K/uL  
 RBC 3.27 (L) 4.10 - 5.70 M/uL HGB 9.6 (L) 12.1 - 17.0 g/dL HCT 30.4 (L) 36.6 - 50.3 % MCV 93.0 80.0 - 99.0 FL  
 MCH 29.4 26.0 - 34.0 PG  
 MCHC 31.6 30.0 - 36.5 g/dL  
 RDW 17.7 (H) 11.5 - 14.5 % PLATELET 200 (L) 594 - 400 K/uL MPV 11.1 8.9 - 12.9 FL  
 NRBC 0.0 0  WBC ABSOLUTE NRBC 0.00 0.00 - 0.01 K/uL NEUTROPHILS 84 (H) 32 - 75 % LYMPHOCYTES 6 (L) 12 - 49 % MONOCYTES 8 5 - 13 % EOSINOPHILS 2 0 - 7 % BASOPHILS 0 0 - 1 % IMMATURE GRANULOCYTES 0 0.0 - 0.5 % ABS. NEUTROPHILS 7.8 1.8 - 8.0 K/UL  
 ABS. LYMPHOCYTES 0.6 (L) 0.8 - 3.5 K/UL ABS. MONOCYTES 0.8 0.0 - 1.0 K/UL  
 ABS. EOSINOPHILS 0.2 0.0 - 0.4 K/UL  
 ABS. BASOPHILS 0.0 0.0 - 0.1 K/UL  
 ABS. IMM. GRANS. 0.0 0.00 - 0.04 K/UL  
 DF SMEAR SCANNED    
 RBC COMMENTS ANISOCYTOSIS 
1+ METABOLIC PANEL, COMPREHENSIVE Collection Time: 05/18/20  7:51 PM  
Result Value Ref Range Sodium 132 (L) 136 - 145 mmol/L Potassium 4.4 3.5 - 5.1 mmol/L Chloride 98 97 - 108 mmol/L  
 CO2 31 21 - 32 mmol/L Anion gap 3 (L) 5 - 15 mmol/L Glucose 73 65 - 100 mg/dL BUN 37 (H) 6 - 20 MG/DL Creatinine 1.45 (H) 0.70 - 1.30 MG/DL  
 BUN/Creatinine ratio 26 (H) 12 - 20 GFR est AA 56 (L) >60 ml/min/1.73m2 GFR est non-AA 46 (L) >60 ml/min/1.73m2 Calcium 8.9 8.5 - 10.1 MG/DL Bilirubin, total 1.0 0.2 - 1.0 MG/DL  
 ALT (SGPT) 759 (H) 12 - 78 U/L  
 AST (SGOT) 770 (H) 15 - 37 U/L Alk. phosphatase 92 45 - 117 U/L Protein, total 7.2 6.4 - 8.2 g/dL Albumin 3.1 (L) 3.5 - 5.0 g/dL Globulin 4.1 (H) 2.0 - 4.0 g/dL A-G Ratio 0.8 (L) 1.1 - 2.2    
TROPONIN I Collection Time: 05/18/20  7:51 PM  
Result Value Ref Range Troponin-I, Qt. 0.19 (H) <0.05 ng/mL NT-PRO BNP Collection Time: 05/18/20  7:51 PM  
Result Value Ref Range NT pro-BNP 24,247 (H) <450 PG/ML  
SAMPLES BEING HELD Collection Time: 05/18/20  7:51 PM  
Result Value Ref Range SAMPLES BEING HELD SST.COLEEN.RED   
 COMMENT Add-on orders for these samples will be processed based on acceptable specimen integrity and analyte stability, which may vary by analyte. FERRITIN Collection Time: 05/18/20  7:51 PM  
Result Value Ref Range Ferritin 527 (H) 26 - 388 NG/ML  
LD Collection Time: 05/18/20  7:51 PM  
Result Value Ref Range  (H) 85 - 241 U/L  
SARS-COV-2 Collection Time: 05/18/20 11:34 PM  
Result Value Ref Range Specimen source Nasopharyngeal    
 SARS-CoV-2 PENDING   
 SARS-CoV-2 PENDING  Specimen source PENDING   
 COVID-19 rapid test PENDING   
 COVID-19 PENDING NEG  
URINALYSIS W/ RFLX MICROSCOPIC Collection Time: 05/19/20  1:35 AM  
Result Value Ref Range Color YELLOW/STRAW Appearance CLEAR CLEAR Specific gravity 1.005 1.003 - 1.030    
 pH (UA) 5.5 5.0 - 8.0 Protein Negative NEG mg/dL Glucose Negative NEG mg/dL Ketone Negative NEG mg/dL Bilirubin Negative NEG Blood Negative NEG Urobilinogen 1.0 0.2 - 1.0 EU/dL Nitrites Negative NEG Leukocyte Esterase Negative NEG    
SAMPLES BEING HELD Collection Time: 05/19/20  1:35 AM  
Result Value Ref Range SAMPLES BEING HELD 1UC   
 COMMENT Add-on orders for these samples will be processed based on acceptable specimen integrity and analyte stability, which may vary by analyte. GLUCOSE, POC Collection Time: 05/19/20  2:05 AM  
Result Value Ref Range Glucose (POC) 58 (L) 65 - 100 mg/dL Performed by Lysbeth Links GLUCOSE, POC Collection Time: 05/19/20  2:07 AM  
Result Value Ref Range Glucose (POC) 54 (L) 65 - 100 mg/dL Performed by Lysbeth Links GLUCOSE, POC Collection Time: 05/19/20  2:31 AM  
Result Value Ref Range Glucose (POC) 76 65 - 100 mg/dL Performed by Lysbeth Links CBC W/O DIFF Collection Time: 05/19/20  3:30 AM  
Result Value Ref Range WBC 9.3 4.1 - 11.1 K/uL  
 RBC 3.17 (L) 4.10 - 5.70 M/uL HGB 9.2 (L) 12.1 - 17.0 g/dL HCT 29.4 (L) 36.6 - 50.3 % MCV 92.7 80.0 - 99.0 FL  
 MCH 29.0 26.0 - 34.0 PG  
 MCHC 31.3 30.0 - 36.5 g/dL  
 RDW 17.3 (H) 11.5 - 14.5 % PLATELET 124 (L) 101 - 400 K/uL MPV 12.5 8.9 - 12.9 FL  
 NRBC 0.0 0  WBC ABSOLUTE NRBC 0.00 0.00 - 0.01 K/uL METABOLIC PANEL, COMPREHENSIVE Collection Time: 05/19/20  3:30 AM  
Result Value Ref Range Sodium 136 136 - 145 mmol/L Potassium 4.0 3.5 - 5.1 mmol/L Chloride 100 97 - 108 mmol/L  
 CO2 29 21 - 32 mmol/L Anion gap 7 5 - 15 mmol/L Glucose 123 (H) 65 - 100 mg/dL  BUN 37 (H) 6 - 20 MG/DL  
 Creatinine 1.32 (H) 0.70 - 1.30 MG/DL  
 BUN/Creatinine ratio 28 (H) 12 - 20 GFR est AA >60 >60 ml/min/1.73m2 GFR est non-AA 52 (L) >60 ml/min/1.73m2 Calcium 8.4 (L) 8.5 - 10.1 MG/DL Bilirubin, total 1.1 (H) 0.2 - 1.0 MG/DL  
 ALT (SGPT) 681 (H) 12 - 78 U/L  
 AST (SGOT) 614 (H) 15 - 37 U/L Alk. phosphatase 82 45 - 117 U/L Protein, total 6.6 6.4 - 8.2 g/dL Albumin 2.8 (L) 3.5 - 5.0 g/dL Globulin 3.8 2.0 - 4.0 g/dL A-G Ratio 0.7 (L) 1.1 - 2.2 PHOSPHORUS Collection Time: 05/19/20  3:30 AM  
Result Value Ref Range Phosphorus 4.0 2.6 - 4.7 MG/DL  
TROPONIN I Collection Time: 05/19/20  3:30 AM  
Result Value Ref Range Troponin-I, Qt. 0.17 (H) <0.05 ng/mL D DIMER Collection Time: 05/19/20  3:30 AM  
Result Value Ref Range D-dimer 12.28 (H) 0.00 - 0.65 mg/L FEU FIBRINOGEN Collection Time: 05/19/20  3:30 AM  
Result Value Ref Range Fibrinogen 314 200 - 475 mg/dL PROTHROMBIN TIME + INR Collection Time: 05/19/20  3:30 AM  
Result Value Ref Range INR 1.7 (H) 0.9 - 1.1 Prothrombin time 16.9 (H) 9.0 - 11.1 sec PTT Collection Time: 05/19/20  3:30 AM  
Result Value Ref Range aPTT 35.0 (H) 22.1 - 32.0 sec  
 aPTT, therapeutic range     58.0 - 77.0 SECS  
HEMOGLOBIN A1C WITH EAG Collection Time: 05/19/20  3:30 AM  
Result Value Ref Range Hemoglobin A1c 6.5 (H) 4.0 - 5.6 % Est. average glucose 140 mg/dL MAGNESIUM Collection Time: 05/19/20  3:30 AM  
Result Value Ref Range Magnesium 2.2 1.6 - 2.4 mg/dL GLUCOSE, POC Collection Time: 05/19/20  8:21 AM  
Result Value Ref Range Glucose (POC) 90 65 - 100 mg/dL Performed by Pedro Jules CARDIAC DIAGNOSTICS:  
 
Cardiac Evaluation Includes: 
 
9/4/19 BL Carotid Duplex - 10-49% stenosis R ICA, 50-79% stenosis of L ICA, >50% stenosis of L ECA 
 
1/4/18 Echo - EF 50%, distal apical septal akinesis, LA moderately dilated, mild aortic stenosis, severe pulm HTN  
 
 Echo 1/4/17 LVEF 50%. Distal apical septal akinesis. LAmoderately dilated. Mild MR. Aortic valve: The valve was trileaflet. Leaflets exhibited moderate 
calcification, reduced mobility, and sclerosis without stenosis. Mild Tr. Severe pulmonary hypertension 
  
ECHO 6-10-15 LV EF 35 % by Ceja's Biplane technique, mod diffuse HK, mod HK mid anteroseptal and apical septal wall(s). Mild LVH. Mod JILLIAN, MR, mild ASt, LISETH 1.5 cm2 by both continuity equation and planimetry. Mild AR, TR, PASP 60. AV Max/meanPG 16.6/7.6 mmHg. AV Vmax was 2 m/s.  
  
NUKE 6-10-15 ischemia and infarction in inferior wall with moderately reduced EF, see report Holter showed high level AV block so underwent testing leading to cath and then CABG 
 
6/26/15 CABG x3 - LIMA to LAD, SVG to OM, SVG to OM 
 
7/2/15: [employer] Pacemaker per Dr. Caitlin Galvez Lab Results Component Value Date/Time Cholesterol, total 103 02/13/2019 10:22 AM  
 HDL Cholesterol 42 02/13/2019 10:22 AM  
 LDL, calculated 46 02/13/2019 10:22 AM  
 Triglyceride 77 02/13/2019 10:22 AM  
 
ASSESSMENT AND PLAN:  
 
Assessment and Plan: 
 
Acute on Chronic Systolic Heart Failure - BNP 36359, R sided pleural effusion and likely L sided pleural effusion - Follow up Echo 
- Recommend EKG - Coreg 3.125mg BID 
- Continue Bumex 1mg BID Pleural Effusion - CXR shows R sided pleural effusion associated with passive atelectasis and/or consolidation with likely small L sided pleural effusion - Follow up COVID19 test, per hospitalist low likelihood - Follow up RVP Elevated Troponin in setting of CAD s/p CABG x3 - POA Trop 0.19 trended down to 0.17, Cr mildly bumped from baseline - Likely 2/2 demand ischemia in setting of acute HF 
- Lipitor 10mg daily - Need EKG 3rd Degree AV Block s/p implant 2015 
- Pacer check in September with good fxn, no programming changes needed HLD - Last LDL at goal 46 
- Lipitor Resident: DO Alanna Nick MD 
 
 [address] [facility], [address]  [address] [facility] [address]  [address] Ph: [phone]   Ph [phone]

## 2020-05-19 NOTE — PROGRESS NOTES
Problem: Mobility Impaired (Adult and Pediatric) Goal: *Acute Goals and Plan of Care (Insert Text) Description: FUNCTIONAL STATUS PRIOR TO ADMISSION: The patient was functional at the wheelchair level and required minimal assistance for transfers to the chair. HOME SUPPORT PRIOR TO ADMISSION: The patient lived in a group home. Physical Therapy Goals Initiated 5/19/2020 1. Patient will move from supine to sit and sit to supine , scoot up and down and roll side to side in bed with supervision/set-up within 7 day(s). 2.  Patient will transfer from bed to chair and chair to bed with minimal assistance/contact guard assist using the least restrictive device within 7 day(s). 3.  Patient will perform sit to stand with minimal assistance/contact guard assist within 7 day(s). 4.  Patient will ambulate with minimal assistance/contact guard assist for 50 feet with the least restrictive device within 7 day(s). Outcome: Progressing Towards Goal 
 PHYSICAL THERAPY EVALUATION Patient: Caroline John (71 y.o. male) Date: 5/19/2020 Primary Diagnosis: Acute on chronic systolic CHF (congestive heart failure) ([facility] [address] [I50.23] Precautions: fall, Covid19 r/o pending ASSESSMENT Based on the objective data described below, the patient presents with difficulty with ambulation. Patient lives in a group home and wheelchair bound, needs min assist to transfer to and from the wheelchair. Patient reports that he can ambulate with rolling walker short distance only and stay on the wheelchair the rest of the day. Rolled on the edge of bed mod assist, supine to sit mod assist, sit to stand mod assist x 2, ambulate with rolling walker towards the recliner mod assist x 2. OOB to chair as tolerated performed some active range of motion exercise on both LE all planes. Communicated with nurse who agreed to monitor patient.    
 
Current Level of Function Impacting Discharge (mobility/balance): mod assist x 2 with transfers and ambulation with rolling walker Functional Outcome Measure: The patient scored 20/100 on the barthel index outcome measure . Other factors to consider for discharge: hard of hearing and vision impaired on the right eye Patient will benefit from skilled therapy intervention to address the above noted impairments. PLAN : 
Recommendations and Planned Interventions: bed mobility training, transfer training, gait training, therapeutic exercises, neuromuscular re-education, patient and family training/education, and therapeutic activities Frequency/Duration: Patient will be followed by physical therapy:  2 times a week to address goals. Recommendation for discharge: (in order for the patient to meet his/her long term goals) To be determined: pending progress from therapy possible going back to group home This discharge recommendation: 
Has been made in collaboration with the attending provider and/or case management IF patient discharges home will need the following DME: patient owns DME required for discharge SUBJECTIVE:  
Patient stated ok.  OBJECTIVE DATA SUMMARY:  
HISTORY:   
Past Medical History:  
Diagnosis Date Arthritis CAD (coronary artery disease) Cardiomyopathy ([facility] [address] 6/11/2015 Colon cancer ([facility] [address] 1993 COLON Depression DEPRESSION Diabetes ([facility] [address] IDDM  
 GERD (gastroesophageal reflux disease) Hypertension Lung cancer ([address] 11/29/2016 Mild aortic stenosis 6/15/2015 Pulmonary hypertension ([facility] [address] 6/15/2015 PVD (peripheral vascular disease) ([facility] [address] STENT RIGHT GROIN, PAD  
 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 LIMA TO LAD; SVG TO OM; SVG TO OM Stroke [facility]) 1999  
 2 STROKES Third degree AV block ([address] 6/11/2015 Thyroid disease Past Surgical History:  
Procedure Laterality Date [facility] COLON RESECTION  
 HX HEART CATHETERIZATION  6/26/2015  HX PACEMAKER    
 INS PPM/ICD LED DUAL ONLY  7/2/2015 Saeid PAD, RIGHT GROIN STENT Personal factors and/or comorbidities impacting plan of care:  
 
Home Situation Home Environment: Skilled nursing facility One/Two Story Residence: One story Living Alone: Yes Support Systems: Skilled nursing facility, Family member(s) Patient Expects to be Discharged to[de-identified] Skilled nursing facility Current DME Used/Available at Home: Adaptive bathing aides, Grab bars EXAMINATION/PRESENTATION/DECISION MAKING:  
Critical Behavior: 
Neurologic State: Alert Orientation Level: Oriented to person, Oriented to place, Disoriented to situation, Disoriented to time Cognition: Follows commands Hearing: Auditory Auditory Impairment: Hard of hearing, bilateral 
 
Range Of Motion: 
AROM: Generally decreased, functional 
  
  
  
PROM: Within functional limits Strength:   
Strength: Generally decreased, functional 
  
  
  
  
  
  
Tone & Sensation:  
  
  
  
  
  
  
  
  
  
   
Coordination: 
Coordination: Generally decreased, functional 
Vision:  
  
Functional Mobility: 
Bed Mobility: 
Rolling: Minimum assistance Supine to Sit: Minimum assistance Sit to Supine: Minimum assistance Scooting: Minimum assistance Transfers: 
Sit to Stand: Moderate assistance; Additional time Stand to Sit: Moderate assistance; Additional time Stand Pivot Transfers: Moderate assistance; Additional time Bed to Chair: Moderate assistance; Additional time Balance:  
Sitting: Impaired;High guard Sitting - Static: Fair (occasional) Sitting - Dynamic: Poor (constant support) Standing: Impaired;Pull to stand; With support Standing - Static: Constant support;Poor Standing - Dynamic : Constant support;Poor Ambulation/Gait Training: 
Distance (ft): 5 Feet (ft) Assistive Device: Walker, rolling;Gait belt Ambulation - Level of Assistance: Moderate assistance; Additional time;Assist x2 
  
 Gait Description (WDL): Exceptions to [facility] Gait Abnormalities: Path deviations; Step to gait Base of Support: Widened Speed/Kamila: Fluctuations; Slow Therapeutic Exercises:  
 Instructed patient to continue active range of motion exercise on both legs while up on chair or on bed. Functional Measure: 
Barthel Index: 
 
Bathin Bladder: 0 Bowels: 5 Groomin Dressin Feedin Mobility: 0 Stairs: 0 Toilet Use: 5 Transfer (Bed to Chair and Back): 5 Total: 20/100 The Barthel ADL Index: Guidelines 1. The index should be used as a record of what a patient does, not as a record of what a patient could do. 2. The main aim is to establish degree of independence from any help, physical or verbal, however minor and for whatever reason. 3. The need for supervision renders the patient not independent. 4. A patient's performance should be established using the best available evidence. Asking the patient, friends/relatives and nurses are the usual sources, but direct observation and common sense are also important. However direct testing is not needed. 5. Usually the patient's performance over the preceding 24-48 hours is important, but occasionally longer periods will be relevant. 6. Middle categories imply that the patient supplies over 50 per cent of the effort. 7. Use of aids to be independent is allowed. Kevin Tellez., Barthel, D.W. (7016). Functional evaluation: the Barthel Index. [address] [facility] (14)2. RUBI Delacruz, Miguel Coyle., Susanna Kaur, [place], [address] [place] [address] (). Measuring the change indisability after inpatient rehabilitation; comparison of the responsiveness of the Barthel Index and Functional [place] Measure. Journal of Neurology, Neurosurgery, and Psychiatry, 66(4), 607-327.  
Anam Smith, N.J.A, BAKARI López.ANA, & Timoteo Chavez MNE. (2004.) Assessment of post-stroke quality of life in cost-effectiveness studies: The usefulness of the Barthel Index and the EuroQoL-5D. [facility], 13, [phone] Physical Therapy Evaluation Charge Determination History Examination Presentation Decision-Making MEDIUM  Complexity : 1-2 comorbidities / personal factors will impact the outcome/ POC  MEDIUM Complexity : 3 Standardized tests and measures addressing body structure, function, activity limitation and / or participation in recreation  MEDIUM Complexity : Evolving with changing characteristics  Other outcome measures barthel  MEDIUM Based on the above components, the patient evaluation is determined to be of the following complexity level: MEDIUM Pain Ratin/10 Activity Tolerance:  
Good Please refer to the flowsheet for vital signs taken during this treatment. After treatment patient left in no apparent distress:  
Sitting in chair, Heels elevated for pressure relief, Call bell within reach, and Bed / chair alarm activated COMMUNICATION/EDUCATION:  
The patients plan of care was discussed with: Occupational therapist and Registered nurse. Fall prevention education was provided and the patient/caregiver indicated understanding. Thank you for this referral. 
Evia Scheuermann, PT,WCC. Time Calculation: 45 mins

## 2020-05-19 NOTE — WOUND CARE
Wound care consult per nursing on admission for blanching redness on the sacrum. Currently in Covid Rule out isolation. Plan of care and assessment discussed with primary nurse. Skin care and preventative measures in place. Will follow up 5/20.  
[address] [facility], [place]

## 2020-05-19 NOTE — ED NOTES
Bedside and Verbal shift change report given to Beth (oncoming nurse) by Yonny Giles (offgoing nurse). Report included the following information SBAR, ED Summary, MAR and Recent Results.

## 2020-05-19 NOTE — PROGRESS NOTES
Reason for Admission:   CHF  
            
RUR Score:     30%,high risk for readmission PCP: First and Last name: [facility]  
 Name of [address] [place] [address] [address] Are you a current patient: Yes/No: yes Approximate date of last visit: monthly Resources/supports as identified by patient/family:   Supportive family Top Challenges facing patient (as identified by patient/family and CM): Finances/Medication cost?      Pt has medicare and [employer] Transportation? Family typically transport pt to & from his appointments Support system or lack thereof? Supportive children Living arrangements? Pt is a resident @ [address] [place] [address] Their address is [address] [place] [address] Self-care/ADLs/Cognition? Pt requires assistance with self-care,medication administration,He recognizes his daughter and knows himself by name but per daughter,pt is a poor historian. Current Advanced Directive/Advance Care Plan:  DNR,AMD is on file,I reviewed ACP with daughter as pt is a COVID 23 rule-out,Pt has DDNR also Plan for utilizing home health:    Pt currently has the Home Based Primary Care Team through the [facility],Their number is [phone]. Transition of Care Plan: Pt was admitted to [facility] with weakness ,CHF,pleural effusion and rule-out COVID 19. Pt has a history of CVA, CAD ,COPD,dementia,DMT2,colon cancer ,depression. GERD,HTN,mild aortic stenosis,s/p CABG . As pt is a poor historian,I contacted his daughter Juliocesar Segundo @ [phone]. Pt lives @ the [address] [place] [address] @ [address] [place] [address] . At the adult home,pt requires assistance with bathing,dressing,toileting and medication management. Daughter states that medication Pt does not drive. Daughter takes pt to and from his appointments but is concerned because he is much weaker than he is typically. The Home Based Primary Team from the [facility] follows pt in his home. Outpatient cardiologist is Dr Eileen Farris pulmonologist is Dr Dano Bosch with [facility]. Daughter,Mita tSevens's numbers are [phone] and [phone]. Daughter is requesting attending call her today with an update on pt.'s clinical status. Son is Angella Fleming. His numbers are [phone] and [phone]. In the past.pt had home health services through [facility]. I contacted the [facility] Based program to see what services they provide pt. They assess pt in the home but if pt needs additional home health services such as SN ,PT and OT,then that will need to set up by the hospital. 
 
ACP reviewed with pt For discharge needs today,please contact me @ [phone]. [address] printed out and placed in pt.'s chart to go with pt when pt is discharged.  
 
Ayse Daniels

## 2020-05-19 NOTE — NURSE NAVIGATOR
Chart reviewed by Heart Failure Nurse Navigator. Heart Failure database completed. EF:  Current Echo is pending. Last Echo on record from January 2018 with EF 50%. ACEi/ARB/ARNi: ** 
 
BB: Carvedilol 3.125 mg BID. Aldosterone Antagonist: ** 
 
Obstructive Sleep Apnea Screening: STOP-BANG score: 
 Referred to Sleep Medicine: CRT **. NYHA Functional Class **. Heart Failure Teach Back in Patient Education. Heart Failure Avoiding Triggers on Discharge Instructions. Cardiologist: Dr. Nehemiah Gomez in the past 
 
Currently receives care from the [facility] and lives in [facility] in [facility]. Post discharge follow up phone call to be made within 48-72 hours of discharge.

## 2020-05-19 NOTE — PROGRESS NOTES
Nutrition Assessment: 
 
RECOMMENDATIONS/INTERVENTION(S):  
1. Continue with Consistent CHO/Cardiac diet order. 2. Will order Ensure Enlive and Ensure Pudding daily for additional kcal/protein intake. Will continue to monitor PO intakes, weight, labs, GI. ASSESSMENT:  
5/19: 82 yo male admitted for acute on chronic CHF. RD assessment for low BMI screen. PMhx: CAD, DM, HTN, PVD, CVA, lung and colon CA s/p colon resection (1993), dementia. Underweight per BMI per advanced age. Weight hx in EMR indicates weight has remained stable over last year. Pt being tested for COVID-19. Attempted to call pt's room with no answer x 2. Unable to obtain nutrition hx at this time. Consistent Cho/Cardiac diet ordered. No intakes reported yet. Labs: , , POC BG 54-90, HgbA1c 6.5. Meds: statin, Bumex, Humalog, Colace. No wounds noted. Diet Order: Cardiac, Consistent carb 
% Eaten:  No data found. Pertinent Medications: [x] Reviewed Labs: [x] Reviewed Anthropometrics: Height: 5' 10\" (177.8 cm) Weight: 61.3 kg (135 lb 2.3 oz) IBW (%IBW):   ( ) UBW (%UBW):   (  %) BMI: Body mass index is 19.39 kg/m². This BMI is indicative of: 
 [x] Underweight  - per age  [] Normal    [] Overweight    []  Obesity    []  Extreme Obesity (BMI>40) Estimated Nutrition Needs (Based on): 1957 Kcals/day(REE 1313 x AF 1.3 + 250) , 61 g(61-73gm (1-1.2gm/kg/d)) Protein Carbohydrate: At Least 130 g/day  Fluids: 1957mL/day (1 ml/kcal) Last BM:    [x]Active     []Hyperactive  []Hypoactive       [] Absent   BS Skin:    [x] Intact   [] Incision  [] Breakdown   [] DTI   [] Tears/Excoriation/Abrasion  []Edema [] Other: Wt Readings from Last 30 Encounters:  
05/19/20 61.3 kg (135 lb 2.3 oz) 12/23/19 59.4 kg (131 lb) 09/04/19 59.4 kg (131 lb) 09/04/19 59.3 kg (130 lb 12.8 oz) 07/02/19 56.7 kg (125 lb)  
02/27/19 60.8 kg (134 lb)  
02/27/19 60.9 kg (134 lb 3.2 oz) 08/29/18 63.5 kg (140 lb) 02/21/18 68.9 kg (152 lb)  
02/21/18 69 kg (152 lb 3.2 oz) 02/21/18 84.4 kg (186 lb) 01/07/18 76.4 kg (168 lb 6 oz) 12/27/17 74.2 kg (163 lb 8 oz) 12/25/17 72.6 kg (160 lb) 08/23/17 74.4 kg (164 lb) 04/12/17 63.5 kg (140 lb) 02/15/17 64.9 kg (143 lb) 02/15/17 65.2 kg (143 lb 12.8 oz) 12/13/16 70.3 kg (155 lb) 10/05/16 68.9 kg (152 lb)  
06/16/16 65.8 kg (145 lb) 05/24/16 62.6 kg (138 lb)  
03/17/16 62.6 kg (138 lb) 02/16/16 62.9 kg (138 lb 9.6 oz) 02/14/16 62.4 kg (137 lb 9.1 oz) 01/26/16 60.8 kg (134 lb) 01/18/16 61.7 kg (136 lb) 12/15/15 62.1 kg (136 lb 12.8 oz)  
11/30/15 60.8 kg (134 lb 1.6 oz)  
11/25/15 61.8 kg (136 lb 3.9 oz) NUTRITION DIAGNOSES:  
Problem:  Underweight Etiology: related to inability to consume sufficient energy to maintain appropriate weight Signs/Symptoms: as evidenced by BMI 19.4 (age > 72 years) NUTRITION INTERVENTIONS: 
Meals/Snacks: General/healthful diet   Supplements: Commercial supplement GOAL:  
Consume > 60% all meals + ONS to promote weight gain of 0.5lb within next 2-3 days Cultural, Confucianist, or Ethnic Dietary Needs: None EDUCATION & DISCHARGE NEEDS:  
 [x] None Identified 
 [] Identified and Education Provided/Documented 
 [] Identified and Pt declined/was not appropriate [x] Interdisciplinary Care Plan Reviewed/Documented  
 [x] Discharge Needs:   Consistent Cho/Cardiac diet with ONS 2x/day 
 [] No Nutrition Related Discharge Needs NUTRITION RISK:  
Pt Is At Nutrition Risk  [x] No Nutrition Risk Identified  [] PT SEEN FOR:  
 []  MD Consult: []Calorie Count []Diabetic Diet Education []Diet Education []Electrolyte Management []General Nutrition Management and Supplements []Management of Tube Feeding []TPN Recommendations []  RN Referral:  []MST score >=2 
   []Enteral/Parenteral Nutrition PTA []Pregnant: Gestational DM or Multigestation [] Pressure Ulcer 
 
[x]  Low BMI      []  Length of Stay       [] Dysphagia Diet         [] Ventilator 
[]  Follow-up Previous Recommendations: 
 [] Implemented          [] Not Implemented          [x] Not Applicable Previous Goal: 
 [] Met              [] Progressing Towards Goal              [] Not Progressing Towards Goal   [x] Not Applicable Krystle Kennedy RD Pager [phone] Phone [phone]

## 2020-05-19 NOTE — ED NOTES
TRANSFER - OUT REPORT: 
 
Verbal report given to Silvestre Arce RN(name) on Kaity Carrillo  being transferred to [facility](unit) for routine progression of care Report consisted of patients Situation, Background, Assessment and  
Recommendations(SBAR). Information from the following report(s) SBAR, ED Summary, [facility] and Recent Results was reviewed with the receiving nurse. Lines:  
Peripheral IV 05/18/20 Right Antecubital (Active) Site Assessment Clean, dry, & intact 5/18/2020  7:55 PM  
Phlebitis Assessment 0 5/18/2020  7:55 PM  
Infiltration Assessment 0 5/18/2020  7:55 PM  
Dressing Status Clean, dry, & intact 5/18/2020  7:55 PM  
Dressing Type Tape;Transparent 5/18/2020  7:55 PM  
Hub Color/Line Status Pink;Patent; Flushed 5/18/2020  7:55 PM  
Action Taken Blood drawn 5/18/2020  7:55 PM  
  
 
Opportunity for questions and clarification was provided. Patient transported with: 
 Monitor O2 @ 2L liters

## 2020-05-19 NOTE — ED NOTES
Incontinence brief soaked as well as absorbent pad under patient. Pericare completed, brief removed, bed pad changed, and condom cath placed on patient to collect urine specimen on next void.  Patient unable to remember to utilize urinal.

## 2020-05-19 NOTE — CONSULTS
Name: Cj Resendiz: Laz Ornelas  
: 1935 Admit Date: 2020 Phone: [phone]  Room: [facility]/ PCP: Madalyn Segundo MD  MRN: [id number] Date: 2020  Code: DNR Chart and notes reviewed. Data reviewed. I review the patient's current medications in the medical record at each encounter. I have evaluated and examined the patient. HPI: 
 
2:51 PM    
 
History was obtained from patient and chart review. I was asked by Freada Cranker, MD to see Renetta [place] in consultation for a chief complaint of pleural effusion. History of Present Illness: 
Mr. Nikki Armstrong is an 82 yo gentleman with a history of dementia, COPD (FEV1 1.56L, 57% in 2016), CAD s/p CABG, cardiomyopathy s/p dual chamber pacemaker, colon cancer, GERD, prior lung cancer, and DM who presented with weakness. He is unable to provide much history other than answering some simple ROS questions. Per report, he was sent from his assisted living facility for progressive weakness. Admitted with acute heart failure. Found to have new LV dysfunction and volume overload with pleural effusions (R > L). He currently denies shortness of breath and appears to be breathing comfortably. No cough, fever, sputum production, edema. Has been getting IV diuresis throughout this admission. Labs reviewed: WBC 9.3, cr 1.32 (1.45 on adission), , , troponin up to 0.19, proNP 15346, RVP negative, COVID pending Images: CXR with right sided effusion and associated atelectasis vs airspace disease, trace L pleural effusion Past Medical History:  
Diagnosis Date  Arthritis  CAD (coronary artery disease)  Cardiomyopathy ([facility] 2015  Colon cancer ([facility]  COLON  
 Depression DEPRESSION  
 Diabetes ([facility] IDDM  GERD (gastroesophageal reflux disease)  Hypertension  Lung cancer ([facility] 2016  Mild aortic stenosis 6/15/2015  Pulmonary hypertension ([facility] 6/15/2015  PVD (peripheral vascular disease) [facility] STENT RIGHT GROIN, PAD  
 S/P CABG x 3 2015 LIMA TO LAD; SVG TO OM; SVG TO OM  
 Stroke ([facility] 1999  
 2 STROKES  Third degree AV block ([facility] 2015  Thyroid disease Past Surgical History:  
Procedure Laterality Date [address] COLON RESECTION  
 HX HEART CATHETERIZATION  2015  HX PACEMAKER    
 INS PPM/ICD LED DUAL ONLY  2015 [address] PAD, RIGHT GROIN STENT Family History Problem Relation Age of Onset  Heart Disease Mother  Diabetes Mother  Other Father AMPUTATION LEG FOLLOWING BUG BITE  
 Other Sister CEREBRAL ANUERYSM  
 Heart Disease Brother  Diabetes Sister Social History Tobacco Use  Smoking status: Former Smoker Packs/day: 2.00 Years: 60.00 Pack years: 120.00 Types: Cigarettes Last attempt to quit: 2015 Years since quittin.9  Smokeless tobacco: Never Used Substance Use Topics  Alcohol use: No  
  Alcohol/week: 0.0 standard drinks Allergies Allergen Reactions  Terazosin Unknown (comments) Current Facility-Administered Medications Medication Dose Route Frequency  aspirin chewable tablet 81 mg  81 mg Oral DAILY  atorvastatin (LIPITOR) tablet 10 mg  10 mg Oral QHS  carvediloL (COREG) tablet 3.125 mg  3.125 mg Oral BID  
 donepeziL (ARICEPT) tablet 10 mg  10 mg Oral DAILY  finasteride (PROSCAR) tablet 5 mg  5 mg Oral QPM  
 levothyroxine (SYNTHROID) tablet 150 mcg  150 mcg Oral ACB  pantoprazole (PROTONIX) tablet 40 mg  40 mg Oral ACB  sertraline (ZOLOFT) tablet 200 mg  200 mg Oral DAILY  tamsulosin (FLOMAX) capsule 0.4 mg  0.4 mg Oral QHS  senna-docusate (PERICOLACE) 8.6-50 mg per tablet 1 Tab  1 Tab Oral DAILY  sodium chloride (NS) flush 5-40 mL  5-40 mL IntraVENous Q8H  
  sodium chloride (NS) flush 5-40 mL  5-40 mL IntraVENous PRN  
 acetaminophen (TYLENOL) tablet 650 mg  650 mg Oral Q4H PRN  prochlorperazine (COMPAZINE) injection 10 mg  10 mg IntraVENous Q6H PRN  
 insulin lispro (HUMALOG) injection   SubCUTAneous QID WITH MEALS  glucose chewable tablet 16 g  4 Tab Oral PRN  
 glucagon (GLUCAGEN) injection 1 mg  1 mg IntraMUSCular PRN  
 dextrose 10% infusion 0-250 mL  0-250 mL IntraVENous PRN  
 bumetanide (BUMEX) injection 1 mg  1 mg IntraVENous Q12H  
 
 
 
REVIEW OF SYSTEMS  
12 point ROS negative except as stated in the HPI. Physical Exam:  
Visit Vitals /55 (BP 1 Location: Left arm, BP Patient Position: At rest) Pulse 60 Temp 98.5 °F (36.9 °C) Resp 20 Ht 5' 10\" (1.778 m) Wt 61.3 kg (135 lb 2.3 oz) SpO2 95% BMI 19.39 kg/m² General:  Alert, cooperative, no distress Head:  Normocephalic, without obvious abnormality, atraumatic. Eyes:  Conjunctivae/corneas clear. Nose: Nares normal. Septum midline. Throat: Lips, mucosa, and tongue normal.   
Neck: Supple, symmetrical, trachea midline Lungs:   Diminished in the bases (scattered wheeze on the right) Chest wall:  No tenderness or deformity. Heart:  Regular rate and rhythm, S1, S2 normal, no murmur, click, rub or gallop. Abdomen:   Soft, non-tender. Bowel sounds normal.   
Extremities: Extremities normal, atraumatic, no cyanosis or edema. Pulses: 2+ and symmetric all extremities. Skin: Skin color, texture, turgor normal. No rashes or lesions Neurologic: Grossly nonfocal  
 
 
Lab Results Component Value Date/Time  Sodium 136 05/19/2020 03:30 AM  
 Potassium 4.0 05/19/2020 03:30 AM  
 Chloride 100 05/19/2020 03:30 AM  
 CO2 29 05/19/2020 03:30 AM  
 BUN 37 (H) 05/19/2020 03:30 AM  
 Creatinine 1.32 (H) 05/19/2020 03:30 AM  
 Glucose 123 (H) 05/19/2020 03:30 AM  
 Calcium 8.4 (L) 05/19/2020 03:30 AM  
 Magnesium 2.2 05/19/2020 03:30 AM  
 Phosphorus 4.0 05/19/2020 03:30 AM  
 Lactic acid 1.1 01/05/2018 08:20 AM  
 
 
Lab Results Component Value Date/Time WBC 9.3 05/19/2020 03:30 AM  
 HGB 9.2 (L) 05/19/2020 03:30 AM  
 PLATELET 782 (L) 28/22/3769 03:30 AM  
 MCV 92.7 05/19/2020 03:30 AM  
 
 
Lab Results Component Value Date/Time INR 1.7 (H) 05/19/2020 03:30 AM  
 aPTT 35.0 (H) 05/19/2020 03:30 AM  
 AST (SGOT) 614 (H) 05/19/2020 03:30 AM  
 Alk. phosphatase 82 05/19/2020 03:30 AM  
 Protein, total 6.6 05/19/2020 03:30 AM  
 Albumin 2.8 (L) 05/19/2020 03:30 AM  
 Globulin 3.8 05/19/2020 03:30 AM  
 
 
Lab Results Component Value Date/Time Ferritin 527 (H) 05/18/2020 07:51 PM  
 
 
Lab Results Component Value Date/Time TSH 12.20 (H) 06/27/2015 04:29 AM  
  
 
No results found for: PH, PHI, PCO2, PCO2I, PO2, PO2I, HCO3, HCO3I, FIO2, FIO2I Lab Results Component Value Date/Time CK 40 01/04/2018 10:23 AM  
 CK-MB Index Cannot be calculated 01/04/2018 10:23 AM  
 Troponin-I, Qt. 0.15 (H) 05/19/2020 12:49 PM  
  (H) 10/11/2015 03:20 AM  
  
 
Lab Results Component Value Date/Time Culture result: (A) 06/27/2019 04:45 PM  
  MODERATE STREPTOCOCCUS PNEUMONIAE (SENSITIVITIES PERFORMED BY E-TEST) Culture result: SAMPLE SENT ON PREPLANTED MEDIA 06/27/2019 04:45 PM  
 Culture result: NO FUNGUS ISOLATED 30 DAYS 06/27/2019 04:45 PM  
 
 
Lab Results Component Value Date/Time Hepatitis B surface Ag 0.69 05/18/2020 07:51 PM  
 
 
Lab Results Component Value Date/Time CK 40 01/04/2018 10:23 AM  
 
 
Lab Results Component Value Date/Time  Color YELLOW/STRAW 05/19/2020 01:35 AM  
 Appearance CLEAR 05/19/2020 01:35 AM  
 pH (UA) 5.5 05/19/2020 01:35 AM  
 Protein Negative 05/19/2020 01:35 AM  
 Glucose Negative 05/19/2020 01:35 AM  
 Ketone Negative 05/19/2020 01:35 AM  
 Bilirubin Negative 05/19/2020 01:35 AM  
 Blood Negative 05/19/2020 01:35 AM  
 Urobilinogen 1.0 05/19/2020 01:35 AM  
 Nitrites Negative 05/19/2020 01:35 AM  
 Leukocyte Esterase Negative 05/19/2020 01:35 AM  
 WBC >30 (A) 02/21/2018 04:05 PM  
 RBC 3-10 (A) 02/21/2018 04:05 PM  
 Epithelial cells None seen 02/21/2018 04:05 PM  
 Bacteria Few 02/21/2018 04:05 PM  
 
 
IMPRESSION 
· Acute respiratory failure with hypoxia · Pleural effusion · Acute systolic heart failure · COPD not in exacerbation · Hepatitis, liver congestion in the setting of heart failure · [employer] 
· Goal sats >88%, wean O2 as tolerated · At baseline uses minimal bronchodilators prn, can add prn nebs if necessary once COVID-19 finalizes · Agree with diuresis · Cardiology following · Will repeat a CXR in the morning, if pleural effusion is not improving will consider thoracentesis · GI following for hepatitis, no acute interventions indicated at this time Thank you for allowing us to participate in the care of this patient. We will be happy to follow along in his/her progress with you.  
 
Ann Matute MD

## 2020-05-19 NOTE — H&P
[facility] [address] [place] [address] 
[phone] Admission History and Physical 
 
 
NAME:  Murtaza Kumar :   1935 MRN:  [id number] PCP:  Yariel Mendoza MD  
 
Date/Time:  2020 Subjective: CHIEF COMPLAINT: weak HISTORY OF PRESENT ILLNESS:    
Mr. Kieran Flores is a 81 y.o.  male who is admitted with CHF. Mr. Kieran Flores presented to the Emergency Department this PM from his assisted living facility with a report of weakness. Patient does not know why he is here. Daughter reports that he has been weaker than usual for the past several days History obtained from child, chart review and unobtainable from patient due to mental status. Previous records reviewed: ED visit last year with cellulitis, outpatient follow up with Cardiology for CHF, admit 2018 for sepsis d/t UTI Past Medical History:  
Diagnosis Date  Arthritis  CAD (coronary artery disease)  Cardiomyopathy ([address] 2015  Colon cancer ([address]  COLON  
 Depression DEPRESSION  
 Diabetes ([address] IDDM  GERD (gastroesophageal reflux disease)  Hypertension  Lung cancer ([address] 2016  Mild aortic stenosis 6/15/2015  Pulmonary hypertension ([address] 6/15/2015  PVD (peripheral vascular disease) ([address] STENT RIGHT GROIN, PAD  
 S/P CABG x 3 2015 [place] TO LAD; SVG TO OM; SVG TO OM  
 Stroke ([address]   
 2 STROKES  Third degree AV block ([address] 2015  Thyroid disease Past Surgical History:  
Procedure Laterality Date [address] COLON RESECTION  
 HX HEART CATHETERIZATION  2015  HX PACEMAKER    
 INS PPM/ICD LED DUAL ONLY  2015 [address] PAD, RIGHT GROIN STENT Social History Tobacco Use  Smoking status: Former Smoker Packs/day: 2.00 Years: 60.00 Pack years: 120.00 Types: Cigarettes Last attempt to quit: 2015 Years since quittin.9  Smokeless tobacco: Never Used Substance Use Topics  Alcohol use: No  
  Alcohol/week: 0.0 standard drinks Family History Problem Relation Age of Onset  Heart Disease Mother  Diabetes Mother  Other Father AMPUTATION LEG FOLLOWING BUG BITE  
 Other Sister CEREBRAL ANUERYSM  
 Heart Disease Brother  Diabetes Sister Allergies Allergen Reactions  Terazosin Unknown (comments) Prior to Admission medications Medication Sig Start Date End Date Taking? Authorizing Provider  
difenoxin-atropine 1-0.025 mg tab Take 1 Tab by mouth daily as needed. Provider, Historical  
glipiZIDE (GLUCOTROL) 10 mg tablet Take 10 mg by mouth every morning. Provider, Historical  
levothyroxine (SYNTHROID) 137 mcg tablet Take  by mouth Daily (before breakfast). Provider, Historical  
sertraline (ZOLOFT) 100 mg tablet Take 200 mg by mouth daily. Provider, Historical  
ketoconazole (NIZORAL) 2 % topical cream Apply  to affected area daily. Provider, Historical  
nystatin (MYCOSTATIN) powder Apply  to affected area four (4) times daily. Provider, Historical  
docusate sodium (STOOL SOFTENER) 100 mg capsule Take 100 mg by mouth daily as needed for Constipation. Provider, Historical  
carbamide peroxide (DEBROX) 6.5 % otic solution Administer 5 Drops into each ear as needed. Provider, Historical  
aspirin 81 mg chewable tablet Take 1 Tab by mouth daily. 18   Jaxson Oconnor MD  
atorvastatin (LIPITOR) 10 mg tablet Take 1 Tab by mouth nightly. 18   Lisbeth Holden MD  
MULTIVIT WITH IRON,MINERALS (MULTIVITAMIN AND MINERALS PO) Take  by mouth daily. Provider, Historical  
bumetanide (BUMEX) 1 mg tablet Take 1 Tab by mouth two (2) times a day.  18   Tha Olvera Early, DO  
acetaminophen (TYLENOL) 325 mg tablet Take 650 mg by mouth every four (4) hours as needed for Pain. Provider, Historical  
calcium-vitamin D (OYSTER SHELL) 500 mg(1,250mg) -200 unit per tablet Take 1 Tab by mouth three (3) times daily (with meals). Provider, Historical  
guaiFENesin SR (MUCINEX) 600 mg SR tablet Take 600 mg by mouth two (2) times a day. Provider, Historical  
albuterol-ipratropium (DUO-NEB) 2.5 mg-0.5 mg/3 ml nebu 3 mL by Nebulization route every four (4) hours as needed (shortness of breath). Provider, Historical  
dextran 70-hypromellose (ARTIFICIAL TEARS,[id number],) ophthalmic solution Administer 2 Drops to both eyes two (2) times a day. Provider, Historical  
bisacodyl (DULCOLAX) 10 mg suppository Insert 10 mg into rectum daily as needed. 12/27/17   Rylan Estrella MD  
senna-docusate (PERICOLACE) 8.6-50 mg per tablet Take 1 Tab by mouth daily. For constipation. Do not take if you have loose stools 12/27/17   Rylan Estrella MD  
cholecalciferol (VITAMIN D3) 1,000 unit tablet Take 3,000 Units by mouth daily. Provider, Historical  
magnesium gluconate 500 mg (27 mg  elemental) tablet Take 500 mg by mouth daily. Provider, Historical  
omeprazole (PRILOSEC) 20 mg capsule Take 20 mg by mouth Daily (before breakfast). 30 minutes prior to breakfast    Provider, Historical  
donepezil (ARICEPT) 10 mg tablet Take 10 mg by mouth daily. Provider, Historical  
carvedilol (COREG) 3.125 mg tablet Take 1 Tab by mouth two (2) times a day. 11/24/15   Luis M Torrez MD  
ferrous sulfate 325 mg (65 mg iron) tablet Take 325 mg by mouth daily (with breakfast). Provider, Historical  
finasteride (PROSCAR) 5 mg tablet Take 5 mg by mouth every evening. Provider, Historical  
fluticasone (FLONASE) 50 mcg/actuation nasal spray 2 Sprays by Both Nostrils route daily. Provider, Historical  
tamsulosin (FLOMAX) 0.4 mg capsule Take 0.4 mg by mouth nightly. Provider, Historical  
 
 
 
Review of Systems: 
(bold if positive, if negative) Unobtainable due to dementia Objective: VITALS:   
Vital signs reviewed; most recent are: 
 
Visit Vitals /65 Pulse 60 Temp 97.6 °F (36.4 °C) Resp 25 Ht 5' 10\" (1.778 m) Wt 59.4 kg (131 lb) SpO2 99% BMI 18.80 kg/m² SpO2 Readings from Last 6 Encounters:  
05/18/20 99% 12/23/19 99% 09/04/19 92% 07/02/19 97% 02/27/19 94% 02/27/19 94% O2 Flow Rate (L/min): 2 l/min(85% on RA) No intake or output data in the 24 hours ending 05/18/20 2208 Exam:  
 
Physical Exam: 
 
Gen: Well-developed, well-nourished, frail, elderly, chronically ill-appearing, in no acute distress HEENT:  Pink conjunctivae, PERRL, hearing intact to voice, moist mucous membranes Neck: Supple, without masses, thyroid non-tender Resp: No accessory muscle use, clear breath sounds, decreased at bases, worse on right 
Card: 3/6 systolic murmur, normal S1, S2 without thrills, bruits or peripheral edema, 2+ LE peripheral pulses Abd:  Soft, non-tender, non-distended, normoactive bowel sounds are present, no palpable organomegaly and no detectable hernias Lymph:  No cervical or inguinal adenopathy Musc: No cyanosis or clubbing Skin: No rashes or ulcers, skin turgor is good, cap refill <2 sec Neuro:  Cranial nerves are grossly intact, no focal motor weakness, follows commands appropriately Psych:  Poor insight, oriented to person, but not place and time, alert Labs: 
 
Recent Labs 05/18/20 1951 WBC 9.4 HGB 9.6* HCT 30.4* * Recent Labs 05/18/20 1951 * K 4.4 CL 98  
CO2 31  
GLU 73 BUN 37* CREA 1.45* CA 8.9 ALB 3.1* TBILI 1.0  
SGOT 770* * Lab Results Component Value Date/Time Glucose (POC) 78 07/02/2019 05:44 PM  
 Glucose (POC) 220 (H) 01/07/2018 11:51 AM  
 
No results for input(s): PH, PCO2, PO2, HCO3, FIO2 in the last 72 hours. No results for input(s): INR, INREXT, INREXT in the last 72 hours. Additional testing:  Chest X-ray: moderate to large right pleural effusion, visualized by me. Results not reviewed with Radiologist. 
 
  
Assessment/Plan:   
  
Principal Problem: 
  Acute on chronic systolic CHF (congestive heart failure) ([facility] (5/18/2020) - continue diuresis - Cardiology consult Active Problems: 
  Hepatitis (5/18/2020) - no clear etiology, pattern not consistent with congestive hepatopathy 
 - check acute viral panel 
 - consult GI Pleural effusion (10/9/2015) - likely due to CHF 
 - follow with diuresis 
 - consult Pulmonary 
 - COVID-19 test ordered by ED attending, I think this is very low likelihood, can stop isolation with one negative result Elevated serum creatinine 
 - may be due to CHF as above, follow with diuresis CAD (coronary artery disease) (6/26/2015) - troponin elevated, likely due to CHF 
 - check echo and trend troponin Chronic obstructive pulmonary disease (HCC) (6/28/2015)/Chronic respiratory failure ([facility] (11/24/2015) - continue O2 and respiratory meds Dementia ([facility] (11/20/2015) 
 - moderate to severe, at baseline mental status per daughter Type 2 diabetes mellitus without complication ([facility] (4/70/4142) 
 - follow BS on SSI Code status: 
 - patient is DO NOT RESUSCITATE, AMD on file, daughter Martine Lay is mPOA Total time spent on patient care: 70 Minutes Care Plan discussed with: Patient, Family, Nursing Staff and Dr. Kevin Jiang Discussed:  Code Status, Care Plan and D/C Planning Prophylaxis:  Lovenox and SCD's 
 
Probable Disposition:  SNF/LTC 
        
___________________________________________________ Attending Physician: Francisco Javier Becerril MD

## 2020-05-19 NOTE — ACP (ADVANCE CARE PLANNING)
Advance Care Planning Advance Care Planning Activator (Inpatient) Conversation Note Date of ACP Conversation: 05/19/20 Conversation Conducted with:   Pt.'s daughter Micheline Catching as pt has Alzheimers dementia ACP Activator: Raza Deleon *When Decision Maker makes decisions on behalf of the incapacitated patient: Decision Maker is asked to consider and make decisions based on patient values, known preferences, or best interests. Health Care Decision Maker: Primary decision maker is daughter Daniel Parent are [phone] and [phone] Secondary decision-maker is pt.'s son,Carl Ledezma. His numbers are [phone] and [phone] Current Designated Health Care Decision Maker:  
Validate  I validated with pt.'s daughter,Mita Stevens that this information as still accurate & up-to-date;  
 
  
 
  
 
Care Preferences Ventilation: \"If you were in your present state of health and suddenly became very ill and were unable to breathe on your own, what would your preference be about the use of a ventilator (breathing machine) if it were available to you? \" If patient would desire the use of a ventilator (breathing machine), answer \"yes\", if not \"no\":NO\"If your health worsens and it becomes clear that your chance of recovery is unlikely, what would your preference be about the use of a ventilator (breathing machine) if it were available to you? \" Would the patient desire the use of a ventilator (breathing machine)? {YES/NO:No The answers were confirmed with pt.'s daughter Resuscitation \"CPR works best to restart the heart when there is a sudden event, like a heart attack, in someone who is otherwise healthy. Unfortunately, CPR does not typically restart the heart for people who have serious health conditions or who are very sick. \" \"In the event your heart stopped as a result of an underlying serious health condition, would you want attempts to be made to restart your heart (answer \"yes\" for attempt to resuscitate) or would you prefer a natural death (answer \"no\" for do not attempt to resuscitate)? \" {Yes/No-NO Confirmed with pt.'s daughter 
[] Yes  [] No   Educated Patient / Katarzyna Cuban regarding differences between Advance Directives and portable DNR orders. Length of ACP Conversation in minutes:  20 
 
Conversation Outcomes: 
[] ACP discussion completed 
[] Existing advance directive reviewed with patient; no changes to patient's previously recorded wishes 
 
 [] New Advance Directive completed 
 [] Portable Do Not Resuscitate prepared for Provider review and signature 
[] POLST/POST/MOLST/MOST prepared for Provider review and signature Will need portable DDNR to be sent with pt when discharged Follow-up plan:   
[] Schedule follow-up conversation to continue planning 
[] Referred individual to Provider for additional questions/concerns  
[] Advised patient/agent/surrogate to review completed ACP document and update if needed with changes in condition, patient preferences or care setting  
 
[] This note routed to one or more involved healthcare providers   Kimberly De Los Santos

## 2020-05-19 NOTE — PROGRESS NOTES
Shift Change: 
 
Bedside and Verbal shift change report given to Betsy Sky RN (oncoming nurse) by Víctor Silver RN (offgoing nurse). Report included the following information SBAR, Kardex, MAR and Recent Results. 1630: Medication education provided verbally and written to patient including Aricept and Carvedilol. 1715: First COVID negaive. Per Dr. Sofya Nobles no need to retest and d/c precautions Shift Change: 
 
Bedside and Verbal shift change report given to Tegan Mackenzie RN (oncoming nurse) by Betsy Sky RN (offgoing nurse). Report included the following information SBAR, Kardex, MAR, Recent Results and Cardiac Rhythm V Paced .

## 2020-05-19 NOTE — ED PROVIDER NOTES
Patient is an 23-year-old male presenting to the emergency department for fatigue, unable to ambulate. Patient comes by EMS from skilled nursing facility and [facility]. Patient's been having an active cough patient also has a history of congestive heart failure. Patient with a history of dementia unable to provide HPI. Past Medical History:  
Diagnosis Date  Arthritis  CAD (coronary artery disease)  Cardiomyopathy ([address] 6/11/2015  Colon cancer ([facility] 1993 COLON  
 Depression DEPRESSION  
 Diabetes ([facility] IDDM  GERD (gastroesophageal reflux disease)  Hypertension  Lung cancer ([facility] 11/29/2016  Mild aortic stenosis 6/15/2015  Pulmonary hypertension ([facility] 6/15/2015  PVD (peripheral vascular disease) ([facility] STENT RIGHT GROIN, PAD  
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  
 Stroke ([facility] 1999  
 2 STROKES  Third degree AV block ([facility] 6/11/2015  Thyroid disease Past Surgical History:  
Pro[facility]  
 HX HEART CATHETERIZATION  6/26/2015  HX PACEMAKER    
 INS PPM/ICD LED DUAL ONLY  7/2/2015 [address] PAD, RIGHT GROIN STENT Family History:  
Problem Relation Age of Onset  Heart Disease Mother  Diabetes Mother  Other Father AMPUTATION LEG FOLLOWING BUG BITE  
 Other Sister CEREBRAL ANUERYSM  
 Heart Disease Brother  Diabetes Sister Social History Socioeconomic History  Marital status:  Spouse name: Not on file  Number of children: Not on file  Years of education: Not on file  Highest education level: Not on file Occupational History  Not on file Social Needs  Financial resource strain: Not on file  Food insecurity Worry: Not on file Inability: Not on file  Transportation needs Medical: Not on file Non-medical: Not on file Tobacco Use  Smoking status: Former Smoker Packs/day: 2.00 Years: 60.00 Pack years: 120.00 Types: Cigarettes Last attempt to quit: 2015 Years since quittin.9  Smokeless tobacco: Never Used Substance and Sexual Activity  Alcohol use: No  
  Alcohol/week: 0.0 standard drinks  Drug use: No  
 Sexual activity: Not Currently Partners: Female Lifestyle  Physical activity Days per week: Not on file Minutes per session: Not on file  Stress: Not on file Relationships  Social connections Talks on phone: Not on file Gets together: Not on file Attends Church service: Not on file Active member of club or organization: Not on file Attends meetings of clubs or organizations: Not on file Relationship status: Not on file  Intimate partner violence Fear of current or ex partner: Not on file Emotionally abused: Not on file Physically abused: Not on file Forced sexual activity: Not on file Other Topics Concern  Not on file Social History Narrative  Not on file ALLERGIES: Terazosin Review of Systems Unable to perform ROS: Dementia Vitals:  
 20 1756 20 1800 20 1900 BP: 124/63 125/60 (!) 120/94 Pulse: 61 60 60 Resp: 23 19 25 Temp: 97.6 °F (36.4 °C) SpO2: 100% 100% 99% Weight: 59.4 kg (131 lb) Height: 5' 10\" (1.778 m) Physical Exam 
Vitals signs and nursing note reviewed. Constitutional:   
   General: He is not in acute distress. Appearance: He is well-developed. He is ill-appearing. He is not diaphoretic. HENT:  
   Head: Normocephalic and atraumatic. Nose: Congestion present. Mouth/Throat:  
   Pharynx: No oropharyngeal exudate. Eyes:  
   General: No scleral icterus. Right eye: No discharge. Left eye: No discharge. Conjunctiva/sclera: Conjunctivae normal.  
Neck: Musculoskeletal: Normal range of motion and neck supple. Thyroid: No thyromegaly. Vascular: No JVD. Trachea: No tracheal deviation. Cardiovascular:  
   Rate and Rhythm: Normal rate and regular rhythm. Heart sounds: Normal heart sounds. No murmur. No friction rub. No gallop. Pulmonary:  
   Effort: Pulmonary effort is normal. No respiratory distress. Breath sounds: No stridor. Rhonchi present. No wheezing or rales. Chest:  
   Chest wall: No tenderness. Abdominal:  
   General: Bowel sounds are normal. There is no distension. Palpations: There is no mass. Tenderness: There is no abdominal tenderness. There is no rebound. Musculoskeletal: Normal range of motion. General: No tenderness. Lymphadenopathy:  
   Cervical: No cervical adenopathy. Skin: 
   General: Skin is warm and dry. Coloration: Skin is not pale. Findings: No erythema or rash. Neurological:  
   General: No focal deficit present. Mental Status: He is alert and oriented to person, place, and time. Mental status is at baseline. Cranial Nerves: No cranial nerve deficit. Coordination: Coordination normal.  
Psychiatric:     
   Behavior: Behavior normal.     
   Thought Content: Thought content normal.     
   Judgment: Judgment normal.  
 
  
 
MDM Number of Diagnoses or Management Options Acute on chronic systolic CHF (congestive heart failure) ([facility]): Hypoxia:  
  
Amount and/or Complexity of Data Reviewed Clinical lab tests: ordered and reviewed Tests in the radiology section of CPT®: ordered and reviewed Independent visualization of images, tracings, or specimens: yes Risk of Complications, Morbidity, and/or Mortality Presenting problems: moderate Diagnostic procedures: moderate Management options: moderate Patient Progress Patient progress: stable Procedures Hospitalist Perfect Serve for Admission 8:42 PM 
 
ED Room Number: YU59/40 Patient Name and age:  Serena Chapman 81 y.o.  male Working Diagnosis: 1. Acute on chronic systolic CHF (congestive heart failure) ([address] 2. Hypoxia COVID-19 Suspicion:  yes Code Status:  Full Code Readmission: yes Isolation Requirements:  yes Recommended Level of Care:  telemetry Department:[facility] - [phone] Other:  82 y/o male with acute on chronic CHF exacerbation with concern for COVID

## 2020-05-19 NOTE — PROGRESS NOTES
[address] [facility] Adult  Hospitalist Group Hospitalist Progress Note Tramaine Moreau MD 
Answering service: [phone] OR [phone] from in house phone Date of Service:  2020 NAME:  Dudley Main :  1935 MRN:  [id number] Admission Summary:  
82 yo male presented to the hospital with a report of weakness, found to have acute heart failure Interval history / Subjective:  
 history not obtained due to patient being demented. He appears comfortable. Assessment & Plan:  
 
Acute on chronic systolic CHF (congestive heart failure) ([address] (2020) - continue diuresis - Cardiology consulted, cont coreg 
  
Hepatitis (2020) 
            - transaminases trending down 
            - no clear etiology 
            - acute viral panel neg 
            - consulted GI - congestive hepatopathy 
  
Pleural effusion (10/9/2015) - likely due to CHF 
            - follow with diuresis 
            - consult Pulmonary 
             -RVP neg 
            - COVID-19 test ordered, I think this is very low likelihood, can stop isolation with one negative result 
 
  
Elevated serum creatinine 
           -improving 
            - may be due to CHF as above, follow with diuresis CAD (coronary artery disease) (2015) - troponin elevated, trending down, likely due to CHF 
            - check echo 
  
Chronic obstructive pulmonary disease ([address] (2015)/Chronic respiratory failure ([address] (2015) - continue O2 and respiratory meds Dementia ([address] (2015) 
            - moderate to severe, at baseline mental status per daughter   
  
Type 2 diabetes mellitus without complication ([address] () 
            - follow BS on SSI 
  
Code status: 
            - patient is DO NOT RESUSCITATE, AMD on file, daughter Abelino Hernadez is mPOA Code status: DNR 
DVT prophylaxis: compression socks Care Plan discussed with: Patient/Family Anticipated Disposition: Home w/Family Anticipated Discharge: 24 hours to 48 hours Hospital Problems  Date Reviewed: 10/17/2019 Codes Class Noted POA Type 2 diabetes mellitus without complication (HCC) (Chronic) ICD-10-CM: E11.9 ICD-9-CM: 250.00  5/18/2020 Yes Hepatitis ICD-10-CM: K75.9 ICD-9-CM: 573.3  5/18/2020 Yes * (Principal) Acute on chronic systolic CHF (congestive heart failure) (HCC) ICD-10-CM: Q58.77 ICD-9-CM: 428.23, 428.0  5/18/2020 Yes Chronic respiratory failure (HCC) (Chronic) ICD-10-CM: J96.10 ICD-9-CM: 518.83  11/24/2015 Yes Dementia ([address] (Chronic) ICD-10-CM: F03.90 ICD-9-CM: 294.20  11/20/2015 Yes Pleural effusion ICD-10-CM: J90 ICD-9-CM: 511.9  10/9/2015 Yes Chronic obstructive pulmonary disease (HCC) (Chronic) ICD-10-CM: J44.9 ICD-9-CM: 098  6/28/2015 Yes Overview Signed 6/28/2015  1:05 PM by Ham Kurtz MD  
  04/24/[facility] FVC 2.53-67%-post bronchodilator 2.61-69%-increase 3% FEV1 1.41-52%-post bronchodilator value 1.45-54%-increase 3% FEV1/FVC 56% TLC 5.19-88% RV 2.% DLCO 45% DLCO VA 73% % 04/24/2015-6 minute walk test no exertional hypoxia CAD (coronary artery disease) (Chronic) ICD-10-CM: I25.10 ICD-9-CM: 414.00  6/26/2015 Yes Review of Systems:  
Review of systems not obtained due to patient factors. Vital Signs:  
 Last 24hrs VS reviewed since prior progress note. Most recent are: 
Visit Vitals /55 (BP 1 Location: Left arm, BP Patient Position: At rest) Pulse 60 Temp 98.5 °F (36.9 °C) Resp 20 Ht 5' 10\" (1.778 m) Wt 61.3 kg (135 lb 2.3 oz) SpO2 95% BMI 19.39 kg/m² Intake/Output Summary (Last 24 hours) at 5/19/2020 1121 Last data filed at 5/19/2020 0650 Gross per 24 hour Intake  Output 600 ml Net -600 ml Physical Examination:  
 
 
     
Constitutional:  No acute distress, cooperative, pleasant ENT:  Oral mucosa moist, oropharynx benign. Resp:  CTA bilaterally. No wheezing/rhonchi/rales. No accessory muscle use CV:  Regular rhythm, normal rate, no murmurs, gallops, rubs GI:  Soft, non distended, non tender. normoactive bowel sounds, no hepatosplenomegaly Musculoskeletal:  No edema, warm, 2+ pulses throughout Neurologic:  Moves all extremities. AAOx3, CN II-XII reviewed Data Review:  
 Review and/or order of clinical lab test 
 
 
Labs:  
 
Recent Labs 05/19/20 0330 05/18/20 1951 WBC 9.3 9.4 HGB 9.2* 9.6* HCT 29.4* 30.4* * 106* Recent Labs 05/19/20 0330 05/18/20 1951  132* K 4.0 4.4  98 CO2 29 31 BUN 37* 37* CREA 1.32* 1.45* * 73  
CA 8.4* 8.9 MG 2.2  --   
PHOS 4.0  --   
 
Recent Labs 05/19/20 0330 05/18/20 1951 SGOT 614* 770* * 759* AP 82 92 TBILI 1.1* 1.0 TP 6.6 7.2 ALB 2.8* 3.1*  
GLOB 3.8 4.1* Recent Labs 05/19/20 0330 INR 1.7* PTP 16.9* APTT 35.0* Recent Labs 05/18/20 1951 FERR 527* No results found for: FOL, RBCF No results for input(s): PH, PCO2, PO2 in the last 72 hours. Recent Labs 05/19/20 
0330 05/18/20 1951 TROIQ 0.17* 0.19* Lab Results Component Value Date/Time Cholesterol, total 103 02/13/2019 10:22 AM  
 HDL Cholesterol 42 02/13/2019 10:22 AM  
 LDL, calculated 46 02/13/2019 10:22 AM  
 Triglyceride 77 02/13/2019 10:22 AM  
 
Lab Results Component Value Date/Time Glucose (POC) 165 (H) 05/19/2020 11:07 AM  
 Glucose (POC) 90 05/19/2020 08:21 AM  
 Glucose (POC) 76 05/19/2020 02:31 AM  
 Glucose (POC) 54 (L) 05/19/2020 02:07 AM  
 Glucose (POC) 58 (L) 05/19/2020 02:05 AM  
 
Lab Results Component Value Date/Time  Color YELLOW/STRAW 05/19/2020 01:35 AM  
 Appearance CLEAR 05/19/2020 01:35 AM  
 Specific gravity 1.005 05/19/2020 01:35 AM  
 pH (UA) 5.5 05/19/2020 01:35 AM  
 Protein Negative 05/19/2020 01:35 AM  
 Glucose Negative 05/19/2020 01:35 AM  
 Ketone Negative 05/19/2020 01:35 AM  
 Bilirubin Negative 05/19/2020 01:35 AM  
 Urobilinogen 1.0 05/19/2020 01:35 AM  
 Nitrites Negative 05/19/2020 01:35 AM  
 Leukocyte Esterase Negative 05/19/2020 01:35 AM  
 Epithelial cells MODERATE (A) 01/03/2018 10:10 PM  
 Bacteria Few 02/21/2018 04:05 PM  
 WBC >30 (A) 02/21/2018 04:05 PM  
 RBC 3-10 (A) 02/21/2018 04:05 PM  
 
 
 
Medications Reviewed:  
 
Current Facility-Administered Medications Medication Dose Route Frequency  aspirin chewable tablet 81 mg  81 mg Oral DAILY  atorvastatin (LIPITOR) tablet 10 mg  10 mg Oral QHS  carvediloL (COREG) tablet 3.125 mg  3.125 mg Oral BID  
 donepeziL (ARICEPT) tablet 10 mg  10 mg Oral DAILY  finasteride (PROSCAR) tablet 5 mg  5 mg Oral QPM  
 levothyroxine (SYNTHROID) tablet 150 mcg  150 mcg Oral ACB  pantoprazole (PROTONIX) tablet 40 mg  40 mg Oral ACB  sertraline (ZOLOFT) tablet 200 mg  200 mg Oral DAILY  tamsulosin (FLOMAX) capsule 0.4 mg  0.4 mg Oral QHS  senna-docusate (PERICOLACE) 8.6-50 mg per tablet 1 Tab  1 Tab Oral DAILY  sodium chloride (NS) flush 5-40 mL  5-40 mL IntraVENous Q8H  
 sodium chloride (NS) flush 5-40 mL  5-40 mL IntraVENous PRN  
 acetaminophen (TYLENOL) tablet 650 mg  650 mg Oral Q4H PRN  prochlorperazine (COMPAZINE) injection 10 mg  10 mg IntraVENous Q6H PRN  
 insulin lispro (HUMALOG) injection   SubCUTAneous QID WITH MEALS  glucose chewable tablet 16 g  4 Tab Oral PRN  
 glucagon (GLUCAGEN) injection 1 mg  1 mg IntraMUSCular PRN  
 dextrose 10% infusion 0-250 mL  0-250 mL IntraVENous PRN  
 bumetanide (BUMEX) injection 1 mg  1 mg IntraVENous Q12H  
 
______________________________________________________________________ EXPECTED LENGTH OF STAY: - - - 
 ACTUAL LENGTH OF STAY:          1 Selena Pena MD

## 2020-05-19 NOTE — ROUTINE PROCESS
Primary Nurse Beatrice Reyes RN and Chasidy Thomas RN performed a dual skin assessment on this patient Impairment noted- see wound doc flow sheet Ladarius score is 18

## 2020-05-19 NOTE — CONSULTS
Gastroenterology Consult Referring Physician: Gilberto Harley Consult Date: 2020 Subjective:weak Chief Complaint: weak History of Present Illness: Luis Puente is a 81 y.o. male who is seen in consultation for liver enzyme abnormalities. In 2018 liver enzymes were normal.  He currently presents for weakness;new liver enzyme abnormalities are identified. Patient is unable to provide any meaningful information. An echocardiogram is now completed; left atrial pressure is 15 mm. Past Medical History:  
Diagnosis Date  Arthritis  CAD (coronary artery disease)  Cardiomyopathy ([address] 2015  Colon cancer ([address]  COLON  
 Depression DEPRESSION  
 Diabetes ([facility] IDDM  GERD (gastroesophageal reflux disease)  Hypertension  Lung cancer ([address] 2016  Mild aortic stenosis 6/15/2015  Pulmonary hypertension ([address] 6/15/2015  PVD (peripheral vascular disease) ([address] STENT RIGHT GROIN, PAD  
 S/P CABG x 3 2015 [place] TO LAD; SVG TO OM; SVG TO OM  
 Stroke ([address]   
 2 STROKES  Third degree AV block ([address] 2015  Thyroid disease Past Surgical History:  
Procedure Laterality Date [address] COLON RESECTION  
 HX HEART CATHETERIZATION  2015  HX PACEMAKER    
 INS PPM/ICD LED DUAL ONLY  2015 [address] PAD, RIGHT GROIN STENT Family History Problem Relation Age of Onset  Heart Disease Mother  Diabetes Mother  Other Father AMPUTATION LEG FOLLOWING BUG BITE  
 Other Sister CEREBRAL ANUERYSM  
 Heart Disease Brother  Diabetes Sister Social History Tobacco Use  Smoking status: Former Smoker Packs/day: 2.00 Years: 60.00 Pack years: 120.00 Types: Cigarettes Last attempt to quit: 2015 Years since quittin.9  Smokeless tobacco: Never Used Substance Use Topics  Alcohol use: No  
  Alcohol/week: 0.0 standard drinks Allergies Allergen Reactions  Terazosin Unknown (comments) Current Facility-Administered Medications Medication Dose Route Frequency  aspirin chewable tablet 81 mg  81 mg Oral DAILY  atorvastatin (LIPITOR) tablet 10 mg  10 mg Oral QHS  carvediloL (COREG) tablet 3.125 mg  3.125 mg Oral BID  
 donepeziL (ARICEPT) tablet 10 mg  10 mg Oral DAILY  finasteride (PROSCAR) tablet 5 mg  5 mg Oral QPM  
 levothyroxine (SYNTHROID) tablet 150 mcg  150 mcg Oral ACB  pantoprazole (PROTONIX) tablet 40 mg  40 mg Oral ACB  sertraline (ZOLOFT) tablet 200 mg  200 mg Oral DAILY  tamsulosin (FLOMAX) capsule 0.4 mg  0.4 mg Oral QHS  senna-docusate (PERICOLACE) 8.6-50 mg per tablet 1 Tab  1 Tab Oral DAILY  sodium chloride (NS) flush 5-40 mL  5-40 mL IntraVENous Q8H  
 sodium chloride (NS) flush 5-40 mL  5-40 mL IntraVENous PRN  
 acetaminophen (TYLENOL) tablet 650 mg  650 mg Oral Q4H PRN  prochlorperazine (COMPAZINE) injection 10 mg  10 mg IntraVENous Q6H PRN  
 insulin lispro (HUMALOG) injection   SubCUTAneous QID WITH MEALS  glucose chewable tablet 16 g  4 Tab Oral PRN  
 glucagon (GLUCAGEN) injection 1 mg  1 mg IntraMUSCular PRN  
 dextrose 10% infusion 0-250 mL  0-250 mL IntraVENous PRN  
 bumetanide (BUMEX) injection 1 mg  1 mg IntraVENous Q12H Review of Systems: 
Not obtainable. Objective:  
 
Physical Exam: 
Visit Vitals /55 (BP 1 Location: Left arm, BP Patient Position: At rest) Pulse 60 Temp 98.5 °F (36.9 °C) Resp 20 Ht 5' 10\" (1.778 m) Wt 61.3 kg (135 lb 2.3 oz) SpO2 95% BMI 19.39 kg/m² Skin:  Extremities and face reveal no rashes. No manriquez erythema. HEENT: Sclerae anicteric. Extra-occular muscles are intact. No oral ulcers. Cardiovascular: Irregular rate and rhythm. No murmurs, gallops, or rubs. Respiratory:  Bilateral rales. GI:  Abdomen nondistended, soft, and nontender. Normal active bowel sounds. No enlargement of the liver or spleen. No masses palpable. Musculoskeletal:  No pitting edema of the lower legs. Lymphatic:  No cervical or supraclavicular adenopathy. Lab/Data Review: 
CMP:  
Lab Results Component Value Date/Time  05/19/2020 03:30 AM  
 K 4.0 05/19/2020 03:30 AM  
  05/19/2020 03:30 AM  
 CO2 29 05/19/2020 03:30 AM  
 AGAP 7 05/19/2020 03:30 AM  
  (H) 05/19/2020 03:30 AM  
 BUN 37 (H) 05/19/2020 03:30 AM  
 CREA 1.32 (H) 05/19/2020 03:30 AM  
 GFRAA >60 05/19/2020 03:30 AM  
 GFRNA 52 (L) 05/19/2020 03:30 AM  
 CA 8.4 (L) 05/19/2020 03:30 AM  
 MG 2.2 05/19/2020 03:30 AM  
 PHOS 4.0 05/19/2020 03:30 AM  
 ALB 2.8 (L) 05/19/2020 03:30 AM  
 TP 6.6 05/19/2020 03:30 AM  
 GLOB 3.8 05/19/2020 03:30 AM  
 AGRAT 0.7 (L) 05/19/2020 03:30 AM  
 SGOT 614 (H) 05/19/2020 03:30 AM  
  (H) 05/19/2020 03:30 AM  
 
CBC:  
Lab Results Component Value Date/Time WBC 9.3 05/19/2020 03:30 AM  
 HGB 9.2 (L) 05/19/2020 03:30 AM  
 HCT 29.4 (L) 05/19/2020 03:30 AM  
  (L) 05/19/2020 03:30 AM  
 
Viral hepatitis serologies were all negative Assessment/Plan:  
 
Principal Problem: 
  Acute on chronic systolic CHF (congestive heart failure) ([facility] (5/18/2020) Active Problems: 
  CAD (coronary artery disease) (6/26/2015) [facility] (6/28/2015) Overview: 04/24/2015 [facility] FVC 2.53-67%-post bronchodilator 2.61-69%-increase 3% FEV1 1.41-52%-post bronchodilator value 1.45-54%-increase 3% FEV1/FVC 56% TLC 5.19-88% RV 2.% DLCO 45% DLCO VA 73% % 04/24/2015-6 minute walk test no exertional hypoxia Pleural effusion (10/9/2015) Dementia ([facility] (11/20/2015) Chronic respiratory failure ([facility] (11/24/2015) Type 2 diabetes mellitus without complication ([facility] (9/20/1689) Hepatitis (5/18/2020) Recommend At this time liver congestion secondary to heart failure is the only identifiable diagnosis. As such liver enzymes should improved in parallel with resolution of pleural effusions and pulmonary infiltrates; if this is the case then I would withhold other evaluation. In the absence of overt jaundice, ascites or other new clinical findings can recheck liver parameters when he achieves baseline status for his CHF. Thank you very much for having asked me to see him

## 2020-05-20 NOTE — PROGRESS NOTES
Bedside and Verbal shift change report given to DEEPIKA Schmitt (oncoming nurse) by Kacie Tristan RN (offgoing nurse). Report included the following information SBAR, Kardex and MAR. I saw this patient today and she has of left-sided foot drop  She did try a brace several years ago which caused some swelling  She is willing to try brace but is unsure about the  equipment company she should go to  Can you please call her  and suggest a place that is  close to her neighborhood    When she does agree let me know I will place a order an the DME for a left foot muscle braces

## 2020-05-20 NOTE — PROGRESS NOTES
TRANSFER - IN REPORT: 
 
Verbal report received from Danae(april) on Dudley Angers  being received from PCU(unit) for routine progression of care Report consisted of patients Situation, Background, Assessment and  
Recommendations(SBAR). Information from the following report(s) SBAR, Kardex and MAR was reviewed with the receiving nurse. Opportunity for questions and clarification was provided. Assessment completed upon patients arrival to unit and care assumed.

## 2020-05-20 NOTE — WOUND CARE
Wound care consult: 
Initial visit for skin assessment, alert- no distressed- assessed with staff. Assessment All skin folds and bony prominences assessed, turned with staff assistance. Condom cath in use. Sacral area intact - skin thin and fragile. Blanching chronic discoloration. Heels red, intact and blanching. Treatment Repositioned in bed Heels floated Foam to sacral area Recommendations/Plan Turn, reposition every 2 hours as tolerated, float heels- foam to sacral area for protection Incontinent care. Apply Zinc to all open areas, moisture barrier as needed. Will follow, reconsult as needed.  
[facility]

## 2020-05-20 NOTE — PROGRESS NOTES
Problem: Dysphagia (Adult) Goal: *Acute Goals and Plan of Care (Insert Text) Description: Swallowing goals initiated 5-20-20: 
1)  MBS to determine severity of dysphagia-met 2) tolerate dysphagia 1, nectars without s/s aspiration by 5-22-20 5/20/2020 1343 by JONE Spencer Outcome: Progressing Towards Goal 
5/20/2020 0938 by Annette Herrera, SLP Outcome: Not Progressing Towards Goal 
 SPEECH PATHOLOGY MODIFIED BARIUM SWALLOW STUDY Patient: Abad Diego (12 y.o. male) Date: 5/20/2020 Primary Diagnosis: Acute on chronic systolic CHF (congestive heart failure) [facility] [I50.23] Precautions: aspiration ASSESSMENT : 
Based on the objective data described below, the patient presents with moderate pharyngeal dysphagia with delay in swallow and weakness in BOT and laryngeal elevation. He had aspiration with thins in small amounts. Less aspiration with nectars, plus it is more difficult to take large and multiple sips of nectars than thins. He had frequent trace aspiration from mild pharyngeal residue persistently throughout the study. ALL ASPIRATION WAS SILENT. However, at times,he has a delayed cough. Suspect this is when material reaches maylin. . 
Patient with baseline dysphagia and h/o aspiration on previous admissions, with use of dysphagia diet. Family aware and claims that when he returns to group home, he is \"better\". Will start modified diet for now, but he most likely will have trace aspiration with most consistencies for the rest of his life. Patient will benefit from skilled intervention to address the above impairments. Patients rehabilitation potential is considered to be Guarded PLAN : 
Recommendations and Planned Interventions: 
Start dysphagia 1, nectars. Small bites and sips. Stop PO when coughing Meds in purees. Upright during and 30 min after all PO. Watch closely for additional s/s aspiration. Frequency/Duration: Patient will be followed by speech-language pathology 2 times a week to address goals. Discharge Recommendations: To Be Determined SUBJECTIVE:  
Patient stated\" Where am I going now? What test is this? . OBJECTIVE:  
 
Past Medical History:  
Diagnosis Date Arthritis CAD (coronary artery disease) Cardiomyopathy ([facility] 6/11/2015 [facility] 1993 COLON Depression DEPRESSION Diabetes ([facility] IDDM  
 GERD (gastroesophageal reflux disease) Hypertension Lung cancer ([facility] 11/29/2016 Mild aortic stenosis 6/15/2015 Pulmonary hypertension ([facility] 6/15/2015 PVD (peripheral vascular disease) ([facility] STENT RIGHT GROIN, PAD  
 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 [place] TO LAD; SVG TO OM; SVG TO OM Stroke [facility]) 1999  
 2 STROKES Third degree AV block ([facility] 6/11/2015 Thyroid disease Past Surgical History:  
Procedure Laterality Date [facility] COLON RESECTION  
 HX HEART CATHETERIZATION  6/26/2015 HX PACEMAKER INS PPM/ICD LED DUAL ONLY  7/2/2015 [place] PAD, RIGHT GROIN STENT Prior Level of Function/Home Situation: baseline intermittant dysphagia with hospitalizations. Home Situation Home Environment: Skilled nursing facility One/Two Story Residence: One story Living Alone: Yes Support Systems: Skilled nursing facility, Family member(s) Patient Expects to be Discharged to[de-identified] Skilled nursing facility Current DME Used/Available at Home: Adaptive bathing aides, Grab bars Diet prior to admission: soft, thins? Current Diet:  NPO Radiologist: Gabbi Tobias Film Views: Lateral 
Patient Position: upright in Hausted chair Trial 1: Trial 2:  
Consistency Presented: Thin liquid; Nectar thick liquid;Puree ORAL PHASE:     
Bolus Acceptance: (tended to take large, multiple sips) Bolus Formation/Control: Impaired: (lingual pumping for a-p propulsion of thins)  :    
 Propulsion: Delayed (# of seconds); Tongue pumping Oral Residue: (piecemeal deglutition) PHARYNGEAL PHASE:     
Initiation of Swallow: Triggered at vallecula;Triggered at pyriform sinus(es)(with thins and nectars, triggered between  valleculae and pyriforms) Timing: Pooling 1-5 sec Penetration: (frequent penetration wiht thins in moderate amounts. intermittent trace penetration before/during the swallow ) due to delay plus reduced airway closure Aspiration/Timing: Silent ;Trace;Repeated; Before;During;From initial swallow. Patient continued to have trace amounts of silent aspiration persistently throughout the swallow test    
Pharyngeal Clearance: (mild  with thins. poor awareness of residue) Attempted Modifications: (nectars) Effective Modifications: (nectars reduced aspiration, but did not stop it) Cues for Modifications: Moderate Trial 3: Trial 4:  
     
     
     
     
 :    :    
     
     
    
     
     
     
     
     
     
     
     
 
Decreased Tongue Base Retraction?: No 
Laryngeal Elevation: Reduced excursion with laryngeal vestibule gap Aspiration/Penetration Score: 8 (Aspiration-Contrast passes cords/glottis with no effort to eject, ie/silent aspiration) Pharyngeal Symmetry: Not assessed Pharyngeal-Esophageal Segment: No impairment NOMS:  
The NOMS functional outcome measure was used to quantify this patient's level of swallowing impairment. Based on the NOMS, the patient was determined to be at level 4 for swallow function NOMS Swallowing Levels: 
Level 1 (CN): NPO Level 2 (CM): NPO but takes consistency in therapy Level 3 (CL): Takes less than 50% of nutrition p.o. and continues with nonoral feedings; and/or safe with mod cues; and/or max diet restriction Level 4 (CK): Safe swallow but needs mod cues; and/or mod diet restriction; and/or still requires some nonoral feeding/supplements Level 5 (CJ): Safe swallow with min diet restriction; and/or needs min cues Level 6 (CI): Independent with p.o.; rare cues; usually self cues; may need to avoid some foods or needs extra time Level 7 (CH): Independent for all p.o. BERTHA. (2003). National Outcomes Measurement System (NOMS): Adult Speech-Language Pathology User's Guide. COMMUNICATION/EDUCATION:  
Patient was educated regarding His deficit(s) of dysphagia as this relates to His diagnosis of CHF. He demonstrated Guarded understanding as evidenced by lack of insight into defictis. . 
 
The patients plan of care including findings from MBS, recommendations, planned interventions, and recommended diet changes were discussed with: Registered nurse and Physician. Patient is unable to participate in goal setting and plan of care. Thank you for this referral. 
JONE To Time Calculation: 15 mins

## 2020-05-20 NOTE — PROGRESS NOTES
Name: Angela Jovany: Tara Rivera  
: 1935 Admit Date: 2020 Phone: [phone]  Room: [facility]/01 PCP: Andrea Lara MD  MRN: [id number] Date: 2020  Code: DNR Chart and notes reviewed. Data reviewed. I review the patient's current medications in the medical record at each encounter. I have evaluated and examined the patient. HPI: 
 
2:51 PM    
 
History was obtained from patient and chart review. I was asked by Michael Merlos MD to see Mariana Reaves in consultation for a chief complaint of pleural effusion. History of Present Illness: 
Mr. Wendy Godinez is an 82 yo gentleman with a history of dementia, COPD (FEV1 1.56L, 57% in 2016), CAD s/p CABG, cardiomyopathy s/p dual chamber pacemaker, colon cancer, GERD, prior lung cancer, and DM who presented with weakness. He is unable to provide much history other than answering some simple ROS questions. Per report, he was sent from his assisted living facility for progressive weakness. Admitted with acute heart failure. Found to have new LV dysfunction and volume overload with pleural effusions (R > L). He currently denies shortness of breath and appears to be breathing comfortably. No cough, fever, sputum production, edema. Has been getting IV diuresis throughout this admission. Labs reviewed: WBC 9.3, cr 1.32 (1.45 on adission), , , troponin up to 0.19, proNP 38640, RVP negative, COVID pending Images: CXR with right sided effusion and associated atelectasis vs airspace disease, trace L pleural effusion Interval History: Afebrile BP stable O2 sats 93% on 2L NC 
K 3.6 Creat 1.03; better ALT/AST trending down CXR  personally reviewed:  continued interstitial edema with R>L pleural effusions Fluid balance: -2245 States that he is feels okay. Denies SOB this morning. RN reports that he is coughing with eating. Past Medical History:  
Diagnosis Date  Arthritis  CAD (coronary artery disease)  Cardiomyopathy ([facility] 2015  [facility] cancer ([facility]  COLON  
 Depression DEPRESSION  
 Diabetes ([facility] IDDM  GERD (gastroesophageal reflux disease)  Hypertension  Lung cancer ([facility] 2016  Mild aortic stenosis 6/15/2015  Pulmonary hypertension ([facility] 6/15/2015  PVD (peripheral vascular disease) ([facility] STENT RIGHT GROIN, PAD  
 S/P CABG x 3 2015 [place] TO LAD; SVG TO OM; SVG TO OM  
 Stroke ([facility]   
 2 STROKES  Third degree AV block ([facility] 2015  Thyroid disease Past Surgical History:  
Procedure Laterality Date [facility] COLON RESECTION  
 HX HEART CATHETERIZATION  2015  HX PACEMAKER    
 INS PPM/ICD LED DUAL ONLY  2015 [address] PAD, RIGHT GROIN STENT Family History Problem Relation Age of Onset  Heart Disease Mother  Diabetes Mother  Other Father AMPUTATION LEG FOLLOWING BUG BITE  
 Other Sister CEREBRAL ANUERYSM  
 Heart Disease Brother  Diabetes Sister Social History Tobacco Use  Smoking status: Former Smoker Packs/day: 2.00 Years: 60.00 Pack years: 120.00 Types: Cigarettes Last attempt to quit: 2015 Years since quittin.9  Smokeless tobacco: Never Used Substance Use Topics  Alcohol use: No  
  Alcohol/week: 0.0 standard drinks Allergies Allergen Reactions  Terazosin Unknown (comments) Current Facility-Administered Medications Medication Dose Route Frequency  aspirin chewable tablet 81 mg  81 mg Oral DAILY  atorvastatin (LIPITOR) tablet 10 mg  10 mg Oral QHS  carvediloL (COREG) tablet 3.125 mg  3.125 mg Oral BID  
 donepeziL (ARICEPT) tablet 10 mg  10 mg Oral DAILY  finasteride (PROSCAR) tablet 5 mg  5 mg Oral QPM  
 levothyroxine (SYNTHROID) tablet 150 mcg  150 mcg Oral ACB  pantoprazole (PROTONIX) tablet 40 mg  40 mg Oral ACB  sertraline (ZOLOFT) tablet 200 mg  200 mg Oral DAILY  tamsulosin (FLOMAX) capsule 0.4 mg  0.4 mg Oral QHS  senna-docusate (PERICOLACE) 8.6-50 mg per tablet 1 Tab  1 Tab Oral DAILY  sodium chloride (NS) flush 5-40 mL  5-40 mL IntraVENous Q8H  
 sodium chloride (NS) flush 5-40 mL  5-40 mL IntraVENous PRN  
 acetaminophen (TYLENOL) tablet 650 mg  650 mg Oral Q4H PRN  prochlorperazine (COMPAZINE) injection 10 mg  10 mg IntraVENous Q6H PRN  
 insulin lispro (HUMALOG) injection   SubCUTAneous QID WITH MEALS  glucose chewable tablet 16 g  4 Tab Oral PRN  
 glucagon (GLUCAGEN) injection 1 mg  1 mg IntraMUSCular PRN  
 dextrose 10% infusion 0-250 mL  0-250 mL IntraVENous PRN  
 bumetanide (BUMEX) injection 1 mg  1 mg IntraVENous Q12H  
 
 
 
REVIEW OF SYSTEMS  
12 point ROS negative except as stated in the HPI. Physical Exam:  
Visit Vitals /57 (BP 1 Location: Right arm, BP Patient Position: At rest) Pulse (!) 59 Temp 98.1 °F (36.7 °C) Resp 20 Ht 5' 10\" (1.778 m) Wt 58.8 kg (129 lb 11.2 oz) SpO2 93% BMI 18.61 kg/m² General:  Alert, cooperative, no distress, frail Head:  Normocephalic, without obvious abnormality, atraumatic. Eyes:  Conjunctivae/corneas clear. Nose: Nares normal. Septum midline. Throat: Lips, mucosa, and tongue normal.   
Neck: Supple, symmetrical, trachea midline Lungs:   Diminished in the bases bilaterally, no wheezing/rales heard Chest wall:  No tenderness or deformity. Heart:  Regular rate and rhythm, S1, S2 normal, no murmur, click, rub or gallop. Abdomen:   Soft, non-tender. Bowel sounds normal.   
Extremities: Extremities normal, atraumatic, no cyanosis or edema. Pulses: 2+ and symmetric all extremities. Skin: Skin color, texture, turgor normal. No rashes or lesions Neurologic: Grossly nonfocal  
 
 
Lab Results Component Value Date/Time Sodium 140 05/20/2020 01:01 AM  
 Potassium 3.6 05/20/2020 01:01 AM  
 Chloride 102 05/20/2020 01:01 AM  
 CO2 33 (H) 05/20/2020 01:01 AM  
 BUN 31 (H) 05/20/2020 01:01 AM  
 Creatinine 1.03 05/20/2020 01:01 AM  
 Glucose 89 05/20/2020 01:01 AM  
 Calcium 8.8 05/20/2020 01:01 AM  
 Magnesium 2.2 05/19/2020 03:30 AM  
 Phosphorus 4.0 05/19/2020 03:30 AM  
 Lactic acid 1.1 01/05/2018 08:20 AM  
 
 
Lab Results Component Value Date/Time WBC 8.4 05/20/2020 01:01 AM  
 HGB 9.7 (L) 05/20/2020 01:01 AM  
 PLATELET 681 (L) 21/57/8698 01:01 AM  
 MCV 93.7 05/20/2020 01:01 AM  
 
 
Lab Results Component Value Date/Time INR 1.5 (H) 05/20/2020 01:01 AM  
 aPTT 33.5 (H) 05/20/2020 01:01 AM  
 AST (SGOT) 406 (H) 05/20/2020 01:01 AM  
 Alk. phosphatase 94 05/20/2020 01:01 AM  
 Protein, total 6.8 05/20/2020 01:01 AM  
 Albumin 2.9 (L) 05/20/2020 01:01 AM  
 Globulin 3.9 05/20/2020 01:01 AM  
 
 
Lab Results Component Value Date/Time Ferritin 527 (H) 05/18/2020 07:51 PM  
 
 
Lab Results Component Value Date/Time TSH 12.20 (H) 06/27/2015 04:29 AM  
  
 
No results found for: PH, PHI, PCO2, PCO2I, PO2, PO2I, HCO3, HCO3I, FIO2, FIO2I Lab Results Component Value Date/Time CK 40 01/04/2018 10:23 AM  
 CK-MB Index Cannot be calculated 01/04/2018 10:23 AM  
 Troponin-I, Qt. 0.15 (H) 05/19/2020 12:49 PM  
  (H) 10/11/2015 03:20 AM  
  
 
Lab Results Component Value Date/Time Culture result: (A) 06/27/2019 04:45 PM  
  MODERATE STREPTOCOCCUS PNEUMONIAE (SENSITIVITIES PERFORMED BY E-TEST) Culture result: SAMPLE SENT ON PREPLANTED MEDIA 06/27/2019 04:45 PM  
 Culture result: NO FUNGUS ISOLATED 30 DAYS 06/27/2019 04:45 PM  
 
 
Lab Results Component Value Date/Time Hepatitis B surface Ag 0.69 05/18/2020 07:51 PM  
 
 
Lab Results Component Value Date/Time CK 40 01/04/2018 10:23 AM  
 
 
Lab Results Component Value Date/Time  Color YELLOW/STRAW 05/19/2020 01:35 AM  
 Appearance CLEAR 05/19/2020 01:35 AM  
 pH (UA) 5.5 05/19/2020 01:35 AM  
 Protein Negative 05/19/2020 01:35 AM  
 Glucose Negative 05/19/2020 01:35 AM  
 Ketone Negative 05/19/2020 01:35 AM  
 Bilirubin Negative 05/19/2020 01:35 AM  
 Blood Negative 05/19/2020 01:35 AM  
 Urobilinogen 1.0 05/19/2020 01:35 AM  
 Nitrites Negative 05/19/2020 01:35 AM  
 Leukocyte Esterase Negative 05/19/2020 01:35 AM  
 WBC >30 (A) 02/21/2018 04:05 PM  
 RBC 3-10 (A) 02/21/2018 04:05 PM  
 Epithelial cells None seen 02/21/2018 04:05 PM  
 Bacteria Few 02/21/2018 04:05 PM  
 
 
IMPRESSION 
· Acute respiratory failure with hypoxia · Pleural effusion · Acute systolic heart failure · COPD not in exacerbation · Hepatitis, liver congestion in the setting of heart failure · GATO; improved today PLAN 
· Goal sats >88%, wean O2 as tolerated · At baseline uses minimal bronchodilators prn, can add prn nebs if necessary once COVID-19 finalizes · Continue diuresis · Cardiology following · Speech eval 
· Having good response to diuresis; will plan on repeat CXR tomorrow AM and if no better, consider thoracentesis at that time · GI following for hepatitis, no acute interventions indicated at this time Valencia Sherman, ROBYN

## 2020-05-20 NOTE — PROGRESS NOTES
PHYSICAL THERAPY TREATMENT Patient: Bo Du (46 y.o. male) Date: 5/20/2020 Diagnosis: Acute on chronic systolic CHF (congestive heart failure) ([facility] [I50.23] Acute on chronic systolic CHF (congestive heart failure) ([facility] Precautions:  fall Chart, physical therapy assessment, plan of care and goals were reviewed. ASSESSMENT Patient continues with skilled PT services and is progressing towards goals. Rolled on the edge of bed min assist x 2, supine to sit min assist x 2, sit to stand min assist x 2, ambulate with rolling walker min assist x 2 towards the recliner. OOB to chair as tolerated performed some active range of motion exercise on both LE all planes. Communicated with nurse who agreed to monitor patient. Current Level of Function Impacting Discharge (mobility/balance): min assist x 2 with transfers and ambulation using a rolling walker Other factors to consider for discharge: fall PLAN : 
Patient continues to benefit from skilled intervention to address the above impairments. Continue treatment per established plan of care. to address goals. Recommendation for discharge: (in order for the patient to meet his/her long term goals) Therapy up to 5 days/week in SNF setting or back to group home with intensive home health therapy program 
 
This discharge recommendation: 
Has been made in collaboration with the attending provider and/or case management IF patient discharges home will need the following DME: patient owns DME required for discharge SUBJECTIVE:  
Patient stated ok.  OBJECTIVE DATA SUMMARY:  
Critical Behavior: 
Neurologic State: Alert, Confused Orientation Level: Oriented to person Cognition: Decreased command following Safety/Judgement: Lack of insight into deficits Functional Mobility Training: 
Bed Mobility: 
Rolling: Minimum assistance;Assist x2 Supine to Sit: Minimum assistance;Assist x2 Sit to Supine: Minimum assistance;Assist x2 
 Scooting: Minimum assistance;Assist x2 Transfers: 
Sit to Stand: Moderate assistance; Additional time;Assist x2 Stand to Sit: Moderate assistance; Additional time;Assist x2 Stand Pivot Transfers: Moderate assistance; Additional time;Assist x2 Bed to Chair: Moderate assistance; Additional time;Assist x2 Balance: 
Sitting: Impaired;High guard Sitting - Static: Fair (occasional) Sitting - Dynamic: Poor (constant support) Standing: Impaired;Pull to stand; With support Ambulation/Gait Training: 
Distance (ft): 5 Feet (ft) Assistive Device: Walker, rolling;Gait belt Ambulation - Level of Assistance: Moderate assistance; Additional time;Assist x2 Gait Description (WDL): Exceptions to [facility] Gait Abnormalities: Path deviations; Step to gait Base of Support: Widened Speed/Kamila: Fluctuations; Slow Therapeutic Exercises:  
 Instructed patient to continue active range of motion exercise on both legs while up on chair or on bed. Pain Ratin/10 Activity Tolerance:  
Good Please refer to the flowsheet for vital signs taken during this treatment. After treatment patient left in no apparent distress:  
Sitting in chair, Heels elevated for pressure relief, Call bell within reach, and Bed / chair alarm activated COMMUNICATION/COLLABORATION:  
The patients plan of care was discussed with: Occupational therapist, Registered nurse Dilip Kirk PT,WCC. Time Calculation: 27 mins

## 2020-05-20 NOTE — PROGRESS NOTES
Chart accessed to assist primary RN. Assisted patient with breakfast, patient coughed with bites. Pulm at bedside. Plan to consult speech.  Updated primary RN

## 2020-05-20 NOTE — PROGRESS NOTES
0715-Bedside and Verbal shift change report given to Lisa Martinez RN (oncoming nurse) by Latosha Kent RN (offgoing nurse). Report included the following information SBAR, Kardex, ED Summary, OR Summary, Procedure Summary, Intake/Output, MAR, Recent Results, Med Rec Status and Cardiac Rhythm NSR.

## 2020-05-20 NOTE — PROGRESS NOTES
[facility] Adult  Hospitalist Group Hospitalist Progress Note Trae Noriega MD 
Answering service: [phone] from in house phone Date of Service:  2020 NAME:  Eh Flores :  1935 MRN:  [id number] Admission Summary:  
82 yo male presented to the hospital with a report of weakness, found to have acute heart failure Interval history / Subjective:  
 history not obtained due to patient being demented. He appears comfortable. Assessment & Plan:  
 
Acute on chronic systolic CHF (congestive heart failure) ([address] (2020) - continue diuresis - Cardiology consulted, cont coreg 
            -echo shows EF 20-25%, has pacer 
  
Hepatitis (2020) 
            - transaminases trending down 
            - no clear etiology 
            - acute viral panel neg 
            - consulted GI - congestive hepatopathy 
  
Pleural effusion (10/9/2015) - likely due to CHF 
            - follow with diuresis 
            - consult Pulmonary - if no improvement on cxr tomorrow then will consider thoracentesis -RVP neg 
            - COVID-19 neg Elevated serum creatinine 
           -improving 
            - may be due to CHF as above, follow with diuresis CAD (coronary artery disease) (2015) - troponin elevated, trending down, likely due to CHF 
            - echo shows EF 20-25% 
  
Chronic obstructive pulmonary disease ([address] (2015)/Chronic respiratory failure ([address] (2015) - continue O2 and respiratory meds Dementia ([address] (2015) 
            - moderate to severe, at baseline mental status per daughter   
  
Type 2 diabetes mellitus without complication ([address] () 
            - follow BS on SSI 
  
Code status: - patient is DO NOT RESUSCITATE, AMD on file, daughter Jaime Sethi is mPOA Code status: DNR 
DVT prophylaxis: compression socks Care Plan discussed with: Patient/Family Anticipated Disposition: Home w/Family Anticipated Discharge: 24 hours to 48 hours Hospital Problems  Date Reviewed: 10/17/2019 Codes Class Noted POA Type 2 diabetes mellitus without complication (HCC) (Chronic) ICD-10-CM: E11.9 ICD-9-CM: 250.00  5/18/2020 Yes Hepatitis ICD-10-CM: K75.9 ICD-9-CM: 573.3  5/18/2020 Yes * (Principal) Acute on chronic systolic CHF (congestive heart failure) (HCC) ICD-10-CM: U14.91 ICD-9-CM: 428.23, 428.0  5/18/2020 Yes Chronic respiratory failure (HCC) (Chronic) ICD-10-CM: J96.10 ICD-9-CM: 518.83  11/24/2015 Yes Dementia ([address] (Chronic) ICD-10-CM: F03.90 ICD-9-CM: 294.20  11/20/2015 Yes Pleural effusion ICD-10-CM: J90 ICD-9-CM: 511.9  10/9/2015 Yes Chronic obstructive pulmonary disease (HCC) (Chronic) ICD-10-CM: J44.9 ICD-9-CM: 409  6/28/2015 Yes Overview Signed 6/28/2015  1:05 PM by Jered Stephens MD  
  04/24/[facility] FVC 2.53-67%-post bronchodilator 2.61-69%-increase 3% FEV1 1.41-52%-post bronchodilator value 1.45-54%-increase 3% FEV1/FVC 56% TLC 5.19-88% RV 2.% DLCO 45% DLCO VA 73% % 04/24/2015-6 minute walk test no exertional hypoxia CAD (coronary artery disease) (Chronic) ICD-10-CM: I25.10 ICD-9-CM: 414.00  6/26/2015 Yes Review of Systems:  
Review of systems not obtained due to patient factors. Vital Signs:  
 Last 24hrs VS reviewed since prior progress note. Most recent are: 
Visit Vitals /52 (BP 1 Location: Right arm, BP Patient Position: At rest) Pulse 60 Temp 98.3 °F (36.8 °C) Resp 18 Ht 5' 10\" (1.778 m) Wt 58.8 kg (129 lb 11.2 oz) SpO2 96% BMI 18.61 kg/m² Intake/Output Summary (Last 24 hours) at 5/20/2020 1122 Last data filed at 5/20/2020 1100 Gross per 24 hour Intake 480 ml Output 3065 ml Net -2585 ml Physical Examination:  
 
 
     
Constitutional:  No acute distress, cooperative, pleasant ENT:  Oral mucosa moist, oropharynx benign. Resp:  CTA bilaterally. No wheezing/rhonchi/rales. No accessory muscle use CV:  Regular rhythm, normal rate, no murmurs, gallops, rubs GI:  Soft, non distended, non tender. normoactive bowel sounds, no hepatosplenomegaly Musculoskeletal:  No edema, warm, 2+ pulses throughout Neurologic:  Moves all extremities. AAOx3, CN II-XII reviewed Data Review:  
 Review and/or order of clinical lab test 
 
 
Labs:  
 
Recent Labs  
  05/20/20 0101 05/19/20 0330 WBC 8.4 9.3 HGB 9.7* 9.2* HCT 31.1* 29.4*  
* 128* Recent Labs  
  05/20/20 
0101 05/19/20 0330 05/18/20 1951  136 132* K 3.6 4.0 4.4  100 98 CO2 33* 29 31 BUN 31* 37* 37* CREA 1.03 1.32* 1.45* GLU 89 123* 73  
CA 8.8 8.4* 8.9 MG  --  2.2  --   
PHOS  --  4.0  --   
 
Recent Labs  
  05/20/20 
0101 05/19/20 0330 05/18/20 1951 SGOT 406* 614* 770* * 681* 759* AP 94 82 92 TBILI 1.4* 1.1* 1.0 TP 6.8 6.6 7.2 ALB 2.9* 2.8* 3.1*  
GLOB 3.9 3.8 4.1* Recent Labs  
  05/20/20 
0101 05/19/20 0330 INR 1.5* 1.7* PTP 14.6* 16.9* APTT 33.5* 35.0* Recent Labs 05/18/20 1951 FERR 527* No results found for: FOL, RBCF No results for input(s): PH, PCO2, PO2 in the last 72 hours. Recent Labs 05/19/20 
1249 05/19/20 0330 05/18/20 
1951 TROIQ 0.15* 0.17* 0.19* Lab Results Component Value Date/Time Cholesterol, total 103 02/13/2019 10:22 AM  
 HDL Cholesterol 42 02/13/2019 10:22 AM  
 LDL, calculated 46 02/13/2019 10:22 AM  
 Triglyceride 77 02/13/2019 10:22 AM  
 
Lab Results Component Value Date/Time  Glucose (POC) 150 (H) 05/20/2020 10:59 AM  
 Glucose (POC) 95 05/20/2020 07:32 AM  
 Glucose (POC) 102 (H) 05/19/2020 08:59 PM  
 Glucose (POC) 210 (H) 05/19/2020 03:09 PM  
 Glucose (POC) 165 (H) 05/19/2020 11:07 AM  
 
Lab Results Component Value Date/Time Color YELLOW/STRAW 05/19/2020 01:35 AM  
 Appearance CLEAR 05/19/2020 01:35 AM  
 Specific gravity 1.005 05/19/2020 01:35 AM  
 pH (UA) 5.5 05/19/2020 01:35 AM  
 Protein Negative 05/19/2020 01:35 AM  
 Glucose Negative 05/19/2020 01:35 AM  
 Ketone Negative 05/19/2020 01:35 AM  
 Bilirubin Negative 05/19/2020 01:35 AM  
 Urobilinogen 1.0 05/19/2020 01:35 AM  
 Nitrites Negative 05/19/2020 01:35 AM  
 Leukocyte Esterase Negative 05/19/2020 01:35 AM  
 Epithelial cells MODERATE (A) 01/03/2018 10:10 PM  
 Bacteria Few 02/21/2018 04:05 PM  
 WBC >30 (A) 02/21/2018 04:05 PM  
 RBC 3-10 (A) 02/21/2018 04:05 PM  
 
 
 
Medications Reviewed:  
 
Current Facility-Administered Medications Medication Dose Route Frequency  aspirin chewable tablet 81 mg  81 mg Oral DAILY  atorvastatin (LIPITOR) tablet 10 mg  10 mg Oral QHS  carvediloL (COREG) tablet 3.125 mg  3.125 mg Oral BID  
 donepeziL (ARICEPT) tablet 10 mg  10 mg Oral DAILY  finasteride (PROSCAR) tablet 5 mg  5 mg Oral QPM  
 levothyroxine (SYNTHROID) tablet 150 mcg  150 mcg Oral ACB  pantoprazole (PROTONIX) tablet 40 mg  40 mg Oral ACB  sertraline (ZOLOFT) tablet 200 mg  200 mg Oral DAILY  tamsulosin (FLOMAX) capsule 0.4 mg  0.4 mg Oral QHS  senna-docusate (PERICOLACE) 8.6-50 mg per tablet 1 Tab  1 Tab Oral DAILY  sodium chloride (NS) flush 5-40 mL  5-40 mL IntraVENous Q8H  
 sodium chloride (NS) flush 5-40 mL  5-40 mL IntraVENous PRN  
 acetaminophen (TYLENOL) tablet 650 mg  650 mg Oral Q4H PRN  prochlorperazine (COMPAZINE) injection 10 mg  10 mg IntraVENous Q6H PRN  
 insulin lispro (HUMALOG) injection   SubCUTAneous QID WITH MEALS  glucose chewable tablet 16 g  4 Tab Oral PRN  
  glucagon (GLUCAGEN) injection 1 mg  1 mg IntraMUSCular PRN  
 dextrose 10% infusion 0-250 mL  0-250 mL IntraVENous PRN  
 bumetanide (BUMEX) injection 1 mg  1 mg IntraVENous Q12H  
 
______________________________________________________________________ EXPECTED LENGTH OF STAY: 3d 7h 
ACTUAL LENGTH OF STAY:          2 Bettie Mason MD

## 2020-05-20 NOTE — PROGRESS NOTES
1900 
Bedside and Verbal shift change report given to [address] [place] [address] (oncoming nurse) by Zay Lund (offgoing nurse). Report included the following information SBAR, Kardex, Procedure Summary, Intake/Output, MAR, Accordion, Recent Results and Cardiac Rhythm NSR. Patient on bed. Still confused. Initial assessment done. No complaints about pain. 2030 Patient's daughter called to get an update. Explained to her why she is not allowed to visit. Visit Vitals /52 (BP 1 Location: Right arm, BP Patient Position: At rest) Pulse 86 Temp 98 °F (36.7 °C) Resp 20 Ht 5' 10\" (1.778 m) Wt 58.8 kg (129 lb 11.2 oz) SpO2 90% BMI 18.61 kg/m²  
 
0700 Bedside and Verbal shift change report given to [address] (oncoming nurse) by Sterling Dupree RN (offgoing nurse). Report included the following information SBAR, Kardex, Procedure Summary, Intake/Output, MAR, Accordion and Recent Results.

## 2020-05-20 NOTE — PROGRESS NOTES
Problem: Falls - Risk of 
Goal: *Absence of Falls Description: Document [place] Cease Fall Risk and appropriate interventions in the flowsheet. Outcome: Progressing Towards Goal 
Note: Fall Risk Interventions: 
Mobility Interventions: Bed/chair exit alarm, PT Consult for mobility concerns, Patient to call before getting OOB, Utilize walker, cane, or other assistive device Mentation Interventions: Bed/chair exit alarm, Door open when patient unattended, Familiar objects from home, More frequent rounding Medication Interventions: Bed/chair exit alarm, Patient to call before getting OOB, Teach patient to arise slowly Elimination Interventions: Bed/chair exit alarm, Call light in reach, Patient to call for help with toileting needs, Toileting schedule/hourly rounds Problem: Patient Education: Go to Patient Education Activity Goal: Patient/Family Education Outcome: Progressing Towards Goal 
  
Problem: Pressure Injury - Risk of 
Goal: *Prevention of pressure injury Description: Document [place] Scale and appropriate interventions in the flowsheet. Outcome: Progressing Towards Goal 
Note: Pressure Injury Interventions: 
Sensory Interventions: Assess changes in LOC, Monitor skin under medical devices, Pressure redistribution bed/mattress (bed type), Turn and reposition approx. every two hours (pillows and wedges if needed) Moisture Interventions: Absorbent underpads, Assess need for specialty bed, Check for incontinence Q2 hours and as needed, Maintain skin hydration (lotion/cream) Activity Interventions: Pressure redistribution bed/mattress(bed type), PT/OT evaluation Mobility Interventions: Pressure redistribution bed/mattress (bed type), PT/OT evaluation, Turn and reposition approx. every two hours(pillow and wedges) Nutrition Interventions: Document food/fluid/supplement intake, Discuss nutritional consult with provider [place] and [place] Interventions: Apply protective barrier, creams and emollients, Lift team/patient mobility team, Transferring/repositioning devices Problem: Patient Education: Go to Patient Education Activity Goal: Patient/Family Education Outcome: Progressing Towards Goal 
  
Problem: Diabetes Self-Management Goal: *Disease process and treatment process Description: Define diabetes and identify own type of diabetes; list 3 options for treating diabetes. Outcome: Progressing Towards Goal 
Goal: *Incorporating nutritional management into lifestyle Description: Describe effect of type, amount and timing of food on blood glucose; list 3 methods for planning meals. Outcome: Progressing Towards Goal 
Goal: *Incorporating physical activity into lifestyle Description: State effect of exercise on blood glucose levels. Outcome: Progressing Towards Goal 
Goal: *Developing strategies to promote health/change behavior Description: Define the ABC's of diabetes; identify appropriate screenings, schedule and personal plan for screenings. Outcome: Progressing Towards Goal 
Goal: *Using medications safely Description: State effect of diabetes medications on diabetes; name diabetes medication taking, action and side effects. Outcome: Progressing Towards Goal 
Goal: *Monitoring blood glucose, interpreting and using results Description: Identify recommended blood glucose targets  and personal targets. Outcome: Progressing Towards Goal 
Goal: *Prevention, detection, treatment of acute complications Description: List symptoms of hyper- and hypoglycemia; describe how to treat low blood sugar and actions for lowering  high blood glucose level. Outcome: Progressing Towards Goal 
Goal: *Prevention, detection and treatment of chronic complications Description: Define the natural course of diabetes and describe the relationship of blood glucose levels to long term complications of diabetes. Outcome: Progressing Towards Goal 
Goal: *Developing strategies to address psychosocial issues Description: Describe feelings about living with diabetes; identify support needed and support network Outcome: Progressing Towards Goal 
Goal: *Insulin pump training Outcome: Progressing Towards Goal 
Goal: *Sick day guidelines Outcome: Progressing Towards Goal 
Goal: *Patient Specific Goal (EDIT GOAL, INSERT TEXT) Outcome: Progressing Towards Goal 
  
Problem: Patient Education: Go to Patient Education Activity Goal: Patient/Family Education Outcome: Progressing Towards Goal 
  
Problem: Patient Education: Go to Patient Education Activity Goal: Patient/Family Education Outcome: Progressing Towards Goal 
  
Problem: Patient Education: Go to Patient Education Activity Goal: Patient/Family Education Outcome: Progressing Towards Goal

## 2020-05-20 NOTE — PROGRESS NOTES
Mike Castro MD [facility] [address] Office [phone]  Mobile [phone] NAME:  David Driscoll :   1935 MRN:   [id number] Assessment/Plan: 1. Acute on chronic HFrEF, severe LV dysfunction, likely chronic RV pacing. Good response to iv loops. CXR unchanged thus far. Optimal medical Rx limited by lowish BP 2. 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 Would not pursue biv pacer ugprade due to comorbid conditions 3. Bilateral pleural effusions R>>L, likely all HF related 4. CAD s/p CABG. No MI 
5. Chronic anemia, normocytic indices 6. Elevation in LFTs (transaminases) - consistent with hepatic congestion from HF 7. Mild-moderate AS 8. Dementia 9. DNR Continue iv loops Monitor renal function Conservative management best 
 
 
Subjective:  
Cardiac ROS: [place], some question of dysphagia noted. Breathing comfortably on nasal oxgyen, seated up in bed. Not terribly good historian. Patient denies any exertional chest pain, , palpitations, syncope, orthopnea, edema or paroxysmal nocturnal dyspnea. Review of Systems: No nausea, indigestion, vomiting, pain, cough, sputum. No bleeding. Objective:  
 
Visit Vitals /57 (BP 1 Location: Right arm, BP Patient Position: At rest) Pulse (!) 59 Temp 98.1 °F (36.7 °C) Resp 20 Ht 5' 10\" (1.778 m) Wt 129 lb 11.2 oz (58.8 kg) SpO2 93% BMI 18.61 kg/m² O2 Flow Rate (L/min): 2 l/min O2 Device: Nasal cannula Temp (24hrs), Av °F (36.7 °C), Min:97.4 °F (36.3 °C), Max:98.5 °F (36.9 °C) No intake/output data recorded.  1901 -  0700 In: 720 [P.O.:720] Out: 2965 [VBJAL:0904] TELE: V-paced rhythm General: AAOx3 cooperative, no acute distress. HEENT: Atraumatic. Pink and moist.  Anicteric sclerae. Neck : Supple, no thyromegaly. Lungs: diminished BS bilateral R>>L. No wheezing Heart: Regular rhythm, grade 2-3 SCOTT, no rubs, no gallops. No JVD.  No carotid bruits. Abdomen: Soft, non-distended, non-tender. + Bowel sounds. No bruits. Extremities: No edema, no clubbing, no cyanosis. No calf tenderness Neurologic: Grossly intact. Alert and oriented X 1. No acute neurological distress. Psych:  Not anxious nor agitated. Additional comments: None Care Plan discussed with: 
  Comments Patient Family RN Care Manager Consultant:     
 
 
Data Review:  
 
05/18/20 ECHO ADULT COMPLETE 05/19/2020 5/19/2020 Narrative · Normal cavity size and wall thickness. Severe global systolic function. Estimated left ventricular ejection fraction is 20 - 25%. Visually  
measured ejection fraction. Abnormal left ventricular septal motion  
consistent with right ventricular pacing. · Severely dilated left atrium. · Mildly dilated right ventricle. Mildly reduced systolic function. Pacing  
wire present · Moderately dilated right atrium. · Aortic valve sclerosis with reduced excursion. Aortic valve leaflet  
calcification present. Mild aortic valve stenosis is present. · Mitral valve non-specific thickening. Mild mitral annular calcification. Mild to moderate mitral valve regurgitation is present. · Dilated annulus of the tricuspid valve. Moderate tricuspid valve  
regurgitation is present. · Moderate pulmonary hypertension. Pulmonary arterial systolic pressure is 58 mmHg. Signed by: Livier Cage MD  
 
 
 
No results for input(s): TNIPOC in the last 72 hours. No lab exists for component: ITNL Recent Labs 05/19/20 
1249 05/19/20 
0330 05/18/20 1951 TROIQ 0.15* 0.17* 0.19* Recent Labs  
  05/20/20 
0101 05/19/20 
0330 05/18/20 1951  136 132* K 3.6 4.0 4.4  100 98 CO2 33* 29 31 BUN 31* 37* 37* CREA 1.03 1.32* 1.45* GLU 89 123* 73 PHOS  --  4.0  --   
MG  --  2.2  --   
ALB 2.9* 2.8* 3.1* WBC 8.4 9.3 9.4 HGB 9.7* 9.2* 9.6* HCT 31.1* 29.4* 30.4* * 128* 106* Recent Labs  
  05/20/20 
0101 05/19/20 
0330 INR 1.5* 1.7* PTP 14.6* 16.9* APTT 33.5* 35.0* Medications reviewed Current Facility-Administered Medications Medication Dose Route Frequency  aspirin chewable tablet 81 mg  81 mg Oral DAILY  atorvastatin (LIPITOR) tablet 10 mg  10 mg Oral QHS  carvediloL (COREG) tablet 3.125 mg  3.125 mg Oral BID  
 donepeziL (ARICEPT) tablet 10 mg  10 mg Oral DAILY  finasteride (PROSCAR) tablet 5 mg  5 mg Oral QPM  
 levothyroxine (SYNTHROID) tablet 150 mcg  150 mcg Oral ACB  pantoprazole (PROTONIX) tablet 40 mg  40 mg Oral ACB  sertraline (ZOLOFT) tablet 200 mg  200 mg Oral DAILY  tamsulosin (FLOMAX) capsule 0.4 mg  0.4 mg Oral QHS  senna-docusate (PERICOLACE) 8.6-50 mg per tablet 1 Tab  1 Tab Oral DAILY  sodium chloride (NS) flush 5-40 mL  5-40 mL IntraVENous Q8H  
 sodium chloride (NS) flush 5-40 mL  5-40 mL IntraVENous PRN  
 acetaminophen (TYLENOL) tablet 650 mg  650 mg Oral Q4H PRN  prochlorperazine (COMPAZINE) injection 10 mg  10 mg IntraVENous Q6H PRN  
 insulin lispro (HUMALOG) injection   SubCUTAneous QID WITH MEALS  glucose chewable tablet 16 g  4 Tab Oral PRN  
 glucagon (GLUCAGEN) injection 1 mg  1 mg IntraMUSCular PRN  
 dextrose 10% infusion 0-250 mL  0-250 mL IntraVENous PRN  
 bumetanide (BUMEX) injection 1 mg  1 mg IntraVENous Q12H Oneil Bhardwaj MD

## 2020-05-20 NOTE — PROGRESS NOTES
GI 
 
Events to date noted. Liver enzymes declining. Aspiration seen as silent No new recommendations

## 2020-05-20 NOTE — PROGRESS NOTES
Problem: Dysphagia (Adult) Goal: *Acute Goals and Plan of Care (Insert Text) Description: Swallowing goals initiated 5-20-20: 
1)  MBS to determine severity of dysphagia Outcome: Not Progressing Towards Goal 
 SPEECH LANGUAGE PATHOLOGY BEDSIDE SWALLOW EVALUATION Patient: Sarah Galan (73 y.o. male) Date: 5/20/2020 Primary Diagnosis: Acute on chronic systolic CHF (congestive heart failure) ([facility] [address] [I50.23] Precautions: aspiration ASSESSMENT : 
Based on the objective data described below, the patient presents with severe pharyngeal dysphagia with noisy, weak swallow, suspected pharyngeal residue and aspiration. Intermittent wet voice and coughing with poor cough strength. . 
Patient with chronic dysphagia and has been seen many times by SLP since 2015    . Last Keyshawn Sharma SLP consult was in Jan 2018. Admitted 5-18-20 with CHF, CXR: R pleural effusion plus atelectasis. COVID negative. PMH: lung CA, HTN, CAD, DM, colon CA, depression, CM, GERD, dementia, cellulitis, UTIS, arthritis, [place], CABG x3, CVA x 2 1999, thyroid dz, hepatitis Patient will benefit from skilled intervention to address the above impairments. Patients rehabilitation potential is considered to be Guarded PLAN : 
Recommendations and Planned Interventions: MBS today. NPO now Frequency/Duration: Patient will be followed by speech-language pathology 2 times a week to address goals. Discharge Recommendations: To Be Determined SUBJECTIVE:  
Patient stated Can I have something to eat? .-after choking badly on pancakes. OBJECTIVE:  
 
Past Medical History:  
Diagnosis Date  Arthritis  CAD (coronary artery disease)  Cardiomyopathy ([facility] [address] 6/11/2015  Colon cancer ([facility] [address] 1993 COLON  
 Depression DEPRESSION  
 Diabetes ([address] IDDM  GERD (gastroesophageal reflux disease)  Hypertension  Lung cancer ([facility] [address] 11/29/2016  Mild aortic stenosis 6/15/2015  Pulmonary hypertension ([facility] [address] 6/15/2015  PVD (peripheral vascular disease) ([facility] [address] STENT RIGHT GROIN, PAD  
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  
 Stroke ([facility] [address] 1999  
 2 STROKES  Third degree AV block ([facility] [address] 6/11/2015  Thyroid disease Past Surgical History:  
[facility]  
 HX HEART CATHETERIZATION  6/26/2015  HX PACEMAKER    
 INS PPM/ICD LED DUAL ONLY  7/2/2015 [address] PAD, RIGHT GROIN STENT Prior Level of Function/Home Situation:  
Home Situation Home Environment: Skilled nursing facility One/Two Story Residence: One story Living Alone: Yes Support Systems: Skilled nursing facility, Family member(s) Patient Expects to be Discharged to[de-identified] Skilled nursing facility Current DME Used/Available at Home: Adaptive bathing aides, Grab bars Diet prior to admission: soft, thins Current Diet:  Regulart, thins. NOW NPO b/c of coughing Cognitive and Communication Status: 
Neurologic State: Alert, Confused Orientation Level: Oriented to person Cognition: Decreased command following Perception: Appears intact Perseveration: No perseveration noted Safety/Judgement: Lack of insight into deficits Oral Assessment: 
Oral Assessment Labial: (L droop) Dentition: Natural;Limited Oral Hygiene: wfl 
Lingual: No impairment Mandible: No impairment P.O. Trials: 
Patient Position: upright in bed Vocal quality prior to P.O.: Wet(intermittently) Consistency Presented: Thin liquid;Puree; Solid How Presented: SLP-fed/presented;Spoon;Straw;Successive swallows ORAL PHASE:  
Bolus Acceptance: No impairment Bolus Formation/Control: No impairment Propulsion: No impairment Oral Residue: None PHARYNGEAL PHASE: 
Initiation of Swallow: Delayed (# of seconds) Laryngeal Elevation: Weak(effortul swallow, noisy swallow) Aspiration Signs/Symptoms: Change vocal quality;Clear throat(constant throat clearing and wet voice. sudden severe choking instances with face turning red) COUGH strength weak. Patient had coughed on pancakes with RN this am.  
Patient with constant throat clearing with PO. Suspect either pharyngeal residue with regurgitation to oral cavity with soldis, or esophageal regurgitation. Patient needed sx attached due to intermittent weak cough, suspected aspiration and difficulty coughing up material.  
 
Called daughter, who reported that \"everytime he comes to the hospital, he has his swallowing evaluated and when he goes back to the facilty he returns to regular diet. Chart review revealed:  
Bedside swallow Jan2018:  
  
ASSESSMENT : 
Patient alert, confused. Oriented to person only, and per discussion with [facility] SLP who discussed cognitive status with patient's daughter, this is baseline due to dementia. Patient's O2 sats 100% on 4LNC. Based on the objective data described below, the patient presents with mild-moderate oral dysphagia characterized by prolonged mastication, suspected premature spillage, and delayed posterior propulsion. Patient also with moderate-severe pharyngeal dysphagia characterized by delayed swallow initiation and decreased/weak hyolaryngeal elevation/excursion. Also suspect pharyngeal residue. Patient with cough x1 with honey liquid and with 2/3 trials of mechanical soft solid. Tolerated remaining 4 oz honey liquid and puree trials well with no additional s/s aspiration. Patient is at risk for aspiration secondary to respiratory status, cognitive status, and known pharyngeal dysphagia. MBS completed at [address] [place] [address] on 1/2/18 with the following results, \"Impressions:  This patient is a 81year old male who currently presents with mild-moderate oral phase dysphagia and moderate-severe pharyngeal phase dysphagia characterized by prolonged mastication, disorganized oral phase, difficulty with A-P bolus propulsion, piecemeal degluttion, premature spillage to pyriform sinuses, delayed swallow initiation, decreased sensation. Patient with decreased base of tongue strength, generalized pharyngeal weakness, decreased hyoid protraction, decreased laryngeal elevation and decreased laryngeal closure leading to consistent deep penetration of thin liquids, variable transient & deep penetration of nectar, aspiration of nectar thick liquids, shallow penetration of honey thck liquids x 1, along with pharygneal residue coating base of tongue, in valleculae & pyriform sinuses. Patient not aware of residue & required verbal cues to complete double swallow which only minimally reduced residue. . Patient is at risk for aspiration. PLAN: Diet Texture: Dysphagia II (Mechanical soft). Liquid Consistency: Honey thick consistency. \" 
  
Patient will benefit from skilled intervention to address the above impairments. Patients rehabilitation potential is considered to be Fair Factors which may influence rehabilitation potential include:  
[]            None noted [x]            Mental ability/status [x]            Medical condition []            Home/family situation and support systems [x]            Safety awareness 
[]            Pain tolerance/management []            Other: PLAN : 
Recommendations and Planned Interventions: 
--Puree/honey liquid diet --Upright at 90 degrees for all PO intake 
--Supervision with PO with cues for double swallow --Meds crushed in puree Today 5-20-20, daughter was concerned that patient was choking so bad that he needed sx and agreed to [facility] again. NOMS:  
The NOMS functional outcome measure was used to quantify this patient's level of swallowing impairment. Based on the NOMS, the patient was determined to be at level 1 for swallow function NOMS Swallowing Levels: 
Level 1 (CN): NPO Level 2 (CM): NPO but takes consistency in therapy Level 3 (CL): Takes less than 50% of nutrition p.o. and continues with nonoral feedings; and/or safe with mod cues; and/or max diet restriction Level 4 (CK): Safe swallow but needs mod cues; and/or mod diet restriction; and/or still requires some nonoral feeding/supplements Level 5 (CJ): Safe swallow with min diet restriction; and/or needs min cues Level 6 (CI): Independent with p.o.; rare cues; usually self cues; may need to avoid some foods or needs extra time Level 7 (CH): Independent for all p.o. BERTHA. (2003). National Outcomes Measurement System (NOMS): Adult Speech-Language Pathology User's Guide. Pain: 
Pain Scale 1: PAINAD (Advanced Dementia) Pain Intensity 1: 0 After treatment:  
Patient left in no apparent distress in bed and Nursing notified COMMUNICATION/EDUCATION:  
Patient was educated regarding his deficit(s) of dysphagia as this relates to his diagnosis of CHF. He demonstrated Guarded understanding as evidenced by discussion. Carlos Enrique Jj The patient's plan of care including recommendations, planned interventions, and recommended diet changes were discussed with: Registered nurse, Physician, and pulmonary, daughter. .  
 
Patient is unable to participate in goal setting and plan of care. Thank you for this referral. 
Yesenia Arceo, SLP Time Calculation: 20 mins

## 2020-05-21 NOTE — PROGRESS NOTES
Problem: Mobility Impaired (Adult and Pediatric) Goal: *Acute Goals and Plan of Care (Insert Text) Description: FUNCTIONAL STATUS PRIOR TO ADMISSION: The patient was functional at the wheelchair level and required minimal assistance for transfers to the chair. HOME SUPPORT PRIOR TO ADMISSION: The patient lived in a group home. Physical Therapy Goals Initiated 5/19/2020 1. Patient will move from supine to sit and sit to supine , scoot up and down and roll side to side in bed with supervision/set-up within 7 day(s). 2.  Patient will transfer from bed to chair and chair to bed with minimal assistance/contact guard assist using the least restrictive device within 7 day(s). 3.  Patient will perform sit to stand with minimal assistance/contact guard assist within 7 day(s). 4.  Patient will ambulate with minimal assistance/contact guard assist for 50 feet with the least restrictive device within 7 day(s). Outcome: Progressing Towards Goal 
 PHYSICAL THERAPY TREATMENT Patient: Kaity Carrillo (14 y.o. 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 ASSESSMENT Patient continues with skilled PT services and is progressing towards goals. Sit on the edge of bed with mod assist x 2, sit to stand mod assist x 2, ambulate with rolling walker towards the recliner mod assist x 2. No loss of balance steady sitting and standing need lots of verbal cues to advance rolling walker. OOB to chair as tolerate performed some active range of motion exercise on both LE all planes. Communicated with nurse who agreed to monitor patient. Current Level of Function Impacting Discharge (mobility/balance): mod assist x 2 with transfers and ambulation using a rolling walker Other factors to consider for discharge: fall PLAN : 
Patient continues to benefit from skilled intervention to address the above impairments. Continue treatment per established plan of care. to address goals. Recommendation for discharge: (in order for the patient to meet his/her long term goals) Therapy up to 5 days/week in SNF setting or back to group home with intensive home health therapy program 
 
This discharge recommendation: 
Has been made in collaboration with the attending provider and/or case management IF patient discharges home will need the following DME: patient owns DME required for discharge SUBJECTIVE:  
Patient stated ok.  OBJECTIVE DATA SUMMARY:  
Critical Behavior: 
Neurologic State: Alert Orientation Level: Oriented to person, Oriented to place, Disoriented to situation, Disoriented to time Cognition: Decreased attention/concentration, Follows commands Safety/Judgement: Awareness of environment, Decreased insight into deficits Functional Mobility Training: 
Bed Mobility: 
Rolling: Moderate assistance; Additional time Supine to Sit: Moderate assistance;Assist x2; Additional time Sit to Supine: (Pt seated in chair at end of session) Scooting: Moderate assistance;Assist x2 Transfers: 
Sit to Stand: Moderate assistance;Assist x2; Additional time Stand to Sit: Moderate assistance;Assist x2 Stand Pivot Transfers: Moderate assistance;Assist x2 Bed to Chair: Moderate assistance;Maximum assistance;Assist x2; Additional time(sig. posterior lean; assist for rw mgmt; cues for sequencing) Balance: 
Sitting: Impaired(posterior lean) Sitting - Static: Fair (occasional) Sitting - Dynamic: Poor (constant support) Standing: Impaired;Pull to stand; With support(with rolling walker; strong posterior lean) Standing - Static: Constant support;Poor Standing - Dynamic : Constant support;Poor Ambulation/Gait Training: 
Distance (ft): 5 Feet (ft) Assistive Device: Walker, rolling;Gait belt Ambulation - Level of Assistance: Moderate assistance;Assist x2 Gait Description (WDL): Exceptions to [facility] Gait Abnormalities: Path deviations; Step to gait Base of Support: Widened Speed/Kamila: Fluctuations; Slow Therapeutic Exercises:  
 Instructed patient to continue active range of motion exercise on both legs while up on chair or on bed. Pain Ratin/10 Activity Tolerance:  
Good Please refer to the flowsheet for vital signs taken during this treatment. After treatment patient left in no apparent distress:  
Sitting in chair, Heels elevated for pressure relief, Call bell within reach, and Bed / chair alarm activated COMMUNICATION/COLLABORATION:  
The patients plan of care was discussed with: Occupational therapist and Registered nurse. Beatrice Desouza PT,WCC. Time Calculation: 26 mins

## 2020-05-21 NOTE — PROGRESS NOTES
Name: Liliane Quiroz: [address]  
: 1935 Admit Date: 2020 Phone: [phone]  Room:  734110 PCP: Lia Shields MD  MRN: [id number] Date: 2020  Code: DNR Chart and notes reviewed. Data reviewed. I review the patient's current medications in the medical record at each encounter. I have evaluated and examined the patient. HPI: 
 
2:51 PM    
 
History was obtained from patient and chart review. I was asked by Danielle Kang MD to see Emely Quezada in consultation for a chief complaint of pleural effusion. History of Present Illness: 
Mr. Michelle Mendosa is an 82 yo gentleman with a history of dementia, COPD (FEV1 1.56L, 57% in 2016), CAD s/p CABG, cardiomyopathy s/p dual chamber pacemaker, colon cancer, GERD, prior lung cancer, and DM who presented with weakness. He is unable to provide much history other than answering some simple ROS questions. Per report, he was sent from his assisted living facility for progressive weakness. Admitted with acute heart failure. Found to have new LV dysfunction and volume overload with pleural effusions (R > L). He currently denies shortness of breath and appears to be breathing comfortably. No cough, fever, sputum production, edema. Has been getting IV diuresis throughout this admission. Labs reviewed: WBC 9.3, cr 1.32 (1.45 on adission), , , troponin up to 0.19, proNP 89888, RVP negative, COVID pending Images: CXR with right sided effusion and associated atelectasis vs airspace disease, trace L pleural effusion Interval History: Afebrile BP stable O2 sats 90% on 2L NC Hgb 9.1 Creat . 92; better ALT/AST trending down Fluid balance: -1180 CXR  personally reviewed:  Improved interstitial edema and slightly improved pleural effusions MBS yesterday;  Trace amounts of aspiration States that he is feeling fine this morning. Denies SOB. Has some cough but not productive. Past Medical History:  
Diagnosis Date  Arthritis  CAD (coronary artery disease)  Cardiomy[facility] ([facility] 2015  Colon cancer ([facility]  COLON  
 Depression DEPRESSION  
 Diabetes ([facility] IDDM  GERD (gastroesophageal reflux disease)  Hypertension  Lung cancer ([facility] 2016  Mild aortic stenosis 6/15/2015  Pulmonary hypertension ([facility] 6/15/2015  PVD (peripheral vascular disease) ([facility] STENT RIGHT GROIN, PAD  
 S/P CABG x 3 2015 [place] TO LAD; SVG TO OM; SVG TO OM  
 Stroke ([facility]   
 2 STROKES  Third degree AV block ([facility] 2015  Thyroid disease Past Surgical History:  
Procedure Laterality Date [address] COLON RESECTION  
 HX HEART CATHETERIZATION  2015  HX PACEMAKER    
 INS PPM/ICD LED DUAL ONLY  2015 [address] PAD, RIGHT GROIN STENT Family History Problem Relation Age of Onset  Heart Disease Mother  Diabetes Mother  Other Father AMPUTATION LEG FOLLOWING BUG BITE  
 Other Sister CEREBRAL ANUERYSM  
 Heart Disease Brother  Diabetes Sister Social History Tobacco Use  Smoking status: Former Smoker Packs/day: 2.00 Years: 60.00 Pack years: 120.00 Types: Cigarettes Last attempt to quit: 2015 Years since quittin.9  Smokeless tobacco: Never Used Substance Use Topics  Alcohol use: No  
  Alcohol/week: 0.0 standard drinks Allergies Allergen Reactions  Terazosin Unknown (comments) Current Facility-Administered Medications Medication Dose Route Frequency  dextrose (D50W) injection syrg 12.5-25 g  12.5-25 g IntraVENous PRN  
 aspirin chewable tablet 81 mg  81 mg Oral DAILY  atorvastatin (LIPITOR) tablet 10 mg  10 mg Oral QHS  carvediloL (COREG) tablet 3.125 mg  3.125 mg Oral BID  
 donepeziL (ARICEPT) tablet 10 mg  10 mg Oral DAILY  finasteride (PROSCAR) tablet 5 mg  5 mg Oral QPM  
 levothyroxine (SYNTHROID) tablet 150 mcg  150 mcg Oral ACB  pantoprazole (PROTONIX) tablet 40 mg  40 mg Oral ACB  sertraline (ZOLOFT) tablet 200 mg  200 mg Oral DAILY  tamsulosin (FLOMAX) capsule 0.4 mg  0.4 mg Oral QHS  senna-docusate (PERICOLACE) 8.6-50 mg per tablet 1 Tab  1 Tab Oral DAILY  sodium chloride (NS) flush 5-40 mL  5-40 mL IntraVENous Q8H  
 sodium chloride (NS) flush 5-40 mL  5-40 mL IntraVENous PRN  
 acetaminophen (TYLENOL) tablet 650 mg  650 mg Oral Q4H PRN  prochlorperazine (COMPAZINE) injection 10 mg  10 mg IntraVENous Q6H PRN  
 insulin lispro (HUMALOG) injection   SubCUTAneous QID WITH MEALS  glucose chewable tablet 16 g  4 Tab Oral PRN  
 glucagon (GLUCAGEN) injection 1 mg  1 mg IntraMUSCular PRN  
 bumetanide (BUMEX) injection 1 mg  1 mg IntraVENous Q12H  
 
 
 
REVIEW OF SYSTEMS  
12 point ROS negative except as stated in the HPI. Physical Exam:  
Visit Vitals /58 (BP 1 Location: Right arm, BP Patient Position: At rest) Pulse 60 Temp 98 °F (36.7 °C) Resp 20 Ht 5' 10\" (1.778 m) Wt 58.7 kg (129 lb 6.6 oz) SpO2 90% BMI 18.57 kg/m² General:  Alert, cooperative, no distress, frail, muscle wasting Head:  Normocephalic, without obvious abnormality, atraumatic. Eyes:  Conjunctivae/corneas clear. Nose: Nares normal. Septum midline. Throat: Lips, mucosa, and tongue normal.   
Neck: Supple, symmetrical, trachea midline Lungs:   Diminished in the bases bilaterally, no wheezing/rales heard Chest wall:  No tenderness or deformity. Heart:  Regular rate and rhythm, S1, S2 normal, no murmur, click, rub or gallop. Abdomen:   Soft, non-tender. Bowel sounds normal.   
Extremities: Extremities normal, atraumatic, no cyanosis or edema. Pulses: 2+ and symmetric all extremities. Skin: Skin color, texture, turgor normal. No rashes or lesions Neurologic: Grossly nonfocal  
 
 
 Lab Results Component Value Date/Time Sodium 138 05/21/2020 03:00 AM  
 Potassium 3.6 05/21/2020 03:00 AM  
 Chloride 100 05/21/2020 03:00 AM  
 CO2 36 (H) 05/21/2020 03:00 AM  
 BUN 26 (H) 05/21/2020 03:00 AM  
 Creatinine 0.92 05/21/2020 03:00 AM  
 Glucose 111 (H) 05/21/2020 03:00 AM  
 Calcium 8.9 05/21/2020 03:00 AM  
 Magnesium 2.2 05/19/2020 03:30 AM  
 Phosphorus 4.0 05/19/2020 03:30 AM  
 Lactic acid 1.1 01/05/2018 08:20 AM  
 
 
Lab Results Component Value Date/Time WBC 6.3 05/21/2020 03:00 AM  
 HGB 9.1 (L) 05/21/2020 03:00 AM  
 PLATELET 303 42/13/7999 03:00 AM  
 MCV 95.0 05/21/2020 03:00 AM  
 
 
Lab Results Component Value Date/Time INR 1.4 (H) 05/21/2020 03:00 AM  
 aPTT 33.9 (H) 05/21/2020 03:00 AM  
 AST (SGOT) 223 (H) 05/21/2020 03:00 AM  
 Alk. phosphatase 88 05/21/2020 03:00 AM  
 Protein, total 6.6 05/21/2020 03:00 AM  
 Albumin 2.8 (L) 05/21/2020 03:00 AM  
 Globulin 3.8 05/21/2020 03:00 AM  
 
 
Lab Results Component Value Date/Time Ferritin 527 (H) 05/18/2020 07:51 PM  
 
 
Lab Results Component Value Date/Time TSH 12.20 (H) 06/27/2015 04:29 AM  
  
 
No results found for: PH, PHI, PCO2, PCO2I, PO2, PO2I, HCO3, HCO3I, FIO2, FIO2I Lab Results Component Value Date/Time CK 40 01/04/2018 10:23 AM  
 CK-MB Index Cannot be calculated 01/04/2018 10:23 AM  
 Troponin-I, Qt. 0.15 (H) 05/19/2020 12:49 PM  
  (H) 10/11/2015 03:20 AM  
  
 
Lab Results Component Value Date/Time Culture result: (A) 06/27/2019 04:45 PM  
  MODERATE STREPTOCOCCUS PNEUMONIAE (SENSITIVITIES PERFORMED BY E-TEST) Culture result: SAMPLE SENT ON PREPLANTED MEDIA 06/27/2019 04:45 PM  
 Culture result: NO FUNGUS ISOLATED 30 DAYS 06/27/2019 04:45 PM  
 
 
Lab Results Component Value Date/Time Hepatitis B surface Ag 0.69 05/18/2020 07:51 PM  
 
 
Lab Results Component Value Date/Time CK 40 01/04/2018 10:23 AM  
 
 
Lab Results Component Value Date/Time Color YELLOW/STRAW 05/19/2020 01:35 AM  
 Appearance CLEAR 05/19/2020 01:35 AM  
 pH (UA) 5.5 05/19/2020 01:35 AM  
 Protein Negative 05/19/2020 01:35 AM  
 Glucose Negative 05/19/2020 01:35 AM  
 Ketone Negative 05/19/2020 01:35 AM  
 Bilirubin Negative 05/19/2020 01:35 AM  
 Blood Negative 05/19/2020 01:35 AM  
 Urobilinogen 1.0 05/19/2020 01:35 AM  
 Nitrites Negative 05/19/2020 01:35 AM  
 Leukocyte Esterase Negative 05/19/2020 01:35 AM  
 WBC >30 (A) 02/21/2018 04:05 PM  
 RBC 3-10 (A) 02/21/2018 04:05 PM  
 Epithelial cells None seen 02/21/2018 04:05 PM  
 Bacteria Few 02/21/2018 04:05 PM  
 
 
IMPRESSION 
· Acute respiratory failure with hypoxia · Pleural effusion · Acute systolic heart failure · COPD not in exacerbation · Hepatitis, liver congestion in the setting of heart failure · GATO; improved today PLAN 
· Goal sats >88%, wean O2 as tolerated · At baseline uses minimal bronchodilators prn, can add prn nebs if necessary once COVID-19 finalizes · Continue diuresis · Cardiology following · Diet as per speech · Having good response to diuresis and CXR improved; will plan on repeat CXR tomorrow AM and if no better, consider thoracentesis at that time. Given his underlying commodities and dementia, risk is certainly higher for complications. · GI following for hepatitis, no acute interventions indicated at this time Nereyda Martin NP

## 2020-05-21 NOTE — PROGRESS NOTES
CM Note: 
Transition of Care Plan:   RUR 25% 1. Kirsten Colón @ [phone]. 
2. Pt to return to  the [address] [place] [address] @ [address] [place] [address] [address] . 3. Pt is a CHF Bundle pt 4. Pt open to Home Based Primary Care Team through the [facility]. Their number is [phone]. 
5. If pt needs additional home health (PT/OT, SN), referral needs to be sent. Pt has had [facility] in the past. 
6. CM to continue to follow KERWIN Kelly

## 2020-05-21 NOTE — PROGRESS NOTES
Problem: Dysphagia (Adult) Goal: *Acute Goals and Plan of Care (Insert Text) Description: Swallowing goals initiated 5-20-20: 
1)  MBS to determine severity of dysphagia-met 2) tolerate dysphagia 1, nectars without s/s aspiration by 5-22-20 Outcome: Progressing Towards Goal 
 SPEECH LANGUAGE PATHOLOGY DYSPHAGIA TREATMENT Patient: Sosa Huddleston (88 y.o. male) Date: 5/21/2020 Diagnosis: Acute on chronic systolic CHF (congestive heart failure) ([facility] [I50.23] Acute on chronic systolic CHF (congestive heart failure) ([facility] Precautions: aspiration ASSESSMENT: 
Patient continues to have s/s of aspiration due to delay and weakness in swallow. His family is aware of chronic dysphagia. Coughing is suspected to be long after aspiration as material reaches maylin in lungs. PLAN: 
Recommendations and Planned Interventions: 
Continue dysphagia 1, nectars in small amounts Stop when coughing becomes consistent. Patient continues to benefit from skilled intervention to address the above impairments. Continue treatment per established plan of care. Discharge Recommendations:  back to group home SUBJECTIVE:  
Patient stated Can I have some water? . OBJECTIVE:  
Cognitive and Communication Status: 
Neurologic State: Alert Orientation Level: Oriented to person, Oriented to place, Disoriented to situation, Disoriented to time Cognition: Follows commands Perception: Cues to maintain midline in sitting, Cues to maintain midline in standing Perseveration: No perseveration noted Safety/Judgement: Decreased awareness of environment Dysphagia Treatment: 
Oral Assessment: P.O. Trials: 
Patient Position: upright in bed Vocal quality prior to P.O.: No impairment Consistency Presented: Puree;Nectar thick liquid How Presented: Self-fed/presented;Spoon;Straw ORAL PHASE:  
Bolus Acceptance: No impairment Bolus Formation/Control: Impaired(mild delay) Type of Impairment: Anterior;Posterior Propulsion: Delayed (# of seconds)(mild) Oral Residue: Less than 10% of bolus(wiht sudden oral leakage at times) PHARYNGEAL  PHASE:  
Initiation of Swallow: Delayed (# of seconds) Laryngeal Elevation: Weak(mild) Aspiration Signs/Symptoms: (he had intermittent wet, nonproductive cough during lunch. MBS showed that coughing is NOT related to immediate aspiration. aspiration was silent and he coughed minutes later, most likely when it reached the maylin) Rn reports that he took 30-50% of bfast. She felt that he coughed consisently after nectars. Exercises: 
Laryngeal Exercises: 
  
  
  
  
  
  
  
  
  
  
  
  
  
  
  
  
  
  
  
  
  
  
  
  
  
  
  
  
  
  
  
  
  
  
  
  
  
  
  
  
  
  
  
Pain: 
Pain Scale 1: Numeric (0 - 10) Pain Intensity 1: 0 After treatment:  
Patient left in no apparent distress in bed COMMUNICATION/EDUCATION:  
Patient was educated regarding his deficit(s) of dysphagia as this relates to his diagnosis of CHF. He demonstrated Guarded understanding as evidenced by lack of insight into deficits. . 
 
The patient's plan of care including recommendations, planned interventions, and recommended diet changes were discussed with: Registered nurse. TERESE De La Torre, SLP Time Calculation: 15 mins

## 2020-05-21 NOTE — PROGRESS NOTES
Nutrition Assessment: 
 
RECOMMENDATIONS/INTERVENTION(S):  
1. Continue with puree/nectar-thickened liquids diet per SLP. 2. Continue Ensure Enlive and Ensure Pudding daily for additional kcal/protein intake. Will continue to monitor PO intakes, weight, labs, GI. ASSESSMENT:  
5/21: F/u. SLP following, pt now on puree/NTL diet. Recorded intakes of 10-60% yesterday. Pt sleeping soundly at time of visit, did not arouse to name. Per RN, pt ate 30-50% of breakfast and 100% Ensure Enlive this AM. Says pt must be fed since he can't see. SLP fed pt lunch, unsure intake. Says \"he had intermittent wet, nonproductive cough during lunch. \" 50% Ensure Pudding eaten at bedside from lunch. Will continue to send both supplements and monitor intakes. Labs- A1c 6.5, -[phone]. Meds reviewed. 5/19: 82 yo male admitted for acute on chronic CHF. RD assessment for low BMI screen. PMhx: CAD, DM, HTN, PVD, CVA, lung and colon CA s/p colon resection (1993), dementia. Underweight per BMI per advanced age. Weight hx in EMR indicates weight has remained stable over last year. Pt being tested for COVID-19. Attempted to call pt's room with no answer x 2. Unable to obtain nutrition hx at this time. Consistent Cho/Cardiac diet ordered. No intakes reported yet. Labs: , , POC BG 54-90, HgbA1c 6.5. Meds: statin, Bumex, Humalog, Colace. No wounds noted. Diet Order: Puree 
% Eaten:   
Patient Vitals for the past 72 hrs: 
 % Diet Eaten 05/20/20 1818 60 % 05/20/20 0735 10 % 05/19/20 1404 50 % 05/19/20 0945 50 % Pertinent Medications: [x] Reviewed Labs: [x] Reviewed Anthropometrics: Height: 5' 10\" (177.8 cm) Weight: 58.7 kg (129 lb 6.6 oz) IBW (%IBW):   ( ) UBW (%UBW):   (  %) BMI: Body mass index is 18.57 kg/m². This BMI is indicative of: 
 [x] Underweight  - per age  [] Normal    [] Overweight    []  Obesity    []  Extreme Obesity (BMI>40) Estimated Nutrition Needs (Based on): 1957 Kcals/day(REE 1313 x AF 1.3 + 250) , 61 g(61-73gm (1-1.2gm/kg/d)) Protein Carbohydrate: At Least 130 g/day  Fluids: 1957mL/day (1 ml/kcal) Last BM: none documented   [x]Active     []Hyperactive  []Hypoactive       [] Absent   BS Skin:    [] Intact   [] Incision  [x] Breakdown: sacral erythema  [] DTI   [] Tears/Excoriation/Abrasion  []Edema [] Other: Wt Readings from Last 30 Encounters:  
05/20/20 58.7 kg (129 lb 6.6 oz) 12/23/19 59.4 kg (131 lb) 09/04/19 59.4 kg (131 lb) 09/04/19 59.3 kg (130 lb 12.8 oz) 07/02/19 56.7 kg (125 lb)  
02/27/19 60.8 kg (134 lb)  
02/27/19 60.9 kg (134 lb 3.2 oz) 08/29/18 63.5 kg (140 lb)  
02/21/18 68.9 kg (152 lb)  
02/21/18 69 kg (152 lb 3.2 oz) 02/21/18 84.4 kg (186 lb) 01/07/18 76.4 kg (168 lb 6 oz) 12/27/17 74.2 kg (163 lb 8 oz) 12/25/17 72.6 kg (160 lb) 08/23/17 74.4 kg (164 lb) 04/12/17 63.5 kg (140 lb) 02/15/17 64.9 kg (143 lb) 02/15/17 65.2 kg (143 lb 12.8 oz) 12/13/16 70.3 kg (155 lb) 10/05/16 68.9 kg (152 lb)  
06/16/16 65.8 kg (145 lb) 05/24/16 62.6 kg (138 lb)  
03/17/16 62.6 kg (138 lb) 02/16/16 62.9 kg (138 lb 9.6 oz) 02/14/16 62.4 kg (137 lb 9.1 oz) 01/26/16 60.8 kg (134 lb) 01/18/16 61.7 kg (136 lb) 12/15/15 62.1 kg (136 lb 12.8 oz)  
11/30/15 60.8 kg (134 lb 1.6 oz)  
11/25/15 61.8 kg (136 lb 3.9 oz) NUTRITION DIAGNOSES:  
Problem:  Underweight Etiology: related to inability to consume sufficient energy to maintain appropriate weight Signs/Symptoms: as evidenced by BMI 19.4 (age > 72 years) 5/21: Nutrition dx continues. Weight down 6# since previous visit. NUTRITION INTERVENTIONS: 
Meals/Snacks: General/healthful diet   Supplements: Commercial supplement GOAL:  
PO intake >75% meals + ONS with 0.5#/week weight gain next 3-5 days Cultural, Judaism, or Ethnic Dietary Needs: None EDUCATION & DISCHARGE NEEDS:  
 [x] None Identified [] Identified and Education Provided/Documented 
 [] Identified and Pt declined/was not appropriate [x] Interdisciplinary Care Plan Reviewed/Documented  
 [x] Discharge Needs:   Diet consistency per SLP with ONS 2x/day 
 [] No Nutrition Related Discharge Needs NUTRITION RISK:  
Pt Is At Nutrition Risk  [x] No Nutrition Risk Identified  [] PT SEEN FOR:  
 []  MD Consult: []Calorie Count []Diabetic Diet Education []Diet Education []Electrolyte Management []General Nutrition Management and Supplements []Management of Tube Feeding []TPN Recommendations []  RN Referral:  []MST score >=2 
   []Enteral/Parenteral Nutrition PTA []Pregnant: Gestational DM or Multigestation  
              [] Pressure Ulcer 
 
[x]  Low BMI      []  Length of Stay       [] Dysphagia Diet         [] Ventilator [x]  Follow-up Previous Recommendations: 
 [x] Implemented          [] Not Implemented          [] Not Applicable Previous Goal: 
 [] Met              [x] Progressing Towards Goal              [] Not Progressing Towards Goal   [] Not Applicable Aldo Arteaga RD Pager [phone] Phone [phone]

## 2020-05-21 NOTE — PROGRESS NOTES
Gastrointestinal Progress Note 5/21/2020 Admit Date: 5/18/2020 Subjective:  
 
Comfortable; no complaint Current Facility-Administered Medications Medication Dose Route Frequency  dextrose (D50W) injection syrg 12.5-25 g  12.5-25 g IntraVENous PRN  
 aspirin chewable tablet 81 mg  81 mg Oral DAILY  atorvastatin (LIPITOR) tablet 10 mg  10 mg Oral QHS  carvediloL (COREG) tablet 3.125 mg  3.125 mg Oral BID  
 donepeziL (ARICEPT) tablet 10 mg  10 mg Oral DAILY  finasteride (PROSCAR) tablet 5 mg  5 mg Oral QPM  
 levothyroxine (SYNTHROID) tablet 150 mcg  150 mcg Oral ACB  pantoprazole (PROTONIX) tablet 40 mg  40 mg Oral ACB  sertraline (ZOLOFT) tablet 200 mg  200 mg Oral DAILY  tamsulosin (FLOMAX) capsule 0.4 mg  0.4 mg Oral QHS  senna-docusate (PERICOLACE) 8.6-50 mg per tablet 1 Tab  1 Tab Oral DAILY  sodium chloride (NS) flush 5-40 mL  5-40 mL IntraVENous Q8H  
 sodium chloride (NS) flush 5-40 mL  5-40 mL IntraVENous PRN  
 acetaminophen (TYLENOL) tablet 650 mg  650 mg Oral Q4H PRN  prochlorperazine (COMPAZINE) injection 10 mg  10 mg IntraVENous Q6H PRN  
 insulin lispro (HUMALOG) injection   SubCUTAneous QID WITH MEALS  glucose chewable tablet 16 g  4 Tab Oral PRN  
 glucagon (GLUCAGEN) injection 1 mg  1 mg IntraMUSCular PRN  
 bumetanide (BUMEX) injection 1 mg  1 mg IntraVENous Q12H Objective:  
 
Blood pressure 121/54, pulse 61, temperature 99 °F (37.2 °C), resp. rate 20, height 5' 10\" (1.778 m), weight 58.7 kg (129 lb 6.6 oz), SpO2 94 %. No intake/output data recorded. 05/19 1901 - 05/21 0700 In: 1010 [P.O.:1010] Out: 3638 [[id number]] EXAM:  GENERAL: alert, no distress, HEART: irregularly irregular rhythm, LUNGS: harsh breath sounds noted both upper lobes, abnormal lung sounds clear with coughing, ABDOMEN:  Bowel sounds are normal, liver is not enlarged, spleen is not enlarged and EXTREMITY: no edema Data Review Recent Results (from the past 24 hour(s)) GLUCOSE, POC Collection Time: 05/20/20  7:32 AM  
Result Value Ref Range Glucose (POC) 95 65 - 100 mg/dL Performed by Steffany Lee ([facility]) GLUCOSE, POC Collection Time: 05/20/20 10:59 AM  
Result Value Ref Range Glucose (POC) 150 (H) 65 - 100 mg/dL Performed by Steffany Lee ([facility]) GLUCOSE, POC Collection Time: 05/20/20  4:23 PM  
Result Value Ref Range Glucose (POC) 159 (H) 65 - 100 mg/dL Performed by Racheal Stover ([facility]) GLUCOSE, POC Collection Time: 05/20/20  9:22 PM  
Result Value Ref Range Glucose (POC) 215 (H) 65 - 100 mg/dL Performed by Trae Salinas D DIMER Collection Time: 05/21/20  3:00 AM  
Result Value Ref Range D-dimer 12.54 (H) 0.00 - 0.65 mg/L FEU FIBRINOGEN Collection Time: 05/21/20  3:00 AM  
Result Value Ref Range Fibrinogen 321 200 - 475 mg/dL PROTHROMBIN TIME + INR Collection Time: 05/21/20  3:00 AM  
Result Value Ref Range INR 1.4 (H) 0.9 - 1.1 Prothrombin time 14.1 (H) 9.0 - 11.1 sec PTT Collection Time: 05/21/20  3:00 AM  
Result Value Ref Range aPTT 33.9 (H) 22.1 - 32.0 sec  
 aPTT, therapeutic range     58.0 - 77.0 SECS  
CBC WITH AUTOMATED DIFF Collection Time: 05/21/20  3:00 AM  
Result Value Ref Range WBC 6.3 4.1 - 11.1 K/uL  
 RBC 3.18 (L) 4.10 - 5.70 M/uL HGB 9.1 (L) 12.1 - 17.0 g/dL HCT 30.2 (L) 36.6 - 50.3 % MCV 95.0 80.0 - 99.0 FL  
 MCH 28.6 26.0 - 34.0 PG  
 MCHC 30.1 30.0 - 36.5 g/dL  
 RDW 17.5 (H) 11.5 - 14.5 % PLATELET 704 773 - 229 K/uL MPV 11.1 8.9 - 12.9 FL  
 NRBC 0.0 0  WBC ABSOLUTE NRBC 0.00 0.00 - 0.01 K/uL NEUTROPHILS 72 32 - 75 % LYMPHOCYTES 13 12 - 49 % MONOCYTES 10 5 - 13 % EOSINOPHILS 4 0 - 7 % BASOPHILS 0 0 - 1 % IMMATURE GRANULOCYTES 1 (H) 0.0 - 0.5 % ABS. NEUTROPHILS 4.5 1.8 - 8.0 K/UL  
 ABS. LYMPHOCYTES 0.8 0.8 - 3.5 K/UL  
 ABS. MONOCYTES 0.6 0.0 - 1.0 K/UL ABS. EOSINOPHILS 0.2 0.0 - 0.4 K/UL  
 ABS. BASOPHILS 0.0 0.0 - 0.1 K/UL  
 ABS. IMM. GRANS. 0.0 0.00 - 0.04 K/UL  
 DF AUTOMATED METABOLIC PANEL, COMPREHENSIVE Collection Time: 05/21/20  3:00 AM  
Result Value Ref Range Sodium 138 136 - 145 mmol/L Potassium 3.6 3.5 - 5.1 mmol/L Chloride 100 97 - 108 mmol/L  
 CO2 36 (H) 21 - 32 mmol/L Anion gap 2 (L) 5 - 15 mmol/L Glucose 111 (H) 65 - 100 mg/dL BUN 26 (H) 6 - 20 MG/DL Creatinine 0.92 0.70 - 1.30 MG/DL  
 BUN/Creatinine ratio 28 (H) 12 - 20 GFR est AA >60 >60 ml/min/1.73m2 GFR est non-AA >60 >60 ml/min/1.73m2 Calcium 8.9 8.5 - 10.1 MG/DL Bilirubin, total 1.2 (H) 0.2 - 1.0 MG/DL  
 ALT (SGPT) 480 (H) 12 - 78 U/L  
 AST (SGOT) 223 (H) 15 - 37 U/L Alk. phosphatase 88 45 - 117 U/L Protein, total 6.6 6.4 - 8.2 g/dL Albumin 2.8 (L) 3.5 - 5.0 g/dL Globulin 3.8 2.0 - 4.0 g/dL A-G Ratio 0.7 (L) 1.1 - 2.2 SAMPLES BEING HELD Collection Time: 05/21/20  3:00 AM  
Result Value Ref Range SAMPLES BEING HELD 1BLU   
 COMMENT Add-on orders for these samples will be processed based on acceptable specimen integrity and analyte stability, which may vary by analyte. Assessment:  
 
Principal Problem: 
  Acute on chronic systolic CHF (congestive heart failure) ([facility] (5/18/2020) Active Problems: 
  CAD (coronary artery disease) (6/26/2015) Chronic obstructive pulmonary disease ([facility] (6/28/2015) Overview: 04/24/2015 [facility] at [facility] FVC 2.53-67%-post bronchodilator 2.61-69%-increase 3% FEV1 1.41-52%-post bronchodilator value 1.45-54%-increase 3% FEV1/FVC 56% TLC 5.19-88% RV 2.% DLCO 45% DLCO [place] 73% % 04/24/2015-6 minute walk test no exertional hypoxia Pleural effusion (10/9/2015) Dementia ([facility] (11/20/2015) Chronic respiratory failure ([facility] (11/24/2015) Type 2 diabetes mellitus without complication ([facility] (1/22/3768) Hepatitis (5/18/2020) Plan: 1. Since liver enzymes are in persistent downtrend, no new recommendations 2. Will see again as needed

## 2020-05-21 NOTE — PROGRESS NOTES
SITUATION: 
Code Status: DNR Reason for Admission: Acute on chronic systolic CHF (congestive heart failure) ([email or web address] [I50.23] Hospital day: 3 Problem List:  
   
Hospital Problems  Date Reviewed: 10/17/2019 Codes Class Noted POA Type 2 diabetes mellitus without complication (HCC) (Chronic) ICD-10-CM: E11.9 ICD-9-CM: 250.00  5/18/2020 Yes Hepatitis ICD-10-CM: K75.9 ICD-9-CM: 573.3  5/18/2020 Yes * (Principal) Acute on chronic systolic CHF (congestive heart failure) (HCC) ICD-10-CM: Q26.26 ICD-9-CM: 428.23, 428.0  5/18/2020 Yes Chronic respiratory failure (HCC) (Chronic) ICD-10-CM: J96.10 ICD-9-CM: 518.83  11/24/2015 Yes Dementia ([facility] (Chronic) ICD-10-CM: F03.90 ICD-9-CM: 294.20  11/20/2015 Yes Pleural effusion ICD-10-CM: J90 ICD-9-CM: 511.9  10/9/2015 Yes Chronic obstructive pulmonary disease (HCC) (Chronic) ICD-10-CM: J44.9 ICD-9-CM: 106  6/28/2015 Yes Overview Signed 6/28/2015  1:05 PM by Luli Fonseca MD  
  04/24/[facility] FVC 2.53-67%-post bronchodilator 2.61-69%-increase 3% FEV1 1.41-52%-post bronchodilator value 1.45-54%-increase 3% FEV1/FVC 56% TLC 5.19-88% RV 2.% DLCO 45% DLCO VA 73% % 04/24/2015-6 minute walk test no exertional hypoxia CAD (coronary artery disease) (Chronic) ICD-10-CM: I25.10 ICD-9-CM: 414.00  6/26/2015 Yes BACKGROUND: 
 Past Medical History:  
Past Medical History:  
Diagnosis Date  Arthritis  CAD (coronary artery disease)  Cardiomyopathy ([email or web address].) 6/11/2015  Colon cancer ([email or web address].) 1993 COLON  
 Depression DEPRESSION  
 Diabetes ([email or web address] IDDM  GERD (gastroesophageal reflux disease)  Hypertension  Lung cancer ([facility] 11/29/2016  Mild aortic stenosis 6/15/2015  Pulmonary hypertension ([facility] 6/15/2015  PVD (peripheral vascular disease) ([facility] STENT RIGHT GROIN, PAD  
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  
 Stroke ([facility] 1999  
 2 STROKES  Third degree AV block ([facility] 6/11/2015  Thyroid disease ASSESSMENT: 
Changes in Assessment Throughout Shift: Assumed care for the 7848-0049 shift. Patient waxes and wanes with orientation and spends a lot of time picking off tele leads, nasal cannula, and gown. Placed leads on back and helped to prevent this. Turned every two hours. No significant changes overnight. Last Vitals: 
Vitals w/ MEWS Score (last day) Date/Time MEWS Score Pulse Resp Temp BP Level of Consciousness SpO2  
 05/21/20 0529    61            
 05/21/20 0417  1  60  20  99 °F (37.2 °C)  121/54  Alert  94 % 05/20/20 2358  1  60  20  98.5 °F (36.9 °C)  111/48  Alert  95 % 05/20/20 2300    61            
 05/20/20 2110  1  60  20  97.6 °F (36.4 °C)  117/55  Alert  96 % 05/20/20 1545  1  60  20  97.5 °F (36.4 °C)  149/66  Alert  100 % 05/20/20 1534    73            
 05/20/20 1038  1  60  18  98.3 °F (36.8 °C)  114/52  Alert  96 % 05/20/20 0735  1  (!) 59  20  98.1 °F (36.7 °C)  128/57  Alert  93 % 05/20/20 0702    60            
 05/20/20 0315  1  86  20  98 °F (36.7 °C)  125/52  Alert  90 % PAIN Pain Assessment Pain Intensity 1: 0 (05/21/20 0417) Pain Location 1: Ear 
  Pain Intervention(s) 1: Medication (see MAR), Other (comment)(removed face mask) Patient Stated Pain Goal: 0 Last 3 Weights: 
Last 3 Recorded Weights in this Encounter 05/19/20 9200 05/20/20 0315 05/20/20 1545 Weight: 61.3 kg (135 lb 2.3 oz) 58.8 kg (129 lb 11.2 oz) 58.7 kg (129 lb 6.6 oz) Weight change: -2.6 kg (-5 lb 11.7 oz) INTAKE/OUPUT Current Shift: 05/20 1901 - 05/21 0700 In: 240 [P.O.:240] Out: 1250 [WWMUQ:5912] Last three shifts: 05/19 0701 - 05/20 1900 In: 1250 [P.O.:1250] Out: 6397 [QCOYM:4266] Rachel Mason RN

## 2020-05-21 NOTE — PROGRESS NOTES
Problem: Self Care Deficits Care Plan (Adult) Goal: *Acute Goals and Plan of Care (Insert Text) Description:  
FUNCTIONAL STATUS PRIOR TO ADMISSION: The patient was functional at the wheelchair level and required assist for most ADLs. Patient is a poor historian therefore background information obtained from the chart. HOME SUPPORT: The patient lived at an adult group home. Occupational Therapy Goals Initiated 5/19/2020 1. Patient will perform grooming with supervision/set-up within 7 day(s). 2.  Patient will perform upper body dressing and bathing with supervision/set-up within 7 day(s). 3.  Patient will perform lower body dressing and bathing with moderate assistance  within 7 day(s). 4.  Patient will perform toilet transfers to [facility] with moderate assistance x1 within 7 day(s). 5.  Patient will perform all aspects of toileting with moderate assistance  within 7 day(s). 6.  Patient will participate in upper extremity therapeutic exercise/activities with supervision/set-up for 10 minutes within 7 day(s). Outcome: Progressing Towards Goal 
  
OCCUPATIONAL THERAPY TREATMENT Patient: Florentina Crigler (18 y.o. 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 ASSESSMENT Patient continues with skilled OT services and is progressing towards goals. Patient continues to present with impaired balance, decreased activity tolerance, weakness, impaired orientation to midline, and cough. He requires significant mod to max A x2 for bed mobility and transfer to chair this session due to strong posterior lean in sitting and in standing. Max multimodal cues required to facilitate re-orientation to midline and to perform PLB strategies throughout all tasks.  Pt's SpO2 stable at 95-96% during activity on 2L supplemental O2 via NC this session. Once seated, pt able to use L UE to perform simple self-feeding tasks, with cues for pacing and for sip size (noted some oral spillage with nectar thickened tea). Continue to recommend return to group home vs. SNF pending progress and level of assist facility can provide. Current Level of Function Impacting Discharge (ADLs): mod to max A x2 for mobility and t/fs with rolling walker; set up to min A self feeding; max to total A lower body dressing and toileting. Other factors to consider for discharge: from group home; w/c level at baseline; requires 2L throughout activity PLAN : 
Patient continues to benefit from skilled intervention to address the above impairments. Continue treatment per established plan of care. to address goals. Recommend with staff: 2 person assist for all bed mobility and transfers with use of rolling walker and gait belt; SPT with A x2 to chair/bed with goal of OOB for 60 minutes; BSC for toileting vs. Bedpan pending pt fatigue levels. Recommend next OT session: UE strengthening; sitting balance EOB; ADL transfers Recommendation for discharge: (in order for the patient to meet his/her long term goals) To be determined: return to group [facility] therapy and 2 person phyiscal assist vs. SNF-level rehab pending progress group home's ability to provide level of assist 
 
This discharge recommendation: 
Has not yet been discussed the attending provider and/or case management IF patient discharges home will need the following DME: patient owns DME required for discharge SUBJECTIVE:  
Patient stated I'd like tea with sweetener please.  OBJECTIVE DATA SUMMARY:  
Cognitive/Behavioral Status: 
Neurologic State: Alert Orientation Level: Oriented to person;Oriented to place; Disoriented to situation;Disoriented to time Cognition: Decreased attention/concentration; Follows commands Perception: Cues to maintain midline in sitting;Cues to maintain midline in standing; Tactile;Verbal;Visual(blind in R eye) Perseveration: No perseveration noted Safety/Judgement: Awareness of environment;Decreased insight into deficits Functional Mobility and Transfers for ADLs: 
Bed Mobility: 
Rolling: Moderate assistance; Additional time Supine to Sit: Moderate assistance;Assist x2; Additional time Sit to Supine: (Pt seated in chair at end of session) Transfers: 
Sit to Stand: Moderate assistance;Assist x2; Additional time Bed to Chair: Moderate assistance;Maximum assistance;Assist x2; Additional time(sig. posterior lean; assist for rw mgmt; cues for sequencing) Balance: 
Sitting: Impaired(posterior lean) Sitting - Static: Fair (occasional) Sitting - Dynamic: Poor (constant support) Standing: Impaired;Pull to stand; With support(with rolling walker; strong posterior lean) Standing - Static: Constant support;Poor Standing - Dynamic : Constant support;Poor ADL Intervention: 
Feeding Drink to Mouth: Set-up(pt able to bring cup of tea to mouth with L UE) Cues: Verbal cues provided Adaptive Equipment: (styrofoam cup due to weakness) Cognitive Retraining Safety/Judgement: Awareness of environment;Decreased insight into deficits Pt performs functional ADL transfer requires significant mod to max A x2 for bed mobility and transfer to chair this session due to strong posterior lean in sitting and in standing. Max multimodal cues required to facilitate re-orientation to midline and to perform PLB strategies throughout all tasks. He benefits from step-by-step verbal cueing for sequencing transfer, as well as physical assistance to manage rolling walker. Pt's SpO2 stable at 95-96% during activity on 2L supplemental O2 via NC this session.  Once seated, pt able to use L UE to perform simple self-feeding tasks, with cues for pacing and for sip size (noted some oral spillage with nectar thickened tea). Pain: 
Pt reporting minimal pain this session. Activity Tolerance:  
Fair, desaturates with exertion and requires oxygen, requires rest breaks and observed SOB with activity Please refer to the flowsheet for vital signs taken during this treatment. After treatment patient left in no apparent distress:  
Sitting in chair, Call bell within reach, Bed / chair alarm activated and RN notified COMMUNICATION/COLLABORATION:  
The patients plan of care was discussed with: Physical therapist and Registered nurse. Regla Inman OT Time Calculation: 17 mins

## 2020-05-21 NOTE — PROGRESS NOTES
[address] [facility] Adult  Hospitalist Group Hospitalist Progress Note Benjamin Reynoso MD 
Answering service: [phone] from in house phone Date of Service:  2020 NAME:  Bo Du :  1935 MRN:  [id number] Admission Summary:  
80 yo male presented to the hospital with a report of weakness, found to have acute heart failure Interval history / Subjective:  
 Pt states he has a slight productive cough, no sob. Assessment & Plan:  
 
Acute on chronic systolic CHF (congestive heart failure) ([address] (2020) -improving, will try and wean off oxygen 
            - continue diuresis - Cardiology consulted, cont coreg 
            -echo shows EF 20-25%, has pacer 
  
Hepatitis (2020) 
            - transaminases trending down 
            - no clear etiology 
            - acute viral panel neg 
            - consulted GI - congestive hepatopathy 
  
Pleural effusion (10/9/2015) - likely due to CHF 
            - follow with diuresis 
            - improvement on CXR 
            -pulm on board -RVP neg 
            - COVID-19 neg Elevated serum creatinine 
           -improving 
            - may be due to CHF as above, follow with diuresis CAD (coronary artery disease) (2015) - troponin elevated, trending down, likely due to CHF 
            - echo shows EF 20-25% 
  
Chronic obstructive pulmonary disease ([address] (2015)/Chronic respiratory failure ([address] (2015) - continue O2 and respiratory meds Dementia ([address] (2015) 
            - moderate to severe, at baseline mental status per daughter   
  
Type 2 diabetes mellitus without complication ([address] (9829) 
            - follow BS on SSI 
  
Code status: - patient is DO NOT RESUSCITATE, AMD on file, daughter Akila Alexandra is mPOA Code status: DNR 
DVT prophylaxis: compression socks Care Plan discussed with: Patient/Family Anticipated Disposition: Home w/Family Anticipated Discharge: 24 hours to 48 hours Hospital Problems  Date Reviewed: 10/17/2019 Codes Class Noted POA Type 2 diabetes mellitus without complication (HCC) (Chronic) ICD-10-CM: E11.9 ICD-9-CM: 250.00  5/18/2020 Yes Hepatitis ICD-10-CM: K75.9 ICD-9-CM: 573.3  5/18/2020 Yes * (Principal) Acute on chronic systolic CHF (congestive heart failure) (HCC) ICD-10-CM: H18.51 ICD-9-CM: 428.23, 428.0  5/18/2020 Yes Chronic respiratory failure (HCC) (Chronic) ICD-10-CM: J96.10 ICD-9-CM: 518.83  11/24/2015 Yes Dementia ([address] (Chronic) ICD-10-CM: F03.90 ICD-9-CM: 294.20  11/20/2015 Yes Pleural effusion ICD-10-CM: J90 ICD-9-CM: 511.9  10/9/2015 Yes Chronic obstructive pulmonary disease (HCC) (Chronic) ICD-10-CM: J44.9 ICD-9-CM: 021  6/28/2015 Yes Overview Signed 6/28/2015  1:05 PM by Ham Kurtz MD  
  [facility]24/[facility] FVC 2.53-67%-post bronchodilator 2.61-69%-increase 3% FEV1 1.41-52%-post bronchodilator value 1.45-54%-increase 3% FEV1/FVC 56% TLC 5.19-88% RV 2.% DLCO 45% DLCO VA 73% % 04/24/2015-6 minute walk test no exertional hypoxia CAD (coronary artery disease) (Chronic) ICD-10-CM: I25.10 ICD-9-CM: 414.00  6/26/2015 Yes Review of Systems:  
Review of systems not obtained due to patient factors. Vital Signs:  
 Last 24hrs VS reviewed since prior progress note. Most recent are: 
Visit Vitals /55 (BP 1 Location: Right arm, BP Patient Position: At rest) Pulse 62 Temp 97.7 °F (36.5 °C) Resp 18 Ht 5' 10\" (1.778 m) Wt 58.7 kg (129 lb 6.6 oz) SpO2 93% BMI 18.57 kg/m² Intake/Output Summary (Last 24 hours) at 5/21/2020 1306 Last data filed at 5/21/2020 9246 Gross per 24 hour Intake 720 ml Output 1250 ml Net -530 ml Physical Examination:  
 
 
     
Constitutional:  No acute distress, cooperative, pleasant ENT:  Oral mucosa moist, oropharynx benign. Resp:  CTA bilaterally. No wheezing/rhonchi/rales. No accessory muscle use CV:  Regular rhythm, normal rate, no murmurs, gallops, rubs GI:  Soft, non distended, non tender. normoactive bowel sounds, no hepatosplenomegaly Musculoskeletal:  No edema, warm, 2+ pulses throughout Neurologic:  Moves all extremities. AAOx3, CN II-XII reviewed Data Review:  
 Review and/or order of clinical lab test 
 
 
Labs:  
 
Recent Labs  
  05/21/20 
0300 05/20/20 
0101 WBC 6.3 8.4 HGB 9.1* 9.7* HCT 30.2* 31.1*  
 137* Recent Labs  
  05/21/20 
0300 05/20/20 
0101 05/19/20 
0330  140 136  
K 3.6 3.6 4.0  
 102 100 CO2 36* 33* 29 BUN 26* 31* 37* CREA 0.92 1.03 1.32* * 89 123* CA 8.9 8.8 8.4* MG  --   --  2.2 PHOS  --   --  4.0 Recent Labs  
  05/21/20 
0300 05/20/20 
0101 05/19/20 
0330 SGOT 223* 406* 614* * 631* 681* AP 88 94 82 TBILI 1.2* 1.4* 1.1* TP 6.6 6.8 6.6 ALB 2.8* 2.9* 2.8*  
GLOB 3.8 3.9 3.8 Recent Labs  
  05/21/20 
0300 05/20/20 
0101 05/19/20 
0330 INR 1.4* 1.5* 1.7* PTP 14.1* 14.6* 16.9* APTT 33.9* 33.5* 35.0* Recent Labs 05/18/20 
1951 FERR 527* No results found for: FOL, RBCF No results for input(s): PH, PCO2, PO2 in the last 72 hours. Recent Labs 05/19/20 
1249 05/19/20 
0330 05/18/20 1951 TROIQ 0.15* 0.17* 0.19* Lab Results Component Value Date/Time Cholesterol, total 103 02/13/2019 10:22 AM  
 HDL Cholesterol 42 02/13/2019 10:22 AM  
 LDL, calculated 46 02/13/2019 10:22 AM  
 Triglyceride 77 02/13/2019 10:22 AM  
 
Lab Results Component Value Date/Time  Glucose (POC) 175 (H) 05/21/2020 11:11 AM  
 Glucose (POC) 106 (H) 05/21/2020 07:38 AM  
 Glucose (POC) 215 (H) 05/20/2020 09:22 PM  
 Glucose (POC) 159 (H) 05/20/2020 04:23 PM  
 Glucose (POC) 150 (H) 05/20/2020 10:59 AM  
 
Lab Results Component Value Date/Time Color YELLOW/STRAW 05/19/2020 01:35 AM  
 Appearance CLEAR 05/19/2020 01:35 AM  
 Specific gravity 1.005 05/19/2020 01:35 AM  
 pH (UA) 5.5 05/19/2020 01:35 AM  
 Protein Negative 05/19/2020 01:35 AM  
 Glucose Negative 05/19/2020 01:35 AM  
 Ketone Negative 05/19/2020 01:35 AM  
 Bilirubin Negative 05/19/2020 01:35 AM  
 Urobilinogen 1.0 05/19/2020 01:35 AM  
 Nitrites Negative 05/19/2020 01:35 AM  
 Leukocyte Esterase Negative 05/19/2020 01:35 AM  
 Epithelial cells MODERATE (A) 01/03/2018 10:10 PM  
 Bacteria Few 02/21/2018 04:05 PM  
 WBC >30 (A) 02/21/2018 04:05 PM  
 RBC 3-10 (A) 02/21/2018 04:05 PM  
 
 
 
Medications Reviewed:  
 
Current Facility-Administered Medications Medication Dose Route Frequency  dextrose (D50W) injection syrg 12.5-25 g  12.5-25 g IntraVENous PRN  
 aspirin chewable tablet 81 mg  81 mg Oral DAILY  atorvastatin (LIPITOR) tablet 10 mg  10 mg Oral QHS  carvediloL (COREG) tablet 3.125 mg  3.125 mg Oral BID  
 donepeziL (ARICEPT) tablet 10 mg  10 mg Oral DAILY  finasteride (PROSCAR) tablet 5 mg  5 mg Oral QPM  
 levothyroxine (SYNTHROID) tablet 150 mcg  150 mcg Oral ACB  pantoprazole (PROTONIX) tablet 40 mg  40 mg Oral ACB  sertraline (ZOLOFT) tablet 200 mg  200 mg Oral DAILY  tamsulosin (FLOMAX) capsule 0.4 mg  0.4 mg Oral QHS  senna-docusate (PERICOLACE) 8.6-50 mg per tablet 1 Tab  1 Tab Oral DAILY  sodium chloride (NS) flush 5-40 mL  5-40 mL IntraVENous Q8H  
 sodium chloride (NS) flush 5-40 mL  5-40 mL IntraVENous PRN  
 acetaminophen (TYLENOL) tablet 650 mg  650 mg Oral Q4H PRN  prochlorperazine (COMPAZINE) injection 10 mg  10 mg IntraVENous Q6H PRN  
 insulin lispro (HUMALOG) injection   SubCUTAneous QID WITH MEALS  
  glucose chewable tablet 16 g  4 Tab Oral PRN  
 glucagon (GLUCAGEN) injection 1 mg  1 mg IntraMUSCular PRN  
 bumetanide (BUMEX) injection 1 mg  1 mg IntraVENous Q12H  
 
______________________________________________________________________ EXPECTED LENGTH OF STAY: 3d 7h 
ACTUAL LENGTH OF STAY:          3 Jodi Kirk MD

## 2020-05-21 NOTE — PROGRESS NOTES
Bedside and Verbal shift change report given to [address] (oncoming nurse) by Junior Castellano RN (offgoing nurse). Report included the following information SBAR, Kardex, MAR, Accordion, Recent Results and Med Rec Status.

## 2020-05-21 NOTE — PROGRESS NOTES
Bedside and Verbal shift change report given to DEEPIKA Starks (oncoming nurse) by Ciaran Alejandro RN (offgoing nurse). Report included the following information SBAR, Kardex, MAR and Accordion.

## 2020-05-22 NOTE — PROGRESS NOTES
Problem: Falls - Risk of 
Goal: *Absence of Falls Outcome: Progressing Towards Goal 
Note: Fall Risk Interventions: 
Mobility Interventions: Bed/chair exit alarm, Communicate number of staff needed for ambulation/transfer, Patient to call before getting OOB Mentation Interventions: Bed/chair exit alarm, Reorient patient, Toileting rounds, Update white board Medication Interventions: Bed/chair exit alarm, Patient to call before getting OOB Elimination Interventions: Bed/chair exit alarm, Call light in reach, Patient to call for help with toileting needs

## 2020-05-22 NOTE — PROGRESS NOTES
Bedside and Verbal shift change report given to [facility] (oncoming nurse) by Crisoforo Kawasaki (offgoing nurse). Report included the following information SBAR and Kardex.

## 2020-05-22 NOTE — PROGRESS NOTES
Spiritual Care Assessment/Progress Note [address] 
 
 
NAME: Eh Flores      MRN: [id number] AGE: 81 y.o. SEX: male Yazdanism Affiliation: No preference Language: [place] 5/22/2020     Total Time (in minutes): 5 Spiritual Assessment begun in [facility] 5M1 MED SURG 1 through conversation with: 
  
    [x]Patient        [] Family    [] Friend(s) Reason for Consult: Initial/Spiritual assessment, patient floor Spiritual beliefs: (Please include comment if needed) 
   [] Identifies with a garrison tradition:     
   [] Supported by a garrison community:        
   [] Claims no spiritual orientation:       
   [] Seeking spiritual identity:            
   [] Adheres to an individual form of spirituality:       
   [x] Not able to assess:                   
 
    
Identified resources for coping:  
   [] Prayer                           
   [] Music                  [] Guided Imagery 
   [] Family/friends                 [] Pet visits [] Devotional reading                         [x] Unknown 
   [] Other:                                         
 
 
Interventions offered during this visit: (See comments for more details) Plan of Care: 
 
 [] Support spiritual and/or cultural needs  
 [] Support AMD and/or advance care planning process    
 [] Support grieving process 
 [] Coordinate Rites and/or Rituals  
 [] Coordination with community clergy [] No spiritual needs identified at this time 
 [] Detailed Plan of Care below (See Comments)  [] Make referral to Music Therapy 
[] Make referral to Pet Therapy    
[] Make referral to Addiction services 
[] Make referral to [facility] 
[] Make referral to Spiritual Care Partner 
[] No future visits requested       
[x] Follow up visits as needed Comments:   attempted initial spiritual assessment at the Med Surg unit. Pt, Mr Josy Owens was comfortably sleeping.  Spiritual care will continue to follow up as needed or as able. Visited by: Nataliya Stanton. To page : [phone])

## 2020-05-22 NOTE — PROGRESS NOTES
Name: Jay Philip: [address]  
: 1935 Admit Date: 2020 Phone: [phone]  Room: [id number] PCP: Aby Guevara MD  MRN: [id number] Date: 2020  Code: DNR Chart and notes reviewed. Data reviewed. I review the patient's current medications in the medical record at each encounter. I have evaluated and examined the patient. HPI: 
 
2:51 PM    
 
History was obtained from patient and chart review. I was asked by Brendan Melissa MD to see Aldair Cochran in consultation for a chief complaint of pleural effusion. History of Present Illness: 
Mr. Dennis Doe is an 80 yo gentleman with a history of dementia, COPD (FEV1 1.56L, 57% in 2016), CAD s/p CABG, cardiomyopathy s/p dual chamber pacemaker, colon cancer, GERD, prior lung cancer, and DM who presented with weakness. He is unable to provide much history other than answering some simple ROS questions. Per report, he was sent from his assisted living facility for progressive weakness. Admitted with acute heart failure. Found to have new LV dysfunction and volume overload with pleural effusions (R > L). He currently denies shortness of breath and appears to be breathing comfortably. No cough, fever, sputum production, edema. Has been getting IV diuresis throughout this admission. Labs reviewed: WBC 9.3, cr 1.32 (1.45 on adission), , , troponin up to 0.19, proNP 29752, RVP negative, COVID pending Images: CXR with right sided effusion and associated atelectasis vs airspace disease, trace L pleural effusion Interval History: Afebrile BP stable O2 sats 94% on 2L NC Hgb 10.1 K 3.6 CO2 29 Creat . 92; better ALT/AST trending down Fluid balance: -4225 States that he is feeling fine this morning. Denies SOB. States that he feels pretty good. Past Medical History:  
Diagnosis Date  Arthritis  CAD (coronary artery disease)  Cardiomyopathy ([facility] 2015  Colon cancer ([facility]  COLON  
 Depression DEPRESSION  
 Diabetes ([facility] IDDM  GERD (gastroesophageal reflux disease)  Hypertension  Lung cancer ([facility] 2016  Mild aortic stenosis 6/15/2015  Pulmonary hypertension ([facility] 6/15/2015  PVD (peripheral vascular disease) ([facility] STENT RIGHT GROIN, PAD  
 S/P CABG x 3 2015 LIM[place] TO LAD; SVG TO OM; SVG TO OM  
 Stroke ([facility]   
 2 STROKES  Third degree AV block ([facility] 2015  Thyroid disease Past Surgical History:  
Procedure Laterality Date [address] COLON RESECTION  
 HX HEART CATHETERIZATION  2015  HX PACEMAKER    
 INS PPM/ICD LED DUAL ONLY  2015 [address] PAD, RIGHT GROIN STENT Family History Problem Relation Age of Onset  Heart Disease Mother  Diabetes Mother  Other Father AMPUTATION LEG FOLLOWING BUG BITE  
 Other Sister CEREBRAL ANUERYSM  
 Heart Disease Brother  Diabetes Sister Social History Tobacco Use  Smoking status: Former Smoker Packs/day: 2.00 Years: 60.00 Pack years: 120.00 Types: Cigarettes Last attempt to quit: 2015 Years since quittin.9  Smokeless tobacco: Never Used Substance Use Topics  Alcohol use: No  
  Alcohol/week: 0.0 standard drinks Allergies Allergen Reactions  Terazosin Unknown (comments) Current Facility-Administered Medications Medication Dose Route Frequency  dextrose (D50W) injection syrg 12.5-25 g  12.5-25 g IntraVENous PRN  
 aspirin chewable tablet 81 mg  81 mg Oral DAILY  atorvastatin (LIPITOR) tablet 10 mg  10 mg Oral QHS  carvediloL (COREG) tablet 3.125 mg  3.125 mg Oral BID  
 donepeziL (ARICEPT) tablet 10 mg  10 mg Oral DAILY  finasteride (PROSCAR) tablet 5 mg  5 mg Oral QPM  
 levothyroxine (SYNTHROID) tablet 150 mcg  150 mcg Oral ACB  pantoprazole (PROTONIX) tablet 40 mg  40 mg Oral ACB  sertraline (ZOLOFT) tablet 200 mg  200 mg Oral DAILY  tamsulosin (FLOMAX) capsule 0.4 mg  0.4 mg Oral QHS  senna-docusate (PERICOLACE) 8.6-50 mg per tablet 1 Tab  1 Tab Oral DAILY  sodium chloride (NS) flush 5-40 mL  5-40 mL IntraVENous Q8H  
 sodium chloride (NS) flush 5-40 mL  5-40 mL IntraVENous PRN  
 acetaminophen (TYLENOL) tablet 650 mg  650 mg Oral Q4H PRN  prochlorperazine (COMPAZINE) injection 10 mg  10 mg IntraVENous Q6H PRN  
 insulin lispro (HUMALOG) injection   SubCUTAneous QID WITH MEALS  glucose chewable tablet 16 g  4 Tab Oral PRN  
 glucagon (GLUCAGEN) injection 1 mg  1 mg IntraMUSCular PRN  
 bumetanide (BUMEX) injection 1 mg  1 mg IntraVENous Q12H  
 
 
 
REVIEW OF SYSTEMS  
12 point ROS negative except as stated in the HPI. Physical Exam:  
Visit Vitals /74 (BP 1 Location: Right arm, BP Patient Position: At rest) Pulse 60 Temp 97.3 °F (36.3 °C) Resp 17 Ht 5' 10\" (1.778 m) Wt 61.6 kg (135 lb 12.9 oz) SpO2 94% BMI 19.49 kg/m² General:  Alert, cooperative, no distress, frail, muscle wasting Head:  Normocephalic, without obvious abnormality, atraumatic. Eyes:  Conjunctivae/corneas clear. Nose: Nares normal. Septum midline. Throat: Lips, mucosa, and tongue normal.   
Neck: Supple, symmetrical, trachea midline Lungs:   Diminished in the bases bilaterally, no wheezing/rales heard Chest wall:  No tenderness or deformity. Heart:  Regular rate and rhythm, S1, S2 normal, no murmur, click, rub or gallop. Abdomen:   Soft, non-tender. Bowel sounds normal.   
Extremities: Extremities normal, atraumatic, no cyanosis or edema. Pulses: 2+ and symmetric all extremities. Skin: Skin color, texture, turgor normal. No rashes or lesions Neurologic: Grossly nonfocal  
 
 
Lab Results Component Value Date/Time  Sodium 137 05/22/2020 05:25 AM  
 Potassium 3.6 05/22/2020 05:25 AM  
 Chloride 97 05/22/2020 05:25 AM  
 CO2 39 (H) 05/22/2020 05:25 AM  
 BUN 20 05/22/2020 05:25 AM  
 Creatinine 0.95 05/22/2020 05:25 AM  
 Glucose 142 (H) 05/22/2020 05:25 AM  
 Calcium 9.0 05/22/2020 05:25 AM  
 Magnesium 2.2 05/19/2020 03:30 AM  
 Phosphorus 4.0 05/19/2020 03:30 AM  
 Lactic acid 1.1 01/05/2018 08:20 AM  
 
 
Lab Results Component Value Date/Time WBC 7.7 05/22/2020 05:25 AM  
 HGB 10.1 (L) 05/22/2020 05:25 AM  
 PLATELET 706 61/19/9645 05:25 AM  
 MCV 95.1 05/22/2020 05:25 AM  
 
 
Lab Results Component Value Date/Time INR 1.3 (H) 05/22/2020 05:25 AM  
 aPTT 32.3 (H) 05/22/2020 05:25 AM  
 AST (SGOT) 118 (H) 05/22/2020 05:25 AM  
 Alk. phosphatase 91 05/22/2020 05:25 AM  
 Protein, total 7.2 05/22/2020 05:25 AM  
 Albumin 3.1 (L) 05/22/2020 05:25 AM  
 Globulin 4.1 (H) 05/22/2020 05:25 AM  
 
 
Lab Results Component Value Date/Time Ferritin 527 (H) 05/18/2020 07:51 PM  
 
 
Lab Results Component Value Date/Time TSH 12.20 (H) 06/27/2015 04:29 AM  
  
 
No results found for: PH, PHI, PCO2, PCO2I, PO2, PO2I, HCO3, HCO3I, FIO2, FIO2I Lab Results Component Value Date/Time CK 40 01/04/2018 10:23 AM  
 CK-MB Index Cannot be calculated 01/04/2018 10:23 AM  
 Troponin-I, Qt. 0.15 (H) 05/19/2020 12:49 PM  
  (H) 10/11/2015 03:20 AM  
  
 
Lab Results Component Value Date/Time Culture result: (A) 06/27/2019 04:45 PM  
  MODERATE STREPTOCOCCUS PNEUMONIAE (SENSITIVITIES PERFORMED BY E-TEST) Culture result: SAMPLE SENT ON PREPLANTED MEDIA 06/27/2019 04:45 PM  
 Culture result: NO FUNGUS ISOLATED 30 DAYS 06/27/2019 04:45 PM  
 
 
Lab Results Component Value Date/Time Hepatitis B surface Ag 0.69 05/18/2020 07:51 PM  
 
 
Lab Results Component Value Date/Time CK 40 01/04/2018 10:23 AM  
 
 
Lab Results Component Value Date/Time  Color YELLOW/STRAW 05/19/2020 01:35 AM  
 Appearance CLEAR 05/19/2020 01:35 AM  
 pH (UA) 5.5 05/19/2020 01:35 AM  
 Protein Negative 05/19/2020 01:35 AM  
 Glucose Negative 05/19/2020 01:35 AM  
 Ketone Negative 05/19/2020 01:35 AM  
 Bilirubin Negative 05/19/2020 01:35 AM  
 Blood Negative 05/19/2020 01:35 AM  
 Urobilinogen 1.0 05/19/2020 01:35 AM  
 Nitrites Negative 05/19/2020 01:35 AM  
 Leukocyte Esterase Negative 05/19/2020 01:35 AM  
 WBC >30 (A) 02/21/2018 04:05 PM  
 RBC 3-10 (A) 02/21/2018 04:05 PM  
 Epithelial cells None seen 02/21/2018 04:05 PM  
 Bacteria Few 02/21/2018 04:05 PM  
 
 
IMPRESSION 
· Acute respiratory failure with hypoxia · Pleural effusion · Acute systolic heart failure · COPD not in exacerbation · Hepatitis, liver congestion in the setting of heart failure · GATO; improved today PLAN 
· Goal sats >88%, wean O2 as tolerated · At baseline uses minimal bronchodilators prn, can add prn nebs if necessary once COVID-19 finalizes · Continue diuresis · Cardiology following · Diet as per speech · Having good response to diuresis and CXR improved. Will hold off on thoracentesis at this time given his lack of symptoms and risk of procedure given underlying multiple comorbidities. Continue to follow CXR. · GI following for hepatitis, no acute interventions indicated at this time We will see  as needed over the weekend and check back with him on Monday if he is still here. Please call with questions.  
 
 
Sorin Marcelino NP

## 2020-05-22 NOTE — PROGRESS NOTES
Problem: Mobility Impaired (Adult and Pediatric) Goal: *Acute Goals and Plan of Care (Insert Text) Description: FUNCTIONAL STATUS PRIOR TO ADMISSION: The patient was functional at the wheelchair level and required minimal assistance for transfers to the chair. HOME SUPPORT PRIOR TO ADMISSION: The patient lived in a group home. Physical Therapy Goals Initiated 5/19/2020 1. Patient will move from supine to sit and sit to supine , scoot up and down and roll side to side in bed with supervision/set-up within 7 day(s). 2.  Patient will transfer from bed to chair and chair to bed with minimal assistance/contact guard assist using the least restrictive device within 7 day(s). 3.  Patient will perform sit to stand with minimal assistance/contact guard assist within 7 day(s). 4.  Patient will ambulate with minimal assistance/contact guard assist for 50 feet with the least restrictive device within 7 day(s). Outcome: Progressing Towards Goal 
Note: PHYSICAL THERAPY TREATMENT Patient: Dudley Main (49 y.o. male) Date: 5/22/2020 Diagnosis: Acute on chronic systolic CHF (congestive heart failure) ([facility] [I50.23] Acute on chronic systolic CHF (congestive heart failure) ([facility] Precautions: (blind in R eye) Chart, physical therapy assessment, plan of care and goals were reviewed. ASSESSMENT Patient continues with skilled PT services and is progressing towards goals. Patient is still confused but able to follow all commands. Patient today required MOD-MAX A x1 for bed mobility, MOD A for sit-stand and MAX A for tranfers to chair due to poor balance and decreased sequencing with RW. Vitals stable. Current Level of Function Impacting Discharge (mobility/balance): MOD-MAX A with RW Other factors to consider for discharge: PLAN : 
Patient continues to benefit from skilled intervention to address the above impairments. Continue treatment per established plan of care. to address goals. Recommendation for discharge: (in order for the patient to meet his/her long term goals) Therapy up to 5 days/week in SNF setting This discharge recommendation: A follow-up discussion with the attending provider and/or case management is planned IF patient discharges home will need the following DME: to be determined (TBD) SUBJECTIVE:  
Patient stated Lonnie Clinton do I have to wear that.  OBJECTIVE DATA SUMMARY:  
Critical Behavior: 
Neurologic State: Alert, Confused Orientation Level: Oriented to person, Disoriented to place, Disoriented to time, Oriented to situation Cognition: Follows commands, Poor safety awareness Safety/Judgement: Awareness of environment, Decreased insight into deficits Vitals Blood pressure Heart rate(bpm) Oxygen saturation (2L NC) Pre-activity 130/61   60   93% During activity Post activity     64   89% Functional Mobility Training: 
Bed Mobility: 
Rolling: Minimum assistance Supine to Sit: Moderate assistance;Assist x1;Additional time Scooting: Maximum assistance Transfers: 
Sit to Stand: Moderate assistance;Assist x1;Additional time Stand to Sit: Moderate assistance; Additional time;Assist x1 Bed to Chair: Maximum assistance;Assist x1;Additional time Pain Rating: 
None reported Activity Tolerance:  
Fair Please refer to the flowsheet for vital signs taken during this treatment. After treatment patient left in no apparent distress:  
Sitting in chair, Call bell within reach, and Bed / chair alarm activated COMMUNICATION/COLLABORATION:  
The patients plan of care was discussed with: Registered nurse. Miah Calabrese, PT, DPT Time Calculation: 11 mins

## 2020-05-22 NOTE — PROGRESS NOTES
[address] [facility] Adult  Hospitalist Group Hospitalist Progress Note Burak Hand MD 
Answering service: [phone] from in house phone Date of Service:  2020 NAME:  Emely Quezada :  1935 MRN:  [id number] Admission Summary:  
82 yo male presented to the hospital with a report of weakness, found to have acute heart failure Interval history / Subjective:  
 Pt states he has no complaints today. Assessment & Plan:  
 
Acute on chronic systolic CHF (congestive heart failure) ([address] (2020) -improving, will try and wean off oxygen 
            - continue diuresis - Cardiology consulted, cont coreg 
            -echo shows EF 20-25%, has pacer 
  
Hepatitis (2020) 
            - transaminases trending down 
            - no clear etiology 
            - acute viral panel neg 
            - consulted GI - congestive hepatopathy 
  
Pleural effusion (10/9/2015) - likely due to CHF 
            - follow with diuresis 
            - improvement on CXR 
            -pulm on board, no indication for thoracentesis -RVP neg 
            - COVID-19 neg Elevated serum creatinine 
           -improving 
            - may be due to CHF as above, follow with diuresis CAD (coronary artery disease) (2015) - troponin elevated, trending down, likely due to CHF 
            - echo shows EF 20-25% 
  
Chronic obstructive pulmonary disease ([address] (2015)/Chronic respiratory failure ([address] (2015) - continue O2 and respiratory meds Dementia ([address] (2015) 
            - moderate to severe, at baseline mental status per daughter   
  
Type 2 diabetes mellitus without complication ([address] () 
            - follow BS on SSI 
  
Code status: - patient is DO NOT RESUSCITATE, AMD on file, daughter Danny Tejeda is mPOA Code status: DNR 
DVT prophylaxis: compression socks Care Plan discussed with: Patient/Family Anticipated Disposition: Home w/Family Anticipated Discharge: 24 hours to 48 hours Hospital Problems  Date Reviewed: 10/17/2019 Codes Class Noted POA Type 2 diabetes mellitus without complication (HCC) (Chronic) ICD-10-CM: E11.9 ICD-9-CM: 250.00  5/18/2020 Yes Hepatitis ICD-10-CM: K75.9 ICD-9-CM: 573.3  5/18/2020 Yes * (Principal) Acute on chronic systolic CHF (congestive heart failure) (HCC) ICD-10-CM: C98.47 ICD-9-CM: 428.23, 428.0  5/18/2020 Yes Chronic respiratory failure (HCC) (Chronic) ICD-10-CM: J96.10 ICD-9-CM: 518.83  11/24/2015 Yes Dementia ([address] (Chronic) ICD-10-CM: F03.90 ICD-9-CM: 294.20  11/20/2015 Yes Pleural effusion ICD-10-CM: J90 ICD-9-CM: 511.9  10/9/2015 Yes Chronic obstructive pulmonary disease (HCC) (Chronic) ICD-10-CM: J44.9 ICD-9-CM: 659  6/28/2015 Yes Overview Signed 6/28/2015  1:05 PM by Sheryle Myrtle, MD  
  04/24/[facility] FVC 2.53-67%-post bronchodilator 2.61-69%-increase 3% FEV1 1.41-52%-post bronchodilator value 1.45-54%-increase 3% FEV1/FVC 56% TLC 5.19-88% RV 2.% DLCO 45% DLCO VA 73% % 04/24/2015-6 minute walk test no exertional hypoxia CAD (coronary artery disease) (Chronic) ICD-10-CM: I25.10 ICD-9-CM: 414.00  6/26/2015 Yes Review of Systems:  
Review of systems not obtained due to patient factors. Vital Signs:  
 Last 24hrs VS reviewed since prior progress note. Most recent are: 
Visit Vitals /74 (BP 1 Location: Right arm, BP Patient Position: At rest) Pulse 60 Temp 97.3 °F (36.3 °C) Resp 17 Ht 5' 10\" (1.778 m) Wt 61.6 kg (135 lb 12.9 oz) SpO2 94% BMI 19.49 kg/m² Intake/Output Summary (Last 24 hours) at 5/22/2020 1042 Last data filed at 5/21/2020 5656 Gross per 24 hour Intake  Output 800 ml Net -800 ml Physical Examination:  
 
 
     
Constitutional:  No acute distress, cooperative, pleasant ENT:  Oral mucosa moist, oropharynx benign. Resp:  CTA bilaterally. No wheezing/rhonchi/rales. No accessory muscle use CV:  Regular rhythm, normal rate, no murmurs, gallops, rubs GI:  Soft, non distended, non tender. normoactive bowel sounds, no hepatosplenomegaly Musculoskeletal:  No edema, warm, 2+ pulses throughout Neurologic:  Moves all extremities. AAOx3, CN II-XII reviewed Data Review:  
 Review and/or order of clinical lab test 
 
 
Labs:  
 
Recent Labs  
  05/22/20 0525 05/21/20 
0300 WBC 7.7 6.3 HGB 10.1* 9.1*  
HCT 32.9* 30.2*  161 Recent Labs  
  05/22/20 0525 05/21/20 
0300 05/20/20 
0101  138 140  
K 3.6 3.6 3.6 CL 97 100 102 CO2 39* 36* 33*  
BUN 20 26* 31* CREA 0.95 0.92 1.03  
* 111* 89  
CA 9.0 8.9 8.8 Recent Labs  
  05/22/20 0525 05/21/20 
0300 05/20/20 
0101 SGOT 118* 223* 406* * 480* 631* AP 91 88 94 TBILI 1.2* 1.2* 1.4* TP 7.2 6.6 6.8 ALB 3.1* 2.8* 2.9*  
GLOB 4.1* 3.8 3.9 Recent Labs  
  05/22/20 0525 05/21/20 
0300 05/20/20 
0101 INR 1.3* 1.4* 1.5* PTP 13.6* 14.1* 14.6* APTT 32.3* 33.9* 33.5* No results for input(s): FE, TIBC, PSAT, FERR in the last 72 hours. No results found for: FOL, RBCF No results for input(s): PH, PCO2, PO2 in the last 72 hours. Recent Labs 05/19/20 
1249 TROIQ 0.15* Lab Results Component Value Date/Time Cholesterol, total 103 02/13/2019 10:22 AM  
 HDL Cholesterol 42 02/13/2019 10:22 AM  
 LDL, calculated 46 02/13/2019 10:22 AM  
 Triglyceride 77 02/13/2019 10:22 AM  
 
Lab Results Component Value Date/Time  Glucose (POC) 129 (H) 05/22/2020 06:33 AM  
 Glucose (POC) 122 (H) 05/21/2020 10:14 PM  
 Glucose (POC) 192 (H) 05/21/2020 05:06 PM  
 Glucose (POC) 175 (H) 05/21/2020 11:11 AM  
 Glucose (POC) 106 (H) 05/21/2020 07:38 AM  
 
Lab Results Component Value Date/Time Color YELLOW/STRAW 05/19/2020 01:35 AM  
 Appearance CLEAR 05/19/2020 01:35 AM  
 Specific gravity 1.005 05/19/2020 01:35 AM  
 pH (UA) 5.5 05/19/2020 01:35 AM  
 Protein Negative 05/19/2020 01:35 AM  
 Glucose Negative 05/19/2020 01:35 AM  
 Ketone Negative 05/19/2020 01:35 AM  
 Bilirubin Negative 05/19/2020 01:35 AM  
 Urobilinogen 1.0 05/19/2020 01:35 AM  
 Nitrites Negative 05/19/2020 01:35 AM  
 Leukocyte Esterase Negative 05/19/2020 01:35 AM  
 Epithelial cells MODERATE (A) 01/03/2018 10:10 PM  
 Bacteria Few 02/21/2018 04:05 PM  
 WBC >30 (A) 02/21/2018 04:05 PM  
 RBC 3-10 (A) 02/21/2018 04:05 PM  
 
 
 
Medications Reviewed:  
 
Current Facility-Administered Medications Medication Dose Route Frequency  dextrose (D50W) injection syrg 12.5-25 g  12.5-25 g IntraVENous PRN  
 aspirin chewable tablet 81 mg  81 mg Oral DAILY  atorvastatin (LIPITOR) tablet 10 mg  10 mg Oral QHS  carvediloL (COREG) tablet 3.125 mg  3.125 mg Oral BID  
 donepeziL (ARICEPT) tablet 10 mg  10 mg Oral DAILY  finasteride (PROSCAR) tablet 5 mg  5 mg Oral QPM  
 levothyroxine (SYNTHROID) tablet 150 mcg  150 mcg Oral ACB  pantoprazole (PROTONIX) tablet 40 mg  40 mg Oral ACB  sertraline (ZOLOFT) tablet 200 mg  200 mg Oral DAILY  tamsulosin (FLOMAX) capsule 0.4 mg  0.4 mg Oral QHS  senna-docusate (PERICOLACE) 8.6-50 mg per tablet 1 Tab  1 Tab Oral DAILY  sodium chloride (NS) flush 5-40 mL  5-40 mL IntraVENous Q8H  
 sodium chloride (NS) flush 5-40 mL  5-40 mL IntraVENous PRN  
 acetaminophen (TYLENOL) tablet 650 mg  650 mg Oral Q4H PRN  prochlorperazine (COMPAZINE) injection 10 mg  10 mg IntraVENous Q6H PRN  
 insulin lispro (HUMALOG) injection   SubCUTAneous QID WITH MEALS  
  glucose chewable tablet 16 g  4 Tab Oral PRN  
 glucagon (GLUCAGEN) injection 1 mg  1 mg IntraMUSCular PRN  
 bumetanide (BUMEX) injection 1 mg  1 mg IntraVENous Q12H  
 
______________________________________________________________________ EXPECTED LENGTH OF STAY: 3d 7h 
ACTUAL LENGTH OF STAY:          4 Selena Pena MD

## 2020-05-22 NOTE — PROGRESS NOTES
CM Note: 
Transition of Care Plan:   RUR 21% 1. Tramaine Stable @ [phone]. 
2. Pt to return to  the [address] [place] [address] Street @ [address] [facility] [address] . 3. Pt is a CHF Bundle pt 4. Pt open to Home Based Primary Care Team through the [facility]. Their number is [phone]. 
5. If pt needs additional home health (PT/OT, SN), referral needs to be sent. Pt has had [facility] in the past. 
6. CM to continue to follow KERWIN Giordano RN 
  
 CM Note: 
Call rec'd from pt's dtr, Vciky Phillips, [phone]. She is his POA and wants to have a daily update since she has not been allowed to see him.    
DEEPIKA Medeiros

## 2020-05-22 NOTE — PROGRESS NOTES
Cassandra Bolton MD. [facility] [place] Patient: Caroline John : 1935 Today's Date: 2020 CARDIOLOGY PROGRESS NOTE 
S: Denies CP or SOB - more alert today per nurse O: 
Visit Vitals /74 (BP 1 Location: Right arm, BP Patient Position: At rest) Pulse 60 Temp 97.3 °F (36.3 °C) Resp 17 Ht 5' 10\" (1.778 m) Wt 135 lb 12.9 oz (61.6 kg) SpO2 94% BMI 19.49 kg/m² General: AAOx3 cooperative, no acute distress. HEENT: Atraumatic. Pink and moist.  Anicteric sclerae. Neck : Supple, no thyromegaly. Lungs: diminished BS bilateral R>>L. No wheezing Heart: Regular rhythm, grade 2-3 SCOTT,   
Abdomen: Soft, non-distended, non-tender. + Bowel sounds. No bruits. Extremities: No edema, no clubbing, no cyanosis. No calf tenderness Neurologic: Grossly intact. Alert and oriented X 1. No acute neurological distress. Psych:  Not anxious nor agitated. Review of Symptoms: 
Constitutional: Negative for fever HEENT: Negative for vision changes. Respiratory: Negative for productive cough Cardiovascular: Negative for syncope Gastrointestinal: Negative for abdominal pain, melena Genitourinary: Negative for dysuria Skin: Negative for rash Heme: No problems bleeding. Neuro - no speech changes or focal weaknesses Tele - V paced LABS / OTHER STUDIES:  
 
Recent Results (from the past 24 hour(s)) D DIMER Collection Time: 20  3:00 AM  
Result Value Ref Range D-dimer 12.54 (H) 0.00 - 0.65 mg/L FEU FIBRINOGEN Collection Time: 20  3:00 AM  
Result Value Ref Range Fibrinogen 321 200 - 475 mg/dL PROTHROMBIN TIME + INR Collection Time: 20  3:00 AM  
Result Value Ref Range INR 1.4 (H) 0.9 - 1.1 Prothrombin time 14.1 (H) 9.0 - 11.1 sec PTT Collection Time: 20  3:00 AM  
Result Value Ref Range  aPTT 33.9 (H) 22.1 - 32.0 sec  
 aPTT, therapeutic range     58.0 - 77.0 SECS  
CBC WITH AUTOMATED DIFF  
 Collection Time: 05/21/20  3:00 AM  
Result Value Ref Range WBC 6.3 4.1 - 11.1 K/uL  
 RBC 3.18 (L) 4.10 - 5.70 M/uL HGB 9.1 (L) 12.1 - 17.0 g/dL HCT 30.2 (L) 36.6 - 50.3 % MCV 95.0 80.0 - 99.0 FL  
 MCH 28.6 26.0 - 34.0 PG  
 MCHC 30.1 30.0 - 36.5 g/dL  
 RDW 17.5 (H) 11.5 - 14.5 % PLATELET 595 670 - 571 K/uL MPV 11.1 8.9 - 12.9 FL  
 NRBC 0.0 0  WBC ABSOLUTE NRBC 0.00 0.00 - 0.01 K/uL NEUTROPHILS 72 32 - 75 % LYMPHOCYTES 13 12 - 49 % MONOCYTES 10 5 - 13 % EOSINOPHILS 4 0 - 7 % BASOPHILS 0 0 - 1 % IMMATURE GRANULOCYTES 1 (H) 0.0 - 0.5 % ABS. NEUTROPHILS 4.5 1.8 - 8.0 K/UL  
 ABS. LYMPHOCYTES 0.8 0.8 - 3.5 K/UL  
 ABS. MONOCYTES 0.6 0.0 - 1.0 K/UL  
 ABS. EOSINOPHILS 0.2 0.0 - 0.4 K/UL  
 ABS. BASOPHILS 0.0 0.0 - 0.1 K/UL  
 ABS. IMM. GRANS. 0.0 0.00 - 0.04 K/UL  
 DF AUTOMATED METABOLIC PANEL, COMPREHENSIVE Collection Time: 05/21/20  3:00 AM  
Result Value Ref Range Sodium 138 136 - 145 mmol/L Potassium 3.6 3.5 - 5.1 mmol/L Chloride 100 97 - 108 mmol/L  
 CO2 36 (H) 21 - 32 mmol/L Anion gap 2 (L) 5 - 15 mmol/L Glucose 111 (H) 65 - 100 mg/dL BUN 26 (H) 6 - 20 MG/DL Creatinine 0.92 0.70 - 1.30 MG/DL  
 BUN/Creatinine ratio 28 (H) 12 - 20 GFR est AA >60 >60 ml/min/1.73m2 GFR est non-AA >60 >60 ml/min/1.73m2 Calcium 8.9 8.5 - 10.1 MG/DL Bilirubin, total 1.2 (H) 0.2 - 1.0 MG/DL  
 ALT (SGPT) 480 (H) 12 - 78 U/L  
 AST (SGOT) 223 (H) 15 - 37 U/L Alk. phosphatase 88 45 - 117 U/L Protein, total 6.6 6.4 - 8.2 g/dL Albumin 2.8 (L) 3.5 - 5.0 g/dL Globulin 3.8 2.0 - 4.0 g/dL A-G Ratio 0.7 (L) 1.1 - 2.2 SAMPLES BEING HELD Collection Time: 05/21/20  3:00 AM  
Result Value Ref Range SAMPLES BEING HELD 1BLU   
 COMMENT Add-on orders for these samples will be processed based on acceptable specimen integrity and analyte stability, which may vary by analyte. GLUCOSE, POC  Collection Time: 05/21/20  7:38 AM  
 Result Value Ref Range Glucose (POC) 106 (H) 65 - 100 mg/dL Performed by Kimo Pearce GLUCOSE, POC Collection Time: 05/21/20 11:11 AM  
Result Value Ref Range Glucose (POC) 175 (H) 65 - 100 mg/dL Performed by Kimo Pearce GLUCOSE, POC Collection Time: 05/21/20  5:06 PM  
Result Value Ref Range Glucose (POC) 192 (H) 65 - 100 mg/dL Performed by Bernadette Ferguson (PCT) GLUCOSE, POC Collection Time: 05/21/20 10:14 PM  
Result Value Ref Range Glucose (POC) 122 (H) 65 - 100 mg/dL Performed by Kareen Kolb CARDIAC DIAGNOSTICS:  
 
Cardiac Evaluation Includes: 
 
 
05/18/20 ECHO ADULT COMPLETE 05/19/2020 5/19/2020 Narrative · Normal cavity size and wall thickness. Severe global systolic function. Estimated left ventricular ejection fraction is 20 - 25%. Visually  
measured ejection fraction. Abnormal left ventricular septal motion  
consistent with right ventricular pacing. · Severely dilated left atrium. · Mildly dilated right ventricle. Mildly reduced systolic function. Pacing  
wire present · Moderately dilated right atrium. · Aortic valve sclerosis with reduced excursion. Aortic valve leaflet  
calcification present. Mild aortic valve stenosis is present. · Mitral valve non-specific thickening. Mild mitral annular calcification. Mild to moderate mitral valve regurgitation is present. · Dilated annulus of the tricuspid valve. Moderate tricuspid valve  
regurgitation is present. · Moderate pulmonary hypertension. Pulmonary arterial systolic pressure is 58 mmHg. Signed by: Livier Cage MD  
 
 
 
CXR 5/22/20 - Moderately large right pleural effusion and tiny left pleural effusion are not significantly changed. There is persistent right basilar atelectasis. Mild pulmonary edema persists.   
 
 
 
ASSESSMENT AND PLAN:  
 
Assessment and Plan: 
 
Devante Estrada is a 81 y.o. male PMH CAD s/p CABG x3, cardiomyopathy, colon cancer, depression, IDDM, GERD, HTN, lung cancer, aortic stenosis, pulm HTN, PVD, stroke, thryoid disease presenting for weakness. Patient has a history of dementia and is a poor historian. Echo shows new LV dysfunction with LVEF 20-25% 1) Acute on chronic HFrEF 
- severe LV dysfunction, likely chronic RV pacing.  
- Still with effusions on CXR --> Continue IV Bumex today - has had a good clinical response - Optimal medical Rx (ACE-I) limited by lowish BP 
 
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 Would not pursue biv pacer ugprade due to comorbid condition 3) Bilateral pleural effusions R>>L, likely all HF related 4) CAD s/p CABG. 5) Chronic anemia 6) Elevation in LFTs (transaminases) - consistent with hepatic congestion from HF 7) Mild-moderate AS 8) Dementia 9) DNR Lilly Nolan MD, [facility] - [address], [address]  [place] [address] [address] [facility] [id number] [place] [address] Ph: [phone]   Ph [phone]

## 2020-05-22 NOTE — PROGRESS NOTES
Problem: Falls - Risk of 
Goal: *Absence of Falls Description: Document Richard Deng Fall Risk and appropriate interventions in the flowsheet. Outcome: Progressing Towards Goal 
Note: Fall Risk Interventions: 
Mobility Interventions: Bed/chair exit alarm, Communicate number of staff needed for ambulation/transfer, OT consult for ADLs, Patient to call before getting OOB, PT Consult for mobility concerns, PT Consult for assist device competence, Utilize walker, cane, or other assistive device, Utilize gait belt for transfers/ambulation Mentation Interventions: Adequate sleep, hydration, pain control, Bed/chair exit alarm, Evaluate medications/consider consulting pharmacy, Door open when patient unattended, Gait belt with transfers/ambulation, Increase mobility, More frequent rounding, Reorient patient, Room close to nurse's station, Toileting rounds Medication Interventions: Bed/chair exit alarm, Evaluate medications/consider consulting pharmacy, Patient to call before getting OOB, Teach patient to arise slowly, Utilize gait belt for transfers/ambulation Elimination Interventions: Bed/chair exit alarm, Call light in reach, Patient to call for help with toileting needs, Toileting schedule/hourly rounds Problem: Pressure Injury - Risk of 
Goal: *Prevention of pressure injury Description: Document Ladarius Scale and appropriate interventions in the flowsheet. Outcome: Progressing Towards Goal 
Note: Pressure Injury Interventions: 
Sensory Interventions: Assess need for specialty bed, Assess changes in LOC, Check visual cues for pain, Discuss PT/OT consult with provider, Keep linens dry and wrinkle-free, Maintain/enhance activity level, Float heels, Monitor skin under medical devices, Pressure redistribution bed/mattress (bed type), Turn and reposition approx. every two hours (pillows and wedges if needed) Moisture Interventions: Absorbent underpads, Apply protective barrier, creams and emollients, Assess need for specialty bed, Check for incontinence Q2 hours and as needed, Maintain skin hydration (lotion/cream) Activity Interventions: Increase time out of bed, Pressure redistribution bed/mattress(bed type), PT/OT evaluation, Assess need for specialty bed Mobility Interventions: Float heels, HOB 30 degrees or less, Pressure redistribution bed/mattress (bed type), PT/OT evaluation, Turn and reposition approx. every two hours(pillow and wedges), Assess need for specialty bed Nutrition Interventions: Document food/fluid/supplement intake, Offer support with meals,snacks and hydration Friction and Shear Interventions: HOB 30 degrees or less, Lift sheet, Lift team/patient mobility team, Transferring/repositioning devices, Apply protective barrier, creams and emollients

## 2020-05-22 NOTE — ROUTINE PROCESS
Bedside shift change report given to Andrea Cespedes (oncoming nurse) by Vandana Mac (offgoing nurse). Report included the following information SBAR, Kardex, Intake/Output, MAR, Accordion, Recent Results and Med Rec Status.

## 2020-05-22 NOTE — PROGRESS NOTES
Problem: Dysphagia (Adult) Goal: *Acute Goals and Plan of Care (Insert Text) Description: Swallowing goals initiated 5-20-20: 
1)  MBS to determine severity of dysphagia-met 2) tolerate dysphagia 1, nectars without s/s aspiration by 5-22-20 ; continue to 5-27-20 Outcome: Not Progressing Towards Goal 
 SPEECH LANGUAGE PATHOLOGY DYSPHAGIA TREATMENT Patient: Sosa Huddleston (18 y.o. 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 
Continuous with s/s aspiration at meals. Premorbid. Family aware. PLAN: 
Recommendations and Planned Interventions: 
Continue dysphagia 1, nectars. Upright duiring nad 30min after all PO. Patient continues to benefit from skilled intervention to address the above impairments. Continue treatment per established plan of care. Discharge Recommendations: To Be Determined SUBJECTIVE:  
Patient stated Can I drink Ensure/?. OBJECTIVE:  
Cognitive and Communication Status: 
Neurologic State: Alert Orientation Level: Oriented to person, Disoriented to place, Disoriented to situation Cognition: Decreased command following, Impulsive, Memory loss Perception: Cues to maintain midline in sitting, Cues to maintain midline in standing, Tactile, Verbal, Visual(blind in R eye) Perseveration: No perseveration noted Safety/Judgement: Awareness of environment, Decreased insight into deficits Dysphagia Treatment: 
Oral Assessment: P.O. Trials: 
Patient Position: up in [facility] Vocal quality prior to P.O.: No impairment Consistency Presented: Puree;Nectar thick liquid How Presented: Self-fed/presented;Spoon;Straw;Successive swallows ORAL PHASE:  
PATIENT feed self impulsively with large bites and sips R lingual residue at times PHARYNGEAL PHASE:  
Mild-modeate wekaness Frequent cough/ thraoat clearing with Po, indicative of penetraiton/aspiration. Continues to feed  self impulsively Exercises: 
Laryngeal Exercises: 
  
  
  
  
  
  
  
  
  
  
  
  
  
  
  
  
  
  
  
  
  
  
  
  
  
  
  
  
  
  
  
  
  
  
  
  
  
  
  
  
  
  
  
Pain: 
Pain Scale 1: Numeric (0 - 10) Pain Intensity 1: 0 After treatment:  
Patient left in no apparent distress sitting up in chair and Nursing notified COMMUNICATION/EDUCATION:  
Patient was educated regarding his deficit(s) of dysphagia  as this relates to his diagnosis of CHF. He demonstrated Guarded understanding as evidenced by memory loss, poor insight. The patient's plan of care including recommendations, planned interventions, and recommended diet changes were discussed with: Registered nurse. Erika De La Torre, SLP Time Calculation: 15 mins

## 2020-05-22 NOTE — ROUTINE PROCESS
9556 - Patient very eager to eat this morning. Did well with yogurt, ate 100%. Ate 50% of Dominican toast and began eating grits when he began to cough and the patient had to be suctioned. Patient requested to keep eating, RN educate patient he needed to catch his breath. Patient has strong cough, and recovered well. However, patient coughed into a napkin with small amount of red blood. Dr. Leatha Epley aware. Will continue to monitor. 0679 - Patient continuously takes off O2, educated patient he needs to wear it. States it hurts his ears. Ear pads have been applied to protect behind ears, however patient still states they are sore.

## 2020-05-23 NOTE — PROGRESS NOTES
[address] [facility] Adult  Hospitalist Group Hospitalist Progress Note Olman Cooper MD 
Answering service: [phone] from in house phone Date of Service:  2020 NAME:  Mervin Aviles :  1935 MRN:  [id number] Admission Summary:  
82 yo male presented to the hospital with a report of weakness, found to have acute heart failure. Receiving IV diuretics, slow improvement. Also concern for aspiration. pulm and cardiology are on board. Interval history / Subjective:  
 Pt is drowsy today, still has intermittent coughing. Assessment & Plan:  
 
Acute on chronic systolic CHF (congestive heart failure) ([facility] (2020) -improving, will try and wean off oxygen. He does not use oxygen at home, but may need to consider sending him home on oxygen if he continues to improve clinically but not able to wean oxygen any further.  
            - continue diuresis - Cardiology consulted, cont coreg and diuresis 
            -echo shows EF 20-25%, has pacer 
  
Hepatitis (2020) 
            - transaminases trending down 
            - no clear etiology 
            - acute viral panel neg 
            - consulted GI - congestive hepatopathy 
  
Pleural effusion (10/9/2015) - likely due to CHF 
            - follow with diuresis 
            - improvement on CXR 
            - pulm on board, no indication for thoracentesis -RVP neg 
            - COVID-19 neg Elevated serum creatinine 
           -improving 
            - may be due to CHF as above, follow with diuresis CAD (coronary artery disease) (2015) - troponin elevated, trending down, likely due to CHF 
            - echo shows EF 20-25% 
  
Chronic obstructive pulmonary disease ([facility] (2015) - continue O2 and respiratory meds Dementia ([facility] (11/20/2015) 
            - moderate to severe, at baseline mental status per daughter   
  
Type 2 diabetes mellitus without complication ([facility] (7/97/2514) 
            - follow BS on SSI 
  
Code status: 
            - patient is DO NOT RESUSCITATE, AMD on file, daughter Jessenia Salamanca is mPOA Code status: DNR 
DVT prophylaxis: compression socks Care Plan discussed with: Patient/Family Anticipated Disposition: Home w/Family Anticipated Discharge: 24 hours to 48 hours Hospital Problems  Date Reviewed: 10/17/2019 Codes Class Noted POA Type 2 diabetes mellitus without complication (HCC) (Chronic) ICD-10-CM: E11.9 ICD-9-CM: 250.00  5/18/2020 Yes Hepatitis ICD-10-CM: K75.9 ICD-9-CM: 573.3  5/18/2020 Yes * (Principal) Acute on chronic systolic CHF (congestive heart failure) (HCC) ICD-10-CM: F15.19 ICD-9-CM: 428.23, 428.0  5/18/2020 Yes Chronic respiratory failure (HCC) (Chronic) ICD-10-CM: J96.10 ICD-9-CM: 518.83  11/24/2015 Yes Dementia ([facility] (Chronic) ICD-10-CM: F03.90 ICD-9-CM: 294.20  11/20/2015 Yes Pleural effusion ICD-10-CM: J90 ICD-9-CM: 511.9  10/9/2015 Yes Chronic obstructive pulmonary disease (HCC) (Chronic) ICD-10-CM: J44.9 ICD-9-CM: 098  6/28/2015 Yes Overview Signed 6/28/2015  1:05 PM by Ayo Gunter MD  
  04/24/[facility] FVC 2.53-67%-post bronchodilator 2.61-69%-increase 3% FEV1 1.41-52%-post bronchodilator value 1.45-54%-increase 3% FEV1/FVC 56% TLC 5.19-88% RV 2.% DLCO 45% DLCO VA 73% % 04/24/2015-6 minute walk test no exertional hypoxia CAD (coronary artery disease) (Chronic) ICD-10-CM: I25.10 ICD-9-CM: 414.00  6/26/2015 Yes Review of Systems:  
Review of systems not obtained due to patient factors. Vital Signs:  
 Last 24hrs VS reviewed since prior progress note. Most recent are: 
Visit Vitals /55 (BP 1 Location: Left arm, BP Patient Position: At rest) Pulse 78 Temp 97.7 °F (36.5 °C) Resp 20 Ht 5' 10\" (1.778 m) Wt 59.3 kg (130 lb 11.2 oz) SpO2 92% BMI 18.75 kg/m² No intake or output data in the 24 hours ending 05/23/20 1003 Physical Examination:  
 
 
     
Constitutional:  No acute distress, cooperative, pleasant ENT:  Oral mucosa moist, oropharynx benign. Resp:  CTA bilaterally. No wheezing/rhonchi/rales. No accessory muscle use CV:  Regular rhythm, normal rate, no murmurs, gallops, rubs GI:  Soft, non distended, non tender. normoactive bowel sounds, no hepatosplenomegaly Musculoskeletal:  No edema, warm, 2+ pulses throughout Neurologic:  Moves all extremities. AAOx3, CN II-XII reviewed Data Review:  
 Review and/or order of clinical lab test 
 
 
Labs:  
 
Recent Labs  
  05/23/20 0323 05/22/20 
0525 WBC 7.8 7.7 HGB 9.6* 10.1* HCT 31.6* 32.9*  
 163 Recent Labs  
  05/23/20 
0323 05/22/20 
0525 05/21/20 
0300  137 138  
K 3.9 3.6 3.6 CL 96* 97 100 CO2 39* 39* 36*  
BUN 26* 20 26* CREA 0.84 0.95 0.92  
* 142* 111* CA 9.0 9.0 8.9 Recent Labs  
  05/22/20 
0525 05/21/20 
0300 SGOT 118* 223* * 480* AP 91 88 TBILI 1.2* 1.2* TP 7.2 6.6 ALB 3.1* 2.8*  
GLOB 4.1* 3.8 Recent Labs  
  05/23/20 
0323 05/22/20 
0525 05/21/20 
0300 INR 1.3* 1.3* 1.4* PTP 13.6* 13.6* 14.1* APTT 33.8* 32.3* 33.9* No results for input(s): FE, TIBC, PSAT, FERR in the last 72 hours. No results found for: FOL, RBCF No results for input(s): PH, PCO2, PO2 in the last 72 hours. No results for input(s): CPK, CKNDX, TROIQ in the last 72 hours. No lab exists for component: CPKMB Lab Results Component Value Date/Time  Cholesterol, total 103 02/13/2019 10:22 AM  
 HDL Cholesterol 42 02/13/2019 10:22 AM  
 LDL, calculated 46 02/13/2019 10:22 AM  
 Triglyceride 77 02/13/2019 10:22 AM  
 
 Lab Results Component Value Date/Time Glucose (POC) 135 (H) 05/23/2020 06:43 AM  
 Glucose (POC) 141 (H) 05/22/2020 09:32 PM  
 Glucose (POC) 239 (H) 05/22/2020 05:20 PM  
 Glucose (POC) 182 (H) 05/22/2020 11:17 AM  
 Glucose (POC) 129 (H) 05/22/2020 06:33 AM  
 
Lab Results Component Value Date/Time Color YELLOW/STRAW 05/19/2020 01:35 AM  
 Appearance CLEAR 05/19/2020 01:35 AM  
 Specific gravity 1.005 05/19/2020 01:35 AM  
 pH (UA) 5.5 05/19/2020 01:35 AM  
 Protein Negative 05/19/2020 01:35 AM  
 Glucose Negative 05/19/2020 01:35 AM  
 Ketone Negative 05/19/2020 01:35 AM  
 Bilirubin Negative 05/19/2020 01:35 AM  
 Urobilinogen 1.0 05/19/2020 01:35 AM  
 Nitrites Negative 05/19/2020 01:35 AM  
 Leukocyte Esterase Negative 05/19/2020 01:35 AM  
 Epithelial cells MODERATE (A) 01/03/2018 10:10 PM  
 Bacteria Few 02/21/2018 04:05 PM  
 WBC >30 (A) 02/21/2018 04:05 PM  
 RBC 3-10 (A) 02/21/2018 04:05 PM  
 
 
 
Medications Reviewed:  
 
Current Facility-Administered Medications Medication Dose Route Frequency  dextrose (D50W) injection syrg 12.5-25 g  12.5-25 g IntraVENous PRN  
 aspirin chewable tablet 81 mg  81 mg Oral DAILY  atorvastatin (LIPITOR) tablet 10 mg  10 mg Oral QHS  carvediloL (COREG) tablet 3.125 mg  3.125 mg Oral BID  
 donepeziL (ARICEPT) tablet 10 mg  10 mg Oral DAILY  finasteride (PROSCAR) tablet 5 mg  5 mg Oral QPM  
 levothyroxine (SYNTHROID) tablet 150 mcg  150 mcg Oral ACB  pantoprazole (PROTONIX) tablet 40 mg  40 mg Oral ACB  sertraline (ZOLOFT) tablet 200 mg  200 mg Oral DAILY  tamsulosin (FLOMAX) capsule 0.4 mg  0.4 mg Oral QHS  senna-docusate (PERICOLACE) 8.6-50 mg per tablet 1 Tab  1 Tab Oral DAILY  sodium chloride (NS) flush 5-40 mL  5-40 mL IntraVENous Q8H  
 sodium chloride (NS) flush 5-40 mL  5-40 mL IntraVENous PRN  
 acetaminophen (TYLENOL) tablet 650 mg  650 mg Oral Q4H PRN  
  prochlorperazine (COMPAZINE) injection 10 mg  10 mg IntraVENous Q6H PRN  
 insulin lispro (HUMALOG) injection   SubCUTAneous QID WITH MEALS  glucose chewable tablet 16 g  4 Tab Oral PRN  
 glucagon (GLUCAGEN) injection 1 mg  1 mg IntraMUSCular PRN  
 bumetanide (BUMEX) injection 1 mg  1 mg IntraVENous Q12H  
 
______________________________________________________________________ EXPECTED LENGTH OF STAY: 3d 7h 
ACTUAL LENGTH OF STAY:          5 Benjamin Reynoso MD

## 2020-05-23 NOTE — PROGRESS NOTES
Spiritual Care Assessment/Progress Note [address] 
 
 
NAME: Magaly Aldana      MRN: [id number] AGE: 81 y.o. SEX: male Anabaptist Affiliation: No preference Language: [place] 5/23/2020     Total Time (in minutes): 6 Spiritual Assessment begun in [facility] 5M1 MED SURG 1 through conversation with: 
  
    [x]Patient        [] Family    [] Friend(s) Reason for Consult: Initial/Spiritual assessment, patient floor Spiritual beliefs: (Please include comment if needed) 
   [] Identifies with a garrison tradition:     
   [] Supported by a garrison community:        
   [] Claims no spiritual orientation:       
   [] Seeking spiritual identity:            
   [] Adheres to an individual form of spirituality:       
   [x] Not able to assess:                   
 
    
Identified resources for coping:  
   [] Prayer                           
   [] Music                  [] Guided Imagery 
   [] Family/friends                 [] Pet visits [] Devotional reading                         [x] Unknown 
   [] Other:                                       
 
 
Interventions offered during this visit: (See comments for more details) Plan of Care: 
 
 [] Support spiritual and/or cultural needs  
 [] Support AMD and/or advance care planning process    
 [] Support grieving process 
 [] Coordinate Rites and/or Rituals  
 [] Coordination with community clergy [] No spiritual needs identified at this time 
 [] Detailed Plan of Care below (See Comments)  [] Make referral to Music Therapy 
[] Make referral to Pet Therapy    
[] Make referral to Addiction services 
[] Make referral to [facility] 
[] Make referral to Spiritual Care Partner 
[] No future visits requested       
[x] Follow up visits as needed Comments:  attempted initial spiritual assessment for the second time at the Med Surg unit.  Pt was unresponsive, nurse said he was not responsive  right now. Spiritual care will continue to follow up as needed, or upon request.  
Visited by: Jerene Sacks. To page : [phone] ([phone])

## 2020-05-23 NOTE — PROGRESS NOTES
Nirali Rodriguez MD. [facility] [place] Patient: Mervin Aviles : 1935 Today's Date: 2020 CARDIOLOGY PROGRESS NOTE 
S: Says he is Ok. No Cp.   
O: 
Visit Vitals /64 (BP 1 Location: Left arm, BP Patient Position: At rest) Pulse 61 Temp 97.5 °F (36.4 °C) Resp 20 Ht 5' 10\" (1.778 m) Wt 130 lb 11.2 oz (59.3 kg) SpO2 91% BMI 18.75 kg/m² General: cooperative, no acute distress. HEENT: Atraumatic. Pink and moist.  Anicteric sclerae. Neck : Supple, no thyromegaly. Lungs: basilar rales; No wheezing Heart: Regular rhythm, grade 2-3 SCOTT,   
Abdomen: Soft, non-distended, non-tender. + Bowel sounds. No bruits. Extremities: No edema, no clubbing, no cyanosis. No calf tenderness Neurologic: Grossly intact.  Alert and oriented X 1.  No acute neurological distress. Psych:  Not anxious nor agitated. 
  
  
Review of Symptoms: 
Constitutional: Negative for fever HEENT: Negative for vision changes. Respiratory: Negative for productive cough Cardiovascular: Negative for syncope Gastrointestinal: Negative for abdominal pain, melena Genitourinary: Negative for dysuria Skin: Negative for rash Heme: No problems bleeding. Neuro - no speech changes or focal weaknesses No intake or output data in the 24 hours ending 20 1625 LABS / OTHER STUDIES:  
 
Recent Results (from the past 24 hour(s)) GLUCOSE, POC Collection Time: 20  5:20 PM  
Result Value Ref Range Glucose (POC) 239 (H) 65 - 100 mg/dL Performed by Alberto Callahan GLUCOSE, POC Collection Time: 20  9:32 PM  
Result Value Ref Range Glucose (POC) 141 (H) 65 - 100 mg/dL Performed by Tommy Patel D DIMER Collection Time: 20  3:23 AM  
Result Value Ref Range D-dimer 3.34 (H) 0.00 - 0.65 mg/L FEU FIBRINOGEN Collection Time: 20  3:23 AM  
Result Value Ref Range Fibrinogen 310 200 - 475 mg/dL PROTHROMBIN TIME + INR  
 Collection Time: 05/23/20  3:23 AM  
Result Value Ref Range INR 1.3 (H) 0.9 - 1.1 Prothrombin time 13.6 (H) 9.0 - 11.1 sec PTT Collection Time: 05/23/20  3:23 AM  
Result Value Ref Range aPTT 33.8 (H) 22.1 - 32.0 sec  
 aPTT, therapeutic range     58.0 - 77.0 SECS  
NT-PRO BNP Collection Time: 05/23/20  3:23 AM  
Result Value Ref Range NT pro-BNP 10,545 (H) <450 PG/ML  
CBC WITH AUTOMATED DIFF Collection Time: 05/23/20  3:23 AM  
Result Value Ref Range WBC 7.8 4.1 - 11.1 K/uL  
 RBC 3.33 (L) 4.10 - 5.70 M/uL HGB 9.6 (L) 12.1 - 17.0 g/dL HCT 31.6 (L) 36.6 - 50.3 % MCV 94.9 80.0 - 99.0 FL  
 MCH 28.8 26.0 - 34.0 PG  
 MCHC 30.4 30.0 - 36.5 g/dL  
 RDW 17.3 (H) 11.5 - 14.5 % PLATELET 707 229 - 441 K/uL MPV 9.9 8.9 - 12.9 FL  
 NRBC 0.0 0  WBC ABSOLUTE NRBC 0.00 0.00 - 0.01 K/uL NEUTROPHILS 72 32 - 75 % LYMPHOCYTES 14 12 - 49 % MONOCYTES 11 5 - 13 % EOSINOPHILS 3 0 - 7 % BASOPHILS 0 0 - 1 % IMMATURE GRANULOCYTES 0 0.0 - 0.5 % ABS. NEUTROPHILS 5.6 1.8 - 8.0 K/UL  
 ABS. LYMPHOCYTES 1.1 0.8 - 3.5 K/UL  
 ABS. MONOCYTES 0.9 0.0 - 1.0 K/UL  
 ABS. EOSINOPHILS 0.3 0.0 - 0.4 K/UL  
 ABS. BASOPHILS 0.0 0.0 - 0.1 K/UL  
 ABS. IMM. GRANS. 0.0 0.00 - 0.04 K/UL  
 DF AUTOMATED METABOLIC PANEL, BASIC Collection Time: 05/23/20  3:23 AM  
Result Value Ref Range Sodium 138 136 - 145 mmol/L Potassium 3.9 3.5 - 5.1 mmol/L Chloride 96 (L) 97 - 108 mmol/L  
 CO2 39 (H) 21 - 32 mmol/L Anion gap 3 (L) 5 - 15 mmol/L Glucose 132 (H) 65 - 100 mg/dL BUN 26 (H) 6 - 20 MG/DL Creatinine 0.84 0.70 - 1.30 MG/DL  
 BUN/Creatinine ratio 31 (H) 12 - 20 GFR est AA >60 >60 ml/min/1.73m2 GFR est non-AA >60 >60 ml/min/1.73m2 Calcium 9.0 8.5 - 10.1 MG/DL  
SAMPLES BEING HELD Collection Time: 05/23/20  3:23 AM  
Result Value Ref Range SAMPLES BEING HELD 1BLU   
 COMMENT Add-on orders for these samples will be processed based on acceptable specimen integrity and analyte stability, which may vary by analyte. GLUCOSE, POC Collection Time: 05/23/20  6:43 AM  
Result Value Ref Range Glucose (POC) 135 (H) 65 - 100 mg/dL Performed by Riri Wooten GLUCOSE, POC Collection Time: 05/23/20 11:48 AM  
Result Value Ref Range Glucose (POC) 188 (H) 65 - 100 mg/dL Performed by Lina Rubi (PCT) GLUCOSE, POC Collection Time: 05/23/20  4:05 PM  
Result Value Ref Range Glucose (POC) 262 (H) 65 - 100 mg/dL Performed by Lina Rubi (PCT)   
 
 
 
  
  
  
CARDIAC DIAGNOSTICS:  
  
Cardiac Evaluation Includes: 
  
  
    
05/18/20 ECHO ADULT COMPLETE 05/19/2020 5/19/2020  
  Narrative · Normal cavity size and wall thickness. Severe global systolic function. Estimated left ventricular ejection fraction is 20 - 25%. Visually  
measured ejection fraction. Abnormal left ventricular septal motion  
consistent with right ventricular pacing. · Severely dilated left atrium. · Mildly dilated right ventricle. Mildly reduced systolic function. Pacing  
wire present · Moderately dilated right atrium. · Aortic valve sclerosis with reduced excursion. Aortic valve leaflet  
calcification present. Mild aortic valve stenosis is present. · Mitral valve non-specific thickening. Mild mitral annular calcification. Mild to moderate mitral valve regurgitation is present. · Dilated annulus of the tricuspid valve. Moderate tricuspid valve  
regurgitation is present. · Moderate pulmonary hypertension. Pulmonary arterial systolic pressure is 58 mmHg. 
   
    Signed by: Honey Duran MD  
  
  
  
CXR 5/22/20 - Moderately large right pleural effusion and tiny left pleural effusion are not significantly changed. There is persistent right basilar atelectasis.  Mild pulmonary edema persists.  
  
CXR 5/23/20 - stable appearance including moderately large right pleural effusion and mild pulmonary edema. 
  
  
ASSESSMENT AND PLAN:  
  
Assessment and Plan: 
  
Chata Giraldo is a 81 y. o. male PMH CAD s/p CABG x3, cardiomyopathy, colon cancer, depression, IDDM, GERD, HTN, lung cancer, aortic stenosis, pulm HTN, PVD, stroke, thryoid disease presenting for weakness. Patient has a history of dementia and is a poor historian.  Echo shows new LV dysfunction with LVEF 20-25% 
  
  
1) Acute on chronic HFrEF 
- severe LV dysfunction, likely chronic RV pacing.  
- Still with effusions on CXR however proBNP has been cut in half  
--> Continue IV Bumex today - has had a good clinical response -- likely switch to PO Bumex 1 mg BID tomorrow and can then diurese further as outpatient - May need to be discharged on home O2 
- try adding low dose lisinopril 2.5 mg tonight; continue coreg   
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 Would not pursue biv pacer ugprade due to comorbid condition   
3) Bilateral pleural effusions R>>L, likely all HF related   
4) CAD s/p CABG.   
5) Chronic anemia 
  
6) Elevation in LFTs (transaminases) - consistent with hepatic congestion from HF 
  
7) Mild-moderate AS 
  
8) Dementia 
  
9) DNR 
  
  
  
  
  
Alex Banegas MD, [address], [address]      [address] Ph: [phone]                               Ph [phone]

## 2020-05-23 NOTE — ROUTINE PROCESS
Bedside shift change report given to [facility] (oncoming nurse) by India Lara (offgoing nurse). Report included the following information SBAR, Kardex, Procedure Summary, Intake/Output, MAR, Accordion, Recent Results and Med Rec Status.

## 2020-05-23 NOTE — PROGRESS NOTES
Problem: Falls - Risk of 
Goal: *Absence of Falls Description: Document Clydene Bone Fall Risk and appropriate interventions in the flowsheet. Outcome: Progressing Towards Goal 
Note: Fall Risk Interventions: 
Mobility Interventions: Bed/chair exit alarm, Patient to call before getting OOB Mentation Interventions: Bed/chair exit alarm, Door open when patient unattended Medication Interventions: Bed/chair exit alarm, Patient to call before getting OOB Elimination Interventions: Bed/chair exit alarm, Call light in reach, Toileting schedule/hourly rounds, Urinal in reach

## 2020-05-23 NOTE — ROUTINE PROCESS
Bedside and Verbal shift change report given to [place] [facility] (oncoming nurse) by Héctor Conklin RN (offgoing nurse). Report included the following information SBAR, Kardex, MAR and Accordion.

## 2020-05-24 NOTE — ROUTINE PROCESS
Bedside shift change report given to Vivek Figueroa (oncoming nurse) by Oumou Harley (offgoing nurse). Report included the following information SBAR, Kardex, Procedure Summary, Intake/Output, MAR, Accordion, Recent Results and Med Rec Status.

## 2020-05-24 NOTE — PROGRESS NOTES
Problem: Falls - Risk of 
Goal: *Absence of Falls Description: Document Richard Deng Fall Risk and appropriate interventions in the flowsheet. Outcome: Progressing Towards Goal 
Note: Fall Risk Interventions: 
Mobility Interventions: Bed/chair exit alarm, Patient to call before getting OOB, PT Consult for mobility concerns Mentation Interventions: Bed/chair exit alarm, Door open when patient unattended, More frequent rounding, Reorient patient, Room close to nurse's station Medication Interventions: Bed/chair exit alarm, Patient to call before getting OOB Elimination Interventions: Bed/chair exit alarm, Call light in reach, Toileting schedule/hourly rounds, Urinal in reach

## 2020-05-24 NOTE — PROGRESS NOTES
Ozzy Resendiz [facility] 
[address] [facility], [address] [place] [address] 
[phone] Medical Progress Note NAME:         Murtaza Kumar :        1935 MRM:        [id number] Date of service: 2020 Subjective: Patient has been seen and examined as a follow up for multiple medical issues. Chart, labs, diagnostics reviewed. He remains weak. Not as much SOB. Hypoxic. No chest pain, nausea or vomiting Objective: 
 
Vital Signs: 
 
Visit Vitals /51 (BP 1 Location: Left arm, BP Patient Position: At rest) Pulse 60 Temp 98.2 °F (36.8 °C) Resp 14 Ht 5' 10\" (1.778 m) Wt 59.3 kg (130 lb 11.2 oz) SpO2 90% BMI 18.75 kg/m² No intake or output data in the 24 hours ending 20 1046 Physical Examination: 
 
General:   Weak and ill looking although not in any acute distress Eyes:   pink conjunctivae, Blind right eye. No discharge. ENT:   no ottorrhea or rhinorrhea with moist mucous membranes Neck: no masses, thyroid non-tender and trachea central. 
Pulm:  no accessory muscle use, clear breath sounds without crackles or wheezes Card:  no JVD or murmurs, has regular and normal S1, S2 without thrills, bruits or peripheral edema Abd:  Soft, non-tender, non-distended, normoactive bowel sounds Musc:  No cyanosis, clubbing, atrophy or deformities. Skin:  No rashes, bruising or ulcers. Neuro: Awake and alert. Generally a non focal exam. Follows commands appropriately Psych:  Has a poor insight to his illness Current Facility-Administered Medications Medication Dose Route Frequency  bumetanide (BUMEX) tablet 1 mg  1 mg Oral BID  lisinopriL (PRINIVIL, ZESTRIL) tablet 2.5 mg  2.5 mg Oral QHS  dextrose (D50W) injection syrg 12.5-25 g  12.5-25 g IntraVENous PRN  
 aspirin chewable tablet 81 mg  81 mg Oral DAILY  atorvastatin (LIPITOR) tablet 10 mg  10 mg Oral QHS  carvediloL (COREG) tablet 3.125 mg  3.125 mg Oral BID  
 donepeziL (ARICEPT) tablet 10 mg  10 mg Oral DAILY  finasteride (PROSCAR) tablet 5 mg  5 mg Oral QPM  
 levothyroxine (SYNTHROID) tablet 150 mcg  150 mcg Oral ACB  pantoprazole (PROTONIX) tablet 40 mg  40 mg Oral ACB  sertraline (ZOLOFT) tablet 200 mg  200 mg Oral DAILY  tamsulosin (FLOMAX) capsule 0.4 mg  0.4 mg Oral QHS  senna-docusate (PERICOLACE) 8.6-50 mg per tablet 1 Tab  1 Tab Oral DAILY  sodium chloride (NS) flush 5-40 mL  5-40 mL IntraVENous Q8H  
 sodium chloride (NS) flush 5-40 mL  5-40 mL IntraVENous PRN  
 acetaminophen (TYLENOL) tablet 650 mg  650 mg Oral Q4H PRN  prochlorperazine (COMPAZINE) injection 10 mg  10 mg IntraVENous Q6H PRN  
 insulin lispro (HUMALOG) injection   SubCUTAneous QID WITH MEALS  glucose chewable tablet 16 g  4 Tab Oral PRN  
 glucagon (GLUCAGEN) injection 1 mg  1 mg IntraMUSCular PRN Laboratory data and review: 
 
Recent Labs  
  05/24/20 
0251 05/23/20 
0323 05/22/20 
2110 WBC 8.4 7.8 7.7 HGB 9.4* 9.6* 10.1* HCT 30.7* 31.6* 32.9*  
 150 163 Recent Labs  
  05/24/20 
0251 05/23/20 
0323 05/22/20 
1277  138 137  
K 4.0 3.9 3.6 CL 98 96* 97  
CO2 40* 39* 39* * 132* 142* BUN 26* 26* 20  
CREA 0.81 0.84 0.95 CA 9.0 9.0 9.0 ALB 2.8*  --  3.1*  
SGOT 40*  --  118* *  --  378* INR 1.3* 1.3* 1.3* No components found for: [place] Assessment and Plan: 
 
Acute on chronic systolic CHF (congestive heart failure) ([facility] (5/18/2020) / [facility] (6/11/2015) /  Pleural effusion (10/9/2015) POA: recent CXR showed mild pulm edema. Recent Echo showed EF 20-25%. Moderate pulm HTN with PASP 58. Cardiomyopathy due to chronic RV pacing. Seen and followed by cardiology. Continue Bumex and low dose Lisinopril. Ambulate as tolerated Acute respiratory failure with hypoxia POA: due to CHF.  Supplement oxygen and wean as tolerated. May need home oxygen. Type 2 diabetes mellitus without complication ([facility] (9/24/7563): A1c 6.5. Holding Glucotrol. Continue SSi per protocol CAD (coronary artery disease) (6/26/2015): sp CABG. Stable. Continue Asprin, Lipitor, Coreg Chronic obstructive pulmonary disease ([facility] (6/28/2015) POA: he says he does NOT use home oxygen. Wean as tolerated. Nebs PRN Dementia ([facility] (11/20/2015): without behavioral disturbances. Moderate to severe. Continue Aricept, Zoloft Hepatitis (5/18/2020): acute hep panel neg. Due to congestive hepatopathy. LFTs improving Total time spent for the patient's care: 35 Minutes Care Plan discussed with: Patient and Nursing Staff Discussed:  Care Plan and D/C Planning Prophylaxis:  H2B/PPI Anticipated Disposition:  [facility]  
        
___________________________________________________ Attending Physician:   Jose Carlos Andino MD

## 2020-05-25 NOTE — PROGRESS NOTES
Bedside and Verbal shift change report given to Cassia fuchs  (oncoming nurse) by Jarret Tellez  (offgoing nurse). Report included the following information SBAR and Kardex.

## 2020-05-25 NOTE — PROGRESS NOTES
Ozzy Resendiz [facility] 
[address] [facility], [place], [id number] [place] [address] 
[phone] Medical Progress Note NAME:         Hero Lloyd :        1935 MRM:        [id number] Date of service: 2020 Subjective: Patient has been seen and examined as a follow up for multiple medical issues. Chart, labs, diagnostics reviewed. He remains weak but stable with no new symptoms. Remains hypoxic though. No chest pain, nausea or vomiting Objective: 
 
Vital Signs: 
 
Visit Vitals /53 (BP 1 Location: Right arm, BP Patient Position: At rest) Pulse (!) 114 Temp 97.8 °F (36.6 °C) Resp 17 Ht 5' 10\" (1.778 m) Wt 57.4 kg (126 lb 8 oz) SpO2 90% BMI 18.15 kg/m² Intake/Output Summary (Last 24 hours) at 2020 5517 Last data filed at 2020 8043 Gross per 24 hour Intake 240 ml Output  Net 240 ml Physical Examination: 
 
General:   Weak and ill looking although not in any acute distress Eyes:   pink conjunctivae, Blind right eye. No discharge. ENT:   no ottorrhea or rhinorrhea with moist mucous membranes Neck: no masses, thyroid non-tender and trachea central. 
Pulm:  no accessory muscle use, decreased but clear breath sounds without crackles or wheezes Card:  no JVD or murmurs, has regular and normal S1, S2 without thrills, bruits or peripheral edema Abd:  Soft, non-tender, non-distended, normoactive bowel sounds Musc:  No cyanosis, clubbing, atrophy or deformities. Skin:  No rashes, bruising or ulcers. Neuro: Awake and alert. Generally a non focal exam. Follows commands appropriately Psych:  Has a poor insight to his illness Current Facility-Administered Medications Medication Dose Route Frequency  bumetanide (BUMEX) tablet 1 mg  1 mg Oral BID  lisinopriL (PRINIVIL, ZESTRIL) tablet 2.5 mg  2.5 mg Oral QHS  dextrose (D50W) injection syrg 12.5-25 g  12.5-25 g IntraVENous PRN  
 aspirin chewable tablet 81 mg  81 mg Oral DAILY  atorvastatin (LIPITOR) tablet 10 mg  10 mg Oral QHS  carvediloL (COREG) tablet 3.125 mg  3.125 mg Oral BID  
 donepeziL (ARICEPT) tablet 10 mg  10 mg Oral DAILY  finasteride (PROSCAR) tablet 5 mg  5 mg Oral QPM  
 levothyroxine (SYNTHROID) tablet 150 mcg  150 mcg Oral ACB  pantoprazole (PROTONIX) tablet 40 mg  40 mg Oral ACB  sertraline (ZOLOFT) tablet 200 mg  200 mg Oral DAILY  tamsulosin (FLOMAX) capsule 0.4 mg  0.4 mg Oral QHS  senna-docusate (PERICOLACE) 8.6-50 mg per tablet 1 Tab  1 Tab Oral DAILY  sodium chloride (NS) flush 5-40 mL  5-40 mL IntraVENous Q8H  
 sodium chloride (NS) flush 5-40 mL  5-40 mL IntraVENous PRN  
 acetaminophen (TYLENOL) tablet 650 mg  650 mg Oral Q4H PRN  prochlorperazine (COMPAZINE) injection 10 mg  10 mg IntraVENous Q6H PRN  
 insulin lispro (HUMALOG) injection   SubCUTAneous QID WITH MEALS  glucose chewable tablet 16 g  4 Tab Oral PRN  
 glucagon (GLUCAGEN) injection 1 mg  1 mg IntraMUSCular PRN Laboratory data and review: 
 
Recent Labs  
  05/24/20 
0251 05/23/20 
0323 WBC 8.4 7.8 HGB 9.4* 9.6* HCT 30.7* 31.6*  
 150 Recent Labs  
  05/24/20 
0251 05/23/20 
2970  138  
K 4.0 3.9 CL 98 96* CO2 40* 39* * 132* BUN 26* 26* CREA 0.81 0.84 CA 9.0 9.0 ALB 2.8*  --   
SGOT 40*  --   
*  --   
INR 1.3* 1.3* No components found for: [place] Assessment and Plan: 
 
Acute on chronic systolic CHF (congestive heart failure) ([facility] (5/18/2020) / Cardiomyopathy ([facility] (6/11/2015) /  Pleural effusion (10/9/2015) POA: recent CXR showed mild pulm edema. Recent Echo showed EF 20-25%. Moderate pulm HTN with PASP 58. Cardiomyopathy due to chronic RV pacing. Seen and followed by cardiology. Continue oral Bumex and Lisinopril. Ambulate as tolerated Acute respiratory failure with hypoxia POA: due to CHF. Supplement oxygen and wean as tolerated. Consult PT to assess for home oxygen and CM to arrange supply. Type 2 diabetes mellitus without complication ([facility] (5/41/0109): A1c 6.5. Holding Glucotrol. Continue SSi per protocol CAD (coronary artery disease) (6/26/2015): sp CABG. Stable. Continue Asprin, Lipitor, Coreg Chronic obstructive pulmonary disease ([facility] (6/28/2015) POA: he says he does NOT use home oxygen. Wean as tolerated. Nebs PRN Dementia ([facility] (11/20/2015): without behavioral disturbances. Moderate to severe. Continue Aricept, Zoloft Hepatitis (5/18/2020): acute hep panel neg. Due to congestive hepatopathy. LFTs improving Total time spent for the patient's care: 30  Minutes Care Plan discussed with: Patient and Nursing Staff Discussed:  Care Plan and D/C Planning Prophylaxis:  H2B/PPI Anticipated Disposition:  [facility]  likely 5/26 
        
___________________________________________________ Attending Physician:   Lori Hunter MD

## 2020-05-25 NOTE — PROGRESS NOTES
Martha Sneed MD. [facility] [place] Patient: Murtaza Kumar : 1935 Today's Date: 2020 CARDIOLOGY PROGRESS NOTE 
S: Denies CP or SOB. O: 
Visit Vitals /55 (BP 1 Location: Right arm, BP Patient Position: At rest) Pulse 60 Temp 97.9 °F (36.6 °C) Resp 17 Ht 5' 10\" (1.778 m) Wt 126 lb 8 oz (57.4 kg) SpO2 91% BMI 18.15 kg/m² General: AAOx1 cooperative, no acute distress. HEENT: Atraumatic. Pink and moist.  Anicteric sclerae. Neck : Supple, no thyromegaly. Lungs: diminished bases with few coarse sounds. No wheezing Heart: Regular rhythm, grade 2-3 SCOTT,   
Abdomen: Soft, non-distended, non-tender. + Bowel sounds. No bruits. Extremities: No edema, no clubbing, no cyanosis. No calf tenderness Neurologic: Grossly intact.  Alert and oriented X 1.  No acute neurological distress. Psych:  Not anxious nor agitated. 
  
  
Review of Symptoms: 
Constitutional: Negative for fever HEENT: Negative for vision changes. Respiratory: Negative for productive cough Cardiovascular: Negative for syncope   
Gastrointestinal: Negative for abdominal pain, melena Genitourinary: Negative for dysuria Skin: Negative for rash Heme: No problems bleeding. Neuro - no speech changes or focal weaknesses 
  
 
No intake or output data in the 24 hours ending 20 1456 LABS / OTHER STUDIES:  
 
Recent Results (from the past 24 hour(s)) GLUCOSE, POC Collection Time: 20  4:36 PM  
Result Value Ref Range Glucose (POC) 213 (H) 65 - 100 mg/dL Performed by Almarie Severs (PCT) GLUCOSE, POC Collection Time: 20  9:43 PM  
Result Value Ref Range Glucose (POC) 148 (H) 65 - 100 mg/dL Performed by Akilah Mai (PCT) GLUCOSE, POC Collection Time: 20  6:54 AM  
Result Value Ref Range Glucose (POC) 121 (H) 65 - 100 mg/dL Performed by Akilah Mai (PCT) GLUCOSE, POC  Collection Time: 20 11:01 AM  
 Result Value Ref Range Glucose (POC) 184 (H) 65 - 100 mg/dL Performed by Abdulaziz Liu   
 
 
  
CARDIAC DIAGNOSTICS:  
  
Cardiac Evaluation Includes: 
  
  
       
05/18/20 ECHO ADULT COMPLETE 05/19/2020 5/19/2020  
  Narrative · Normal cavity size and wall thickness. Severe global systolic function. Estimated left ventricular ejection fraction is 20 - 25%. Visually  
measured ejection fraction. Abnormal left ventricular septal motion  
consistent with right ventricular pacing. · Severely dilated left atrium. · Mildly dilated right ventricle. Mildly reduced systolic function. Pacing  
wire present · Moderately dilated right atrium. · Aortic valve sclerosis with reduced excursion. Aortic valve leaflet  
calcification present. Mild aortic valve stenosis is present. · Mitral valve non-specific thickening. Mild mitral annular calcification. Mild to moderate mitral valve regurgitation is present. · Dilated annulus of the tricuspid valve. Moderate tricuspid valve  
regurgitation is present. · Moderate pulmonary hypertension. Pulmonary arterial systolic pressure is 58 mmHg. 
   
    Signed by: Radha Guillen MD  
  
  
  
CXR 5/22/20 - Moderately large right pleural effusion and tiny left pleural effusion are not significantly changed. There is persistent right basilar atelectasis. Mild pulmonary edema persists.  
  
CXR 5/23/20 - Frontal and lateral upright views of the chest show stable appearance including moderately large right pleural effusion and mild pulmonary edema.  
  
  
ASSESSMENT AND PLAN:  
  
Assessment and Plan: 
  
Martha Giraldo is a 81 y. o. male PMH CAD s/p CABG x3, cardiomyopathy, colon cancer, depression, IDDM, GERD, HTN, lung cancer, aortic stenosis, pulm HTN, PVD, stroke, thryoid disease presenting for weakness.  Patient has a history of dementia and is a poor historian.  Echo shows new LV dysfunction with LVEF 20-25% 
  
  
1) Acute on chronic HFrEF 
 - severe LV dysfunction, likely chronic RV pacing.  
- has had a good clinical response to IV diuretics  but effusion persists. Pulmonary noted on 5/22/20 that \"Will hold off on thoracentesis at this time given his lack of symptoms and risk of procedure given underlying multiple comorbidities. \" 
- Will proceed with PO Bumex 1 mg BID for now (may need to adjust as an outpatient)  
- Cont Coreg, lisinopril   
2) Cardiomyopathy due to RV pacing. Would not pursue biv pacer ugprade due to comorbid condition   
3) Bilateral pleural effusions R>>L, likely all HF related   
4) CAD s/p CABG. - cont ASA and statin   
5) Chronic anemia 
  
6) Elevation in LFTs (transaminases) - consistent with hepatic congestion from HF 
  
7) Mild-moderate AS 
  
8) Dementia 
  
9) DNR 
  
10) Dispo - OK for SNF/rehab from a cardiac standpoint. Will need early FU with Cardiology (VV with Dr. Sancho [place]) - office to arrange that 11) Will sign off. Please call if any questions.   
 
  
  
  
Donaldo Nascimento MD, [facility] [address] [facility], [place] [address] [facility], [address] [id number] [address] [facility], [address] [place] [address] Ph: [phone]                                [phone]

## 2020-05-25 NOTE — PROGRESS NOTES
Spiritual Care Assessment/Progress Note [address] 
 
 
NAME: Brett Lao      MRN: [id number] AGE: 81 y.o. SEX: male Synagogue Affiliation: No preference Language: [place] 5/25/2020     Total Time (in minutes): 5 Spiritual Assessment begun in [facility] 5M1 MED SURG 1 through conversation with: 
  
    [x]Patient        [] Family    [] Friend(s) Reason for Consult: Initial/Spiritual assessment, patient floor Spiritual beliefs: (Please include comment if needed) 
   [] Identifies with a garrison tradition:     
   [] Supported by a garrison community:        
   [] Claims no spiritual orientation:       
   [] Seeking spiritual identity:            
   [] Adheres to an individual form of spirituality:       
   [x] Not able to assess:                   
 
    
Identified resources for coping:  
   [] Prayer                           
   [] Music                  [] Guided Imagery 
   [] Family/friends                 [] Pet visits [] Devotional reading                         [x] Unknown 
   [] Other:                               
 
 
Interventions offered during this visit: (See comments for more details) Patient Interventions: Initial visit(Attempted) Plan of Care: 
 
 [] Support spiritual and/or cultural needs  
 [] Support AMD and/or advance care planning process    
 [] Support grieving process 
 [] Coordinate Rites and/or Rituals  
 [] Coordination with community clergy [] No spiritual needs identified at this time 
 [] Detailed Plan of Care below (See Comments)  [] Make referral to Music Therapy 
[] Make referral to Pet Therapy    
[] Make referral to Addiction services 
[] Make referral to [facility] 
[] Make referral to Spiritual Care Partner 
[] No future visits requested       
[x] Follow up visits as needed Comments: Attempted Initial Spiritual Care Assessment on 5 Med SUrg. Staff working withMr. Cesar Cervantes at this time. Chaplains will follow as able and/or needed. Staff came out of room as I finished above note. Went to Mr. Shalonda Espinosa bedside. He greeted me warmly and then indicated he could not understand me. Finally was able to get him to understand peace be with you. Which he responded appropriately. Chaplains available as needed. Visited by: Dionne Benítez., MS., [address] ([id number])

## 2020-05-25 NOTE — PROGRESS NOTES
PHYSICAL THERAPY TREATMENT Patient: Hero Lloyd (12 y.o. 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 ASSESSMENT Patient continues with skilled PT services and is progressing towards goals. Patient remains confused but able to follow one-step commands  Patient appears to be weaker today and today required MAX A x1 for bed mobility, MOD A for sit<->stand x 5 times. Pt refused to sit up in chair today and practiced sit<->stand transfers x 5. Pt also appears to have increased congestion  which limited safety when sitting on EOB with limited balance. Vitals remained stable. Current Level of Function Impacting Discharge (mobility/balance): Max-Mod assist x 1 for bed mobility and sit to stand transfers. Sitting balance is fair. Other factors to consider for discharge: confusion PLAN : 
Patient continues to benefit from skilled intervention to address the above impairments. Continue treatment per established plan of care. to address goals. Recommendation for discharge: (in order for the patient to meet his/her long term goals) Therapy up to 5 days/week in SNF setting This discharge recommendation: 
Has not yet been discussed the attending provider and/or case management IF patient discharges home will need the following DME: to be determined (TBD) SUBJECTIVE:  
Patient stated I need some water! Verlee Peabody OBJECTIVE DATA SUMMARY:  
Critical Behavior: 
Neurologic State: Alert, Confused Orientation Level: Oriented to person, Disoriented to time, Disoriented to situation, Disoriented to place Cognition: Follows commands, Impulsive, Memory loss, Poor safety awareness Safety/Judgement: Awareness of environment, Decreased insight into deficits Functional Mobility Training: 
Bed Mobility: 
Rolling: Minimum assistance Supine to Sit: Moderate assistance Sit to Supine: Moderate assistance Scooting: Maximum assistance Level of Assistance: Maximum assistance Interventions: Safety awareness training; Tactile cues; Verbal cues Transfers: 
Sit to Stand: Moderate assistance; Additional time;Assist x1(x 5 ) Stand to Sit: Moderate assistance; Additional time;Assist x1(x 5) Bed to Chair: (pt refused to sit up in chair) Interventions: Safety awareness training; Tactile cues; Verbal cues Level of Assistance: Moderate assistance Balance: 
Sitting: Impaired Sitting - Static: Fair (occasional) Sitting - Dynamic: Poor (constant support) Standing: Impaired;Pull to stand; With support Standing - Static: Constant support;Poor Standing - Dynamic : Constant support;Poor Ambulation/Gait Training: Not done during this Rx session. Stairs: 
  
 Not done during this Rx session Therapeutic Exercises:  
 
Not done during this Rx session Pain Rating: 
Pt displaayed pain affects during acctivity but unable to report level of pain. Activity Tolerance:  
Fair Please refer to the flowsheet for vital signs taken during this treatment. After treatment patient left in no apparent distress:  
Supine in bed, Call bell within reach, Bed / chair alarm activated, and Side rails x 3 
 
COMMUNICATION/COLLABORATION:  
The patients plan of care was discussed with: Registered nurse. Noah Guajardo, PT Time Calculation: 24 mins

## 2020-05-25 NOTE — PROGRESS NOTES
Problem: Falls - Risk of 
Goal: *Absence of Falls Description: Document Michael Doan Fall Risk and appropriate interventions in the flowsheet. Outcome: Progressing Towards Goal 
Note: Fall Risk Interventions: 
Mobility Interventions: Bed/chair exit alarm, Patient to call before getting OOB, Utilize walker, cane, or other assistive device Mentation Interventions: Adequate sleep, hydration, pain control, Bed/chair exit alarm, Door open when patient unattended, Reorient patient, Room close to nurse's station Medication Interventions: Bed/chair exit alarm, Patient to call before getting OOB Elimination Interventions: Bed/chair exit alarm, Call light in reach, Patient to call for help with toileting needs, Toileting schedule/hourly rounds, Urinal in reach

## 2020-05-25 NOTE — ROUTINE PROCESS
Bedside and Verbal shift change report given to [place] [address] (oncoming nurse) by Bib Magallanes RN (offgoing nurse). Report included the following information SBAR, Kardex, MAR and Accordion.

## 2020-05-26 NOTE — PROGRESS NOTES
Problem: Self Care Deficits Care Plan (Adult) Goal: *Acute Goals and Plan of Care (Insert Text) Description:  
FUNCTIONAL STATUS PRIOR TO ADMISSION: The patient was functional at the wheelchair level and required assist for most ADLs. Patient is a poor historian therefore background information obtained from the chart. HOME SUPPORT: The patient lived at an adult group home. Occupational Therapy Goals Initiated 5/19/2020; Weekly re-assessment 5/26/2020, goals updated below. 1.  Patient will perform grooming with supervision/set-up within 7 day(s). Continue in supported sitting 2. Patient will perform upper body dressing and bathing with supervision/set-up within 7 day(s). Downgrade to min A 3. Patient will perform lower body dressing and bathing with moderate assistance  within 7 day(s). Downgrade to max A 4. Patient will perform toilet transfers to [facility] with moderate assistance x1 within 7 day(s). Continue as written 5. Patient will perform all aspects of toileting with moderate assistance  within 7 day(s). Continue as written 6. Patient will participate in upper extremity therapeutic exercise/activities with supervision/set-up for 10 minutes within 7 day(s). Continue as written Goals added 5/26/2020: 
7. Patient will sit EOB unsupported with CGA for 5 minutes in preparation for seated ADLs within 7 days. Outcome: Progressing Towards Goal 
  
OCCUPATIONAL THERAPY TREATMENT/WEEKLY RE-ASSESSMENT Patient: Tr Ortiz (16 y.o. 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 ASSESSMENT Patient continues with skilled OT services and is not progressing towards goals.  Pt has met 0/6 goals since initial evaluation and continues to present with impaired balance, decreased initiation, weakness, and decreased activity tolerance. Pt is further limited this session by decreased participation, requiring max encouragement. He requires up to max A x2 for all mobility due to strong posterior and R lateral lean in sitting and standing. He requires proximal support and max cues to perform simple face washing task at EOB. He continues to require supplemental O2 throughout all activity and at rest and requires max verbal/visual cues for PLB throughout to maintain saturations at 90-92% on 2 L via NC. At this time, feel patient will require SNF-level rehab prior to return to group home. Goals updated to reflect current level of performance. Will continue per POC of 3x/week. Current Level of Function Impacting Discharge (ADLs): A x 2 for all mobility; mod to max A upper body dressing and grooming and bathing; max to total A lower body dressing and toileting; min A self feeding Other factors to consider for discharge: decreased participation this date - behavior? ; high fall risk; poor balance; decreased tolerance PLAN : 
Goals have been updated based on progression since last assessment. Patient continues to benefit from skilled intervention to address the above impairments. Continue to follow patient 3 times a week to address goals. Recommend with staff: bed level toileting; elevate HOB/modified chair position for all meals and at least 3x/day with goal of 60 minutes; Assist for all ADLs, including feeding; 
 
Recommend next OT session: continue with activity at EOB; sitting balance; functional transfers Recommendation for discharge: (in order for the patient to meet his/her long term goals) Therapy up to 5 days/week in SNF setting This discharge recommendation: 
Has been made in collaboration with the attending provider and/or case management IF patient discharges home will need the following DME: TBD SUBJECTIVE:  
 Patient stated I want to sit down now.  OBJECTIVE DATA SUMMARY:  
Cognitive/Behavioral Status: 
Neurologic State: Confused Orientation Level: Oriented to person Cognition: Decreased command following;Poor safety awareness;Decreased attention/concentration Perception: Cues to maintain midline in sitting;Cues to maintain midline in standing; Tactile;Verbal;Visual(blind in R eye) Perseveration: No perseveration noted Safety/Judgement: Decreased awareness of environment;Decreased awareness of need for assistance;Decreased awareness of need for safety; Lack of insight into deficits Functional Mobility and Transfers for ADLs: 
Bed Mobility: 
Rolling: Maximum assistance;Assist x2 Supine to Sit: Maximum assistance;Assist x2 Sit to Supine: Total assistance Scooting: Maximum assistance Transfers: 
Sit to Stand: Maximum assistance;Assist x2; Additional time Balance: 
Sitting: Impaired Sitting - Static: Poor (constant support) Sitting - Dynamic: Poor (constant support) Standing: Impaired;Pull to stand; With support Standing - Static: Constant support;Poor Standing - Dynamic : Constant support;Poor ADL Intervention: 
Grooming Grooming Assistance: Moderate assistance;Maximum assistance Position Performed: Seated edge of bed Washing Face: Moderate assistance;Maximum assistance(assist for balance; min proximal support at elbow) Cues: Physical assistance; Tactile cues provided;Verbal cues provided;Visual cues provided Cognitive Retraining Safety/Judgement: Decreased awareness of environment;Decreased awareness of need for assistance;Decreased awareness of need for safety; Lack of insight into deficits Functional Measure:  
Barthel Index: 
 
Bathin Bladder: 0 Bowels: 0 Groomin Dressin Feedin Mobility: 0 Stairs: 0 Toilet Use: 0 Transfer (Bed to Chair and Back): 5 Total: 5/100 The Barthel ADL Index: Guidelines 1. The index should be used as a record of what a patient does, not as a record of what a patient could do. 2. The main aim is to establish degree of independence from any help, physical or verbal, however minor and for whatever reason. 3. The need for supervision renders the patient not independent. 4. A patient's performance should be established using the best available evidence. Asking the patient, friends/relatives and nurses are the usual sources, but direct observation and common sense are also important. However direct testing is not needed. 5. Usually the patient's performance over the preceding 24-48 hours is important, but occasionally longer periods will be relevant. 6. Middle categories imply that the patient supplies over 50 per cent of the effort. 7. Use of aids to be independent is allowed. Autumn Yu., Barthel, D.W. (2962). Functional evaluation: the Barthel Index. [address] [facility] (14)2. RUBI Oakley, Nissa Morocho., Lavinia Harris., [place], [address] [place] [address] (1999). Measuring the change indisability after inpatient rehabilitation; comparison of the responsiveness of the Barthel Index and Functional [place] Measure. Journal of Neurology, Neurosurgery, and Psychiatry, 66(4), 726-893. Makenzie Lopez, N.J.A, MARQUITA López, & Oneil Luke M.A. (2004.) Assessment of post-stroke quality of life in cost-effectiveness studies: The usefulness of the Barthel Index and the EuroQoL-5D. [facility], 13, 102-40 Pain: 
Pt reporting minimal pain Activity Tolerance:  
Fair, Poor, desaturates with exertion and requires oxygen, requires frequent rest breaks and observed SOB with activity Please refer to the flowsheet for vital signs taken during this treatment. After treatment patient left in no apparent distress:  
Call bell within reach, Bed / chair alarm activated, Side rails x 3 and modified chair position in bed COMMUNICATION/COLLABORATION:  
 The patients plan of care was discussed with: Physical therapist and Registered nurse. Macarena Philip OT Time Calculation: 23 mins

## 2020-05-26 NOTE — DISCHARGE SUMMARY
Ozzy Resendiz [facility] 
[address] [facility], [address] [place] [address] Tel: [phone] Physician Discharge Summary Patient ID:    Devante Estrada Age:              81 y.o.    : 1935 MRN:             [id number] PCP: Mamie Montiel MD  
 
Date of Admission: 2020 Date of Discharge:  2020 Principal admission Diagnosis: 
 Acute on chronic systolic CHF (congestive heart failure) ([address] [I50.23] Discharge Diagnoses: 
Principal Problem: 
  Acute on chronic systolic CHF (congestive heart failure) ([address] (2020) Acute on chronic respiratory failure with hypoxia ([address] (2015) CAD (coronary artery disease) (2015) Cardiomyopathy ([address] (2015) Chronic obstructive pulmonary disease ([address] (2015) Pleural effusion (10/9/2015) Dementia ([address] (2015) Type 2 diabetes mellitus without complication ([address] (7/10/5648) Hepatitis (2020) Consults: Cardiology, Pulmonary/Intensive care and GI Hospital Course:  
 
Mr. Carole Arita is a 81 y.o. admitted to [facility] and treated for the following: 
 
Acute on chronic systolic CHF (congestive heart failure) ([address] (2020) / Cardiomyopathy ([address] (2015) /  Pleural effusion (10/9/2015) POA: admitted with generalized weakness and found to have CHF. Has been on diuresis and a recent CXR showed mild pulm edema but clinically stable. He was reviewed by cardiology and an Echo done showed EF 20-25%. Moderate pulm HTN with PASP 58. Cardiomyopathy due to chronic RV pacing. Now being discharged on continued medical management with Bumex, Coreg and Lisinopril. Outpatient follow up with cardiology.  
  
Acute respiratory failure with hypoxia POA: due to CHF as well as underlying COPD. Arrangements made for supplemental oxygen. Arrangements made by .    
  
Type 2 diabetes mellitus without complication ([address] (2696): A1c 6.5. Now eating much better. Resume low dose Glucotrol but would discontinue it if appetite worsens. Diet as per speech therapy  
  
CAD (coronary artery disease) (6/26/2015): sp CABG. Stable. Continue Asprin, Lipitor, Coreg 
  
Chronic obstructive pulmonary disease ([facility] (6/28/2015) POA: Not wheezing and overall stable. Nebs as needed  
  
[facility] (11/20/2015): without behavioral disturbances. Moderate to severe. Continue Aricept, Zoloft 
  
Hepatitis (5/18/2020): acute hep panel neg. Due to congestive hepatopathy. LFTs improving 
  
Discharge Exam:   
Visit Vitals /53 (BP 1 Location: Left arm, BP Patient Position: At rest) Pulse 60 Temp 98 °F (36.7 °C) Resp 16 Ht 5' 10\" (1.778 m) Wt 57.4 kg (126 lb 8 oz) SpO2 95% BMI 18.15 kg/m² Patient has been seen and examined. General: weak looking but not in any acute distress Pulm: clear breath sounds without wheezes Card: no murmurs, normal S1, S2 without thrills, bruits Abd:    soft, non-tender, normoactive bowel sounds Skin: no rashes or ulcers, skin turgor is good Neuro: awake, alert. Hard of hearing Activity: Activity as tolerated Diet: Dysphagia pureed 2 Nectar, 2 mildly thick cardiac diet. Meds in purees. Diet supplement with Ensure Enlive or similar for breakfast and dinner. Fortified pudding for lunch. Keep upright duiring nad 30min after all PO Current Discharge Medication List  
  
START taking these medications Details  
lisinopriL (PRINIVIL, ZESTRIL) 2.5 mg tablet Take 1 Tab by mouth nightly for 30 days. Qty: 30 Tab, Refills: 0 CONTINUE these medications which have NOT CHANGED Details  
loratadine (CLARITIN) 10 mg tablet Take 10 mg by mouth daily. glipiZIDE (GLUCOTROL) 10 mg tablet Take 5 mg by mouth every morning. levothyroxine (SYNTHROID) 150 mcg tablet Take 150 mcg by mouth Daily (before breakfast). sertraline (ZOLOFT) 100 mg tablet Take 200 mg by mouth daily. ketoconazole (NIZORAL) 2 % shampoo Apply  to affected area daily. Apply to scalp 2 days per week - Wednesday and saturday  
  
docusate sodium (STOOL SOFTENER) 100 mg capsule Take 100 mg by mouth daily as needed for Constipation. aspirin 81 mg chewable tablet Take 1 Tab by mouth daily. Qty: 90 Tab, Refills: 3  
  
atorvastatin (LIPITOR) 10 mg tablet Take 1 Tab by mouth nightly. Qty: 90 Tab, Refills: 3  
  
bumetanide (BUMEX) 1 mg tablet Take 1 Tab by mouth two (2) times a day. Qty: 60 Tab, Refills: 0  
  
acetaminophen (TYLENOL) 325 mg tablet Take 650 mg by mouth every four (4) hours as needed for Pain. calcium-vitamin D (OYSTER SHELL) 500 mg(1,250mg) -200 unit per tablet Take 1 Tab by mouth three (3) times daily (with meals). guaiFENesin SR (MUCINEX) 600 mg SR tablet Take 600 mg by mouth two (2) times a day. albuterol-ipratropium (DUO-NEB) 2.5 mg-0.5 mg/3 ml nebu 3 mL by Nebulization route every four (4) hours as needed (shortness of breath). dextran 70-hypromellose (ARTIFICIAL TEARS,[id number],) ophthalmic solution Administer 2 Drops to both eyes two (2) times a day. senna-docusate (PERICOLACE) 8.6-50 mg per tablet Take 1 Tab by mouth daily. For constipation. Do not take if you have loose stools Qty: 30 Tab, Refills: 0  
  
cholecalciferol (VITAMIN D3) 1,000 unit tablet Take 3,000 Units by mouth daily. omeprazole (PRILOSEC) 20 mg capsule Take 20 mg by mouth Daily (before breakfast). 30 minutes prior to breakfast  
  
donepezil (ARICEPT) 10 mg tablet Take 10 mg by mouth daily. carvedilol (COREG) 3.125 mg tablet Take 1 Tab by mouth two (2) times a day. Qty: 60 Tab, Refills: 0  
  
ferrous sulfate 325 mg (65 mg iron) tablet Take 325 mg by mouth daily (with breakfast). finasteride (PROSCAR) 5 mg tablet Take 5 mg by mouth every evening. tamsulosin (FLOMAX) 0.4 mg capsule Take 0.4 mg by mouth nightly. STOP taking these medications melatonin 3 mg tablet Comments:  
Reason for Stopping:   
   
 carbamide peroxide (DEBROX) 6.5 % otic solution Comments:  
Reason for Stopping: MULTIVIT WITH IRON,MINERALS (MULTIVITAMIN AND MINERALS PO) Comments:  
Reason for Stopping:   
   
 bisacodyl (DULCOLAX) 10 mg suppository Comments:  
Reason for Stopping:   
   
 fluticasone (FLONASE) 50 mcg/actuation nasal spray Comments:  
Reason for Stopping: Follow-up Information Follow up With Specialties Details Why Contact Info Dr Hodge [facility]   On 6/17/2020 [address] [facility]  
[address] [facility] 99 [id number] Janeen Giles NP Nurse Practitioner On 6/1/2020 [address] [facility] [address] [place] [address] 
[phone] Other, MD Sherlyn    Patient can only remember the practice name and not the physician Follow-up tests or labs: None Discharge Condition: Stable Disposition: [facility] adult home Time taken to arrange discharge:  35 minutes. Signed: 
Chuck Gibbons MD     [facility] 5/26/2020   1:48 PM

## 2020-05-26 NOTE — PROGRESS NOTES
Pharmacist Discharge Medication Reconciliation Discharge Provider:  Dr. Thelma Brito Discharge Medications: My Medications START taking these medications Instructions Each Dose to Equal Morning Noon Evening Bedtime  
lisinopriL 2.5 mg tablet Commonly known as:  Renetta Grubbs Your last dose was: Your next dose is: Take 1 Tab by mouth nightly for 30 days. 2.5 mg 
  
  
  
  
  
 
  
 
CONTINUE taking these medications Instructions Each Dose to Equal Morning Noon Evening Bedtime  
acetaminophen 325 mg tablet Commonly known as:  TYLENOL Your last dose was: Your next dose is: Take 650 mg by mouth every four (4) hours as needed for Pain. 650 mg 
  
  
  
  
  
albuterol-ipratropium 2.5 mg-0.5 mg/3 ml Nebu Commonly known as:  Yi Jenkins Your last dose was: Your next dose is:   
 
  
 3 mL by Nebulization route every four (4) hours as needed (shortness of breath). 3 mL Artificial Tears([id number]) ophthalmic solution Generic drug:  dextran 70-hypromellose Your last dose was: Your next dose is:   
 
  
 Administer 2 Drops to both eyes two (2) times a day. 2 Drop 
  
  
  
  
  
aspirin 81 mg chewable tablet Your last dose was: Your next dose is: Take 1 Tab by mouth daily. 81 mg 
  
  
  
  
  
atorvastatin 10 mg tablet Commonly known as:  LIPITOR Your last dose was: Your next dose is: Take 1 Tab by mouth nightly. 10 mg 
  
  
  
  
  
bumetanide 1 mg tablet Commonly known as:  Nupur Sinks Your last dose was: Your next dose is: Take 1 Tab by mouth two (2) times a day. 1 mg 
  
  
  
  
  
calcium-vitamin D 500 mg(1,250mg) -200 unit per tablet Commonly known as:  OYSTER SHELL Your last dose was: Your next dose is: Take 1 Tab by mouth three (3) times daily (with meals). 1 Tab 
  
  
  
  
  
carvediloL 3.125 mg tablet Commonly known as:  Holmdel Xiomara 
 
 Your last dose was: Your next dose is: Take 1 Tab by mouth two (2) times a day. 3.125 mg 
  
  
  
  
  
donepeziL 10 mg tablet Commonly known as:  ARICEPT Your last dose was: Your next dose is: Take 10 mg by mouth daily. 10 mg 
  
  
  
  
  
ferrous sulfate 325 mg (65 mg iron) tablet Your last dose was: Your next dose is: Take 325 mg by mouth daily (with breakfast). 325 mg 
  
  
  
  
  
finasteride 5 mg tablet Commonly known as:  PROSCAR Your last dose was: Your next dose is: Take 5 mg by mouth every evening. 5 mg 
  
  
  
  
  
glipiZIDE 10 mg tablet Commonly known as:  Clarnce Hook Your last dose was: Your next dose is: Take 5 mg by mouth every morning. 5 mg 
  
  
  
  
  
guaiFENesin  mg SR tablet Commonly known as:  Armando & Armando Your last dose was: Your next dose is: Take 600 mg by mouth two (2) times a day. 600 mg 
  
  
  
  
  
ketoconazole 2 % shampoo Commonly known as:  NIZORAL Your last dose was: Your next dose is:   
 
  
 Apply  to affected area daily. Apply to scalp 2 days per week - Wednesday and saturday 
   
  
  
  
  
levothyroxine 150 mcg tablet Commonly known as:  SYNTHROID Your last dose was: Your next dose is: Take 150 mcg by mouth Daily (before breakfast). 150 mcg 
  
  
  
  
  
loratadine 10 mg tablet Commonly known as:  Pukwana Mimes Your last dose was: Your next dose is: Take 10 mg by mouth daily. 10 mg 
  
  
  
  
  
omeprazole 20 mg capsule Commonly known as:  PRILOSEC Your last dose was: Your next dose is: Take 20 mg by mouth Daily (before breakfast). 30 minutes prior to breakfast 
 20 mg 
  
  
  
  
  
senna-docusate 8.6-50 mg per tablet Commonly known as:  Rosalina Dust Your last dose was: Your next dose is: Take 1 Tab by mouth daily. For constipation. Do not take if you have loose stools 1 Tab 
  
  
  
  
  
sertraline 100 mg tablet Commonly known as:  ZOLOFT Your last dose was: Your next dose is: Take 200 mg by mouth daily. 200 mg Stool Softener 100 mg capsule Generic drug:  docusate sodium Your last dose was: Your next dose is: Take 100 mg by mouth daily as needed for Constipation. 100 mg 
  
  
  
  
  
tamsulosin 0.4 mg capsule Commonly known as:  FLOMAX Your last dose was: Your next dose is: Take 0.4 mg by mouth nightly. 0.4 mg 
  
  
  
  
  
Vitamin D3 (1000 Units /25 mcg) tablet Generic drug:  cholecalciferol Your last dose was: Your next dose is: Take 3,000 Units by mouth daily. 3,000 Units STOP taking these medications   
 
bisacodyL 10 mg suppository Commonly known as:  DULCOLAX 
  
  
carbamide peroxide 6.5 % otic solution Commonly known as:  DEBROX 
  
  
fluticasone propionate 50 mcg/actuation nasal spray Commonly known as:  FLONASE 
  
  
melatonin 3 mg tablet MULTIVITAMIN AND MINERALS PO Where to Get Your Medications Information on where to get these meds will be given to you by the nurse or doctor. Ask your nurse or doctor about these medications 
lisinopriL 2.5 mg tablet The patient's chart, MAR, and AVS were reviewed by 
 Asif Salazar PHARMD, Contact: [phone]

## 2020-05-26 NOTE — PROGRESS NOTES
Nutrition Assessment: 
 
RECOMMENDATIONS/INTERVENTION(S):  
1. Continue with puree/nectar-thickened liquids diet per SLP. 2. Continue Ensure Enlive (increase to BID) and Ensure Pudding daily for additional kcal/protein intake. Will continue to monitor PO intakes, weight, labs, GI. ASSESSMENT:  
5/26: F/u. Continues on puree/nectar-thick diet. Per RN, pt coughed throughout breakfast. PCT fed pt breakfast, says he ate 100% waffles and Ensure and 50% cereal w/ berries. Recorded intakes 50-80% meals. Weight down 3# since previous visit. Will continue supplements. Labs- Na [phone]. Meds- Bumex, Humalog, bowel regimen. 5/21: F/u. SLP following, pt now on puree/NTL diet. Recorded intakes of 10-60% yesterday. Pt sleeping soundly at time of visit, did not arouse to name. Per RN, pt ate 30-50% of breakfast and 100% Ensure Enlive this AM. Says pt must be fed since he can't see. SLP fed pt lunch, unsure intake. Says \"he had intermittent wet, nonproductive cough during lunch. \" 50% Ensure Pudding eaten at bedside from lunch. Will continue to send both supplements and monitor intakes. Labs- A1c 6.5, -[phone]. Meds reviewed. 5/19: 82 yo male admitted for acute on chronic CHF. RD assessment for low BMI screen. PMhx: CAD, DM, HTN, PVD, CVA, lung and colon CA s/p colon resection (1993), dementia. Underweight per BMI per advanced age. Weight hx in EMR indicates weight has remained stable over last year. Pt being tested for COVID-19. Attempted to call pt's room with no answer x 2. Unable to obtain nutrition hx at this time. Consistent Cho/Cardiac diet ordered. No intakes reported yet. Labs: , , POC BG 54-90, HgbA1c 6.5. Meds: statin, Bumex, Humalog, Colace. No wounds noted. Diet Order: Puree, nectar-thickened liquids 
% Eaten:   
Patient Vitals for the past 72 hrs: 
 % Diet Eaten 05/25/20 1831 50 % 05/24/20 1403 50 % 05/23/20 1746 80 % 05/23/20 1200 75 % Pertinent Medications: [x] Reviewed Labs: [x] Reviewed Anthropometrics: Height: 5' 10\" (177.8 cm) Weight: 57.4 kg (126 lb 8 oz) IBW (%IBW):   ( ) UBW (%UBW):   (  %) BMI: Body mass index is 18.15 kg/m². This BMI is indicative of: 
 [x] Underweight  - per age  [] Normal    [] Overweight    []  Obesity    []  Extreme Obesity (BMI>40) Estimated Nutrition Needs (Based on): 1957 Kcals/day(REE 1313 x AF 1.3 + 250) , 61 g(61-73gm (1-1.2gm/kg/d)) Protein Carbohydrate: At Least 130 g/day  Fluids: 1957mL/day (1 ml/kcal) Last BM: n5/23  [x]Active     []Hyperactive  []Hypoactive       [] Absent   BS Skin:    [] Intact   [] Incision  [x] Breakdown: sacral erythema  [] DTI   [] Tears/Excoriation/Abrasion  []Edema [] Other: Wt Readings from Last 30 Encounters:  
05/25/20 57.4 kg (126 lb 8 oz) 12/23/19 59.4 kg (131 lb) 09/04/19 59.4 kg (131 lb) 09/04/19 59.3 kg (130 lb 12.8 oz) 07/02/19 56.7 kg (125 lb)  
02/27/19 60.8 kg (134 lb)  
02/27/19 60.9 kg (134 lb 3.2 oz) 08/29/18 63.5 kg (140 lb)  
02/21/18 68.9 kg (152 lb)  
02/21/18 69 kg (152 lb 3.2 oz) 02/21/18 84.4 kg (186 lb) 01/07/18 76.4 kg (168 lb 6 oz) 12/27/17 74.2 kg (163 lb 8 oz) 12/25/17 72.6 kg (160 lb) 08/23/17 74.4 kg (164 lb) 04/12/17 63.5 kg (140 lb) 02/15/17 64.9 kg (143 lb) 02/15/17 65.2 kg (143 lb 12.8 oz) 12/13/16 70.3 kg (155 lb) 10/05/16 68.9 kg (152 lb)  
06/16/16 65.8 kg (145 lb) 05/24/16 62.6 kg (138 lb)  
03/17/16 62.6 kg (138 lb) 02/16/16 62.9 kg (138 lb 9.6 oz) 02/14/16 62.4 kg (137 lb 9.1 oz) 01/26/16 60.8 kg (134 lb) 01/18/16 61.7 kg (136 lb) 12/15/15 62.1 kg (136 lb 12.8 oz)  
11/30/15 60.8 kg (134 lb 1.6 oz)  
11/25/15 61.8 kg (136 lb 3.9 oz) NUTRITION DIAGNOSES:  
Problem:  Underweight Etiology: related to inability to consume sufficient energy to maintain appropriate weight Signs/Symptoms: as evidenced by BMI 19.4 (age > 72 years) 5/21: Nutrition dx continues. Weight down 6# since previous visit. 5/26: Nutrition dx continues. Weight down 3# since previous visit. NUTRITION INTERVENTIONS: 
Meals/Snacks: General/healthful diet   Supplements: Commercial supplement GOAL:  
PO intake >75% meals + ONS with 0.5#/week weight gain next 5-7 days Cultural, Christianity, or Ethnic Dietary Needs: None EDUCATION & DISCHARGE NEEDS:  
 [x] None Identified 
 [] Identified and Education Provided/Documented 
 [] Identified and Pt declined/was not appropriate [x] Interdisciplinary Care Plan Reviewed/Documented  
 [x] Discharge Needs:   Diet consistency per SLP with ONS 2x/day 
 [] No Nutrition Related Discharge Needs NUTRITION RISK:  
Pt Is At Nutrition Risk  [x] No Nutrition Risk Identified  [] PT SEEN FOR:  
 []  MD Consult: []Calorie Count []Diabetic Diet Education []Diet Education []Electrolyte Management []General Nutrition Management and Supplements []Management of Tube Feeding []TPN Recommendations []  RN Referral:  []MST score >=2 
   []Enteral/Parenteral Nutrition PTA []Pregnant: Gestational DM or Multigestation  
              [] Pressure Ulcer 
 
[]  Low BMI      []  Length of Stay       [] Dysphagia Diet         [] Ventilator [x]  Follow-up Previous Recommendations: 
 [x] Implemented          [] Not Implemented          [] Not Applicable Previous Goal: 
 [] Met              [x] Progressing Towards Goal              [] Not Progressing Towards Goal   [] Not Applicable [address] RD Pager [phone] Phone [phone]

## 2020-05-26 NOTE — PROGRESS NOTES
Name: Jose Carlos Dunlap: [address]  
: 1935 Admit Date: 2020 Phone: [phone]  Room:  178238 PCP: Benson Boothe MD  MRN: [id number] Date: 2020  Code: DNR Chart and notes reviewed. Data reviewed. I review the patient's current medications in the medical record at each encounter. I have evaluated and examined the patient. History of Present Illness: 
Mr. Cesar Cervantes is an 80 yo gentleman with a history of dementia, COPD (FEV1 1.56L, 57% in 2016), CAD s/p CABG, cardiomyopathy s/p dual chamber pacemaker, colon cancer, GERD, prior lung cancer, and DM who presented with weakness. He is unable to provide much history other than answering some simple ROS questions. Per report, he was sent from his assisted living facility for progressive weakness. Admitted with acute heart failure. Found to have new LV dysfunction and volume overload with pleural effusions (R > L). He currently denies shortness of breath and appears to be breathing comfortably. No cough, fever, sputum production, edema. Has been getting IV diuresis throughout this admission. Labs reviewed: WBC 9.3, cr 1.32 (1.45 on adission), , , troponin up to 0.19, proNP [id number], RVP negative, COVID pending Images: CXR with right sided effusion and associated atelectasis vs airspace disease, trace L pleural effusion  Interval History: Afebrile BP stable Sats 95% on 2L I/O: not documented ROS: Sleepy this morning. States he is \"a little\" SOB. Denies fever or chills. Denies CP. Denies abd pain or LE pain/swelling. Past Medical History:  
Diagnosis Date  Arthritis  CAD (coronary artery disease)  Cardiomyopathy ([facility] 2015  Colon cancer ([facility]  COLON  
 Depression DEPRESSION  
 Diabetes ([facility] IDDM  GERD (gastroesophageal reflux disease)  Hypertension  [facility] 2016  Mild aortic stenosis 6/15/2015  Pulmonary hypertension ([facility] 6/15/2015  [facility]VD [facility]ipheral vascular [facility] STENT RIGHT GROIN, PAD  
 S/P CABG x 3 2015 LIMA TO LAD; SVG TO OM; SVG TO OM  
 Stroke ([facility] 1999  
 2 STROKES  Third degree AV block ([facility] 2015  Thyroid disease Past Surgical History:  
Procedure Laterality Date [address] COLON RESECTION  
 HX HEART CATHETERIZATION  2015  HX PACEMAKER    
 INS PPM/ICD LED DUAL ONLY  2015 [address] PAD, RIGHT GROIN STENT Family History Problem Relation Age of Onset  Heart Disease Mother  Diabetes Mother  Other Father AMPUTATION LEG FOLLOWING BUG BITE  
 Other Sister CEREBRAL ANUERYSM  
 Heart Disease Brother  Diabetes Sister Social History Tobacco Use  Smoking status: Former Smoker Packs/day: 2.00 Years: 60.00 Pack years: 120.00 Types: Cigarettes Last attempt to quit: 2015 Years since quittin.9  Smokeless tobacco: Never Used Substance Use Topics  Alcohol use: No  
  Alcohol/week: 0.0 standard drinks Allergies Allergen Reactions  Terazosin Unknown (comments) Current Facility-Administered Medications Medication Dose Route Frequency  bumetanide (BUMEX) tablet 1 mg  1 mg Oral BID  lisinopriL (PRINIVIL, ZESTRIL) tablet 2.5 mg  2.5 mg Oral QHS  dextrose (D50W) injection syrg 12.5-25 g  12.5-25 g IntraVENous PRN  
 aspirin chewable tablet 81 mg  81 mg Oral DAILY  atorvastatin (LIPITOR) tablet 10 mg  10 mg Oral QHS  carvediloL (COREG) tablet 3.125 mg  3.125 mg Oral BID  
 donepeziL (ARICEPT) tablet 10 mg  10 mg Oral DAILY  finasteride (PROSCAR) tablet 5 mg  5 mg Oral QPM  
 levothyroxine (SYNTHROID) tablet 150 mcg  150 mcg Oral ACB  pantoprazole (PROTONIX) tablet 40 mg  40 mg Oral ACB  sertraline (ZOLOFT) tablet 200 mg  200 mg Oral DAILY  tamsulosin (FLOMAX) capsule 0.4 mg  0.4 mg Oral QHS  senna-docusate (PERICOLACE) 8.6-50 mg per tablet 1 Tab  1 Tab Oral DAILY  sodium chloride (NS) flush 5-40 mL  5-40 mL IntraVENous Q8H  
 sodium chloride (NS) flush 5-40 mL  5-40 mL IntraVENous PRN  
 acetaminophen (TYLENOL) tablet 650 mg  650 mg Oral Q4H PRN  prochlorperazine (COMPAZINE) injection 10 mg  10 mg IntraVENous Q6H PRN  
 insulin lispro (HUMALOG) injection   SubCUTAneous QID WITH MEALS  glucose chewable tablet 16 g  4 Tab Oral PRN  
 glucagon (GLUCAGEN) injection 1 mg  1 mg IntraMUSCular PRN  
 
 
 
REVIEW OF SYSTEMS  
12 point ROS negative except as stated in the HPI. Physical Exam:  
Visit Vitals /53 (BP 1 Location: Left arm, BP Patient Position: At rest) Pulse 60 Temp 98 °F (36.7 °C) Resp 16 Ht 5' 10\" (1.778 m) Wt 57.4 kg (126 lb 8 oz) SpO2 95% BMI 18.15 kg/m² General:  Alert, cooperative, no distress, frail, muscle wasting Head:  Normocephalic, without obvious abnormality, atraumatic. Eyes:  Conjunctivae/corneas clear. Nose: Nares normal. Septum midline. Throat: Lips, mucosa, and tongue normal.   
Neck: Supple, symmetrical, trachea midline Lungs:   Diminished in the bases bilaterally, no wheezing/rales heard Chest wall:  No tenderness or deformity. Heart:  Regular rate and rhythm, S1, S2 normal, no murmur, click, rub or gallop. Abdomen:   Soft, non-tender. Bowel sounds normal.   
Extremities: Extremities normal, atraumatic, no cyanosis or edema. Pulses: 2+ and symmetric all extremities. Skin: Skin color, texture, turgor normal. No rashes or lesions Neurologic: Grossly nonfocal  
 
 
Lab Results Component Value Date/Time  Sodium 139 05/24/2020 02:51 AM  
 Potassium 4.0 05/24/2020 02:51 AM  
 Chloride 98 05/24/2020 02:51 AM  
 CO2 40 (H) 05/24/2020 02:51 AM  
 BUN 26 (H) 05/24/2020 02:51 AM  
 Creatinine 0.81 05/24/2020 02:51 AM  
 Glucose 118 (H) 05/24/2020 02:51 AM  
 Calcium 9.0 05/24/2020 02:51 AM  
 Magnesium 2.2 05/19/2020 03:30 AM  
 Phosphorus 4.0 05/19/2020 03:30 AM  
 Lactic acid 1.1 01/05/2018 08:20 AM  
 
 
Lab Results Component Value Date/Time WBC 8.4 05/24/2020 02:51 AM  
 HGB 9.4 (L) 05/24/2020 02:51 AM  
 PLATELET 302 78/13/1771 02:51 AM  
 MCV 95.0 05/24/2020 02:51 AM  
 
 
Lab Results Component Value Date/Time INR 1.3 (H) 05/24/2020 02:51 AM  
 aPTT 32.0 05/24/2020 02:51 AM  
 AST (SGOT) 40 (H) 05/24/2020 02:51 AM  
 Alk. phosphatase 78 05/24/2020 02:51 AM  
 Protein, total 6.5 05/24/2020 02:51 AM  
 Albumin 2.8 (L) 05/24/2020 02:51 AM  
 Globulin 3.7 05/24/2020 02:51 AM  
 
 
Lab Results Component Value Date/Time Ferritin 527 (H) 05/18/2020 07:51 PM  
 
 
Lab Results Component Value Date/Time TSH 12.20 (H) 06/27/2015 04:29 AM  
  
 
No results found for: PH, PHI, PCO2, PCO2I, PO2, PO2I, HCO3, HCO3I, FIO2, FIO2I Lab Results Component Value Date/Time CK 40 01/04/2018 10:23 AM  
 CK-MB Index Cannot be calculated 01/04/2018 10:23 AM  
 Troponin-I, Qt. 0.15 (H) 05/19/2020 12:49 PM  
  (H) 10/11/2015 03:20 AM  
  
 
Lab Results Component Value Date/Time Culture result: (A) 06/27/2019 04:45 PM  
  MODERATE STREPTOCOCCUS PNEUMONIAE (SENSITIVITIES PERFORMED BY E-TEST) Culture result: SAMPLE SENT ON PREPLANTED MEDIA 06/27/2019 04:45 PM  
 Culture result: NO FUNGUS ISOLATED 30 DAYS 06/27/2019 04:45 PM  
 
 
Lab Results Component Value Date/Time Hepatitis B surface Ag 0.69 05/18/2020 07:51 PM  
 
 
Lab Results Component Value Date/Time CK 40 01/04/2018 10:23 AM  
 
 
Lab Results Component Value Date/Time  Color YELLOW/STRAW 05/19/2020 01:35 AM  
 Appearance CLEAR 05/19/2020 01:35 AM  
 pH (UA) 5.5 05/19/2020 01:35 AM  
 Protein Negative 05/19/2020 01:35 AM  
 Glucose Negative 05/19/2020 01:35 AM  
 Ketone Negative 05/19/2020 01:35 AM  
 Bilirubin Negative 05/19/2020 01:35 AM  
 Blood Negative 05/19/2020 01:35 AM  
 Urobilinogen 1.0 05/19/2020 01:35 AM  
 Nitrites Negative 05/19/2020 01:35 AM  
 Leukocyte Esterase Negative 05/19/2020 01:35 AM  
 WBC >30 (A) 02/21/2018 04:05 PM  
 RBC 3-10 (A) 02/21/2018 04:05 PM  
 Epithelial cells None seen 02/21/2018 04:05 PM  
 Bacteria Few 02/21/2018 04:05 PM  
 
 
Images: personally visualized and report reviewed CXR (5/23/2020): Stable right pleural effusion. Mild pulmonary edema IMPRESSION 
· Acute respiratory failure with hypoxia · Pleural effusion · Acute systolic heart failure · COPD not in exacerbation · Hepatitis, liver congestion in the setting of heart failure · GATO; improved today PLAN 
· Goal sats >88%, wean O2 as tolerated · At baseline uses minimal bronchodilators prn, can add prn nebs if necessary as he is COVID negative · Continue diuresis · Cardiology following · Diet as per speech · Having good response to diuresis and CXR overall improved. Will hold off on thoracentesis at this time given his lack of symptoms and risk of procedure given underlying multiple comorbidities. · GI following for hepatitis, no acute interventions indicated at this time Joanne Murillo

## 2020-05-26 NOTE — PROGRESS NOTES
Problem: Mobility Impaired (Adult and Pediatric) Goal: *Acute Goals and Plan of Care (Insert Text) Description: FUNCTIONAL STATUS PRIOR TO ADMISSION: The patient was functional at the wheelchair level and required minimal assistance for transfers to the chair. HOME SUPPORT PRIOR TO ADMISSION: The patient lived in a group home. Physical Therapy Goals Initiated 5/19/2020 1. Patient will move from supine to sit and sit to supine , scoot up and down and roll side to side in bed with supervision/set-up within 7 day(s). 2.  Patient will transfer from bed to chair and chair to bed with minimal assistance/contact guard assist using the least restrictive device within 7 day(s). 3.  Patient will perform sit to stand with minimal assistance/contact guard assist within 7 day(s). 4.  Patient will ambulate with minimal assistance/contact guard assist for 50 feet with the least restrictive device within 7 day(s). Outcome: Progressing Towards Goal 
Note: PHYSICAL THERAPY TREATMENT Patient: Emely Quezada (89 y.o. 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 ASSESSMENT Patient continues with skilled PT services and is not progressing towards goals. Max encouragement for participation today. Max A x2 to sit EOB requiring constant support with verbal and tactile cues, pt unable to maintain midline sitting. Rigid fwd flexed posture upon standing using RW with max A x2. Desat on room air at rest and required supplemental O2 throughout session. Pt requires increase assistance compared to baseline Documentation for home O2: 
  
ROOM AIR  
 AT REST 
 O2 SATS 
84  
(2    ) LITERS OF O2 WITH ACTIVITY O2 SATS 
90  
(2   )LITERS OF 02 PATIENT LEFT COMFORTABLY SITTING/SUPINE 02 SATS 92  
 
   
 
Current Level of Function Impacting Discharge (mobility/balance): Max A Other factors to consider for discharge: PLAN : 
Patient continues to benefit from skilled intervention to address the above impairments. Continue treatment per established plan of care. to address goals. Recommendation for discharge: (in order for the patient to meet his/her long term goals) Therapy up to 5 days/week in SNF setting This discharge recommendation: 
Has been made in collaboration with the attending provider and/or case management IF patient discharges home will need the following DME: to be determined (TBD) SUBJECTIVE:  
Patient stated Khadijah Stone don't want to.  OBJECTIVE DATA SUMMARY:  
Critical Behavior: 
Neurologic State: Confused, Alert Orientation Level: Oriented to person Cognition: Follows commands Safety/Judgement: Awareness of environment, Decreased insight into deficits Functional Mobility Training: 
Bed Mobility: 
Rolling: Maximum assistance;Assist x2 Supine to Sit: Maximum assistance;Assist x2 Sit to Supine: Total assistance Scooting: Maximum assistance Transfers: 
Sit to Stand: Maximum assistance;Assist x2; Additional time Stand to Sit: Maximum assistance;Assist x2; Additional time Balance: 
Sitting: Impaired Sitting - Static: Poor (constant support) Sitting - Dynamic: Poor (constant support) Standing: Impaired;Pull to stand; With support Standing - Static: Poor;Constant support Standing - Dynamic : Poor;Constant support Ambulation/Gait Training: 
  
  
  
  
  
  
  
  
  
  
  
  
  
  
  
  
  
  
Stairs: Therapeutic Exercises:  
 
Pain Rating: 
Denies pain Activity Tolerance:  
Fair and desaturates with exertion and requires oxygen Please refer to the flowsheet for vital signs taken during this treatment. After treatment patient left in no apparent distress: Supine in bed, Call bell within reach, and Bed / chair alarm activated COMMUNICATION/COLLABORATION:  
The patients plan of care was discussed with: Occupational therapist and Registered nurse. Hazel Loera Time Calculation: 26 mins

## 2020-05-26 NOTE — PROGRESS NOTES
Bedside and Verbal shift change report given to Oumou Cuevas (oncoming nurse) by Terra Nolan (offgoing nurse). Report included the following information SBAR, Kardex, Intake/Output, MAR and Accordion.

## 2020-05-26 NOTE — ACP (ADVANCE CARE PLANNING)
Advance Care Planning Note Name:                 David Driscoll YOB: 1935 MRN:                  [id number] Admission Date: 5/18/2020  5:36 PM 
 
Date of service: 5/26/2020 Active Diagnoses: 
 
Principal Problem: 
  Acute on chronic systolic CHF (congestive heart failure) ([facility] (5/18/2020) Acute on chronic respiratory failure with hypoxia ([facility] (11/24/2015) Cardiomyopathy ([facility] (6/11/2015) CAD (coronary artery disease) (6/26/2015) Chronic obstructive pulmonary disease ([facility] (6/28/2015) Overview: 04/24/2015 [facility] at [facility] FVC 2.53-67%-post bronchodilator 2.61-69%-increase 3% FEV1 1.41-52%-post bronchodilator value 1.45-54%-increase 3% FEV1/FVC 56% TLC 5.19-88% RV 2.% DLCO 45% DLCO VA 73% % 04/24/2015-6 minute walk test no exertional hypoxia Pleural effusion (10/9/2015) Dementia ([facility] (11/20/2015) Type 2 diabetes mellitus without complication ([facility] (2/52/5299) Hepatitis (5/18/2020) These active diagnoses are of sufficient risk that focused discussion on advance care planning is indicated in order to allow the patient to thoughtfully consider personal goals of care, and if situations arise that prevent the ability to personally give input, to ensure appropriate representation of their personal desires for different levels and aggressiveness of care. Discussion:  
 
Persons present and participating in discussion: Giuseppe Pratt MD, Daughter Mary Bosch Discussion: I updated Mr Arslan Morejon daughter about the patient's clinical progress especially relating to his CHF, cardiomyopathy with a now worsened LV function. I have indicated that cardiology will continue to maximize his medical treatments and due to to his other co-morbid conditions, he may over time clinically deteriorate.  In the event this does happen, she may need to guide her treating teams on goals of care. She confirms she is the mPOA and that Mr Elo Ponce is a DNR. DDNR already scanned to the system. Time Spent:  
 
Total time spent face-to-face in education and discussion: 16 minutes.   
 
Joe Schmidt MD 
5/26/2020 
2:15 PM

## 2020-05-26 NOTE — DISCHARGE INSTRUCTIONS
TRANSFER  INSTRUCTIONS    NAME: Dudley Main   :  1935   MRN:  [id number]     Date/Time:  2020 1:44 PM    ADMIT DATE: 2020     TRANSFER DATE: 2020     DISPOSITION: [facility]    DISCHARGE DIAGNOSIS:  Principal Problem:    Acute on chronic systolic CHF (congestive heart failure) ([facility] (2020)    Acute on Chronic respiratory failure ([facility] (2015)    CAD (coronary artery disease) (2015)    Cardiomyopathy ([facility] (2015)    Chronic obstructive pulmonary disease ([facility] (2015)    Pleural effusion (10/9/2015)    Dementia ([facility] (2015)    Type 2 diabetes mellitus without complication ([facility] ()    Hepatitis (2020)    MEDICATIONS: See medication list on the AVS. Monitor blood glucose at least twice daily. It is important that medications are taken exactly as they are prescribed on the discharge medication instructions and keep them your  in the bottles provided by the pharmacist. Keep a list of the medication names, dosages, and times to be taken at all times. Do not take other medications without consulting your doctor. Recommended diet:  Dysphagia pureed 2 Nectar, 2 mildly thick cardiac diet. Meds in purees. Diet supplement with Ensure Enlive or similar for breakfast and dinner. Fortified pudding for lunch. Keep upright duiring nad 30min after all oral intake. Recommended activity: Activity as tolerated with assistance with fall precautions. Follow Up:     Follow-up Information     Follow up With Specialties Details Why Contact Info    Dr Nehemiah Gomez   On 2020 10 AM  [address] [facility]      Ashley Bales NP Nurse Practitioner On 2020 [facility] [id number]  [phone]      Other, MD Sherlyn    Patient can only remember the practice name and not the physician            Information obtained by :  I understand that if any problems occur once I am at home I am to contact my physician. I understand and acknowledge receipt of the instructions indicated above.                                                                                                                                            Physician's or R.N.'s Signature                                                                  Date/Time                                                                                                                                              Patient or Representative Signature                                                          Date/Time

## 2020-05-26 NOTE — PROGRESS NOTES
Bedside and Verbal shift change report given to leeroy fuchs  (oncoming nurse) by Abram Soriano  (offgoing nurse). Report included the following information SBAR and Kardex.

## 2020-05-26 NOTE — PROGRESS NOTES
CM spoke to RN. Pt's daughter will be here at [phone] to bring pt back to adult home. Adult home did not want report from RN since they already know the pt. RN will send AVS with pt for the adult home, since the pt is not oriented enough to sign discharge instructions. RN removed all IVs from pt.  
 
1800: RN spoke with daughter, Wai Larkin, about pt's discharge. Daughter stated that she just got out of an appointment, and that she will try to make her way here before shift change. Will continue to monitor.

## 2020-05-26 NOTE — PROGRESS NOTES
5/26/2020   CARE MANAGEMENT NOTE:  CM reviewed EMR and handoff received from previous . Pt was admitted with CHF. Also with COPF, DM, and dementia. Reportedly, pt resides at [facility] located on [address] [facility]; [place], [address] [place] [address] [phone] Pt's dtr, Keren Chiu ([phone]) is the primary family contact. Pt's son, Marycarmen Mena ([phone]; [phone]) is a secondary family contact. RUR 25%; LOS 7 days Transition Plan of Care: 
1  Pt will return to [facility]. He is enrolled in the Home Based Primary Care Team thru the [facility].  
2.  Orders for home oxygen at 2 LPM continuous with portable written. A referral was made to [facility] and a portable tank was delivered to pt's room. 3.  If HH is warranted, pt has used [facility] agency in the past. 
4.   Bundle pt for CHF 5.  Dtr. [facility] will provide transport. CM will continue to follow pt until discharged. Imelda

## 2020-06-01 NOTE — TELEPHONE ENCOUNTER
Pt daughter Mary Bosch is calling to change his 6/17/2020 appt w Dr Jeniffer Whitten to a VV please call her at [phone]

## 2020-06-01 NOTE — TELEPHONE ENCOUNTER
Attempted to contact for today's visit; was unable to reach by phone. Left VM.   Has appt on 6/17 with Dr. Jr White.

## 2020-06-04 NOTE — TELEPHONE ENCOUNTER
Seen by NP today   I spoke to pt briefly in am  Future Appointments   Date Time Provider Preethi Armenta   6/17/2020 10:20 AM Margie King MD [facility] SAMANTHA Saleem do we need to keep that appt   I cant tell if VV or FTF OV

## 2020-06-04 NOTE — PROGRESS NOTES
VIRTUAL VISIT DOCUMENTATION Pursuant to the emergency declaration under the [facility] waiver authority and the [employer] Act, this Virtual  Visit was conducted, with patient's consent, to reduce the patient's risk of exposure to COVID-19 and provide continuity of care for an established patient. Services were provided through a video synchronous discussion virtually to substitute for in-person clinic visit. CHIEF COMPLAINT Arminda Zurita is a 81 y.o. male who was seen by synchronous (real-time) audio-video technology on 6/4/2020. Patient is being seen today for discharge follow-up. ASSESSMENT  
  
  ICD-10-CM ICD-9-CM 1. Chronic systolic congestive heart failure (HCC) N10.26 649.38 METABOLIC PANEL, COMPREHENSIVE  
  428.0 MAGNESIUM NT-PRO BNP 2. Coronary artery disease involving native coronary artery of native heart without angina pectoris I25.10 414.01   
3. Mild aortic stenosis I35.0 424.1 4. S/P CABG x 3 Z95.1 V45.81   
5. Cardiac pacemaker in situ Z95.0 V45.01   
6. Pleural effusion, right J90 511.9 PLAN Chronic HFrEF, newly reduced LVEF 20-25%  
- volume difficult to estimate by VV; pt too weak for hm weights 
- still with \"lung crackles\" and cough per nursing 
- cont with Bumex 1 mg BID; check updated labs in 1-2 weeks - CMP, Mag, pro BNP 
- Cont low dose Coreg and lisinopril; further titration of meds limited by BP 
- cardiomyopathy likely thought 2/2 RV pacing 
  
Right pleural effusion, suspect HF related. - bumex per above; Pulmonary held off on thoracentesis inpt given control of symptoms and risk of procedure given  comorbidities 
- on supplemental O2 at 2L - Repeat CXR scheduled in the near future (per nursing); will request results 
  
CAD s/p CABG. - on ASA and statin.  No anginal c/o. 
  
 Elevation in LFTs - improving at dc - acute hep panel was neg; thought 2/2 CHF / hepatic congestion  
  
Mild-moderate AS 
  
Dementia Keep f/u as scheduled 6/17 with Dr. Joycie Schlatter; will obtain labs prior to visit We discussed the expected course, resolution and complications of the diagnosis(es) in detail. Medication risks, benefits, costs, interactions, and alternatives were discussed as indicated. I advised him to contact the office if his condition worsens, changes or fails to improve as anticipated. He expressed understanding with the diagnosis(es) and plan HISTORY OF PRESENTING ILLNESS Chata Giraldo is a 81 y. o. male PMH CAD s/p CABG x3, cardiomyopathy, colon cancer, depression, IDDM, GERD, HTN, lung cancer, aortic stenosis, pulm HTN, PVD, stroke, thryoid disease who presented for weakness 5/18/20. Admitted with generalized weakness and found to have CHF and acute respiratory failure with hypoxia. Echo showed EF 20-25%. Moderate pulm HTN with PASP 58. CXR with bilateral pleural effusions (R>>L). Cardiomyopathy thought 2/2 chronic RV pacing. Pt diuresed. Discharged on medical management with Bumex, Coreg and Lisinopril; home O2 arranged. Inpt 5/18 to 5/26. Pt states he is doing well since discharge. Breathing is better on supplemental O2. Taking all meds as prescribed with regular nursing support. Care Aid assisted in visit. States he has occasional cough and a rattling in his chest. SOB at night if trying to lay down. Wheezes at times which improves with albuterol. Denies any swelling. Home VS 110s/50s. O2 sats low 90s. HR 60. On 2L supplemental O2. Pt is very weak and unable to stand on scale for weights. Has CXR scheduled in the near future; unsure which day. Denies CP. Denies new dizziness. ACTIVE PROBLEM LIST Patient Active Problem List  
 Diagnosis Date Noted  Type 2 diabetes mellitus without complication ([facility] 29/32/2125  Hepatitis 05/18/2020  Acute on chronic systolic CHF (congestive heart failure) ([address] 05/18/2020  Acute encephalopathy 01/04/2018  Severe sepsis ([address] 01/04/2018  Complicated UTI (urinary tract infection) 01/04/2018  Hip fracture, right ([address] 12/21/2017  Recurrent left pleural effusion 11/24/2015  Acute on chronic respiratory failure with hypoxia ([address] 11/24/2015  Iron deficiency anemia 11/24/2015  Counseling regarding advanced care planning and goals of care 11/23/2015  Dementia ([address] 11/20/2015  Pleural effusion 10/09/2015  Ex-smoker 09/24/2015  Chronic systolic heart failure ([address] 09/18/2015  ICH (intracerebral hemorrhage) ([address] 08/10/2015  Pacemaker 07/02/2015  S/P CABG x 3 06/29/2015  Tobacco use 06/28/2015  Poor historian 06/28/2015  Hypothyroidism 06/28/2015  Diabetes mellitus ([address] 06/28/2015  Cerebrovascular accident ([address] 06/28/2015  Chronic obstructive pulmonary disease ([address] 06/28/2015  Malignant neoplasm of colon ([address] 06/28/2015  Other nonspecific abnormal finding of lung field 06/28/2015  Gastroesophageal reflux disease 06/28/2015  CAD (coronary artery disease) 06/26/2015  Pulmonary hypertension 06/15/2015  Mild aortic stenosis 06/15/2015  Third degree AV block ([address] 06/11/2015  Cardiomyopathy ([address] 06/11/2015  Second degree AV block 06/11/2015  Stroke ([address]  Hypertension  PVD (peripheral vascular disease) ([address]  Diabetes ([address]  Lumbar stenosis 09/29/2011 PAST MEDICAL HISTORY Past Medical History:  
Diagnosis Date  Arthritis  CAD (coronary artery disease)  Cardiomyopathy ([address] 6/11/2015  Colon cancer ([address] 1993 COLON  
 Depression DEPRESSION  
 Diabetes ([address] IDDM  GERD (gastroesophageal reflux disease)  Hypertension  Lung cancer ([address] 11/29/2016  Mild aortic stenosis 6/15/2015  Pulmonary hypertension ([facility] 6/15/2015  PVD (peripheral vascular disease) ([facility] STENT RIGHT GROIN, PAD  
 S/P CABG x 3 2015 LIMA TO LAD; SVG TO OM; SVG TO OM  
 Stroke ([facility] 1999  
 2 STROKES  Third degree [facility] block ([facility] 2015  Thyroid disease PAST SURGICAL HISTORY Past Surgical History:  
Procedure Laterality Date [address] COLON RESECTION  
 HX HEART CATHETERIZATION  2015  HX PACEMAKER    
 INS PPM/ICD LED DUAL ONLY  2015 [address] PAD, RIGHT GROIN STENT ALLERGIES Allergies Allergen Reactions  Terazosin Unknown (comments) FAMILY HISTORY Family History Problem Relation Age of Onset  Heart Disease Mother  Diabetes Mother  Other Father AMPUTATION LEG FOLLOWING BUG BITE  
 Other Sister CEREBRAL ANUERYSM  
 Heart Disease Brother  Diabetes Sister SOCIAL HISTORY Social History Socioeconomic History  Marital status:  Spouse name: Not on file  Number of children: Not on file  Years of education: Not on file  Highest education level: Not on file Tobacco Use  Smoking status: Former Smoker Packs/day: 2.00 Years: 60.00 Pack years: 120.00 Types: Cigarettes Last attempt to quit: 2015 Years since quittin.9  Smokeless tobacco: Never Used Substance and Sexual Activity  Alcohol use: No  
  Alcohol/week: 0.0 standard drinks  Drug use: No  
 Sexual activity: Not Currently Partners: Female MEDICATIONS Current Outpatient Medications Medication Sig  
 Oxygen 2 Each.  bumetanide (BUMEX) 1 mg tablet Take 1 Tab by mouth two (2) times a day for 30 days.  lisinopriL (PRINIVIL, ZESTRIL) 2.5 mg tablet Take 1 Tab by mouth nightly for 30 days.  loratadine (CLARITIN) 10 mg tablet Take 10 mg by mouth daily.  glipiZIDE (GLUCOTROL) 10 mg tablet Take 5 mg by mouth every morning.  levothyroxine (SYNTHROID) 150 mcg tablet Take 150 mcg by mouth Daily (before breakfast).  sertraline (ZOLOFT) 100 mg tablet Take 200 mg by mouth daily.  ketoconazole (NIZORAL) 2 % shampoo Apply  to affected area daily. Apply to scalp 2 days per week - Wednesday and saturday  docusate sodium (STOOL SOFTENER) 100 mg capsule Take 100 mg by mouth daily as needed for Constipation.  aspirin 81 mg chewable tablet Take 1 Tab by mouth daily.  atorvastatin (LIPITOR) 10 mg tablet Take 1 Tab by mouth nightly.  acetaminophen (TYLENOL) 325 mg tablet Take 650 mg by mouth every four (4) hours as needed for Pain.  calcium-vitamin D (OYSTER SHELL) 500 mg(1,250mg) -200 unit per tablet Take 1 Tab by mouth three (3) times daily (with meals).  guaiFENesin SR (MUCINEX) 600 mg SR tablet Take 600 mg by mouth two (2) times a day.  albuterol-ipratropium (DUO-NEB) 2.5 mg-0.5 mg/3 ml nebu 3 mL by Nebulization route every four (4) hours as needed (shortness of breath).  dextran 70-hypromellose (ARTIFICIAL TEARS,[id number],) ophthalmic solution Administer 2 Drops to both eyes two (2) times a day.  senna-docusate (PERICOLACE) 8.6-50 mg per tablet Take 1 Tab by mouth daily. For constipation. Do not take if you have loose stools  cholecalciferol (VITAMIN D3) 1,000 unit tablet Take 3,000 Units by mouth daily.  omeprazole (PRILOSEC) 20 mg capsule Take 20 mg by mouth Daily (before breakfast). 30 minutes prior to breakfast  
 donepezil (ARICEPT) 10 mg tablet Take 10 mg by mouth daily.  carvedilol (COREG) 3.125 mg tablet Take 1 Tab by mouth two (2) times a day.  ferrous sulfate 325 mg (65 mg iron) tablet Take 325 mg by mouth daily (with breakfast).  finasteride (PROSCAR) 5 mg tablet Take 5 mg by mouth every evening.  tamsulosin (FLOMAX) 0.4 mg capsule Take 0.4 mg by mouth nightly. No current facility-administered medications for this visit. I have reviewed the nurses notes, vitals, problem list, allergy list, medical history, family, social history and medications. REVIEW OF SYMPTOMS Constitutional: Negative for fever, chills, and diaphoresis. Respiratory: Negative for hemoptysis,   
Cardiovascular: Negative for chest pain, palpitations,  leg swelling Gastrointestinal: Negative fornausea, vomiting, blood in stool and melena. Genitourinary: Negative for dysuria and flank pain. Neurological: Negative for seizures, loss of consciousness,  
Endo/Heme/Allergies: Negative for abnormal bleeding. Psychiatric/Behavioral: +memory loss PHYSICAL EXAMINATION Due to this being a TeleHealth evaluation, many elements of the physical examination are unable to be assessed. General: elderly, frail, hard of hearing Respiratory: No audible wheezing, no signs of respiratory distress,on supplemental O2 Neuro: A&Ox3, speech clear, no facial droop, answering questions appropriately Skin: Skin color is normal. Non diaphoretic on visible skin during exam 
 
 
 DIAGNOSTIC DATA Echo 1/4/17 Echo 1/4/17 LVEF 50%. Distal apical septal akinesis. LAmoderately dilated. Mild MR. Aortic valve: The valve was trileaflet. Leaflets exhibited moderate 
calcification, reduced mobility, and sclerosis without stenosis. Mild Tr. Severe pulmonary hypertension ECHO 6-10-15 LV EF 35 % by Ceja's Biplane technique, mod diffuse HK, mod HK mid anteroseptal and apical septal wall(s). Mild LVH. Mod JILLIAN, MR, mild ASt, LISETH 1.5 cm2 by both continuity equation and planimetry. Mild AR, TR, PASP 60. AV Max/meanPG 16.6/7.6 mmHg. AV Vmax was 2 m/s. NUKE 6-10-15 ischemia and infarction in inferior wall with moderately reduced EF, see report Holter showed high level AV block so underwent testing leading to cath and then CABG The PFTs done at [facility] on 4/24/15 show a FEV1 of 1.41. FVC 56% He has a history of colon cancer, anxiety, diabetes, depression, emphysema, hypertension, hypothyroidism. 7/2/15: [employer] Pacemaker per Dr. Caitlin Galvez  
 
       
05/18/20 ECHO ADULT COMPLETE 05/19/2020 5/19/2020  
  Narrative · Normal cavity size and wall thickness. Severe global systolic function. Estimated left ventricular ejection fraction is 20 - 25%. Visually  
measured ejection fraction. Abnormal left ventricular septal motion  
consistent with right ventricular pacing. · Severely dilated left atrium. · Mildly dilated right ventricle. Mildly reduced systolic function. Pacing  
wire present · Moderately dilated right atrium. · Aortic valve sclerosis with reduced excursion. Aortic valve leaflet  
calcification present. Mild aortic valve stenosis is present. · Mitral valve non-specific thickening. Mild mitral annular calcification. Mild to moderate mitral valve regurgitation is present. · Dilated annulus of the tricuspid valve. Moderate tricuspid valve  
regurgitation is present. · Moderate pulmonary hypertension. Pulmonary arterial systolic pressure is 58 mmHg. 
   
    Signed by: Cameron Reardon MD  
  
CXR 5/22/20 - Moderately large right pleural effusion and tiny left pleural effusion are not significantly changed. There is persistent right basilar atelectasis. Mild pulmonary edema persists.  
  
CXR 5/23/20 - Frontal and lateral upright views of the chest show stable appearance including moderately large right pleural effusion and mild pulmonary edema.  
 
 
 LABORATORY DATA Lab Results Component Value Date/Time WBC 8.4 05/24/2020 02:51 AM  
 Hemoglobin (POC) 11.6 (L) 02/13/2016 02:26 PM  
 HGB 9.4 (L) 05/24/2020 02:51 AM  
 Hematocrit (POC) 34 (L) 02/13/2016 02:26 PM  
 HCT 30.7 (L) 05/24/2020 02:51 AM  
 PLATELET 285 14/93/0547 02:51 AM  
 MCV 95.0 05/24/2020 02:51 AM  
  
Component Latest Ref Rng & Units 5/24/2020 5/23/2020 5/18/2020 5/18/2020 2:51 AM  3:23 AM  7:51 PM  7:51 PM  
Sodium 136 - 145 mmol/L 139   132 (L) Potassium 3.5 - 5.1 mmol/L 4.0   4.4 Chloride 97 - 108 mmol/L 98   98 CO2 
    21 - 32 mmol/L 40 (H)   31 Anion gap 5 - 15 mmol/L 1 (L)   3 (L) Glucose 65 - 100 mg/dL 118 (H)   73 BUN 
    6 - 20 MG/DL 26 (H)   37 (H) Creatinine 
    0.70 - 1.30 MG/DL 0.81   1.45 (H) BUN/Creatinine ratio 12 - 20   32 (H)   26 (H) GFR est AA 
    >60 ml/min/1.73m2 >60   56 (L)  
GFR est non-AA 
    >60 ml/min/1.73m2 >60   46 (L) Calcium 8.5 - 10.1 MG/DL 9.0   8.9 Bilirubin, total 
    0.2 - 1.0 MG/DL 1.3 (H)   1.0 ALT 
    12 - 78 U/L 201 (H)   759 (H) AST 
    15 - 37 U/L 40 (H)   770 (H) Alk. phosphatase 45 - 117 U/L 78   92 Protein, total 
    6.4 - 8.2 g/dL 6.5   7.2 Albumin 3.5 - 5.0 g/dL 2.8 (L)   3.1 (L) Globulin 2.0 - 4.0 g/dL 3.7   4.1 (H) A-G Ratio 1.1 - 2.2   0.8 (L)   0.8 (L)  
NT pro-BNP 
    <450 PG/ML  10,545 (H) 24,247 (H) Lab Results Component Value Date/Time Hemoglobin A1c 6.5 (H) 05/19/2020 03:30 AM  
 
Lab Results Component Value Date/Time Cholesterol, total 103 02/13/2019 10:22 AM  
 HDL Cholesterol 42 02/13/2019 10:22 AM  
 LDL, calculated 46 02/13/2019 10:22 AM  
 VLDL, calculated 15 02/13/2019 10:22 AM  
 Triglyceride 77 02/13/2019 10:22 AM  
 
Lab Results Component Value Date/Time TSH 12.20 (H) 06/27/2015 04:29 AM  
 
 
 FOLLOW-UP Follow-up and Dispositions · Return in about 2 weeks (around 6/18/2020), or if symptoms worsen or fail to improve. as previously scheduled in 2 weeks Patient was made aware and verbalized understanding that an appointment will be scheduled for them for a virtual visit and/or office visit within the above time frame. Patient understanding his/her responsibility to call and change time/date if he/she so chooses. Thank you, Other, MD Sherlyn for allowing me to participate in the care of Bo Ana Laura. Please do not hesitate to contact me for further questions/concerns. Greater than 25 minutes was spent in direct video patient care, planning and chart review. This visit was conducted using [employer] telemedicine services. Parrish Webber NP [address], [address] Dawn [address]       
[phone] / [phone] Fax No

## 2020-06-04 NOTE — PROGRESS NOTES
Reviewed PCP, allergens, meds, pharmacy. He lives in an adult home. Alternate email: [email or web address] Tylenol needed more of 
 
/51 60 pulse  O2 85 but he keeps taking O2 tubing off because it keeps bothering him. On 2 Liters.

## 2020-06-05 NOTE — TELEPHONE ENCOUNTER
BP and HR ok  Looks like CXR was another physician ordering  O2 sats are that Low?   Were normal in hospital  Have them check sat with patient wearing Oxygen for 20 minutes and see if >90%, if so then keep wearing if lower then call us for next steps

## 2020-06-05 NOTE — TELEPHONE ENCOUNTER
Keeley Howell from [facility] Adult home called stated that the Pt VS were: /53 HR 60 O2 76-83. Pt stated he was told to call if VS got to low. Pt denied any CP and SOB at this time. Verified with Keeley Howell that he is using Oxygen as prescribed. Keeley Howell Stated that he is not wearing it regularly like he should. Pt stated that he has been Wheezing more and had an X-Ray done yesterday. Keeley Howell was wondering if we had results. Notified Keeley Howell that I do not have the order in my system for they X-ray so it must have come from another Physician .  Will notify MD about Vitals      Pt can be reached at [phone]

## 2020-06-05 NOTE — TELEPHONE ENCOUNTER
[facility] spoke with Keeley Howell. Notified Keeley Howell that she would about Dr. Laura Parker recommendations. Keeley Howell stated that the pt has been wearing his O2 all day and check O2 sat on the phone. Pt O2 was in the 95 to 97 range whiule she was checking.  Will notify MD

## 2020-06-17 NOTE — TELEPHONE ENCOUNTER
Pt daughter returned Dr. Darnell Days call and was told to ask for Liz Nash.  Please advise      Phone:[phone]

## 2020-06-17 NOTE — TELEPHONE ENCOUNTER
Called Pt Daughter Abelino Hernadez to reschedule VV for tomorrow. Abelino Hernadez asked me home where he lives @ [phone]. Called Mattie Hodgson on land line to reschedule pt for VV on 6/18/2020@ 11:20. Mattie Hodgson stated to call [phone] for Check in. Mattie Hodgson verbalized understanding of information discussed w/ no further questions at this time.

## 2020-06-18 NOTE — PROGRESS NOTES
Luis Puente     1935       Jaxson Mccrary MD, [facility] Date of Visit-6/18/2020 PCP is Epifanio, MD Sherlyn  
[facility] Virtual Visit HPI:  Luis Puente is a 81 y.o. male  
who was seen by synchronous (real-time) audio-video technology on 6/18/2020. MULTIPLE ATTEMPTS TO CONNECT BUT UNABLE TO CONNECT VIA VIDEO Correct email is [email or web address] in [address] [facility] adult home 
 he is at adult home with caretaker Irlanda Girard Prev OV 9-4-19 F/u of CAD with CABG, AS, CHF, COPD 
EF had been 42-50% CABG 2015, mild AS , PA HTN and pacer for third heart block in 2015 Fu of Admit hospital 5-18 to 5- for Acute on chronic systolic CHF Had weakness and pulmonary edema on CXR, sent home with oxygen, stable CAD, COPD 
pBNP was >25K Echo EF 20-25% mild mod AS, unclear if due to RV pacing or CAD, given dementia debility and age, BiV was not pursued Adult home called 6-5-20 with sat 76-83% and bp 122 HR 60 , pt had not been on oxygen Since normal in hospital , asked them to recheck and they came up to 95-97 when wearing all day VV 6-4-20 with Nataly fu systolic CHF, felt to be weak and crackles  And plan was labs, bumex, and cxr with office fu, but changed to VV today Today Via caretaker Has cough some sob-that is same No lower extremity edema Had blood work and cxr - Homecare nurse and doctor say him and the [place] Needs assistance He can stand and get to commode with help or dinner Previous hx or visit: 
 
 
Echo 1/4/17 Echo 1/4/17 LVEF 50%. Distal apical septal akinesis. LAmoderately dilated. Mild MR. Aortic valve: The valve was trileaflet. Leaflets exhibited moderate 
calcification, reduced mobility, and sclerosis without stenosis. Mild Tr. Severe pulmonary hypertension ECHO 6-10-15 LV EF 35 % by Ceja's Biplane technique, mod diffuse HK, mod HK mid anteroseptal and apical septal wall(s). Mild LVH.  Mod JILLIAN, MR, mild ASt, LISETH 1.5 cm2 by both continuity equation and planimetry. Mild AR, TR, PASP 60. AV Max/meanPG 16.6/7.6 mmHg. AV Vmax was 2 m/s. NUKE 6-10-15 ischemia and infarction in inferior wall with moderately reduced EF, see report Holter showed high level AV block so underwent testing leading to cath and then CABG [facility] show a FEV1 of 1.41. FVC 56% He has a history of colon cancer, anxiety, diabetes, depression, emphysema, hypertension, hypothyroidism. 7/2/15: [employer] Pacemaker per Dr. Jay Cortze Assessment/Plan: The primary encounter diagnosis was Chronic systolic congestive heart failure ([address] Diagnoses of Coronary artery disease involving native coronary artery of native heart without angina pectoris, Mild aortic stenosis, S/P CABG x 3, and Pleural effusion, right were also pertinent to this visit. No future appointments. congestive heart failure very difficult evaluate by phone given that there were going to the caretaker we connected video to work. He was seen by virtual visit though fairly stable a few weeks ago and sounds like he is reasonably stable breathing now is admitted to the hospital.  If at all possible like to get him in the office but since it is not currently desired by the patient we will see if he can have reattempted video visit. He had a pleural effusion he has stable CAD with bypass mild aortic stenosis and chronic CHF with a estimated ejection fraction now reduced 2025% he has been on Bumex Coreg and lisinopril last x-ray 523 in hospital showed right pleural effusion Key CAD CHF Meds   
    
  
 lisinopriL (PRINIVIL, ZESTRIL) 2.5 mg tablet (Taking) Take 1 Tab by mouth nightly for 30 days. aspirin 81 mg chewable tablet (Taking) Take 1 Tab by mouth daily. atorvastatin (LIPITOR) 10 mg tablet (Taking) Take 1 Tab by mouth nightly. carvedilol (COREG) 3.125 mg tablet (Taking) Take 1 Tab by mouth two (2) times a day. bumetanide (BUMEX) 1 mg tablet Take 1 Tab by mouth two (2) times a day for 30 days. This note was created using voice recognition software. Despite editing, there may be syntax errors. Key CAD CHF Meds   
    
  
 lisinopriL (PRINIVIL, ZESTRIL) 2.5 mg tablet (Taking) Take 1 Tab by mouth nightly for 30 days. aspirin 81 mg chewable tablet (Taking) Take 1 Tab by mouth daily. atorvastatin (LIPITOR) 10 mg tablet (Taking) Take 1 Tab by mouth nightly. carvedilol (COREG) 3.125 mg tablet (Taking) Take 1 Tab by mouth two (2) times a day. bumetanide (BUMEX) 1 mg tablet Take 1 Tab by mouth two (2) times a day for 30 days. ROS-except as noted above. . A complete cardiac and respiratory are reviewed and negative except as above ; Resp-denies wheezing  or productive cough,. Const- No unusual weight loss or fever; Neuro-no recent seizure or CVA ; GI- No BRBPR, abdom pain, bloating ; - no  hematuria Past Medical History:  
Diagnosis Date  Arthritis  CAD (coronary artery disease)  Cardiomyopathy ([facility] 6/11/2015  Colon cancer ([facility] 1993 COLON  
 Depression DEPRESSION  
 Diabetes ([address] IDDM  GERD (gastroesophageal reflux disease)  Hypertension  Lung cancer ([address] 11/29/2016  Mild aortic stenosis 6/15/2015  Pulmonary hypertension ([address] 6/15/2015  PVD (peripheral vascular disease) ([facility] STENT RIGHT GROIN, PAD  
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  
 Stroke ([address] 1999  
 2 STROKES  Third degree AV block ([address] 6/11/2015  Thyroid disease Social Hx= reports that he quit smoking about 4 years ago. His smoking use included cigarettes. He has a 120.00 pack-year smoking history. He has never used smokeless tobacco. He reports that he does not drink alcohol or use drugs. Lab Results Component Value Date/Time  Cholesterol, total 103 02/13/2019 10:22 AM  
 HDL Cholesterol 42 02/13/2019 10:22 AM  
 LDL, calculated 46 02/13/2019 10:22 AM  
 Triglyceride 77 02/13/2019 10:22 AM  
 
Lab Results Component Value Date/Time Sodium 139 05/24/2020 02:51 AM  
 Potassium 4.0 05/24/2020 02:51 AM  
 Chloride 98 05/24/2020 02:51 AM  
 CO2 40 (H) 05/24/2020 02:51 AM  
 Anion gap 1 (L) 05/24/2020 02:51 AM  
 Glucose 118 (H) 05/24/2020 02:51 AM  
 BUN 26 (H) 05/24/2020 02:51 AM  
 Creatinine 0.81 05/24/2020 02:51 AM  
 BUN/Creatinine ratio 32 (H) 05/24/2020 02:51 AM  
 GFR est AA >60 05/24/2020 02:51 AM  
 GFR est non-AA >60 05/24/2020 02:51 AM  
 Calcium 9.0 05/24/2020 02:51 AM  
  
Wt Readings from Last 3 Encounters:  
05/25/20 126 lb 8 oz (57.4 kg) 12/23/19 131 lb (59.4 kg) 09/04/19 131 lb (59.4 kg) BP Readings from Last 3 Encounters:  
05/26/20 109/45  
12/23/19 130/62  
09/04/19 104/58 Current Outpatient Medications Medication Sig  
 Oxygen 2 Each.  lisinopriL (PRINIVIL, ZESTRIL) 2.5 mg tablet Take 1 Tab by mouth nightly for 30 days.  loratadine (CLARITIN) 10 mg tablet Take 10 mg by mouth daily.  glipiZIDE (GLUCOTROL) 10 mg tablet Take 5 mg by mouth every morning.  levothyroxine (SYNTHROID) 150 mcg tablet Take 150 mcg by mouth Daily (before breakfast).  sertraline (ZOLOFT) 100 mg tablet Take 200 mg by mouth daily.  ketoconazole (NIZORAL) 2 % shampoo Apply  to affected area daily. Apply to scalp 2 days per week - Wednesday and saturday  aspirin 81 mg chewable tablet Take 1 Tab by mouth daily.  atorvastatin (LIPITOR) 10 mg tablet Take 1 Tab by mouth nightly.  acetaminophen (TYLENOL) 325 mg tablet Take 650 mg by mouth every four (4) hours as needed for Pain.  calcium-vitamin D (OYSTER SHELL) 500 mg(1,250mg) -200 unit per tablet Take 1 Tab by mouth three (3) times daily (with meals).  guaiFENesin SR (MUCINEX) 600 mg SR tablet Take 600 mg by mouth two (2) times a day.   
 albuterol-ipratropium (DUO-NEB) 2.5 mg-0.5 mg/3 ml nebu 3 mL by Nebulization route every four (4) hours as needed (shortness of breath).  dextran 70-hypromellose (ARTIFICIAL TEARS,[id number],) ophthalmic solution Administer 2 Drops to both eyes two (2) times a day.  senna-docusate (PERICOLACE) 8.6-50 mg per tablet Take 1 Tab by mouth daily. For constipation. Do not take if you have loose stools  cholecalciferol (VITAMIN D3) 1,000 unit tablet Take 3,000 Units by mouth daily.  omeprazole (PRILOSEC) 20 mg capsule Take 20 mg by mouth Daily (before breakfast). 30 minutes prior to breakfast  
 donepezil (ARICEPT) 10 mg tablet Take 10 mg by mouth daily.  carvedilol (COREG) 3.125 mg tablet Take 1 Tab by mouth two (2) times a day.  ferrous sulfate 325 mg (65 mg iron) tablet Take 325 mg by mouth daily (with breakfast).  finasteride (PROSCAR) 5 mg tablet Take 5 mg by mouth every evening.  tamsulosin (FLOMAX) 0.4 mg capsule Take 0.4 mg by mouth nightly.  bumetanide (BUMEX) 1 mg tablet Take 1 Tab by mouth two (2) times a day for 30 days.  docusate sodium (STOOL SOFTENER) 100 mg capsule Take 100 mg by mouth daily as needed for Constipation. No current facility-administered medications for this visit. Impression see above. VIRTUAL VISIT DOCUMENTATION Pursuant to the emergency declaration under the [facility] [facility], [facility] authority and the [employer] [place] comar General Act, this Virtual  Visit was conducted, with patient's consent, to reduce the patient's risk of exposure to COVID-19 and provide continuity of care for an established patient. Services were provided through a video synchronous discussion virtually to substitute for in-person clinic visit. We discussed the expected course, resolution and complications of the diagnosis(es) in detail. Medication risks, benefits, costs, interactions, and alternatives were discussed as indicated.   I advised him to contact the office if his condition worsens, changes or fails to improve as anticipated. He expressed understanding with the diagnosis(es) and plan I have reviewed the nurses notes, vitals, problem list, allergy list, medical history, family, social history and medications. FOLLOW-UP Patient was made aware and verbalized understanding that an appointment will be scheduled for them for a virtual visit and/or office visit within the above time frame. Patient understanding his/her responsibility to call and change time/date if he/she so chooses. Lauro Cornejo MD 
 
[address] [place] [address], [address] 
[phone] / [phone] Fax  [phone] / [phone] Fax This visit was conducted using [employer] telemedicine services or similar service.

## 2020-07-22 PROBLEM — I50.23 ACUTE ON CHRONIC SYSTOLIC CHF (CONGESTIVE HEART FAILURE) (HCC): Status: RESOLVED | Noted: 2020-01-01 | Resolved: 2020-01-01

## 2020-07-22 PROBLEM — G93.40 ACUTE ENCEPHALOPATHY: Status: RESOLVED | Noted: 2018-01-04 | Resolved: 2020-01-01

## 2020-07-22 PROBLEM — E11.9 TYPE 2 DIABETES MELLITUS WITHOUT COMPLICATION (HCC): Chronic | Status: RESOLVED | Noted: 2020-01-01 | Resolved: 2020-01-01

## 2020-07-22 PROBLEM — S72.001A HIP FRACTURE, RIGHT (HCC): Status: RESOLVED | Noted: 2017-12-21 | Resolved: 2020-01-01

## 2020-07-22 PROBLEM — R65.20 SEVERE SEPSIS (HCC): Status: RESOLVED | Noted: 2018-01-04 | Resolved: 2020-01-01

## 2020-07-22 PROBLEM — K75.9 HEPATITIS: Status: RESOLVED | Noted: 2020-01-01 | Resolved: 2020-01-01

## 2020-07-22 PROBLEM — A41.9 SEVERE SEPSIS (HCC): Status: RESOLVED | Noted: 2018-01-04 | Resolved: 2020-01-01

## 2020-07-22 PROBLEM — N39.0 COMPLICATED UTI (URINARY TRACT INFECTION): Status: RESOLVED | Noted: 2018-01-04 | Resolved: 2020-01-01

## 2020-07-22 PROBLEM — I50.22 CHF (CONGESTIVE HEART FAILURE), NYHA CLASS IV, CHRONIC, SYSTOLIC (HCC): Status: ACTIVE | Noted: 2020-01-01

## 2020-07-22 NOTE — PROGRESS NOTES
Bedside shift change report given to Tabitha (oncoming nurse) by Vinnie Quiroz (offgoing nurse). Report included the following information SBAR, Kardex, MAR and Recent Results.

## 2020-07-22 NOTE — ED NOTES
Spoke w/ pt's daughter Nabil Bustos who stated that she spoke w/ nurse from the [facility] who follows pt. She stated that the nurse reported to her that the nursing facility reported low O2 @ 61% & a cough with green sputum. Pt's daughter stated that she was told pt's BP was 89/46. Daughter also stated that w/ pt's confusion he sometimes pulls his O2 off. MD Tamika Poole aware.

## 2020-07-22 NOTE — ED TRIAGE NOTES
Pt arrives via EMS from [facility] in [place] for c/c of hypotension & hypoglycemia. Facility stated that BG was in 62s. EMS unsure if treatment was given.      EMS BP: 114/73  B

## 2020-07-22 NOTE — ED PROVIDER NOTES
Date of Service:  7/22/2020    Patient:  Luiza Correia    Chief Complaint:  Hypotension and Low Blood Sugar       HPI:  Luiza Correia is a 81 y.o.  male who presents for evaluation of reported hypotension and blood sugar. Later as the daughter called and clarified he was here for reported hypoxia and hypotension. There is no report of any low blood sugar. Patient has dementia and is unable to provide any information. Patient states that he just feels bad. It is reported that the patient was found to have hypoxia down in the high 60s upon EMS arrival.  Patient had also taken off of his oxygen. Here, he arrives in the mid 90s on his 2 L supplemental oxygen. Patient has extensive cardiac history and seems to come in regularly for hypoxia and congestive heart failure.            Past Medical History:   Diagnosis Date    Arthritis     CAD (coronary artery disease)     Cardiomyopathy ([facility] 6/11/2015    Colon cancer ([facility] 1993    COLON    Depression     DEPRESSION    Diabetes ([facility]     IDDM    GERD (gastroesophageal reflux disease)     Hypertension     Lung cancer ([facility] 11/29/2016    Mild aortic stenosis 6/15/2015    Pulmonary hypertension ([address] 6/15/2015    PVD (peripheral vascular disease) ([facility]     STENT RIGHT GROIN, PAD    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    LIMA TO LAD; SVG TO OM; SVG TO OM    Stroke ([address] 1999    2 STROKES    Third degree AV block ([address] 6/11/2015    Thyroid disease        Past Surgical History:   Procedure Laterality Date    HX GI  1993    COLON RESECTION    HX HEART CATHETERIZATION  6/26/2015    HX PACEMAKER      INS PPM/ICD LED DUAL ONLY  7/2/2015         VASCULAR SURGERY PROCEDURE UNLIST  1999    PAD, RIGHT GROIN STENT         Family History:   Problem Relation Age of Onset    Heart Disease Mother     Diabetes Mother     Other Father         AMPUTATION LEG FOLLOWING BUG BITE    Other Sister         CEREBRAL ANUERYSM    Heart Disease Brother     Diabetes Sister Social History     Socioeconomic History    Marital status:      Spouse name: Not on file    Number of children: Not on file    Years of education: Not on file    Highest education level: Not on file   Occupational History    Not on file   Social Needs    Financial resource strain: Not on file    Food insecurity     Worry: Not on file     Inability: Not on file    Transportation needs     Medical: Not on file     Non-medical: Not on file   Tobacco Use    Smoking status: Former Smoker     Packs/day: 2.00     Years: 60.00     Pack years: 120.00     Types: Cigarettes     Last attempt to quit: 2015     Years since quittin.0    Smokeless tobacco: Never Used   Substance and Sexual Activity    Alcohol use: No     Alcohol/week: 0.0 standard drinks    Drug use: No    Sexual activity: Not Currently     Partners: Female   Lifestyle    Physical activity     Days per week: Not on file     Minutes per session: Not on file    Stress: Not on file   Relationships    Social connections     Talks on phone: Not on file     Gets together: Not on file     Attends Orthodoxy service: Not on file     Active member of club or organization: Not on file     Attends meetings of clubs or organizations: Not on file     Relationship status: Not on file    Intimate partner violence     Fear of current or ex partner: Not on file     Emotionally abused: Not on file     Physically abused: Not on file     Forced sexual activity: Not on file   Other Topics Concern    Not on file   Social History Narrative    Not on file         ALLERGIES: Terazosin    Review of Systems   Unable to perform ROS: Dementia       Vitals:    20 1200 20 1300 20 1400 20 1500   BP: 113/61 120/49 114/46 116/45   Pulse:       Resp:       Temp:       SpO2: 100% 96% 98% 99%   Weight:       Height:                Physical Exam  Vitals signs and nursing note reviewed.    Constitutional:       General: He is not in acute distress. Appearance: Normal appearance. He is not ill-appearing. HENT:      Head: Normocephalic and atraumatic. Nose: Nose normal.      Mouth/Throat:      Mouth: Mucous membranes are moist.   Eyes:      General: No scleral icterus. Comments: Right eye blindness   Cardiovascular:      Rate and Rhythm: Normal rate and regular rhythm. Pulmonary:      Effort: Pulmonary effort is normal. No respiratory distress. Breath sounds: Rhonchi present. Abdominal:      General: Abdomen is flat. Tenderness: There is no abdominal tenderness. Musculoskeletal:         General: No tenderness. Skin:     General: Skin is warm. Capillary Refill: Capillary refill takes less than 2 seconds. Coloration: Skin is pale. Findings: No rash. Neurological:      Mental Status: Mental status is at baseline.    Psychiatric:         Mood and Affect: Mood normal.          MDM  Number of Diagnoses or Management Options  Acute on chronic congestive heart failure, unspecified heart failure type [facility]):   Elevated troponin:   Hypoxia:      Amount and/or Complexity of Data Reviewed  Decide to obtain previous medical records or to obtain history from someone other than the patient: yes      ED Course as of Jul 22 1611 Wed Jul 22, 2020   1124 Glucose(!): 111 [GG]      ED Course User Index  [GG] Kacey Fam,      VITAL SIGNS:  Patient Vitals for the past 4 hrs:   BP SpO2   07/22/20 1500 116/45 99 %   07/22/20 1400 114/46 98 %   07/22/20 1300 120/49 96 %         LABS:  Recent Results (from the past 6 hour(s))   GLUCOSE, POC    Collection Time: 07/22/20 10:32 AM   Result Value Ref Range    Glucose (POC) 108 (H) 65 - 100 mg/dL    Performed by Jossy Leary (Tech)    SAMPLES BEING HELD    Collection Time: 07/22/20 10:43 AM   Result Value Ref Range    SAMPLES BEING HELD 1PST,1LAV,1BL,1SST     COMMENT        Add-on orders for these samples will be processed based on acceptable specimen integrity and analyte stability, which may vary by analyte. METABOLIC PANEL, BASIC    Collection Time: 07/22/20 10:43 AM   Result Value Ref Range    Sodium 137 136 - 145 mmol/L    Potassium 3.9 3.5 - 5.1 mmol/L    Chloride 99 97 - 108 mmol/L    CO2 33 (H) 21 - 32 mmol/L    Anion gap 5 5 - 15 mmol/L    Glucose 111 (H) 65 - 100 mg/dL    BUN 26 (H) 6 - 20 MG/DL    Creatinine 0.90 0.70 - 1.30 MG/DL    BUN/Creatinine ratio 29 (H) 12 - 20      GFR est AA >60 >60 ml/min/1.73m2    GFR est non-AA >60 >60 ml/min/1.73m2    Calcium 9.0 8.5 - 10.1 MG/DL   CBC WITH AUTOMATED DIFF    Collection Time: 07/22/20 10:43 AM   Result Value Ref Range    WBC 9.6 4.1 - 11.1 K/uL    RBC 3.38 (L) 4.10 - 5.70 M/uL    HGB 10.1 (L) 12.1 - 17.0 g/dL    HCT 33.4 (L) 36.6 - 50.3 %    MCV 98.8 80.0 - 99.0 FL    MCH 29.9 26.0 - 34.0 PG    MCHC 30.2 30.0 - 36.5 g/dL    RDW 18.8 (H) 11.5 - 14.5 %    PLATELET 329 899 - 929 K/uL    MPV 10.9 8.9 - 12.9 FL    NRBC 0.0 0  WBC    ABSOLUTE NRBC 0.00 0.00 - 0.01 K/uL    NEUTROPHILS 76 (H) 32 - 75 %    LYMPHOCYTES 12 12 - 49 %    MONOCYTES 9 5 - 13 %    EOSINOPHILS 3 0 - 7 %    BASOPHILS 0 0 - 1 %    IMMATURE GRANULOCYTES 0 0.0 - 0.5 %    ABS. NEUTROPHILS 7.3 1.8 - 8.0 K/UL    ABS. LYMPHOCYTES 1.1 0.8 - 3.5 K/UL    ABS. MONOCYTES 0.8 0.0 - 1.0 K/UL    ABS. EOSINOPHILS 0.3 0.0 - 0.4 K/UL    ABS. BASOPHILS 0.0 0.0 - 0.1 K/UL    ABS. IMM. GRANS. 0.0 0.00 - 0.04 K/UL    DF AUTOMATED     TROPONIN I    Collection Time: 07/22/20 10:43 AM   Result Value Ref Range    Troponin-I, Qt. 0.12 (H) <0.05 ng/mL   NT-PRO BNP    Collection Time: 07/22/20 10:43 AM   Result Value Ref Range    NT pro-BNP 22,480 (H) <450 PG/ML        IMAGING:  XR CHEST PORT   Final Result   IMPRESSION: Mild pulmonary edema with stable or recurrent moderate right pleural   effusion. Medications During Visit:  Medications - No data to display      DECISION MAKING:  Joan Oviedo is a 81 y.o. male who comes in as above.   Here, patient is remained on supplemental oxygen but has not had any hypoxia. Patient's troponin is slightly elevated however he seems to have a chronically elevated troponin. More concerning is his x-ray findings of some pulmonary edema with the effusions as well as the elevated BNP. Patient will be provided with a small dose of Lasix and admitted for further evaluation and treatment. IMPRESSION:  1. Hypoxia    2. Acute on chronic congestive heart failure, unspecified heart failure type (HCC)    3. Elevated troponin        DISPOSITION:  Admitted        Procedures      Hospitalist Perfect Serve for Admission  4:15 PM    ED Room Number: ER16/16  Patient Name and age:  Yamile Nunez 81 y.o.  male  Working Diagnosis:   1. Hypoxia    2. Acute on chronic congestive heart failure, unspecified heart failure type (HCC)    3. Elevated troponin        COVID-19 Suspicion:  no    Code Status:  UNK  Readmission: no  Isolation Requirements:  no  Recommended Level of Care:  telemetry  Department:[facility] ED - [phone]  Other:  Hyppoxic in the 60s at nursing home off of 02. Better here. AOC congestive HF. Elevated trop seem chronic.

## 2020-07-22 NOTE — ED NOTES
Gave update to daughter Dominique Calix on phone & gave update on pt that he will be admitted. Daughter stated that she has had bad experiences w/ pt being hospitalized in past. Made daughter aware that hospital policy as of now states that pt may have 1 visitor, as long as he remains without isolation precautions. Daughter thankful on phone & would like to be updated when pt has a bed assignment.

## 2020-07-22 NOTE — PROGRESS NOTES
[facility]: MED RECONCILIATION    Information obtained from: [id number] [facility] Adult Home Transfer papers    Medication(s) ADDED to PTA list:  Bumex 1 mg BID  Lisinopril 2.5 mg daily  Magnesium gluc 500 mg daily  Ipratropium nebs Q 4hrs prn  Glucose tabs prn    Medication(s) REMOVED from PTA list:  Artifical tears  Docusate 100 mg prn  Ketoconazole shampoo    Medication(s) ADJUSTED on PTA list:  Finasteride changed to \"daily\" from [facility]      Allergies: Terazosin    Prior to Admission Medications:     Prior to Admission Medications   Prescriptions Last Dose Informant Patient Reported? Taking?   acetaminophen (TYLENOL) 325 mg tablet   Yes Yes   Sig: Take 650 mg by mouth every four (4) hours as needed for Pain. albuterol-ipratropium (DUO-NEB) 2.5 mg-0.5 mg/3 ml nebu   Yes Yes   Sig: 3 mL by Nebulization route every four (4) hours as needed (shortness of breath). aspirin 81 mg chewable tablet   No Yes   Sig: Take 1 Tab by mouth daily. atorvastatin (LIPITOR) 10 mg tablet   No Yes   Sig: Take 1 Tab by mouth nightly. bumetanide (BUMEX) 0.5 mg tablet   Yes Yes   Sig: Take 1 mg by mouth two (2) times a day. calcium-vitamin D (OYSTER SHELL) 500 mg(1,250mg) -200 unit per tablet   Yes Yes   Sig: Take 1 Tab by mouth daily. carvedilol (COREG) 3.125 mg tablet  Care Giver No Yes   Sig: Take 1 Tab by mouth two (2) times a day. cholecalciferol (VITAMIN D3) 1,000 unit tablet  Care Giver Yes Yes   Sig: Take 3,000 Units by mouth daily. donepezil (ARICEPT) 10 mg tablet  Care Giver Yes Yes   Sig: Take 10 mg by mouth daily. ferrous sulfate 325 mg (65 mg iron) tablet  Care Giver Yes Yes   Sig: Take 325 mg by mouth daily (with breakfast). finasteride (PROSCAR) 5 mg tablet  Care Giver Yes Yes   Sig: Take 5 mg by mouth daily. glipiZIDE (GLUCOTROL) 5 mg tablet   Yes Yes   Sig: Take 5 mg by mouth daily. glucose 4 gram chewable tablet   Yes Yes   Sig: Take 15 g by mouth as needed.    guaiFENesin SR (MUCINEX) 600 mg SR tablet   Yes Yes   Sig: Take 600 mg by mouth two (2) times a day. ipratropium (ATROVENT) 0.02 % soln   Yes Yes   Si.5 mg by Nebulization route every four (4) hours as needed for Wheezing or Shortness of Breath. levothyroxine (SYNTHROID) 150 mcg tablet   Yes Yes   Sig: Take 150 mcg by mouth Daily (before breakfast). lisinopriL (PRINIVIL, ZESTRIL) 5 mg tablet   Yes Yes   Sig: Take 2.5 mg by mouth daily. loratadine (CLARITIN) 10 mg tablet   Yes Yes   Sig: Take 10 mg by mouth daily. magnesium gluconate 500 mg (27 mg  elemental) tablet   Yes Yes   Sig: Take 500 mg by mouth daily. omeprazole (PRILOSEC) 20 mg capsule  Care Giver Yes Yes   Sig: Take 20 mg by mouth Daily (before breakfast). 30 minutes prior to breakfast   senna (Senna) 8.6 mg tablet   Yes Yes   Sig: Take 1 Tab by mouth daily. sertraline (ZOLOFT) 100 mg tablet   Yes Yes   Sig: Take 200 mg by mouth daily. tamsulosin (FLOMAX) 0.4 mg capsule  Care Giver Yes Yes   Sig: Take 0.4 mg by mouth nightly.       Facility-Administered Medications: None            Elena Valera, PHARMD   Contact: [phone]

## 2020-07-22 NOTE — ED NOTES
Incontinence care performed, pt changed into clean gown & bed pad changed. Non-blanchable erythema noted to sacral area. Pt boosted in bed & is resting comfortably at this time.

## 2020-07-22 NOTE — H&P
[facility]    Hospitalist Admission Note      NAME:  Cayla Gaming   :   1935   MRN:  [id number]     PCP:  Danielle Bishop MD     Date/Time of service:  2020 4:29 PM          Subjective:     CHIEF COMPLAINT: low oxygen     HISTORY OF PRESENT ILLNESS:     Mr. Alisa Benitez is a 81 y.o.  male who presented to the Emergency Department complaining of low oxygen. He provides no hx due to dementia. Noted by NH staff. Had pulled off his chronic oxygen. ER workup is relatively unremarkable compared to prior admit. We will admit him for observation.       Past Medical History:   Diagnosis Date    Anxiety and depression     Arthritis     CAD (coronary artery disease)     Cardiomyopathy ([address] 2015    CHF (congestive heart failure), NYHA class IV, chronic, systolic (HCC)     Chronic hypoxemic respiratory failure (HCC)     Colon cancer ([address]     COLON    Dementia ([address]     Depression     DEPRESSION    DM type 2 causing vascular disease (HCC)     IDDM    GERD (gastroesophageal reflux disease)     Hx of cancer of lung 2016    Hx of completed stroke     Hypertension     Hypothyroid     Mild aortic stenosis 6/15/2015    Pulmonary hypertension ([address] 6/15/2015    PVD (peripheral vascular disease) ([address]     STENT RIGHT GROIN, PAD    S/P CABG x 3 2015    LIMA TO LAD; SVG TO OM; SVG TO OM    Third degree AV block ([address] 2015        Past Surgical History:   Procedure Laterality Date    HX GI      COLON RESECTION    HX HEART CATHETERIZATION  2015    HX PACEMAKER      INS PPM/ICD LED DUAL ONLY  2015         VASCULAR SURGERY PROCEDURE UNLIST      PAD, RIGHT GROIN STENT       Social History     Tobacco Use    Smoking status: Former Smoker     Packs/day: 2.00     Years: 60.00     Pack years: 120.00     Types: Cigarettes     Last attempt to quit: 2015     Years since quittin.0    Smokeless tobacco: Never Used   Substance Use Topics  Alcohol use: No     Alcohol/week: 0.0 standard drinks        Family History   Problem Relation Age of Onset    Heart Disease Mother     Diabetes Mother     Other Father         AMPUTATION LEG FOLLOWING BUG BITE    Other Sister         CEREBRAL ANUERYSM    Heart Disease Brother     Diabetes Sister       Family hx cannot be fully assessed, since the patient cannot provide information    Allergies   Allergen Reactions    Terazosin Unknown (comments)        Prior to Admission medications    Medication Sig Start Date End Date Taking? Authorizing Provider   Oxygen 2 Each. Provider, Historical   loratadine (CLARITIN) 10 mg tablet Take 10 mg by mouth daily. Provider, Historical   glipiZIDE (GLUCOTROL) 10 mg tablet Take 5 mg by mouth every morning. Provider, Historical   levothyroxine (SYNTHROID) 150 mcg tablet Take 150 mcg by mouth Daily (before breakfast). Provider, Historical   sertraline (ZOLOFT) 100 mg tablet Take 200 mg by mouth daily. Provider, Historical   ketoconazole (NIZORAL) 2 % shampoo Apply  to affected area daily. Apply to scalp 2 days per week - Wednesday and saturday    Provider, Historical   docusate sodium (STOOL SOFTENER) 100 mg capsule Take 100 mg by mouth daily as needed for Constipation. Provider, Historical   aspirin 81 mg chewable tablet Take 1 Tab by mouth daily. 8/30/18   Jaxson Oconnor MD   atorvastatin (LIPITOR) 10 mg tablet Take 1 Tab by mouth nightly. 8/30/18   Jaxson Oconnor MD   acetaminophen (TYLENOL) 325 mg tablet Take 650 mg by mouth every four (4) hours as needed for Pain. Provider, Historical   calcium-vitamin D (OYSTER SHELL) 500 mg(1,250mg) -200 unit per tablet Take 1 Tab by mouth three (3) times daily (with meals). Provider, Historical   guaiFENesin SR (MUCINEX) 600 mg SR tablet Take 600 mg by mouth two (2) times a day.     Provider, Historical   albuterol-ipratropium (DUO-NEB) 2.5 mg-0.5 mg/3 ml nebu 3 mL by Nebulization route every four (4) hours as needed (shortness of breath). Provider, Historical   dextran 70-hypromellose (ARTIFICIAL TEARS,[id number],) ophthalmic solution Administer 2 Drops to both eyes two (2) times a day. Provider, Historical   senna-docusate (PERICOLACE) 8.6-50 mg per tablet Take 1 Tab by mouth daily. For constipation. Do not take if you have loose stools 12/27/17   Vick Hsu MD   cholecalciferol (VITAMIN D3) 1,000 unit tablet Take 3,000 Units by mouth daily. Provider, Historical   omeprazole (PRILOSEC) 20 mg capsule Take 20 mg by mouth Daily (before breakfast). 30 minutes prior to breakfast    Provider, Historical   donepezil (ARICEPT) 10 mg tablet Take 10 mg by mouth daily. Provider, Historical   carvedilol (COREG) 3.125 mg tablet Take 1 Tab by mouth two (2) times a day. 11/24/15   Gila Ruiz MD   ferrous sulfate 325 mg (65 mg iron) tablet Take 325 mg by mouth daily (with breakfast). Provider, Historical   finasteride (PROSCAR) 5 mg tablet Take 5 mg by mouth every evening. Provider, Historical   tamsulosin (FLOMAX) 0.4 mg capsule Take 0.4 mg by mouth nightly.     Provider, Historical       Review of Systems: VAgue due to dementia  (bold if positive, if negative)    Gen:  fatigueEyes:  ENT:  CVS:  Pulm:   dyspnea,GI:  :  MS:  Skin:  Psych:  Endo:  Hem:  Renal:  Neuro:        Objective:      VITALS:    Vital signs reviewed; most recent are:    Visit Vitals  /45   Pulse 75   Temp 97.4 °F (36.3 °C)   Resp 18   Ht 5' 10\" (1.778 m)   Wt 57.4 kg (126 lb 8 oz)   SpO2 99%   BMI 18.15 kg/m²     SpO2 Readings from Last 6 Encounters:   07/22/20 99%   05/26/20 96%   12/23/19 99%   09/04/19 92%   07/02/19 97%   02/27/19 94%    O2 Flow Rate (L/min): 2 l/min(Baseline.)   No intake or output data in the 24 hours ending 07/22/20 1629     Exam:     Physical Exam:    Gen:  Frail, cachectic, in no acute distress  HEENT:  Pink conjunctivae, PERRL, hearing intact to voice, moist mucous membranes  Neck:  Supple, without masses, thyroid non-tender  Resp:  No accessory muscle use, clear breath sounds without wheezes rales or rhonchi  Card:  No murmurs, normal S1, S2 without thrills, bruits or peripheral edema  Abd:  Soft, non-tender, non-distended, normoactive bowel sounds are present, no mass  Lymph:  No cervical or inguinal adenopathy  Musc:  No cyanosis or clubbing  Skin:  No rashes or ulcers, skin turgor is good  Neuro:  Cranial nerves are grossly intact, general motor weakness, follows commands vaguely  Psych:  Poor insight, oriented to person, place and time, alert     Labs:    Recent Labs     07/22/20  1043   WBC 9.6   HGB 10.1*   HCT 33.4*        Recent Labs     07/22/20  1043      K 3.9   CL 99   CO2 33*   *   BUN 26*   CREA 0.90   CA 9.0     Lab Results   Component Value Date/Time    Glucose (POC) 108 (H) 07/22/2020 10:32 AM    Glucose (POC) 253 (H) 05/26/2020 04:19 PM     No results for input(s): PH, PCO2, PO2, HCO3, FIO2 in the last 72 hours. No results for input(s): INR, INREXT in the last 72 hours. All Micro Results     None          I have reviewed previous records       Assessment and Plan:      CHF (congestive heart failure), NYHA class IV, chronic, systolic / Cardiomyopathy / Hypertension / Pleural effusion / Third degree AV block with Pacemaker - POA, CXR showed effusion and pulm edema. Recent Echo showed EF 20-25%, Pulm HTN with PASP 58. Cardiomyopathy due to chronic RV pacing. Consult cardiology and palliative care to discuss role of hospice with family, no other reasonable option. Continue oral Bumex and Lisinopril. Ambulate as tolerated     Chronic respiratory failure with hypoxia / Pulmonary hypertension / Hx of cancer of lung - POA: due to CHF, pulm HTN, COPD, debility, etc.. He is at his baseline supplemental oxygen.       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 Stop Glucotrol in setting of end stage cardiac disease.  Continue SSi per protocol     CAD (coronary artery disease) sp CABG / Hx of completed stroke / PVD (peripheral vascular disease) - POA, Stable. Continue Aspirin, Lipitor, Coreg    Severe protein calorie malnutrition / FTT - Nutrition supplements and consult. Hospice consult.     Chronic obstructive pulmonary disease -  POA, stable. Nebs PRN     Dementia without behavioral disturbances / Anxiety and depression - Moderate to severe. Continue Aricept, Zoloft. Palliative and likely hospice. No other option is reasonable. Hypothyroid - continue synthroid    BPH - Proscar and flomax     GERD (gastroesophageal reflux disease) / Hepatitis - PPI     Arthritis / Lumbar stenosis - tylenol prn    Telemetry reviewed:   normal sinus rhythm, pace    Risk of deterioration: high      Total time spent with patient: 79 Minutes I personally reviewed chart, notes, data and current medications in the medical record. I have personally examined and treated the patient at bedside during this period.                  Care Plan discussed with: Patient, Nursing Staff and >50% of time spent in counseling and coordination of care    Discussed:  Care Plan       ___________________________________________________    Attending Physician: Jean Claude Zaragoza MD

## 2020-07-22 NOTE — ED NOTES
TRANSFER - OUT REPORT:    Verbal report given to [facility] RN (name) on Remedios Ha  being transferred to 5th floor (unit) for routine progression of care       Report consisted of patients Situation, Background, Assessment and   Recommendations(SBAR). Information from the following report(s) SBAR, Kardex, ED Summary, Intake/Output, Recent Results and Cardiac Rhythm Paced was reviewed with the receiving nurse. Lines:   Peripheral IV 07/22/20 Left Forearm (Active)   Site Assessment Clean, dry, & intact 07/22/20 1040   Phlebitis Assessment 0 07/22/20 1040   Infiltration Assessment 0 07/22/20 1040   Dressing Status Clean, dry, & intact 07/22/20 1040   Hub Color/Line Status Pink 07/22/20 1040   Action Taken Blood drawn 07/22/20 1040   Alcohol Cap Used Yes 07/22/20 1040       Peripheral IV 07/22/20 Right Antecubital (Active)   Site Assessment Clean, dry, & intact 07/22/20 1110   Phlebitis Assessment 0 07/22/20 1110   Infiltration Assessment 0 07/22/20 1110   Dressing Status Clean, dry, & intact 07/22/20 1110   Dressing Type Transparent;Tape 07/22/20 1110   Hub Color/Line Status Pink;Patent; Flushed 07/22/20 1110   Action Taken Blood drawn 07/22/20 1110        Opportunity for questions and clarification was provided.       Patient transported with:   Monitor  O2 @ 2 liters  Tech

## 2020-07-22 NOTE — PROGRESS NOTES
7/22/2020  6:07 PM  Case management note    Reason for Admission:   SOB, increased weakness and confusion. Patient resides at [facility] is  at Home. [phone]  Daughter is Myke Arce [phone]. Patient has history of dementia, chf, CAD, pulmonary HTN. He wears 2lpm oxygen with [facility]. Patient has not been eating and had increased weakness. Patient is mostly in wheelchair, recently unable to transfer. Daughter indicated he was suppose to get [employer]rt, and had used BS in past. I dont think they go to [employer]. OBS educated, letter acknowledged over phone by daughter  Patient has CM consult for discharge planning. RUR Score:     moderate             PCP: First and Last name:  Home Based Primary Team through [place]   Name of Practice:    Are you a current patient: Yes/No: yes   Approximate date of last visit: 4-6 weeks   Can you participate in a virtual visit if needed: no    Do you (patient/family) have any concerns for transition/discharge? Plan for utilizing home health:   PT/OT to eval    Current Advanced Directive/Advance Care Plan:  DNR on chart, daughter Ailyn Rivera             Transition of Care Plan:        1. Home to group adult home  2. [facility] follow up  3. PT/OT eval  4. CM to follow through discharge    Care Management Interventions  PCP Verified by CM: Yes(primary home visits through [place])  Mode of Transport at Discharge: [facility]  Current Support Network:  Adult Group Home, Has Personal Caregivers  Confirm Follow Up Transport: Other (see comment)  The Plan for Transition of Care is Related to the Following Treatment Goals : weakness, demenita  Discharge Location  Discharge Placement: Group home  [facility]

## 2020-07-22 NOTE — PROGRESS NOTES
TRANSFER - IN REPORT:    Verbal report received from Zaria(name) on Reford Sham  being received from ED(unit) for routine progression of care      Report consisted of patients Situation, Background, Assessment and   Recommendations(SBAR). Information from the following report(s) SBAR, Kardex, [facility] and Recent Results was reviewed with the receiving nurse. Opportunity for questions and clarification was provided. Assessment completed upon patients arrival to unit and care assumed.

## 2020-07-23 PROBLEM — I50.22 CHF (CONGESTIVE HEART FAILURE), NYHA CLASS IV, CHRONIC, SYSTOLIC (HCC): Chronic | Status: ACTIVE | Noted: 2020-01-01

## 2020-07-23 NOTE — CONSULTS
Palliative Medicine Consult  Mark: [phone] ([phone])    Patient Name: Janice Lynch  YOB: 1935    Date of Initial Consult: 7/23/2020  Reason for Consult: End-stage disease  Requesting Provider: Hector Chamberlain MD  Primary Care Physician: Sherlyn Godfrey MD     SUMMARY:   Janice Lynch is a 81 y.o. with a past history of dementia; NYHA class IV CHF (EF 20-25% 5/2020); CAD s/p CABG; chronic bilateral pleural effusions; aortic stenosis; pulmonary hypertension; DM, COPD; who was admitted on 7/22/2020 from [address] [place] [address] (adult group home) with a diagnosis of acute-on-chronic heart failure. Current medical issues leading to Palliative Medicine involvement include: care decisions. PALLIATIVE DIAGNOSES:   1. Shortness of breath  2. Weakness  3. Poor appetite with weight loss  4. Dementia  5. End-stage CHF        PLAN:   1. Patient has limited insight and ability to participate in making complex medical decisions due to his dementia. 2. I called and spoke with his daughter/MPOA, Jeremy Rossi, about the events since his last hospitalization at [facility] in 5/2020.  3. We reviewed his Advance Medical Directive completed in 2015 in which he specified in detail his wishes for care. 4. We discussed these wishes in context of his slow decline over the past several years with a more acute decline over the past 2 months. 5. He is followed in his group home by the [place] [facility] who have discussed hospice as an option to his care given his ongoing decline. 6. I reviewed the natural history of heart failure with rapid decline characteristic of the last stages of severe heart failure. 7. In response to her question, I gave an estimate of a few months to many months as prognosis with the qualification of an acute event causing death sooner. 8. She requested hospice and a referral was made to [facility].   9. She's been told by his [place] [facility] that they can continue to provide support as well and she would like this service to continue. 10. Initial consult note routed to primary continuity provider and/or primary health care team members  11. Communicated plan of care with: Palliative [facility] Team     GOALS OF CARE / TREATMENT PREFERENCES:     GOALS OF CARE:  Patient/Health Care Proxy Stated Goals: Other (comment) - quality of life    TREATMENT PREFERENCES:   Code Status: DNR    Advance Care Planning:  [x] [facility] Interdisciplinary Team has updated the ACP Navigator with [address] and Patient Capacity      Primary Decision Maker: Mita Stevens) - Daughter - [phone]    Secondary Decision Maker: Noemy Grey - Son - [phone]. Advance Care Planning 5/19/2020   Patient's Healthcare Decision Maker is: Named in scanned ACP document   Primary Decision Maker Name -   Primary Decision Maker Phone Number -   Primary Decision Maker Relationship to Patient -   Confirm Advance Directive Yes, on file   Does the patient have other document types -       Medical Interventions: Other (comment) - continue current medications while in hospital with goal of maximizing heart function, then discharge back to [facility] with hospice support    Other Instructions:   Artificially Administered Nutrition: No feeding tube     Other:    As far as possible, the palliative care team has discussed with patient / health care proxy about goals of care / treatment preferences for patient. HISTORY:     History obtained from: daughter, Lazarus Metz, chart    CHIEF COMPLAINT: weakness    HPI/SUBJECTIVE:    The patient is:   [] Verbal and participatory  [x] Non-participatory due to: dementia    He was in the hospital in May for heart failure. When he left the hospital in May, his [address] told her that he might not get better, they would just have to see how he did over the following weeks.   The team talked with her about hospice then, saying he could go in a nursing home with hospice or stay at [facility] and they could continue to take care of him, too. She's been waiting to see if he would get better. He's been weak and eating less since then. He's lost weight  He has good days when he seems like himself and bad days when he's more confused. But he hasn't gone back to where he was. The staff at the group home called her about his weakness and his low blood pressure and she said it was OK to bring him to the hospital.    Clinical Pain Assessment (nonverbal scale for severity on nonverbal patients):   Clinical Pain Assessment  Severity: 0     Activity (Movement): Lying quietly, normal position    Duration: for how long has pt been experiencing pain (e.g., 2 days, 1 month, years)  Frequency: how often pain is an issue (e.g., several times per day, once every few days, constant)     FUNCTIONAL ASSESSMENT:     Palliative Performance Scale (PPS):  PPS: 30       PSYCHOSOCIAL/SPIRITUAL SCREENING:     Palliative IDT has assessed this patient for cultural preferences / practices and a referral made as appropriate to needs (Cultural Services, Patient Advocacy, Ethics, etc.)    Any spiritual / [employer]eterian concerns:  [] Yes /  [x] No    Caregiver Burnout:  [] Yes /  [x] No /  [] No Caregiver Present      Anticipatory grief assessment:   [x] Normal  / [] Maladaptive       ESAS Anxiety:      ESAS Depression:          REVIEW OF SYSTEMS:     Positive and pertinent negative findings in ROS are noted above in HPI. The following systems were [x] reviewed / [] unable to be reviewed as noted in HPI  Other findings are noted below. Systems: constitutional, ears/nose/mouth/throat, respiratory, gastrointestinal, genitourinary, musculoskeletal, integumentary, neurologic, psychiatric, endocrine. Positive findings noted below.   Modified ESAS Completed by: provider           Pain: 0           Dyspnea: 0                    PHYSICAL EXAM:     From RN flowsheet:  Wt Readings from Last 3 Encounters: 07/22/20 130 lb (59 kg)   05/25/20 126 lb 8 oz (57.4 kg)   12/23/19 131 lb (59.4 kg)     Blood pressure 90/44, pulse 62, temperature 97.3 °F (36.3 °C), resp. rate 16, height 5' 10\" (1.778 m), weight 130 lb (59 kg), SpO2 98 %.     Pain Scale 1: Adult Nonverbal Pain Scale  Pain Intensity 1: 0                 Last bowel movement, if known:     Constitutional: frail, ill-appeaing  Eyes: pupils equal, anicteric  ENMT: no nasal discharge, moist mucous membranes  Cardiovascular: regular rhythm, distal pulses intact; no peripheral edema  Respiratory: breathing not labored, symmetric  Gastrointestinal: soft non-tender, +bowel sounds  Musculoskeletal: no deformity, no tenderness to palpation; cachetic  Skin: warm, dry  Neurologic: eyes closed, opens eyes to name, knows name and \"[place]\"  Psychiatric: full affect, no hallucinations  Other:       HISTORY:     Principal Problem:    CHF (congestive heart failure), NYHA class IV, chronic, systolic ([facility]) (6/47/7008)    Active Problems:    Hypertension ()      CAD (coronary artery disease) (6/26/2015)      Chronic obstructive pulmonary disease ([facility] (6/28/2015)      Overview: 04/24/[facility]      FVC 2.53-67%-post bronchodilator 2.61-69%-increase 3%      FEV1 1.41-52%-post bronchodilator value 1.45-54%-increase 3%      FEV1/FVC 56%      TLC 5.19-88%      RV 2.%      DLCO 45%      DLCO VA 73%      %            04/24/2015-6 minute walk test no exertional hypoxia      Dementia ([facility] (11/20/2015)      Chronic hypoxemic respiratory failure (HCC) ()      Hypothyroid ()      GERD (gastroesophageal reflux disease) ()      DM type 2 causing vascular disease (HCC) ()      Overview: IDDM      Past Medical History:   Diagnosis Date    Anxiety and depression     Arthritis     CAD (coronary artery disease)     Cardiomyopathy ([facility] 6/11/2015    CHF (congestive heart failure), NYHA class IV, chronic, systolic (HCC)     [facility] hypox[facility] failure ([facility]     Colon cancer ([facility]     COLON    Dementia ([facility]     Depression     DEPRESSION    DM type 2 causing vascular disease (HCC)     IDDM    GERD (gastroesophageal reflux disease)     Hx of cancer of lung 2016    Hx of completed stroke     Hypertension     Hypothyroid     Mild aortic stenosis 6/15/2015    Pulmonary hypertension ([facility] 6/15/2015    PVD (peripheral vascular disease) ([facility]     STENT RIGHT GROIN, PAD    S/P CABG x 3 2015    LIMA TO LAD; SVG TO OM; SVG TO OM    Third degree AV block ([facility] 2015      Past Surgical History:   Procedure Laterality Date    HX GI      COLON RESECTION    HX HEART CATHETERIZATION  2015    HX PACEMAKER      INS PPM/ICD LED DUAL ONLY  2015         VASCULAR SURGERY PROCEDURE UNLIST      PAD, RIGHT GROIN STENT      Family History   Problem Relation Age of Onset    Heart Disease Mother     Diabetes Mother     Other Father         AMPUTATION LEG FOLLOWING BUG BITE    Other Sister         CEREBRAL ANUERYSM    Heart Disease Brother     Diabetes Sister       History reviewed, no pertinent family history.   Social History     Tobacco Use    Smoking status: Former Smoker     Packs/day: 2.00     Years: 60.00     Pack years: 120.00     Types: Cigarettes     Last attempt to quit: 2015     Years since quittin.0    Smokeless tobacco: Never Used   Substance Use Topics    Alcohol use: No     Alcohol/week: 0.0 standard drinks     Allergies   Allergen Reactions    Terazosin Unknown (comments)      Current Facility-Administered Medications   Medication Dose Route Frequency    bumetanide (BUMEX) injection 1 mg  1 mg IntraVENous BID    lisinopriL (PRINIVIL, ZESTRIL) tablet 2.5 mg  2.5 mg Oral QHS    albuterol-ipratropium (DUO-NEB) 2.5 MG-0.5 MG/3 ML  3 mL Nebulization Q4H PRN    aspirin chewable tablet 81 mg  81 mg Oral DAILY    atorvastatin (LIPITOR) tablet 10 mg  10 mg Oral QHS    carvediloL (COREG) tablet 3.125 mg  3.125 mg Oral BID    docusate sodium (COLACE) capsule 100 mg  100 mg Oral DAILY PRN    donepeziL (ARICEPT) tablet 10 mg  10 mg Oral DAILY    finasteride (PROSCAR) tablet 5 mg  5 mg Oral QPM    levothyroxine (SYNTHROID) tablet 150 mcg  150 mcg Oral ACB    pantoprazole (PROTONIX) tablet 40 mg  40 mg Oral ACB    senna-docusate (PERICOLACE) 8.6-50 mg per tablet 1 Tab  1 Tab Oral DAILY    sertraline (ZOLOFT) tablet 200 mg  200 mg Oral DAILY    tamsulosin (FLOMAX) capsule 0.4 mg  0.4 mg Oral QHS    glucose chewable tablet 16 g  4 Tab Oral PRN    dextrose (D50W) injection syrg 12.5-25 g  25-50 mL IntraVENous PRN    glucagon (GLUCAGEN) injection 1 mg  1 mg IntraMUSCular PRN    sodium chloride (NS) flush 5-40 mL  5-40 mL IntraVENous Q8H    sodium chloride (NS) flush 5-40 mL  5-40 mL IntraVENous PRN    acetaminophen (TYLENOL) tablet 650 mg  650 mg Oral Q6H PRN    Or    acetaminophen (TYLENOL) suppository 650 mg  650 mg Rectal Q6H PRN    polyethylene glycol (MIRALAX) packet 17 g  17 g Oral DAILY PRN    ondansetron (ZOFRAN ODT) tablet 4 mg  4 mg Oral Q8H PRN    Or    ondansetron (ZOFRAN) injection 4 mg  4 mg IntraVENous Q6H PRN    famotidine (PEPCID) tablet 20 mg  20 mg Oral BID    enoxaparin (LOVENOX) injection 40 mg  40 mg SubCUTAneous DAILY    insulin lispro (HUMALOG) injection   SubCUTAneous AC&HS          LAB AND IMAGING FINDINGS:     Lab Results   Component Value Date/Time    WBC 7.0 07/23/2020 05:55 AM    HGB 10.8 (L) 07/23/2020 05:55 AM    PLATELET 651 29/02/3730 05:55 AM     Lab Results   Component Value Date/Time    Sodium 140 07/23/2020 05:55 AM    Potassium 3.7 07/23/2020 05:55 AM    Chloride 100 07/23/2020 05:55 AM    CO2 37 (H) 07/23/2020 05:55 AM    BUN 23 (H) 07/23/2020 05:55 AM    Creatinine 0.67 (L) 07/23/2020 05:55 AM    Calcium 9.1 07/23/2020 05:55 AM    Magnesium 2.0 07/23/2020 05:55 AM    Phosphorus 4.0 05/19/2020 03:30 AM      Lab Results   Component Value Date/Time    Alk. phosphatase 72 07/23/2020 05:55 AM    Protein, total 6.8 07/23/2020 05:55 AM    Albumin 2.5 (L) 07/23/2020 05:55 AM    Globulin 4.3 (H) 07/23/2020 05:55 AM     Lab Results   Component Value Date/Time    INR 1.3 (H) 05/24/2020 02:51 AM    Prothrombin time 13.4 (H) 05/24/2020 02:51 AM    aPTT 32.0 05/24/2020 02:51 AM      Lab Results   Component Value Date/Time    Ferritin 527 (H) 05/18/2020 07:51 PM      No results found for: PH, PCO2, PO2  No components found for: [place]   Lab Results   Component Value Date/Time    CK 40 01/04/2018 10:23 AM    CK - MB <1.0 01/04/2018 10:23 AM                Total time: 70 minutes  Counseling / coordination time, spent as noted above: 60 minutes  > 50% counseling / coordination?: yes    Prolonged service was provided for  []30 min   []75 min in face to face time in the presence of the patient, spent as noted above. Time Start:   Time End:   Note: this can only be billed with 03901 (initial) or 68593 (follow up). If multiple start / stop times, list each separately.

## 2020-07-23 NOTE — PROGRESS NOTES
Bedside shift change report given to Garth Lai (oncoming nurse) by Rosanna Jj (offgoing nurse). Report included the following information SBAR, Kardex, MAR and Recent Results.

## 2020-07-23 NOTE — PROGRESS NOTES
Ozzy Resendiz josef [address]  [address] [facility], [address] [place] [address]  [phone]      Medical Progress Note      NAME: Marylou Davis   :  1935  MRM:  [id number]    Date/Time: 2020  11:40 AM         Subjective:     Chief Complaint:  Confusion: patient does not know where he is or why he is here, cannot tell me what happened yesterday    ROS:  (bold if positive, if negative)    Unreliable due to dementia          Objective:       Vitals:          Last 24hrs VS reviewed since prior progress note.  Most recent are:    Visit Vitals  /61 (BP 1 Location: Left arm, BP Patient Position: At rest)   Pulse 60   Temp 97.6 °F (36.4 °C)   Resp 18   Ht 5' 10\" (1.778 m)   Wt 59 kg (130 lb)   SpO2 98%   BMI 18.65 kg/m²     SpO2 Readings from Last 6 Encounters:   20 98%   20 96%   19 99%   19 92%   19 97%   19 94%    O2 Flow Rate (L/min): 2 l/min       Intake/Output Summary (Last 24 hours) at 2020 1140  Last data filed at 2020 1924  Gross per 24 hour   Intake 200 ml   Output    Net 200 ml          Exam:     Physical Exam:    Gen:  Well-developed, frail, elderly, chronically ill-appearing, in no acute distress  HEENT:  Pink conjunctivae, PERRL, hearing intact to voice, moist mucous membranes  Neck:  Supple, without masses, thyroid non-tender  Resp:  No accessory muscle use, clear breath sounds without wheezes rales or rhonchi  Card:  No murmurs, normal S1, S2 without thrills, bruits or peripheral edema  Abd:  Soft, non-tender, non-distended, normoactive bowel sounds are present, no palpable organomegaly and no detectable hernias  Lymph:  No cervical or inguinal adenopathy  Musc:  No cyanosis or clubbing  Skin:  No rashes or ulcers, skin turgor is good  Neuro:  Cranial nerves are grossly intact, no focal motor weakness, follows commands appropriately  Psych:  Poor insight, oriented to person, but not place and time, alert       Medications Reviewed: (see below)    Lab Data Reviewed: (see below)    ______________________________________________________________________    Medications:     Current Facility-Administered Medications   Medication Dose Route Frequency    bumetanide (BUMEX) injection 1 mg  1 mg IntraVENous BID    lisinopriL (PRINIVIL, ZESTRIL) tablet 2.5 mg  2.5 mg Oral QHS    albuterol-ipratropium (DUO-NEB) 2.5 MG-0.5 MG/3 ML  3 mL Nebulization Q4H PRN    aspirin chewable tablet 81 mg  81 mg Oral DAILY    atorvastatin (LIPITOR) tablet 10 mg  10 mg Oral QHS    carvediloL (COREG) tablet 3.125 mg  3.125 mg Oral BID    docusate sodium (COLACE) capsule 100 mg  100 mg Oral DAILY PRN    donepeziL (ARICEPT) tablet 10 mg  10 mg Oral DAILY    finasteride (PROSCAR) tablet 5 mg  5 mg Oral QPM    levothyroxine (SYNTHROID) tablet 150 mcg  150 mcg Oral ACB    pantoprazole (PROTONIX) tablet 40 mg  40 mg Oral ACB    senna-docusate (PERICOLACE) 8.6-50 mg per tablet 1 Tab  1 Tab Oral DAILY    sertraline (ZOLOFT) tablet 200 mg  200 mg Oral DAILY    tamsulosin (FLOMAX) capsule 0.4 mg  0.4 mg Oral QHS    glucose chewable tablet 16 g  4 Tab Oral PRN    dextrose (D50W) injection syrg 12.5-25 g  25-50 mL IntraVENous PRN    glucagon (GLUCAGEN) injection 1 mg  1 mg IntraMUSCular PRN    sodium chloride (NS) flush 5-40 mL  5-40 mL IntraVENous Q8H    sodium chloride (NS) flush 5-40 mL  5-40 mL IntraVENous PRN    acetaminophen (TYLENOL) tablet 650 mg  650 mg Oral Q6H PRN    Or    acetaminophen (TYLENOL) suppository 650 mg  650 mg Rectal Q6H PRN    polyethylene glycol (MIRALAX) packet 17 g  17 g Oral DAILY PRN    ondansetron (ZOFRAN ODT) tablet 4 mg  4 mg Oral Q8H PRN    Or    ondansetron (ZOFRAN) injection 4 mg  4 mg IntraVENous Q6H PRN    famotidine (PEPCID) tablet 20 mg  20 mg Oral BID    enoxaparin (LOVENOX) injection 40 mg  40 mg SubCUTAneous DAILY    insulin lispro (HUMALOG) injection   SubCUTAneous AC&HS            Lab Review:     Recent Labs 07/23/20  0555 07/22/20  1043   WBC 7.0 9.6   HGB 10.8* 10.1*   HCT 36.8 33.4*    233     Recent Labs     07/23/20  0555 07/22/20  1043    137   K 3.7 3.9    99   CO2 37* 33*   * 111*   BUN 23* 26*   CREA 0.67* 0.90   CA 9.1 9.0   MG 2.0  --    ALB 2.5*  --    TBILI 1.1*  --    ALT 12  --      Lab Results   Component Value Date/Time    Glucose (POC) 112 (H) 07/23/2020 11:33 AM    Glucose (POC) 110 (H) 07/23/2020 06:36 AM    Glucose (POC) 170 (H) 07/22/2020 09:14 PM    Glucose (POC) 108 (H) 07/22/2020 10:32 AM    Glucose (POC) 253 (H) 05/26/2020 04:19 PM     No results for input(s): PH, PCO2, PO2, HCO3, FIO2 in the last 72 hours. No results for input(s): INR, INREXT in the last 72 hours.   Lab Results   Component Value Date/Time    Specimen Description: URINE 10/15/2011 02:00 PM    Specimen Description: URINE 09/09/2011 01:05 PM     Lab Results   Component Value Date/Time    Culture result: (A) 06/27/2019 04:45 PM     MODERATE STREPTOCOCCUS PNEUMONIAE (SENSITIVITIES PERFORMED BY E-TEST)    Culture result: SAMPLE SENT ON PREPLANTED MEDIA 06/27/2019 04:45 PM    Culture result: NO FUNGUS ISOLATED 30 DAYS 06/27/2019 04:45 PM            Assessment:     Principal Problem:    CHF (congestive heart failure), NYHA class IV, chronic, systolic ([facility] (4/04/4939)    Active Problems:    Hypertension ()      CAD (coronary artery disease) (6/26/2015)      Chronic obstructive pulmonary disease ([facility] (6/28/2015)      Overview: 04/24/[facility]      FVC 2.53-67%-post bronchodilator 2.61-69%-increase 3%      FEV1 1.41-52%-post bronchodilator value 1.45-54%-increase 3%      FEV1/FVC 56%      TLC 5.19-88%      RV 2.%      DLCO 45%      DLCO VA 73%      %            04/24/2015-6 minute walk test no exertional hypoxia      Dementia ([facility] (11/20/2015)      Chronic hypoxemic respiratory failure (HCC) ()      Hypothyroid ()      GERD (gastroesophageal reflux disease) ()      DM type 2 causing vascular disease ([facility] ()      Overview: IDDM           Plan:     Principal Problem:    CHF (congestive heart failure), NYHA class IV, chronic, systolic ([facility] (7/08/2815)   - appears to be quite advanced, I am not convinced there is any acute component at this time, however   - Cardiology input appreciated, diurese as able per them   - Palliative Care to see   - if returns to SNF, consider do not return to hospital order and comfort care or hospice    Active Problems:    Hypertension ()   - follow BP on meds a sabove      CAD (coronary artery disease) (6/26/2015)   - continue cardiac meds      Chronic obstructive pulmonary disease ([facility] (6/28/2015)   - continue respiratory meds      Dementia ([facility] (11/20/2015)   - on Aricept      Chronic hypoxemic respiratory failure (HCC) ()   - continue chronic oxygen, expect patient to become confused if he removes it and becomes hypoxic      Hypothyroid ()   - continue LT4      GERD (gastroesophageal reflux disease) ()   - continue PPI      DM type 2 causing vascular disease ([facility] ()   - follow BS on SSI, high risk for hypoglycemia        Total time spent in patient care: 35 minutes                  Care Plan discussed with: Patient, Care Manager and Nursing Staff    Discussed:  Code Status, Care Plan and D/C Planning    Prophylaxis:  Lovenox    Disposition:  SNF/LTC vs hospice           ___________________________________________________    Attending Physician: Ugo Chi MD

## 2020-07-23 NOTE — PROGRESS NOTES
Observed with increased throat clearing and extra swallowing when assisting patient with ensure supplement. Denies shortness of breath. Spoke with patient's daughter to inquire about po intake. Daughter states at one point in time patient required thickened liquids but not sure if he is still getting thickened liquids at group home. Speech therapy consulted due to history.

## 2020-07-23 NOTE — PROGRESS NOTES
Occupational Therapy    Patient chart reviewed in prep for OT evaluation. Spoke with RN who recommends deferring therapy until tomorrow as patient has been very lethargic and is sound asleep at this time. Will defer and follow-up as able and appropriate. Thank you.   Payal David, OTR/L

## 2020-07-23 NOTE — CONSULTS
Minna Maldonado MD. [facility]              Patient: Maurice Robbins  : 1935      Today's Date: 2020          HISTORY OF PRESENT ILLNESS:     History of Present Illness:  Reason for consult: CHF, time for hospice? Nikky Giraldo is a 81 y. o. male PMH CAD s/p CABG x3, HFrEF, PPM, colon cancer, depression, IDDM, HTN, lung cancer, aortic stenosis, pulm HTN, PVD, stroke, thryoid disease admitted with hypoxia.  Patient has a history of dementia and unable to provide any detailed history. He does answer some questions. He denies any chest pain and SOB and says he is OK. Per nursing on admission : \"Spoke w/ pt's daughter Payal Jurado who stated that she spoke w/ nurse from the [facility] who follows pt. She stated that the nurse reported to her that the nursing facility reported low O2 @ 61% & a cough with green sputum. Pt's daughter stated that she was told pt's BP was 89/46. Daughter also stated that w/ pt's confusion he sometimes pulls his O2 off. \"    CXR 20 - Mild pulmonary edema with stable or recurrent moderate right pleural Effusion. proBNP was 22 k (was 10 k in May)  He was given lasix 20 mg IV in ED. He seems comfortable when I see him and is about to work with PT.           PAST MEDICAL HISTORY:     Past Medical History:   Diagnosis Date    Anxiety and depression     Arthritis     CAD (coronary artery disease)     Cardiomyopathy ([address] 2015    CHF (congestive heart failure), NYHA class IV, chronic, systolic (HCC)     Chronic hypoxemic respiratory failure (HCC)     Colon cancer ([address]     COLON    Dementia ([address]     Depression     DEPRESSION    DM type 2 causing vascular disease (HCC)     IDDM    GERD (gastroesophageal reflux disease)     Hx of cancer of lung 2016    Hx of completed stroke     Hypertension     Hypothyroid     Mild aortic stenosis 6/15/2015    Pulmonary hypertension ([address] 6/15/2015    PVD (peripheral vascular disease) ([address] STENT RIGHT GROIN, PAD    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    LIMA TO LAD; SVG TO OM; SVG TO OM    Third degree AV block ([address] 6/11/2015       Past Surgical History:   Procedure Laterality Date    HX GI  1993    COLON RESECTION    HX HEART CATHETERIZATION  6/26/2015    HX PACEMAKER      INS PPM/ICD LED DUAL ONLY  7/2/2015         VASCULAR SURGERY PROCEDURE UNLIST  1999    PAD, RIGHT GROIN STENT       MEDICATIONS:     Prior to Admission Medications:      Prior to Admission Medications   Prescriptions Last Dose Informant Patient Reported? Taking?   acetaminophen (TYLENOL) 325 mg tablet     Yes Yes   Sig: Take 650 mg by mouth every four (4) hours as needed for Pain. albuterol-ipratropium (DUO-NEB) 2.5 mg-0.5 mg/3 ml nebu     Yes Yes   Sig: 3 mL by Nebulization route every four (4) hours as needed (shortness of breath). aspirin 81 mg chewable tablet     No Yes   Sig: Take 1 Tab by mouth daily. atorvastatin (LIPITOR) 10 mg tablet     No Yes   Sig: Take 1 Tab by mouth nightly. bumetanide (BUMEX) 0.5 mg tablet     Yes Yes   Sig: Take 1 mg by mouth two (2) times a day. calcium-vitamin D (OYSTER SHELL) 500 mg(1,250mg) -200 unit per tablet     Yes Yes   Sig: Take 1 Tab by mouth daily. carvedilol (COREG) 3.125 mg tablet   Care Giver No Yes   Sig: Take 1 Tab by mouth two (2) times a day. cholecalciferol (VITAMIN D3) 1,000 unit tablet   Care Giver Yes Yes   Sig: Take 3,000 Units by mouth daily. donepezil (ARICEPT) 10 mg tablet   Care Giver Yes Yes   Sig: Take 10 mg by mouth daily. ferrous sulfate 325 mg (65 mg iron) tablet   Care Giver Yes Yes   Sig: Take 325 mg by mouth daily (with breakfast). finasteride (PROSCAR) 5 mg tablet   Care Giver Yes Yes   Sig: Take 5 mg by mouth daily. glipiZIDE (GLUCOTROL) 5 mg tablet     Yes Yes   Sig: Take 5 mg by mouth daily. glucose 4 gram chewable tablet     Yes Yes   Sig: Take 15 g by mouth as needed.    guaiFENesin SR (MUCINEX) 600 mg SR tablet     Yes Yes   Sig: Take 600 mg by mouth two (2) times a day. ipratropium (ATROVENT) 0.02 % soln     Yes Yes   Si.5 mg by Nebulization route every four (4) hours as needed for Wheezing or Shortness of Breath. levothyroxine (SYNTHROID) 150 mcg tablet     Yes Yes   Sig: Take 150 mcg by mouth Daily (before breakfast). lisinopriL (PRINIVIL, ZESTRIL) 5 mg tablet     Yes Yes   Sig: Take 2.5 mg by mouth daily. loratadine (CLARITIN) 10 mg tablet     Yes Yes   Sig: Take 10 mg by mouth daily. magnesium gluconate 500 mg (27 mg  elemental) tablet     Yes Yes   Sig: Take 500 mg by mouth daily. omeprazole (PRILOSEC) 20 mg capsule   Care Giver Yes Yes   Sig: Take 20 mg by mouth Daily (before breakfast). 30 minutes prior to breakfast   senna (Senna) 8.6 mg tablet     Yes Yes   Sig: Take 1 Tab by mouth daily. sertraline (ZOLOFT) 100 mg tablet     Yes Yes   Sig: Take 200 mg by mouth daily. tamsulosin (FLOMAX) 0.4 mg capsule   Care Giver Yes Yes   Sig: Take 0.4 mg by mouth nightly.       Facility-Administered Medications: None              Allergies   Allergen Reactions    Terazosin Unknown (comments)           SOCIAL HISTORY:     Social History     Tobacco Use    Smoking status: Former Smoker     Packs/day: 2.00     Years: 60.00     Pack years: 120.00     Types: Cigarettes     Last attempt to quit: 2015     Years since quittin.0    Smokeless tobacco: Never Used   Substance Use Topics    Alcohol use: No     Alcohol/week: 0.0 standard drinks    Drug use: No           FAMILY HISTORY:     Family History   Problem Relation Age of Onset    Heart Disease Mother     Diabetes Mother     Other Father         AMPUTATION LEG FOLLOWING BUG BITE    Other Sister         CEREBRAL ANUERYSM    Heart Disease Brother     Diabetes Sister              REVIEW OF SYMPTOMS:     Review of Symptoms:     --- Patient says he is OK, but is a poor historian     Constitutional: Negative for fever, chills  HEENT: Negative for nosebleeds, tinnitus, and vision changes. Respiratory: + hypoxia   Cardiovascular: Negative for   Syncope   Gastrointestinal: Negative for abdominal pain, diarrhea, melena. Genitourinary: Negative for dysuria  Musculoskeletal: Negative for myalgias. Skin: Negative for rash  Heme: No problems bleeding. Neurological: Negative for speech change and focal weakness. PHYSICAL EXAM:     Physical Exam:  Visit Vitals  /53 (BP 1 Location: Left arm, BP Patient Position: At rest)   Pulse 60   Temp 97.3 °F (36.3 °C)   Resp 18   Ht 5' 10\" (1.778 m)   Wt 130 lb (59 kg)   SpO2 98%   BMI 18.65 kg/m²     Patient appears frail   HEENT:  Hearing intact, non-icteric, normocephalic, atraumatic. Neck Exam: Supple, + JVD  Lung Exam: Clear to auscultation, even breath sounds. Decreased at bases   Cardiac Exam: Regular rate and rhythm with 2/6 systolic murmur   Abdomen: Soft, non-tender, normal bowel sounds. Extremities: Moves all ext well. No lower extremity edema. Vascular: palpable dorsalis pedis pulses bilaterally. + left leg boot  Psych: Appropriate affect  Neuro - Grossly intact        LABS / OTHER STUDIES:     Recent Results (from the past 24 hour(s))   GLUCOSE, POC    Collection Time: 07/22/20  9:14 PM   Result Value Ref Range    Glucose (POC) 170 (H) 65 - 100 mg/dL    Performed by Awa Post (PCT)    METABOLIC PANEL, COMPREHENSIVE    Collection Time: 07/23/20  5:55 AM   Result Value Ref Range    Sodium 140 136 - 145 mmol/L    Potassium 3.7 3.5 - 5.1 mmol/L    Chloride 100 97 - 108 mmol/L    CO2 37 (H) 21 - 32 mmol/L    Anion gap 3 (L) 5 - 15 mmol/L    Glucose 124 (H) 65 - 100 mg/dL    BUN 23 (H) 6 - 20 MG/DL    Creatinine 0.67 (L) 0.70 - 1.30 MG/DL    BUN/Creatinine ratio 34 (H) 12 - 20      GFR est AA >60 >60 ml/min/1.73m2    GFR est non-AA >60 >60 ml/min/1.73m2    Calcium 9.1 8.5 - 10.1 MG/DL    Bilirubin, total 1.1 (H) 0.2 - 1.0 MG/DL    ALT (SGPT) 12 12 - 78 U/L    AST (SGOT) 19 15 - 37 U/L    Alk.  phosphatase 72 45 - 117 U/L    Protein, total 6.8 6.4 - 8.2 g/dL    Albumin 2.5 (L) 3.5 - 5.0 g/dL    Globulin 4.3 (H) 2.0 - 4.0 g/dL    A-G Ratio 0.6 (L) 1.1 - 2.2     MAGNESIUM    Collection Time: 07/23/20  5:55 AM   Result Value Ref Range    Magnesium 2.0 1.6 - 2.4 mg/dL   CBC W/O DIFF    Collection Time: 07/23/20  5:55 AM   Result Value Ref Range    WBC 7.0 4.1 - 11.1 K/uL    RBC 3.68 (L) 4.10 - 5.70 M/uL    HGB 10.8 (L) 12.1 - 17.0 g/dL    HCT 36.8 36.6 - 50.3 %    .0 (H) 80.0 - 99.0 FL    MCH 29.3 26.0 - 34.0 PG    MCHC 29.3 (L) 30.0 - 36.5 g/dL    RDW 18.8 (H) 11.5 - 14.5 %    PLATELET 332 381 - 345 K/uL    MPV 10.7 8.9 - 12.9 FL    NRBC 0.0 0  WBC    ABSOLUTE NRBC 0.00 0.00 - 0.01 K/uL   GLUCOSE, POC    Collection Time: 07/23/20  6:36 AM   Result Value Ref Range    Glucose (POC) 110 (H) 65 - 100 mg/dL    Performed by Slava Paulino (PCT)            CARDIAC DIAGNOSTICS:     Cardiac Evaluation Includes:      NUKE 6-10-15 ischemia and infarction in inferior wall with moderately reduced EF    7/2/15: [employer] Pacemaker per Dr. Lolita Urbano    Echo 1/4/17 Echo 1/4/17 LVEF 50%. Distal apical septal akinesis. LAmoderately dilated. Mild MR. Aortic valve: The valve was trileaflet. Leaflets exhibited moderate  calcification, reduced mobility, and sclerosis without stenosis. Mild Tr. Severe pulmonary hypertension    Echo 5/19/20 - LVEF 20-25%, mildly dilated RV with reduced function, JILLIAN, Mild AS, mild-mod MR, Mod TR, RVSP 58 mmHg       EKG 7/22/20 - V paced     CXR 7/22/20 - Mild pulmonary edema with stable or recurrent moderate right pleural Effusion. I viewed EKG and CXR myself       ASSESSMENT AND PLAN:     Assessment and Plan:    Jose jordan 81 y. o. male PMH CAD s/p CABG x3, HFrEF, PPM, colon cancer, depression, IDDM, HTN, lung cancer, aortic stenosis, pulm HTN, PVD, stroke, thryoid disease admitted with hypoxia.  Patient has a history of dementia and is a poor historian.         1) Acute on chronic HFrEF  - Has chronic pleural effusions and is on chronic O2  - He may be a little bit more fluid overloaded than usual but hard tosay as he is frail and a poor historian.   ---> Will diurese with IV Bumex today and switch back to Bumex 1 mg BID in next day or two  - Palliative Care consult to guide goals of care and address hospice questions is reasonable   - Cont Coreg, lisinopril      2) CAD s/p CABG. - cont ASA and statin      3) Chronic anemia     4) Mild-moderate AS     5) Dementia     6) DNR    Baron Valencia MD, [address], [address]  [address] [place] [address].  Suite [facility] [address], [address] [place] [address]    Ph: [phone]   Ph [phone]

## 2020-07-23 NOTE — PROGRESS NOTES
2045    Rounded on high fall risk pt. Pt sleeping in bed with gripper socks, fall band, and bed alarm on. Call light within reach.

## 2020-07-23 NOTE — PROGRESS NOTES
Transition of Care Plan:    RUR-N/A     1. Home to group adult home ([facility]; [place], [phone])  2. [facility] ([facility] Massey)  3. PT/OT eval, palliative care consult  4. CM to follow through discharge  5. Will need stretcher transport--referral sent to [facility] & placed on a will call  6. Daughter: Robert Standard; [phone]  KERWIN Lundy RN

## 2020-07-23 NOTE — DIABETES MGMT
STEPHANE GUALLPA  CLINICAL NURSE SPECIALIST CONSULT  PROGRAM FOR DIABETES HEALTH    INITIAL NOTE    Presentation   Petra Rea is a 81 y.o. male admitted for evaluation and treatment of hypotension and hypoxia. Patient has dementia and unable to answer questions when asked. Past Medical History:   Diagnosis Date    Anxiety and depression     Arthritis     CAD (coronary artery disease)     Cardiomyopathy ([facility] 6/11/2015    CHF (congestive heart failure), NYHA class IV, chronic, systolic (HCC)     Chronic hypoxemic respiratory failure (HCC)     Colon cancer ([facility] 1993    COLON    Dementia ([facility]     Depression     DEPRESSION    DM type 2 causing vascular disease (HCC)     IDDM    GERD (gastroesophageal reflux disease)     Hx of cancer of lung 11/29/2016    Hx of completed stroke     Hypertension     Hypothyroid     Mild aortic stenosis 6/15/2015    Pulmonary hypertension ([facility] 6/15/2015    PVD (peripheral vascular disease) ([facility]     STENT RIGHT GROIN, PAD    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    LIMA TO LAD; SVG TO OM; SVG TO OM    Third degree AV block ([facility] 6/11/2015     Current clinical course has been uncomplicated. Diabetes: Patient has known Type 2 diabetes. Home diabetes medication regimen is glipizide 5mg daily.   A1c 5/19/20: 6.9%     Consulted by Provider for advanced diabetes nursing assessment and care, specifically related to     [x] Home management assessment      Diabetes-related medical history  Acute complications  Unable to assess  Neurological complications  Unable to assess  Microvascular disease  Unable to assess  Macrovascular disease  Unable to assess  Other associated conditions     Unable to assess    Diabetes medication history  Drug class Currently in use Discontinued Never used   Biguanide      DPP-4 inhibitor       Sulfonylurea Glipizide 5mg daily     Thiazolidinedione      GLP-1 RA      SGLT-2 inhibitors      Basal insulin      Fixed Dose  Combinations      Bolus insulin Subjective   Patient sleeping when this CNS entered room. Patient's name called several times, patient opened eyes and then fell back to sleep before this CNS could ask any further questions. Patient reports the following home diabetes self-care practices:  Unable to assess due to dementia      Social determinants of health impacting diabetes self-management practices   Unable to assess    Objective   Physical exam  General Sleeping, opens eyes to his name, then goes back to sleep, ill-appearing. Vital Signs   Visit Vitals  /61 (BP 1 Location: Left arm, BP Patient Position: At rest)   Pulse 60   Temp 97.6 °F (36.4 °C)   Resp 18   Ht 5' 10\" (1.778 m)   Wt 59 kg (130 lb)   SpO2 98%   BMI 18.65 kg/m²     Skin  Warm and dry. No Acanthosis noted along neckline. No lipohypertrophy or lipoatrophy noted at injection sites   Heart   Regular rate and rhythm. No murmurs, rubs or gallops  Lungs  Clear to auscultation without rales or rhonchi  Extremities No foot wounds, off loading boots in place       Laboratory  Lab Results   Component Value Date/Time    Hemoglobin A1c 6.5 (H) 05/19/2020 03:30 AM     Lab Results   Component Value Date/Time    LDL, calculated 46 02/13/2019 10:22 AM     Lab Results   Component Value Date/Time    Creatinine (POC) 1.0 10/30/2017 02:16 PM    Creatinine 0.67 (L) 07/23/2020 05:55 AM     Lab Results   Component Value Date/Time    Sodium 140 07/23/2020 05:55 AM    Potassium 3.7 07/23/2020 05:55 AM    Chloride 100 07/23/2020 05:55 AM    CO2 37 (H) 07/23/2020 05:55 AM    Anion gap 3 (L) 07/23/2020 05:55 AM    Glucose 124 (H) 07/23/2020 05:55 AM    BUN 23 (H) 07/23/2020 05:55 AM    Creatinine 0.67 (L) 07/23/2020 05:55 AM    BUN/Creatinine ratio 34 (H) 07/23/2020 05:55 AM    GFR est AA >60 07/23/2020 05:55 AM    GFR est non-AA >60 07/23/2020 05:55 AM    Calcium 9.1 07/23/2020 05:55 AM    Bilirubin, total 1.1 (H) 07/23/2020 05:55 AM    Alk.  phosphatase 72 07/23/2020 05:55 AM    Protein, total 6.8 07/23/2020 05:55 AM    Albumin 2.5 (L) 07/23/2020 05:55 AM    Globulin 4.3 (H) 07/23/2020 05:55 AM    A-G Ratio 0.6 (L) 07/23/2020 05:55 AM    ALT (SGPT) 12 07/23/2020 05:55 AM     Lab Results   Component Value Date/Time    ALT (SGPT) 12 07/23/2020 05:55 AM       Factors affecting BG pattern  Factor Dose Comments   Nutrition:  Dysphagia pureed    Dysphagia pureed    Drugs:  Steroids  HIV   Other: n/a   n/a  n/a    Pain 0/10    Infection n/a    Other: n/a n/a      Blood glucose pattern          Evaluation   This 81year old male with diabetes, has achieved inpatient blood glucose target of 100-180mg/dl. BG's have ranged 110-170 over the past 24 hours. Basal insulin isn't in use. Bolus insulin isn't in use. Patient is eating meals. Corrective insulin is in use. Patient has not required any corrective insulin over the past 24 hours. Impression: It is likely that corrective insulin alone will not continue to control BG's during this admission. It is suspected that patient will require home medication regimen (Glipizide 5mg) will need to be started in addition to the the corrective insulin.      Assessment and Plan   Nursing Diagnosis Risk for unstable blood glucose pattern   Nursing Intervention Domain 7542 Decision-making Support   Nursing Interventions Examined current inpatient diabetes control   Explored factors facilitating and impeding inpatient management  Identified self-management practices impeding diabetes control  Explored corrective strategies with patient and responsible inpatient provider   Informed patient of rational for insulin strategy while hospitalized       Recommendations     Check BG AC and HS    Start Glipizide 5mg per day    Use Subcutaneous Insulin Order set (3741):    Corrective insulin  Correctional Scale for Normal Sensitivity    200-249- 2units Humalog  [id number] Humalog  [id number] Humalog  [id number] Humalog  Over 400- 10units Humalog    Do NOT hold for NPO; give in addition to meal time insulin dose. If patient does not eat, -give correction dose only. Time Spent     Total time spent with patient: 30 Minutes   I personally reviewed chart, notes, data and current medications in the medical record. I have personally examined and treated the patient at bedside during this period.      OSMAN Contreras  Program [facility] Diabetes [facility]  Access via [employer]

## 2020-07-23 NOTE — PROGRESS NOTES
Problem: Mobility Impaired (Adult and Pediatric)  Goal: *Acute Goals and Plan of Care (Insert Text)  Description: FUNCTIONAL STATUS PRIOR TO ADMISSION: Unsure. Pt reports heavy use of w/c and some walking with RW with assistance. Requires assistance for all ADLs inferred. HOME SUPPORT PRIOR TO ADMISSION: The patient lived in a group home/NH. Questionable historian d/t dementia and [place]. Physical Therapy Goals  Initiated 7/23/2020  1. Patient will move from supine to sit and sit to supine , scoot up and down, and roll side to side in bed with minimal assistance/contact guard assist within 7 day(s). 2.  Patient will transfer from bed to chair and chair to bed with moderate assistance  using the least restrictive device within 7 day(s). 3.  Patient will perform sit to stand with minimal assistance/contact guard assist within 7 day(s). 4.  Patient will ambulate with moderate assistance  for 10 feet with the least restrictive device within 7 day(s). Outcome: Progressing Towards Goal   PHYSICAL THERAPY EVALUATION  Patient: Jill Xiong (11 y.o. male)  Date: 7/23/2020  Primary Diagnosis: CHF (congestive heart failure), NYHA class IV, chronic, systolic (HCC) [L71.20]        Precautions: Fall, Skin       ASSESSMENT  Based on the objective data described below, the patient presents with global weakness, decreased muscle/fat mass, significantly [place], impaired sitting/standing balance and poor activity tolerance following admission for CHF and low O2 readings. Pt on baseline 2L/min and O2 saturations registering between 86 and 91% with mobility. Follows simple one-step commands and accurately answers yes/no questions. Pt requires Mod A for bed mobility, Min A x 2 to stand and then transfer back to supine after briefly standing for 30 seconds at EOB. Pt deferred chair transfer. Will follow on a trial basis to assess ability to improve with therapy. Possible hospice consult pending?     Current Level of Function Impacting Discharge (mobility/balance): Min-Mod A for functional mobility    Functional Outcome Measure: The patient scored 2/28 on the Tinetti outcome measure which is indicative of high fall risk. Other factors to consider for discharge: unsure of true PLOF and level of assistance in group home     Patient will benefit from skilled therapy intervention to address the above noted impairments. PLAN :  Recommendations and Planned Interventions: bed mobility training, transfer training, gait training, therapeutic exercises, neuromuscular re-education, patient and family training/education, and therapeutic activities      Frequency/Duration: Patient will be followed by physical therapy:  3 times a week to address goals. Recommendation for discharge: (in order for the patient to meet his/her long term goals)  Physical therapy at least 2 days/week in the home vs none pending acute therapy progress and POC    This discharge recommendation:  Has been made in collaboration with the attending provider and/or case management    IF patient discharges home will need the following DME: patient owns DME required for discharge         SUBJECTIVE:   Patient stated I was an .     OBJECTIVE DATA SUMMARY:   HISTORY:    Past Medical History:   Diagnosis Date    Anxiety and depression     Arthritis     CAD (coronary artery disease)     Cardiomyopathy ([facility] 6/11/2015    CHF (congestive heart failure), NYHA class IV, chronic, systolic (HCC)     Chronic hypoxemic respiratory failure (HCC)     Colon cancer ([address] 1993    COLON    Dementia ([address]     Depression     DEPRESSION    DM type 2 causing vascular disease ([address]     IDDM    GERD (gastroesophageal reflux disease)     Hx of cancer of lung 11/29/2016    Hx of completed stroke     Hypertension     Hypothyroid     Mild aortic stenosis 6/15/2015    Pulmonary hypertension ([address] 6/15/2015    PVD (peripheral vascular disease) ([facility]     STENT RIGHT GROIN, PAD    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    LIMA TO LAD; SVG TO OM; SVG TO OM    Third degree AV block ([address] 6/11/2015     Past Surgical History:   Procedure Laterality Date    HX GI  1993    COLON RESECTION    HX HEART CATHETERIZATION  6/26/2015    HX PACEMAKER      INS PPM/ICD LED DUAL ONLY  7/2/2015         VASCULAR SURGERY PROCEDURE UNLIST  1999    PAD, RIGHT GROIN STENT       Personal factors and/or comorbidities impacting plan of care: arthritis, dementia, cancer, diabetes, HTN, CABG    Home Situation  Home Environment: Group home  One/Two Story Residence: One story  Living Alone: No  Support Systems: Home care staff, Family member(s)  Patient Expects to be Discharged to[de-identified] Group home  Current DME Used/Available at Home: Wheelchair, Walker, rolling    EXAMINATION/PRESENTATION/DECISION MAKING:   Critical Behavior:  Neurologic State: Confused, Drowsy, Eyes open to voice  Orientation Level: Oriented to person, Disoriented to situation, Disoriented to time, Disoriented to place  Cognition: Memory loss, Poor safety awareness     Hearing: Auditory  Auditory Impairment: Hard of hearing, bilateral  Skin:    Edema:   Range Of Motion:  AROM: Generally decreased, functional           PROM: Generally decreased, functional           Strength:    Strength: Generally decreased, functional                    Tone & Sensation:   Tone: Normal                              Coordination:  Coordination: Generally decreased, functional  Vision:      Functional Mobility:  Bed Mobility:  Rolling: Maximum assistance  Supine to Sit: Moderate assistance  Sit to Supine:  Moderate assistance;Assist x1     Transfers:  Sit to Stand: Minimum assistance;Assist x2  Stand to Sit: Minimum assistance;Assist x2                       Balance:   Sitting: Impaired  Sitting - Static: Fair (occasional)  Sitting - Dynamic: Fair (occasional)  Standing: Impaired  Standing - Static: Fair;Constant support  Standing - Dynamic : Poor;Constant support  Ambulation/Gait Training:              Gait Description (WDL): (did not ambulate)               Functional Measure:  Tinetti test:    Sitting Balance: 1  Arises: 0  Attempts to Rise: 0  Immediate Standing Balance: 0  Standing Balance: 0  Nudged: 0  Eyes Closed: 0  Turn 360 Degrees - Continuous/Discontinuous: 0  Turn 360 Degrees - Steady/Unsteady: 0  Sitting Down: 1  Balance Score: 2 Balance total score  Indication of Gait: 0  R Step Length/Height: 0  L Step Length/Height: 0  R Foot Clearance: 0  L Foot Clearance: 0  Step Symmetry: 0  Step Continuity: 0  Path: 0  Trunk: 0  Walking Time: 0  Gait Score: 0 Gait total score  Total Score: 2/28 Overall total score         Tinetti Tool Score Risk of Falls  <19 = High Fall Risk  19-24 = Moderate Fall Risk  25-28 = Low Fall Risk  Tinetti ME. Performance-Oriented Assessment of Mobility Problems in Elderly Patients. Ramos 66; [id number].  (Scoring Description: PT Bulletin Feb. 10, 1993)    Older adults: Loc Parra et al, 2009; n = 1000 [facility] elderly evaluated with ABC, CHINA, ADL, and IADL)  · Mean CHINA score for males aged 69-68 years = 26.21(3.40)  · Mean CHINA score for females age 69-68 years = 25.16(4.30)  · Mean CHINA score for males over 80 years = 23.29(6.02)  · Mean CHINA score for females over 80 years = 17.20(8.32)            Physical Therapy Evaluation Charge Determination   History Examination Presentation Decision-Making   MEDIUM  Complexity : 1-2 comorbidities / personal factors will impact the outcome/ POC  MEDIUM Complexity : 3 Standardized tests and measures addressing body structure, function, activity limitation and / or participation in recreation  LOW Complexity : Stable, uncomplicated  Other outcome measures Tinetti  LOW       Based on the above components, the patient evaluation is determined to be of the following complexity level: LOW     Pain Rating:  None reported    Activity Tolerance:   Fair, desaturates with exertion and requires oxygen, and requires rest breaks  Please refer to the flowsheet for vital signs taken during this treatment. After treatment patient left in no apparent distress:   Supine in bed, Heels elevated for pressure relief, Patient positioned in right sidelying for pressure relief, Call bell within reach, Bed / chair alarm activated, and Side rails x 3    COMMUNICATION/EDUCATION:   The patients plan of care was discussed with: Registered nurse. Patient understands intent and goals of therapy, but is neutral about his/her participation.     Thank you for this referral.  Marylou Garrett, PT   Time Calculation: 21 mins

## 2020-07-23 NOTE — NURSE NAVIGATOR
Chart reviewed by Heart Failure Nurse Navigator. Heart Failure database completed. HF Bundle patient. Patient with advanced dementia and lives in group home. Patient is on HF Bundle from admission 5/18 to 5/26 with HF.    EF:  20 to 25% by recent Echo 5/19/20. ACEi/ARB/ARNi: Lisinopril 2.5 mg daily. BB: Coreg 3.125 mg BID. Aldosterone Antagonist: **    Obstructive Sleep Apnea Screening:   STOP-BANG score:   Referred to Sleep Medicine:     CRT Not currently indicated. NYHA Functional Class IV. Heart Failure Teach Back in Patient Education. Heart Failure Avoiding Triggers on Discharge Instructions. Cardiologist: Dr. Canelo Lind consulted. Post discharge follow up phone call to be made within 48-72 hours of discharge.

## 2020-07-23 NOTE — PROGRESS NOTES
Comprehensive Nutrition Assessment    Type and Reason for Visit: Initial, Consult    Nutrition Recommendations/Plan:   1. Continue current diet. 2. Add Ensure Enlive TID to promote adequate energy/protein intake. Nutrition Assessment:      7/23: 82 y/o male admitted with CHF. PMH includes CAD, COPD, DM, dementia. BMI 18.7, c/w underweight per age. Pt unable to obtain nutrition hx, no family in room at time of visit. No recent signficant weight changes noted per EMR. Recorded intake of 35% dinner last night. Per RN, pt consumed a little eggs and milk for breakfast but his appetite wasn't very good this morning. Pt has drank Ensure during previous admissions per past RD notes so will add TID for extra protein for wounds. Monitor intakes. Labs- -124-170. Meds- Protonix, bowel regimen. Intakes:  Patient Vitals for the past 168 hrs:   % Diet Eaten   07/22/20 1924 35 %     Weight hx: Wt Readings from Last 10 Encounters:   07/22/20 59 kg (130 lb)   05/25/20 57.4 kg (126 lb 8 oz)   12/23/19 59.4 kg (131 lb)   09/04/19 59.4 kg (131 lb)   09/04/19 59.3 kg (130 lb 12.8 oz)   07/02/19 56.7 kg (125 lb)   02/27/19 60.8 kg (134 lb)   02/27/19 60.9 kg (134 lb 3.2 oz)   08/29/18 63.5 kg (140 lb)   02/21/18 68.9 kg (152 lb)     Malnutrition Assessment:  Malnutrition Status: none    Estimated Daily Nutrient Needs:  Energy (kcal):  4783(3293 x 1.3 AF)  Protein (g):  71(1.2-1.4 g/kg)       Fluid (ml/day):  1500(fluid restriction per MD)    Nutrition Related Findings:  last BM unknown      Wounds:    Stage I, Pressure ulcer(stage 1 ear PI, stage 1 sacral PI)       Current Nutrition Therapies:   DIET CARDIAC Mechanical Soft; 2 GM NA (House Low NA);  Consistent Carb 1800kcal; FR 1500ML  DIET NUTRITIONAL SUPPLEMENTS Breakfast, Lunch, Dinner; Ensure Ree Alaina    Anthropometric Measures:  · Height:  5' 10\" (177.8 cm)  · Current Body Wt:  59 kg (130 lb)   · Admission Body Wt:       · Usual Body Wt:        · Ideal Body Wt:   : · Adjusted Body Weight:   ; Weight Adjustment for:     · Adjusted BMI:       · BMI Categories:  Underweight (BMI less than 22) age over 72       Nutrition Diagnosis:   · Increased nutrient needs related to increased demand for energy/nutrients(protein) as evidenced by wounds(redness behind right ear, non blanchable redness to sacrum/coccyx)      Nutrition Interventions:   Food and/or Nutrient Delivery: Continue current diet, Start oral nutrition supplement  Nutrition Education and Counseling: No recommendations at this time  Coordination of Nutrition Care: Continued inpatient monitoring    Goals:  PO intake >50% meals + ONS meeting estimated protein needs next 3-5 days       Nutrition Monitoring and Evaluation:   Behavioral-Environmental Outcomes:    Food/Nutrient Intake Outcomes: Food and nutrient intake, Supplement intake  Physical Signs/Symptoms Outcomes: Weight, Skin    Discharge Planning:    Continue oral nutrition supplement, Continue current diet     Electronically signed by Liliam Mane RDN on 7/23/2020 at 10:55 AM    Contact: [phone]

## 2020-07-23 NOTE — PROGRESS NOTES
Spiritual Care Assessment/Progress Note  [address]      NAME: Adria Gordon      MRN: [id number]  AGE: 81 y.o. SEX: male  Scientology Affiliation: No preference   Language: English     7/23/2020           Spiritual Assessment begun in [facility] 5M1 MED SURG 1 through conversation with:         []Patient        [] Family    [] Friend(s)              Spiritual beliefs: (Please include comment if needed)     [] Identifies with a garrison tradition:         [] Supported by a garrison community:            [] Claims no spiritual orientation:           [] Seeking spiritual identity:                [] Adheres to an individual form of spirituality:           [x] Not able to assess:                           Identified resources for coping:      [] Prayer                               [] Music                  [] Guided Imagery     [] Family/friends                 [] Pet visits     [] Devotional reading                         [x] Unknown     [] Other:                                            Interventions offered during this visit: (See comments for more details)                Plan of Care:     [] Support spiritual and/or cultural needs    [] Support AMD and/or advance care planning process      [] Support grieving process   [] Coordinate Rites and/or Rituals    [] Coordination with community clergy   [] No spiritual needs identified at this time   [] Detailed Plan of Care below (See Comments)  [] Make referral to Music Therapy  [] Make referral to Pet Therapy     [] Make referral to Addiction services  [] Make referral to [facility]  [] Make referral to Spiritual Care Partner  [] No future visits requested        [x] Follow up visits as needed     Comments: Initial Palliative Care spiritual assessment in 5 Med Surg. Mr. Ramírez Bean appeared to be resting comfortably, no family present. Chaplains will follow as able and/or needed.   Visited by: Nely Jackson MS., [facility]   Paging Service Julisa MANZANO [phone])

## 2020-07-23 NOTE — PROGRESS NOTES
Bedside and Verbal shift change report given to Rashel Chiang (oncoming nurse) by Saul Sarmiento (offgoing nurse). Report included the following information SBAR, Kardex and MAR.

## 2020-07-23 NOTE — PROGRESS NOTES
Noted RN screen for SLP. He was just in hospital in May and on Dysphagia 1, nectars. Will need order from MD for SLP to see. IN the meantime, will return patient to recommended diet of dysphagia 1, nectars for safety purposes.

## 2020-07-23 NOTE — ACP (ADVANCE CARE PLANNING)
Advance Care Planning       Advance Care Planning (ACP) Physician/NP/PA (Provider) Donald Delaney        Date of ACP Conversation: 7/22/2020    Conversation Conducted with:   Briseida Tinoco, daughter/primary decision-maker    Health Care Decision Maker:    Current Designated Health Care Decision Maker:   Primary Decision Maker: Mita Stevens) - Daughter - [phone]    Secondary Decision Maker: Aileen Wisdom - [phone]      Care Preferences:  I reviewed patient's existing Advance Medical Directive with Fausto Stevens/primary MPOA. Based on his documented wishes, she asked that he not be re-hospitalized. He has an existing DDNR.               Conversation Outcomes / Follow-Up Plan:   Hospice referral      Length of Voluntary ACP Conversation in minutes:  40 minutes      Hetal Valles MD

## 2020-07-23 NOTE — WOUND CARE
Wound care consult:  Initial visit for skin and wound assessment present on admission  Alert, no distress  Skin is thin and fragile    Assessment  All skin folds and bony prominences assessed, turned with staff assistance. Incontinent of urine and stool  Left ear- area of blanching redness under nasal cannula posterior - tender to touch, skin intact   Right ear- red with linear area of partial thickness skin loss under loop of cannula- partial thickness skin loss- stage 2 injury present on admission. Sacral area- area of blanching redness and dark discoloration over the sacral bone, skin thin and fragile, chronic - history of skin loss  Present on admission  Heels and elbows intact  Off loading boots in place. Treatment  Moisture barrier and foam sleeves applied ot nasal cannula    Incontinent care given - foam to sacrum   Repositioned in bed   Heels floated- off loading boots     Recommendations/Plan  Wound and skin care preventative measures in place. PMT- nutrition consults   Turn, reposition every 2 hours as tolerated, float heels, off loading boots   Incontinent care --- Apply Zinc to all open areas, moisture barrier as needed. Staff advised of care given    Will follow, reconsult as needed.   IDNU Grace

## 2020-07-23 NOTE — PROGRESS NOTES
Spiritual Care Assessment/Progress Note  [address]      NAME: Xiomara Lopez      MRN: [id number]  AGE: 81 y.o. SEX: male  Jain Affiliation: No preference   Language: English     7/23/2020     Total Time (in minutes): 7     Spiritual Assessment begun in [facility] 5M1 MED SURG 1 through conversation with:         []Patient        [] Family    [] Friend(s)        Reason for Consult: Palliative Care, Initial/Spiritual Assessment     Spiritual beliefs: (Please include comment if needed)     [] Identifies with a garrison tradition:         [] Supported by a garrison community:            [] Claims no spiritual orientation:           [] Seeking spiritual identity:                [] Adheres to an individual form of spirituality:           [x] Not able to assess:                           Identified resources for coping:      [] Prayer                               [] Music                  [] Guided Imagery     [] Family/friends                 [] Pet visits     [] Devotional reading                         [x] Unknown     [] Other:                              Interventions offered during this visit: (See comments for more details)                Plan of Care:     [] Support spiritual and/or cultural needs    [] Support AMD and/or advance care planning process      [] Support grieving process   [] Coordinate Rites and/or Rituals    [] Coordination with community clergy   [] No spiritual needs identified at this time   [] Detailed Plan of Care below (See Comments)  [] Make referral to Music Therapy  [] Make referral to Pet Therapy     [] Make referral to Addiction services  [] Make referral to [facility]  [] Make referral to Spiritual Care Partner  [] No future visits requested        [x] Follow up visits as needed     Comments: Attempted Initial Palliative Care spiritual assessment in 4 Post Surg/Ortho. Mr. Anurag Carlton appeared to be sleeping, no family present.   Consulted with RN who has not spoken with family. Chaplains will continue to follow as able and/or needed.   Visited by: Larry Murillo., MS., [address] ([id number])

## 2020-07-24 NOTE — HOSPICE
[facility]  [phone]     Patient Name: Joan Luna  YOB: 1935  Age: 81 y.o. [facility]TL RN Note:  Hospice consult received, LCSW and RN Liaison working on discharge planning for tomorrow back to adult home [facility] in [place], [place]. NASREEN notified Sonam Jaime at [facility] [phone] that patient will be coming back tomorrow via ambulance and 02 will be delivered in the AM.       Thank you for the opportunity to be of service to this patient.

## 2020-07-24 NOTE — HOSPICE
[facility]  [phone]     Patient Name: Fadumo Briones  YOB: 1935  Age: 81 y.o. [facility] RN Note:  Hospice consult received, reviewing chart. Will follow up with Unit Nurse and Care Manager to discuss plan of care, patient status and discharge disposition within the hour. 11:45 telephone message left w/ daughter Robert Standard to set up info session and plan for home hospice D/C. Per MD and liaison, Bry Mabry assessment this AM, patient is not appropriate for GIP level of care however is appropriate for home hospice w/ return to [facility]. Will continue to attempt to make contact w/ daughter to assist w/ dispo    1414: Spoke to Carolina Ferguson, reviewed hospice policies, goals of care, procedures. Patient is service connected and receives home based primary services through the [place]. [place] Ferguson would like to speak to the [place] about using [facility] hospice prior to making decision. States that she is agreeable to hospice, is needing to decide [id number] vs hospice through the [place]. Will call me back with an answer once she speaks to his [facility] primary care team    Thank you for the opportunity to be of service to this patient.

## 2020-07-24 NOTE — PROGRESS NOTES
Transition of Care Plan:    RUR-N/A     1. Home to group adult home ( [facility]; Fausto Peace, [phone])  2. [facility] follow up ([facility])  3. Spoke with Fausto Peace who stated the home can accept pt with hospice  4. CM to follow through discharge  5. Will need stretcher transport--referral sent to [facility] & placed on a will call; packet on chart  6. Daughter: Adrien Duane; [phone]  KERWIN Lundy RN         Note:  Order for hospice noted. A referral was sent in CC to [address] [place] [address].   DEEPIKA Medeiros

## 2020-07-24 NOTE — PROGRESS NOTES
Ozzy Resendiz josef [place] [address]  [address] [facility], [place], [address] [place] [address]  [phone]      Medical Progress Note      NAME: Parvin Arshad   :  1935  MRM:  [id number]    Date/Time: 2020  10:29 AM          Subjective:     Chief Complaint:  Confusion: patient does not know where he is or why he is here, cannot tell me what happened yesterday    ROS:  (bold if positive, if negative)    Unreliable due to dementia          Objective:       Vitals:          Last 24hrs VS reviewed since prior progress note.  Most recent are:    Visit Vitals  /60 (BP 1 Location: Left arm, BP Patient Position: At rest)   Pulse 79   Temp 97.6 °F (36.4 °C)   Resp 18   Ht 5' 10\" (1.778 m)   Wt 53.2 kg (117 lb 4.6 oz)   SpO2 100%   BMI 16.83 kg/m²     SpO2 Readings from Last 6 Encounters:   20 100%   20 96%   19 99%   19 92%   19 97%   19 94%    O2 Flow Rate (L/min): 2 l/min     No intake or output data in the 24 hours ending 20 1029       Exam:     Physical Exam:    Gen:  Well-developed, frail, elderly, chronically ill-appearing, in no acute distress  HEENT:  Pink conjunctivae, PERRL, hearing intact to voice, moist mucous membranes  Neck:  Supple, without masses, thyroid non-tender  Resp:  No accessory muscle use, clear breath sounds without wheezes rales or rhonchi  Card:  No murmurs, normal S1, S2 without thrills, bruits or peripheral edema  Abd:  Soft, non-tender, non-distended, normoactive bowel sounds are present, no palpable organomegaly and no detectable hernias  Lymph:  No cervical or inguinal adenopathy  Musc:  No cyanosis or clubbing  Skin:  No rashes or ulcers, skin turgor is good  Neuro:  Cranial nerves are grossly intact, no focal motor weakness, follows commands appropriately  Psych:  Poor insight, oriented to person, but not place and time, alert       Medications Reviewed: (see below)    Lab Data Reviewed: (see below)    ______________________________________________________________________    Medications:     Current Facility-Administered Medications   Medication Dose Route Frequency    bumetanide (BUMEX) tablet 1 mg  1 mg Oral BID    lisinopriL (PRINIVIL, ZESTRIL) tablet 2.5 mg  2.5 mg Oral QHS    albuterol-ipratropium (DUO-NEB) 2.5 MG-0.5 MG/3 ML  3 mL Nebulization Q4H PRN    aspirin chewable tablet 81 mg  81 mg Oral DAILY    atorvastatin (LIPITOR) tablet 10 mg  10 mg Oral QHS    carvediloL (COREG) tablet 3.125 mg  3.125 mg Oral BID    docusate sodium (COLACE) capsule 100 mg  100 mg Oral DAILY PRN    donepeziL (ARICEPT) tablet 10 mg  10 mg Oral DAILY    finasteride (PROSCAR) tablet 5 mg  5 mg Oral QPM    levothyroxine (SYNTHROID) tablet 150 mcg  150 mcg Oral ACB    pantoprazole (PROTONIX) tablet 40 mg  40 mg Oral ACB    senna-docusate (PERICOLACE) 8.6-50 mg per tablet 1 Tab  1 Tab Oral DAILY    sertraline (ZOLOFT) tablet 200 mg  200 mg Oral DAILY    tamsulosin (FLOMAX) capsule 0.4 mg  0.4 mg Oral QHS    glucose chewable tablet 16 g  4 Tab Oral PRN    dextrose (D50W) injection syrg 12.5-25 g  25-50 mL IntraVENous PRN    glucagon (GLUCAGEN) injection 1 mg  1 mg IntraMUSCular PRN    sodium chloride (NS) flush 5-40 mL  5-40 mL IntraVENous Q8H    sodium chloride (NS) flush 5-40 mL  5-40 mL IntraVENous PRN    acetaminophen (TYLENOL) tablet 650 mg  650 mg Oral Q6H PRN    Or    acetaminophen (TYLENOL) suppository 650 mg  650 mg Rectal Q6H PRN    polyethylene glycol (MIRALAX) packet 17 g  17 g Oral DAILY PRN    ondansetron (ZOFRAN ODT) tablet 4 mg  4 mg Oral Q8H PRN    Or    ondansetron (ZOFRAN) injection 4 mg  4 mg IntraVENous Q6H PRN    famotidine (PEPCID) tablet 20 mg  20 mg Oral BID    enoxaparin (LOVENOX) injection 40 mg  40 mg SubCUTAneous DAILY    insulin lispro (HUMALOG) injection   SubCUTAneous AC&HS            Lab Review:     Recent Labs     07/23/20  0555 07/22/20  1043   WBC 7.0 9.6   HGB 10.8* 10.1*   HCT 36.8 33.4*    233     Recent Labs     07/23/20  0555 07/22/20  1043    137   K 3.7 3.9    99   CO2 37* 33*   * 111*   BUN 23* 26*   CREA 0.67* 0.90   CA 9.1 9.0   MG 2.0  --    ALB 2.5*  --    TBILI 1.1*  --    ALT 12  --      Lab Results   Component Value Date/Time    Glucose (POC) 125 (H) 07/24/2020 06:51 AM    Glucose (POC) 168 (H) 07/23/2020 09:32 PM    Glucose (POC) 148 (H) 07/23/2020 04:26 PM    Glucose (POC) 112 (H) 07/23/2020 11:33 AM    Glucose (POC) 110 (H) 07/23/2020 06:36 AM     No results for input(s): PH, PCO2, PO2, HCO3, FIO2 in the last 72 hours. No results for input(s): INR, INREXT, INREXT in the last 72 hours.   Lab Results   Component Value Date/Time    Specimen Description: URINE 10/15/2011 02:00 PM    Specimen Description: URINE 09/09/2011 01:05 PM     Lab Results   Component Value Date/Time    Culture result: (A) 06/27/2019 04:45 PM     MODERATE STREPTOCOCCUS PNEUMONIAE (SENSITIVITIES PERFORMED BY E-TEST)    Culture result: SAMPLE SENT ON PREPLANTED MEDIA 06/27/2019 04:45 PM    Culture result: NO FUNGUS ISOLATED 30 DAYS 06/27/2019 04:45 PM            Assessment:     Principal Problem:    CHF (congestive heart failure), NYHA class IV, chronic, systolic ([facility] (9/91/5079)    Active Problems:    Hypertension ()      CAD (coronary artery disease) (6/26/2015)      Chronic obstructive pulmonary disease ([facility] (6/28/2015)      Overview: 04/24/2015 [facility]      FVC 2.53-67%-post bronchodilator 2.61-69%-increase 3%      FEV1 1.41-52%-post bronchodilator value 1.45-54%-increase 3%      FEV1/FVC 56%      TLC 5.19-88%      RV 2.%      DLCO 45%      DLCO VA 73%      %            04/24/2015-6 minute walk test no exertional hypoxia      Dementia ([facility] (11/20/2015)      Chronic hypoxemic respiratory failure (HCC) ()      Hypothyroid ()      GERD (gastroesophageal reflux disease) ()      DM type 2 causing vascular disease ([facility] () Overview: IDDM           Plan:     Principal Problem:    CHF (congestive heart failure), NYHA class IV, chronic, systolic ([facility] (2/55/6949)   - appears to be quite advanced, I do not think there is any acute component at this time, however   - Cardiology input appreciated, diurese as able per them   - Palliative Care input appreciated, hospice consulted, he can go at any time as he is and has been at his baseline this entire hospitalization   - if returns to SNF, consider do not return to hospital order and comfort care or hospice    Active Problems:    Hypertension ()   - follow BP on meds as above      CAD (coronary artery disease) (6/26/2015)   - continue cardiac meds      Chronic obstructive pulmonary disease ([facility] (6/28/2015)   - continue respiratory meds      Dementia ([facility] (11/20/2015)   - on Aricept      Chronic hypoxemic respiratory failure (HCC) ()   - continue chronic oxygen, expect patient to become confused if he removes it and becomes hypoxic      Hypothyroid ()   - continue LT4      GERD (gastroesophageal reflux disease) ()   - continue PPI      DM type 2 causing vascular disease ([facility] ()   - follow BS on SSI, high risk for hypoglycemia        Total time spent in patient care: 25 minutes                  Care Plan discussed with: Patient, Care Manager and Nursing Staff    Discussed:  Code Status, Care Plan and D/C Planning    Prophylaxis:  Lovenox    Disposition:  SNF/LTC vs hospice           ___________________________________________________    Attending Physician: Pato Crowley MD

## 2020-07-24 NOTE — PROGRESS NOTES
Occupational Therapy  Chart reviewed in prep for evaluation, noted hospice consult and plan for home with hospice, discussed with RN and not appropriate at this time. Will sign off.    Neeru Rodriguez, OTR/L

## 2020-07-24 NOTE — PROGRESS NOTES
Bedside and Verbal shift change report given to Joyce Dinh RN (oncoming nurse) by Marylene Muster (offgoing nurse). Report included the following information SBAR, Kardex, MAR and Accordion.

## 2020-07-24 NOTE — PROGRESS NOTES
Cardiology Progress Note    5th floor      NAME:  Franco Mac   :   1935   MRN:   [id number]     Assessment/Plan:   1) Acute on chronic HFrEF  - Has chronic pleural effusions and is on chronic O2  - change bumex to po.   - Hospice referral  - Cont Coreg, lisinopril      2) CAD s/p CABG. - cont ASA and statin      3) Chronic anemia     4) Mild-moderate AS     5) Dementia: on aricept      6) DNR          Will s/o and see prn    CARDIOLOGY ATTENDING  Patient personally seen and examined. All the elements of history and examination were personally performed. Assessment and plan was discussed and agree as written above      Seems comfortable at rest.  Answers questions but confused. Hrt RRR, 2/6 systolic murmur. Lungs clear and no edema. Will switch form IV to PO Bumex 1 mg BID  OK for DC from cardiac standpoint   Seems he is heading towards hospice    Will sign off - please call with any questions. Galen Munoz MD, [facility]            Subjective:   Hedy Giraldo is a 81 y. o. male PMH CAD s/p CABG x3, HFrEF, PPM, colon cancer, depression, IDDM, HTN, lung cancer, aortic stenosis, pulm HTN, PVD, stroke, thryoid disease admitted with hypoxia.  Patient has a history of dementia and unable to provide any detailed history. He does answer some questions. He denies any chest pain and SOB and says he is OK.      Per nursing on admission : \"Spoke w/ pt's daughter Briseida Tinoco who stated that she spoke w/ nurse from the [facility] who follows pt. She stated that the nurse reported to her that the nursing facility reported low O2 @ 61% & a cough with green sputum. Pt's daughter stated that she was told pt's BP was 89/46. Daughter also stated that w/ pt's confusion he sometimes pulls his O2 off. \"     CXR 20 - Mild pulmonary edema with stable or recurrent moderate right pleural Effusion.     proBNP was 22 k (was 10 k in May)    I/O incomplete     Cardiac ROS: Patient denies any exertional chest pain, dyspnea, palpitations, syncope, orthopnea, edema or paroxysmal nocturnal dyspnea. Previous Cardiac Eval  Echo 17 Echo 17 LVEF 50%. Distal apical septal akinesis. LAmoderately dilated. Mild MR. Aortic valve: The valve was trileaflet. Leaflets exhibited moderate  calcification, reduced mobility, and sclerosis without stenosis. Mild Tr. Severe pulmonary hypertension    ECHO 6-10-15 LV EF 35 % by Ceja's Biplane technique, mod diffuse HK, mod HK mid anteroseptal and apical septal wall(s). Mild LVH. Mod JILLIAN, MR, mild ASt, LISETH 1.5 cm2 by both continuity equation and planimetry. Mild AR, TR, PASP 60. AV Max/meanPG 16.6/7.6 mmHg. AV Vmax was 2 m/s. NUKE 6-10-15 ischemia and infarction in inferior wall with moderately reduced EF, see report  Holter showed high level AV block so underwent testing leading to cath and then CABG    The PFTs done at [facility] on 4/24/15 show a FEV1 of 1.41. FVC 56%    He has a history of colon cancer, anxiety, diabetes, depression, emphysema, hypertension, hypothyroidism. 7/2/15: [employer] Pacemaker per Dr. Sp Domínguez    Review of Systems: No nausea, indigestion, vomiting, pain, cough, sputum. No bleeding. Taking po. .           Objective:     Visit Vitals  /60 (BP 1 Location: Left arm, BP Patient Position: At rest)   Pulse 79   Temp 97.6 °F (36.4 °C)   Resp 18   Ht 5' 10\" (1.778 m)   Wt 117 lb 4.6 oz (53.2 kg)   SpO2 100%   BMI 16.83 kg/m²    O2 Flow Rate (L/min): 2 l/min O2 Device: Nasal cannula    Temp (24hrs), Av.5 °F (36.4 °C), Min:97.3 °F (36.3 °C), Max:97.9 °F (36.6 °C)      No intake/output data recorded.  1901 -  0700  In: 200 [P.O.:200]  Out: -   TELE: SR    General: AAOx 1 cooperative, no acute distress. Thin, frail. HEENT: Atraumatic. bilat green discharge OU. Neck : Supple  Lungs: CTA bilaterally. No wheezing/rhonchi/rales.  SPO2 100 % 2 liters   Heart: Regular rhythm, 3/6 systolic murmur, no rubs, no gallops. No JVD. Abdomen: Soft, non-distended, non-tender. + Bowel sounds. Extremities: No edema  Neurologic: Grossly intact. Alert and oriented X 1. No acute neurological distress. Psych: Limited insight. Not anxious or agitated. Care Plan discussed with:    Comments   Patient x    Family      RN x    Care Manager                    Consultant:  x        Data Review:     No lab exists for component: ITNL   Recent Labs     07/22/20  1043   TROIQ 0.12*     Recent Labs     07/23/20  0555 07/22/20  1043    137   K 3.7 3.9    99   CO2 37* 33*   BUN 23* 26*   CREA 0.67* 0.90   * 111*   MG 2.0  --    ALB 2.5*  --    WBC 7.0 9.6   HGB 10.8* 10.1*   HCT 36.8 33.4*    233     No results for input(s): INR, PTP, APTT, INREXT in the last 72 hours.     Medications reviewed  Current Facility-Administered Medications   Medication Dose Route Frequency    bumetanide (BUMEX) tablet 1 mg  1 mg Oral BID    lisinopriL (PRINIVIL, ZESTRIL) tablet 2.5 mg  2.5 mg Oral QHS    albuterol-ipratropium (DUO-NEB) 2.5 MG-0.5 MG/3 ML  3 mL Nebulization Q4H PRN    aspirin chewable tablet 81 mg  81 mg Oral DAILY    atorvastatin (LIPITOR) tablet 10 mg  10 mg Oral QHS    carvediloL (COREG) tablet 3.125 mg  3.125 mg Oral BID    docusate sodium (COLACE) capsule 100 mg  100 mg Oral DAILY PRN    donepeziL (ARICEPT) tablet 10 mg  10 mg Oral DAILY    finasteride (PROSCAR) tablet 5 mg  5 mg Oral QPM    levothyroxine (SYNTHROID) tablet 150 mcg  150 mcg Oral ACB    pantoprazole (PROTONIX) tablet 40 mg  40 mg Oral ACB    senna-docusate (PERICOLACE) 8.6-50 mg per tablet 1 Tab  1 Tab Oral DAILY    sertraline (ZOLOFT) tablet 200 mg  200 mg Oral DAILY    tamsulosin (FLOMAX) capsule 0.4 mg  0.4 mg Oral QHS    glucose chewable tablet 16 g  4 Tab Oral PRN    dextrose (D50W) injection syrg 12.5-25 g  25-50 mL IntraVENous PRN    glucagon (GLUCAGEN) injection 1 mg  1 mg IntraMUSCular PRN    sodium chloride (NS) flush 5-40 mL  5-40 mL IntraVENous Q8H    sodium chloride (NS) flush 5-40 mL  5-40 mL IntraVENous PRN    acetaminophen (TYLENOL) tablet 650 mg  650 mg Oral Q6H PRN    Or    acetaminophen (TYLENOL) suppository 650 mg  650 mg Rectal Q6H PRN    polyethylene glycol (MIRALAX) packet 17 g  17 g Oral DAILY PRN    ondansetron (ZOFRAN ODT) tablet 4 mg  4 mg Oral Q8H PRN    Or    ondansetron (ZOFRAN) injection 4 mg  4 mg IntraVENous Q6H PRN    famotidine (PEPCID) tablet 20 mg  20 mg Oral BID    enoxaparin (LOVENOX) injection 40 mg  40 mg SubCUTAneous DAILY    insulin lispro (HUMALOG) injection   SubCUTAneous AC&HS         Grace Ellis, NP

## 2020-07-25 NOTE — DISCHARGE INSTRUCTIONS
HOSPITALIST DISCHARGE INSTRUCTIONS  NAME: Joan Luna   :  1935   MRN:  [id number]     Date/Time:  2020 10:42 AM    ADMIT DATE: 2020     DISCHARGE DATE: 2020     DISCHARGE DIAGNOSIS:  Confusion due to low oxygen    MEDICATIONS:  · It is important that you take the medication exactly as they are prescribed. · Keep your medication in the bottles provided by the pharmacist and keep a list of the medication names, dosages, and times to be taken in your wallet. · Do not take other medications without consulting your doctor. Pain Management: per above medications    What to do at [address]:  Comfort feeding    Recommended activity: Activity as tolerated    If you experience any of the following symptoms then please call your primary care physician or return to the emergency room if you cannot get hold of your doctor:  Fever, chills, nausea, vomiting, diarrhea, change in mentation, falling, bleeding, shortness of breath    Follow Up: Follow-up Information     Follow up With Specialties Details Why [address] obtained by :  I understand that if any problems occur once I am at home I am to contact my physician. I understand and acknowledge receipt of the instructions indicated above.                                                                                                                                            Physician's or R.N.'s Signature                                                                  Date/Time                                                                                                                                              Patient or Representative Signature                                                          Date/Time

## 2020-07-25 NOTE — PROGRESS NOTES
Pharmacist Discharge Medication Reconciliation Discharge Provider:  Katina Breaux MD 
   
  
Discharge Medications: My Medications CONTINUE taking these medications Instructions Each Dose to Equal Morning Noon Evening Bedtime  
acetaminophen 325 mg tablet Commonly known as:  TYLENOL Take 650 mg by mouth every four (4) hours as needed for Pain. 650 mg 
      
albuterol-ipratropium 2.5 mg-0.5 mg/3 ml Nebu Commonly known as:  DUO-NEB 
  
 3 mL by Nebulization route every four (4) hours as needed (shortness of breath). 3 mL 
      
aspirin 81 mg chewable tablet Take 1 Tab by mouth daily. 81 mg 
      
atorvastatin 10 mg tablet Commonly known as:  LIPITOR Take 1 Tab by mouth nightly. 10 mg 
      
bumetanide 0.5 mg tablet Commonly known as:  Danielle Puller Take 1 mg by mouth two (2) times a day. 1 mg 
      
calcium-vitamin D 500 mg(1,250mg) -200 unit per tablet Commonly known as:  OYSTER SHELL Take 1 Tab by mouth daily. 1 Tab 
      
carvediloL 3.125 mg tablet Commonly known as:  Marinell Shaggy Take 1 Tab by mouth two (2) times a day. 3.125 mg 
      
donepeziL 10 mg tablet Commonly known as:  ARICEPT Take 10 mg by mouth daily. 10 mg 
      
ferrous sulfate 325 mg (65 mg iron) tablet Take 325 mg by mouth daily (with breakfast). 325 mg 
      
finasteride 5 mg tablet Commonly known as:  PROSCAR Take 5 mg by mouth daily. 5 mg 
      
glipiZIDE 5 mg tablet Commonly known as:  Tacey Claw Take 5 mg by mouth daily. 5 mg 
      
glucose 4 gram chewable tablet Take 15 g by mouth as needed. 15 g 
      
guaiFENesin  mg SR tablet Commonly known as:  Armando & Armando Take 600 mg by mouth two (2) times a day. 600 mg 
      
ipratropium 0.02 % Soln Commonly known as:  ATROVENT 
  
 0.5 mg by Nebulization route every four (4) hours as needed for Wheezing or Shortness of Breath. 0.5 mg 
      
levothyroxine 150 mcg tablet Commonly known as:  SYNTHROID Take 150 mcg by mouth Daily (before breakfast). 150 mcg 
      
lisinopriL 5 mg tablet Commonly known as:  Nathan Emms Take 2.5 mg by mouth daily. 2.5 mg 
      
loratadine 10 mg tablet Commonly known as:  Rolo Curry Take 10 mg by mouth daily. 10 mg 
      
magnesium gluconate 500 mg (27 mg  elemental) tablet Take 500 mg by mouth daily. 500 mg 
      
omeprazole 20 mg capsule Commonly known as:  PRILOSEC Take 20 mg by mouth Daily (before breakfast). 30 minutes prior to breakfast 
 20 mg Senna 8.6 mg tablet Generic drug:  senna Take 1 Tab by mouth daily. 1 Tab 
      
sertraline 100 mg tablet Commonly known as:  ZOLOFT Take 200 mg by mouth daily. 200 mg 
      
tamsulosin 0.4 mg capsule Commonly known as:  FLOMAX Take 0.4 mg by mouth nightly. 0.4 mg 
      
Vitamin D3 (1000 Units /25 mcg) tablet Generic drug:  cholecalciferol Take 3,000 Units by mouth daily. 3,000 Units The patient's chart, MAR, and AVS were reviewed by Davide Johnson, PHARMD, Contact: [phone]

## 2020-07-25 NOTE — PROGRESS NOTES
Ozzy Resendiz [address] 
[address] [facility], [address] [place] [address] 
[phone] Medical Progress Note NAME: Katerina Rowe :  1935 MRM:  [id number] Date/Time: 2020  10:41 AM  
 
 
  
Subjective: Chief Complaint:  Confusion: patient does not know where he is or why he is here, cannot tell me what happened yesterday ROS: 
(bold if positive, if negative) Unreliable due to dementia Objective:  
 
 
Vitals:  
 
 
  
Last 24hrs VS reviewed since prior progress note. Most recent are: 
 
Visit Vitals /60 (BP 1 Location: Right arm, BP Patient Position: At rest) Pulse 60 Temp 97.3 °F (36.3 °C) Resp 16 Ht 5' 10\" (1.778 m) Wt 53.8 kg (118 lb 9.7 oz) SpO2 100% BMI 17.02 kg/m² SpO2 Readings from Last 6 Encounters:  
20 100% 20 96% 19 99% 19 92% 19 97% 19 94% O2 Flow Rate (L/min): 2 l/min Intake/Output Summary (Last 24 hours) at 2020 1041 Last data filed at 2020 0930 Gross per 24 hour Intake 240 ml Output 425 ml Net -185 ml Exam:  
 
Physical Exam: 
 
Gen:  Well-developed, frail, elderly, chronically ill-appearing, in no acute distress HEENT:  Pink conjunctivae, PERRL, hearing intact to voice, moist mucous membranes Neck:  Supple, without masses, thyroid non-tender Resp:  No accessory muscle use, clear breath sounds without wheezes rales or rhonchi 
Card:  No murmurs, normal S1, S2 without thrills, bruits or peripheral edema Abd:  Soft, non-tender, non-distended, normoactive bowel sounds are present, no palpable organomegaly and no detectable hernias Lymph:  No cervical or inguinal adenopathy Musc:  No cyanosis or clubbing Skin:  No rashes or ulcers, skin turgor is good Neuro:  Cranial nerves are grossly intact, no focal motor weakness, follows commands appropriately Psych:  Poor insight, oriented to person, but not place and time, alert Medications Reviewed: (see below) Lab Data Reviewed: (see below) 
 
______________________________________________________________________ Medications:  
 
Current Facility-Administered Medications Medication Dose Route Frequency  bumetanide (BUMEX) tablet 1 mg  1 mg Oral BID  artificial tears (dextran 70-hypromellose) (GENTEAL TEARS MILD) 0.1-0.3 % ophthalmic solution 1 Drop  1 Drop Both Eyes PRN  
 lisinopriL (PRINIVIL, ZESTRIL) tablet 2.5 mg  2.5 mg Oral QHS  albuterol-ipratropium (DUO-NEB) 2.5 MG-0.5 MG/3 ML  3 mL Nebulization Q4H PRN  
 aspirin chewable tablet 81 mg  81 mg Oral DAILY  atorvastatin (LIPITOR) tablet 10 mg  10 mg Oral QHS  carvediloL (COREG) tablet 3.125 mg  3.125 mg Oral BID  docusate sodium (COLACE) capsule 100 mg  100 mg Oral DAILY PRN  
 donepeziL (ARICEPT) tablet 10 mg  10 mg Oral DAILY  finasteride (PROSCAR) tablet 5 mg  5 mg Oral QPM  
 levothyroxine (SYNTHROID) tablet 150 mcg  150 mcg Oral ACB  pantoprazole (PROTONIX) tablet 40 mg  40 mg Oral ACB  senna-docusate (PERICOLACE) 8.6-50 mg per tablet 1 Tab  1 Tab Oral DAILY  sertraline (ZOLOFT) tablet 200 mg  200 mg Oral DAILY  tamsulosin (FLOMAX) capsule 0.4 mg  0.4 mg Oral QHS  glucose chewable tablet 16 g  4 Tab Oral PRN  
 dextrose (D50W) injection syrg 12.5-25 g  25-50 mL IntraVENous PRN  
 glucagon (GLUCAGEN) injection 1 mg  1 mg IntraMUSCular PRN  
 sodium chloride (NS) flush 5-40 mL  5-40 mL IntraVENous Q8H  
 sodium chloride (NS) flush 5-40 mL  5-40 mL IntraVENous PRN  
 acetaminophen (TYLENOL) tablet 650 mg  650 mg Oral Q6H PRN Or  
 acetaminophen (TYLENOL) suppository 650 mg  650 mg Rectal Q6H PRN  polyethylene glycol (MIRALAX) packet 17 g  17 g Oral DAILY PRN  
 ondansetron (ZOFRAN ODT) tablet 4 mg  4 mg Oral Q8H PRN  Or  
 ondansetron (ZOFRAN) injection 4 mg  4 mg IntraVENous Q6H PRN  
 famotidine (PEPCID) tablet 20 mg  20 mg Oral BID  
  enoxaparin (LOVENOX) injection 40 mg  40 mg SubCUTAneous DAILY  insulin lispro (HUMALOG) injection   SubCUTAneous AC&HS Lab Review:  
 
Recent Labs  
  07/23/20 
0555 07/22/20 
1043 WBC 7.0 9.6 HGB 10.8* 10.1* HCT 36.8 33.4*  
 233 Recent Labs  
  07/23/20 
0555 07/22/20 
1043  137  
K 3.7 3.9  99 CO2 37* 33* * 111* BUN 23* 26* CREA 0.67* 0.90  
CA 9.1 9.0 MG 2.0  --   
ALB 2.5*  --   
TBILI 1.1*  --   
ALT 12  --   
 
Lab Results Component Value Date/Time Glucose (POC) 129 (H) 07/25/2020 06:57 AM  
 Glucose (POC) 152 (H) 07/24/2020 09:08 PM  
 Glucose (POC) 117 (H) 07/24/2020 04:44 PM  
 Glucose (POC) 179 (H) 07/24/2020 11:52 AM  
 Glucose (POC) 125 (H) 07/24/2020 06:51 AM  
 
No results for input(s): PH, PCO2, PO2, HCO3, FIO2 in the last 72 hours. No results for input(s): INR, INREXT, INREXT in the last 72 hours. Lab Results Component Value Date/Time Specimen Description: URINE 10/15/2011 02:00 PM  
 Specimen Description: URINE 09/09/2011 01:05 PM  
 
Lab Results Component Value Date/Time Culture result: (A) 06/27/2019 04:45 PM  
  MODERATE STREPTOCOCCUS PNEUMONIAE (SENSITIVITIES PERFORMED BY E-TEST) Culture result: SAMPLE SENT ON PREPLANTED MEDIA 06/27/2019 04:45 PM  
 Culture result: NO FUNGUS ISOLATED 30 DAYS 06/27/2019 04:45 PM  
 
 
 
  
Assessment:  
 
Principal Problem: 
  CHF (congestive heart failure), NYHA class IV, chronic, systolic ([facility] (9/75/1762) Active Problems: Hypertension () 
 
  CAD (coronary artery disease) (6/26/2015) Chronic obstructive pulmonary disease ([facility] (6/28/2015) Overview: 04/24/2015 [facility] at [facility] FVC 2.53-67%-post bronchodilator 2.61-69%-increase 3% FEV1 1.41-52%-post bronchodilator value 1.45-54%-increase 3% FEV1/FVC 56% TLC 5.19-88% RV 2.% DLCO 45% DLCO VA 73% % 04/24/2015-6 minute walk test no exertional hypoxia Dementia ([facility] (11/20/2015) Chronic hypoxemic respiratory failure (HCC) () Hypothyroid () GERD (gastroesophageal reflux disease) () 
 
  DM type 2 causing vascular disease (HCC) () Overview: IDDM Plan:  
 
Principal Problem: 
  CHF (congestive heart failure), NYHA class IV, chronic, systolic ([facility] (6/91/2062) - appears to be quite advanced, I do not think there is any acute component at this time, however - Cardiology input appreciated, diurese as able per them - Palliative Care input appreciated, hospice consulted, he can go at any time as he is and has been at his baseline this entire hospitalization 
 - if returns to SNF, consider do not return to hospital order and comfort care or hospice Active Problems: Hypertension () 
 - follow BP on meds as above CAD (coronary artery disease) (6/26/2015) - continue cardiac meds Chronic obstructive pulmonary disease ([facility] (6/28/2015) - continue respiratory meds Dementia ([facility] (11/20/2015) - on Aricept Chronic hypoxemic respiratory failure (HCC) () 
 - continue chronic oxygen, expect patient to become confused if he removes it and becomes hypoxic Hypothyroid () 
 - continue LT4 
 
  GERD (gastroesophageal reflux disease) () 
 - continue PPI 
 
  DM type 2 causing vascular disease (HCC) () 
 - follow BS on SSI, high risk for hypoglycemia Total time spent in patient care: 35 minutes Care Plan discussed with: Patient, Care Manager and Nursing Staff Discussed:  Code Status, Care Plan and D/C Planning Prophylaxis:  Lovenox Disposition:  SNF/LTC vs hospice 
        
___________________________________________________ Attending Physician: Luci Carlson MD

## 2020-07-25 NOTE — DISCHARGE SUMMARY
Physician Discharge Summary Patient ID: 
Kev Elmore [id number] 
26 y.o. 
1935 Admit date: 7/22/2020 Discharge date: 7/25/2020 Admission Diagnoses: CHF (congestive heart failure), NYHA class IV, chronic, systolic ([facility] [Q08.82] Discharge Diagnoses:  Principal Diagnosis CHF (congestive heart failure), NYHA class IV, chronic, systolic (HCC) Principal Problem: 
  CHF (congestive heart failure), NYHA class IV, chronic, systolic ([facility] (3/25/9390) Active Problems: Hypertension () 
 
  CAD (coronary artery disease) (6/26/2015) Chronic obstructive pulmonary disease ([facility] (6/28/2015) Overview: 04/24/2015 [facility] at [facility] FVC 2.53-67%-post bronchodilator 2.61-69%-increase 3% FEV1 1.41-52%-post bronchodilator value 1.45-54%-increase 3% FEV1/FVC 56% TLC 5.19-88% RV 2.% DLCO 45% DLCO VA 73% % 04/24/2015-6 minute walk test no exertional hypoxia Dementia ([facility] (11/20/2015) Chronic hypoxemic respiratory failure (HCC) () Hypothyroid () GERD (gastroesophageal reflux disease) () 
 
  DM type 2 causing vascular disease (HCC) () Overview: IDDM Resolved Problems: 
Problem List as of 7/25/2020 Date Reviewed: 7/22/2020 Codes Class Noted - Resolved * (Principal) CHF (congestive heart failure), NYHA class IV, chronic, systolic (HCC) (Chronic) [id number]-CE: Q28.06 ICD-9-CM: 428.22, 428.0  7/22/2020 - Present Arthritis (Chronic) ICD-10-CM: M19.90 ICD-9-CM: 716.90  Unknown - Present Chronic hypoxemic respiratory failure (HCC) (Chronic) ICD-10-CM: J96.11 
ICD-9-CM: 518.83, 799.02  Unknown - Present Hypothyroid (Chronic) ICD-10-CM: E03.9 ICD-9-CM: 244.9  Unknown - Present Hx of completed stroke ICD-10-CM: Z86.73 
ICD-9-CM: V12.54  Unknown - Present GERD (gastroesophageal reflux disease) (Chronic) ICD-10-CM: K21.9 ICD-9-CM: 530.81  Unknown - Present DM type 2 causing vascular disease (HCC) (Chronic) ICD-10-CM: E11.59 
ICD-9-CM: 250.70, 443.81  Unknown - Present Overview Signed 7/22/2020  4:21 PM by Alia Olivera MD  
  IDDM Depression (Chronic) ICD-10-CM: F32.9 ICD-9-CM: 311  Unknown - Present Overview Signed 7/22/2020  4:21 PM by Alia Olivera MD  
  DEPRESSION Anxiety and depression (Chronic) ICD-10-CM: F41.9, F32.9 ICD-9-CM: 300.00, 311  Unknown - Present Hx of cancer of lung ICD-10-CM: Z85.118 
ICD-9-CM: V10.11  11/29/2016 - Present Dementia ([address] (Chronic) ICD-10-CM: F03.90 ICD-9-CM: 294.20  11/20/2015 - Present Pleural effusion ICD-10-CM: J90 ICD-9-CM: 511.9  10/9/2015 - Present Pacemaker ICD-10-CM: Z95.0 ICD-9-CM: V45.01  7/2/2015 - Present Overview Signed 7/2/2015  7:18 PM by Dequan Arias MD  
  7/2/2015 Medtronic pacer implant S/P CABG x 3 ICD-10-CM: Z95.1 ICD-9-CM: V45.81  6/29/2015 - Present Overview Signed 6/29/2015 11:15 AM by VAL Henley  
  LIMA TO LAD; SVG TO OM; SVG TO OM Tobacco use ICD-10-CM: Z72.0 ICD-9-CM: 305.1  6/28/2015 - Present Chronic obstructive pulmonary disease (HCC) (Chronic) ICD-10-CM: J44.9 ICD-9-CM: 472  6/28/2015 - Present Overview Signed 6/28/2015  1:05 PM by Uyen Luna MD  
  04/24/2015 [facility] at [facility] FVC 2.53-67%-post bronchodilator 2.61-69%-increase 3% FEV1 1.41-52%-post bronchodilator value 1.45-54%-increase 3% FEV1/FVC 56% TLC 5.19-88% RV 2.% DLCO 45% DLCO VA 73% % 04/24/2015-6 minute walk test no exertional hypoxia CAD (coronary artery disease) (Chronic) ICD-10-CM: I25.10 ICD-9-CM: 414.00  6/26/2015 - Present Pulmonary hypertension ICD-10-CM: I27.20 ICD-9-CM: 416.8  6/15/2015 - Present Mild aortic stenosis ICD-10-CM: I35.0 ICD-9-CM: 424.1  6/15/2015 - Present Third degree AV block (HCC) ICD-10-CM: I44.2 ICD-9-CM: 426.0  6/11/2015 - Present Cardiomyopathy ([facility] (Chronic) ICD-10-CM: I42.9 ICD-9-CM: 425.4  6/11/2015 - Present Hypertension (Chronic) ICD-10-CM: I10 
ICD-9-CM: 401.9  Unknown - Present PVD (peripheral vascular disease) ([facility] ICD-10-CM: I73.9 ICD-9-CM: 443.9  Unknown - Present Overview Signed 6/11/2015  9:54 PM by Brain Scale, MD  
  STENT RIGHT GROIN, PAD Lumbar stenosis ICD-10-CM: M48.061 
ICD-9-CM: 724.02  9/29/2011 - Present RESOLVED: Type 2 diabetes mellitus without complication (HCC) (Chronic) ICD-10-CM: E11.9 ICD-9-CM: 250.00  5/18/2020 - 7/22/2020 RESOLVED: Hepatitis ICD-10-CM: K75.9 ICD-9-CM: 573.3  5/18/2020 - 7/22/2020 RESOLVED: Acute on chronic systolic CHF (congestive heart failure) (HCC) ICD-10-CM: M70.48 ICD-9-CM: 428.23, 428.0  5/18/2020 - 7/22/2020 RESOLVED: Acute encephalopathy ICD-10-CM: G93.40 ICD-9-CM: 348.30  1/4/2018 - 7/22/2020 RESOLVED: Severe sepsis (HCC) ICD-10-CM: A41.9, R65.20 ICD-9-CM: 038.9, 995.92  1/4/2018 - 7/22/2020 RESOLVED: Complicated UTI (urinary tract infection) ICD-10-CM: N39.0 ICD-9-CM: 599.0  1/4/2018 - 7/22/2020 RESOLVED: Hip fracture, right ([facility] ICD-10-CM: S72.001A ICD-9-CM: 820.8  12/21/2017 - 7/22/2020 RESOLVED: Back pain ICD-10-CM: M54.9 ICD-9-CM: 724.5  2/14/2016 - 2/16/2016 RESOLVED: Recurrent left pleural effusion ICD-10-CM: J90 ICD-9-CM: 511.9  11/24/2015 - 7/22/2020 RESOLVED: Acute on chronic respiratory failure with hypoxia (HCC) (Chronic) ICD-10-CM: Q12.40 
ICD-9-CM: 518.84, 799.02  11/24/2015 - 7/22/2020 RESOLVED: Iron deficiency anemia (Chronic) ICD-10-CM: D50.9 ICD-9-CM: 280.9  11/24/2015 - 7/22/2020  RESOLVED: Counseling regarding advanced care planning and goals of care ICD-10-CM: Z71.89 ICD-9-CM: V65.49  11/23/2015 - 7/22/2020 RESOLVED: Fatigue ICD-10-CM: R53.83 ICD-9-CM: 780.79  11/20/2015 - 11/30/2015 RESOLVED: Shortness of breath ICD-10-CM: R06.02 
ICD-9-CM: 786.05  11/20/2015 - 2/16/2016 RESOLVED: Hypoxia ICD-10-CM: R09.02 
ICD-9-CM: 799.02  11/19/2015 - 2/16/2016 RESOLVED: Loculated pleural effusion ICD-10-CM: J90 ICD-9-CM: 511.9  10/12/2015 - 10/12/2015 RESOLVED: Loculated pleural effusion ICD-10-CM: J90 ICD-9-CM: 511.9  10/12/2015 - 2/16/2016 RESOLVED: Pleural effusion due to congestive heart failure (HCC) ICD-10-CM: I50.9 ICD-9-CM: 428.0  10/12/2015 - 11/30/2015 RESOLVED: Ex-smoker ICD-10-CM: D62.481 ICD-9-CM: V15.82  9/24/2015 - 7/22/2020 RESOLVED: Cachexia ([address] ICD-10-CM: R64 
ICD-9-CM: 799.4  9/24/2015 - 2/16/2016 RESOLVED: Chronic systolic heart failure (HCC) ICD-10-CM: I50.22 ICD-9-CM: 428.22  9/18/2015 - 7/22/2020 RESOLVED: Respiratory distress ICD-10-CM: R06.03 
ICD-9-CM: 786.09  9/17/2015 - 9/23/2015 RESOLVED: Acute bronchitis ICD-10-CM: J20.9 ICD-9-CM: 466.0  9/17/2015 - 9/23/2015 RESOLVED: ICH (intracerebral hemorrhage) (HCC) ICD-10-CM: I61.9 ICD-9-CM: 012  8/10/2015 - 7/22/2020 RESOLVED: Anemia associated with acute blood loss ICD-10-CM: D62 
ICD-9-CM: 285.1  7/1/2015 - 2/16/2016 RESOLVED: Poor historian ICD-10-CM: Z78.9 ICD-9-CM: V49.89  6/28/2015 - 7/22/2020 RESOLVED: Diabetes mellitus ([facility] ICD-10-CM: E11.9 ICD-9-CM: 250.00  6/28/2015 - 7/22/2020 RESOLVED: Cerebrovascular accident [facility]) ICD-10-CM: I63.9 ICD-9-CM: 434.91  6/28/2015 - 7/22/2020 Overview Signed 6/28/2015  1:05 PM by Ignacia Knott MD  
  stroke x 2 RESOLVED: Malignant neoplasm of colon ([facility] ICD-10-CM: C18.9 ICD-9-CM: 153.9  6/28/2015 - 7/22/2020 Overview Signed 6/28/2015  1:05 PM by Ignacia Knott MD  
  [facility] RESOLVED: Chronic coronary artery disease ICD-10-CM: I25.10 ICD-9-CM: 414.00  6/28/2015 - 11/24/2015 Overview Signed 6/28/2015  1:05 PM by Nick Moore MD  
  Left ventricle: The ventricle was mildly dilated. Systolic function was 
mildly to moderately reduced by visual assessment. Ejection fraction was 
estimated in the range of 35 % to 40 %. Suboptimal endocardial 
visualization limits wall motion analysis. Wall thickness was mildly 
increased. Left atrium: The atrium was moderately dilated. Right atrium: The atrium was mildly dilated. Mitral valve: There was mild annular calcification. There was mild 
regurgitation. Aortic valve: The valve was trileaflet. Leaflets exhibited moderate 
calcification. There was mild stenosis. Tricuspid valve: There was mild regurgitation. Pulmonary artery systolic 
pressure: 60 mmHg. Insufficient tricuspid regurgitation to estimate 
pulmonary artery pressure. RESOLVED: Other nonspecific abnormal finding of lung field ICD-10-CM: R91.8 ICD-9-CM: 793.19  6/28/2015 - 7/22/2020 Overview Signed 6/28/2015  1:05 PM by Nick Moore MD  
  CT 3/17/15 - [facility] IMPRESSION:  
Interval right-sided pleural effusion with nodularity/atelectasis at the  
left lung base as well. Consider sampling minimal pleural fluid on the  
right, this will need to be done under ultrasonographic guidance. Alternatively PET scan to evaluate nodularity at left lung base. Coronary vascular disease. No evidence of metastatic disease in the abdomen or pelvis. Aortic atherosclerotic change. There is mild fecal stasis in the colon. Patient is status post right  
colectomy. 11 mm LLL Nodule 4/2105 - PET Scan no enhancement IMPRESSION: No Foci of Abnormal Hypermetabolism. Specifically, no abnormal  
activity is seen to correspond to the nodular density at the left lung base. Continuing followup by CT for stability is recommended. RESOLVED: Myocardial perfusion defect ICD-10-CM: I99.8 ICD-9-CM: 459.89  6/11/2015 - 2/16/2016 RESOLVED: Left ventricular enlargement ICD-10-CM: I51.7 ICD-9-CM: 429.3  6/11/2015 - 2/16/2016 RESOLVED: Second degree AV block ICD-10-CM: I44.1 ICD-9-CM: 426.13  6/11/2015 - 7/22/2020 RESOLVED: Stroke [facility]) ICD-10-CM: I63.9 ICD-9-CM: 434.91  Unknown - 7/22/2020 Overview Signed 6/11/2015  9:53 PM by Mariola Rehman MD  
  2 STROKES 
  
  
   
 RESOLVED: Diabetes ([facility] ICD-10-CM: E11.9 ICD-9-CM: 250.00  Unknown - 7/22/2020 Overview Signed 6/11/2015  9:54 PM by Mariola Rehman MD  
  IDDM RESOLVED: Colon cancer ([facility] ICD-10-CM: C18.9 ICD-9-CM: 153.9  1993 - 7/22/2020 Overview Signed 7/22/2020  4:21 PM by Rea Nunez MD  
  [facility] Course:  
Mr. Martha Holloway was admitted to the Hospitalist Service on the 3rd for treatment of acute metabolic encephalopathy superimposed on dementia. This was due to hypoxia as a result of the patient taking off his home oyxgen. His confusion resolved with supplemental oxygen and he remained at his baseline throughout his hospitalization. Patient's wife elected to enroll him in hospice at discharge. He was discharged home to his LTC adult home with hospice on 7/25/2020 in improved condition. PCP: Sherlyn Godfrey MD 
 
Consults: Cardiology and Palliative Care Discharge Exam: 
See my Progress Note from today. Disposition: home Patient Instructions:  
Current Discharge Medication List  
  
CONTINUE these medications which have NOT CHANGED Details  
glipiZIDE (GLUCOTROL) 5 mg tablet Take 5 mg by mouth daily. senna (Senna) 8.6 mg tablet Take 1 Tab by mouth daily. bumetanide (BUMEX) 0.5 mg tablet Take 1 mg by mouth two (2) times a day. glucose 4 gram chewable tablet Take 15 g by mouth as needed.   
  
ipratropium (ATROVENT) 0.02 % soln 0.5 mg by Nebulization route every four (4) hours as needed for Wheezing or Shortness of Breath. lisinopriL (PRINIVIL, ZESTRIL) 5 mg tablet Take 2.5 mg by mouth daily. magnesium gluconate 500 mg (27 mg  elemental) tablet Take 500 mg by mouth daily. loratadine (CLARITIN) 10 mg tablet Take 10 mg by mouth daily. levothyroxine (SYNTHROID) 150 mcg tablet Take 150 mcg by mouth Daily (before breakfast). sertraline (ZOLOFT) 100 mg tablet Take 200 mg by mouth daily. aspirin 81 mg chewable tablet Take 1 Tab by mouth daily. Qty: 90 Tab, Refills: 3  
  
atorvastatin (LIPITOR) 10 mg tablet Take 1 Tab by mouth nightly. Qty: 90 Tab, Refills: 3  
  
acetaminophen (TYLENOL) 325 mg tablet Take 650 mg by mouth every four (4) hours as needed for Pain. calcium-vitamin D (OYSTER SHELL) 500 mg(1,250mg) -200 unit per tablet Take 1 Tab by mouth daily. guaiFENesin SR (MUCINEX) 600 mg SR tablet Take 600 mg by mouth two (2) times a day. albuterol-ipratropium (DUO-NEB) 2.5 mg-0.5 mg/3 ml nebu 3 mL by Nebulization route every four (4) hours as needed (shortness of breath). cholecalciferol (VITAMIN D3) 1,000 unit tablet Take 3,000 Units by mouth daily. omeprazole (PRILOSEC) 20 mg capsule Take 20 mg by mouth Daily (before breakfast). 30 minutes prior to breakfast  
  
donepezil (ARICEPT) 10 mg tablet Take 10 mg by mouth daily. carvedilol (COREG) 3.125 mg tablet Take 1 Tab by mouth two (2) times a day. Qty: 60 Tab, Refills: 0  
  
ferrous sulfate 325 mg (65 mg iron) tablet Take 325 mg by mouth daily (with breakfast). finasteride (PROSCAR) 5 mg tablet Take 5 mg by mouth daily. tamsulosin (FLOMAX) 0.4 mg capsule Take 0.4 mg by mouth nightly. Activity: Activity as tolerated with assistance Diet: Comfort feeding Wound Care: None needed Follow-up Information Follow up With Specialties Details Why Contact Info [facility] minutes were spent on this discharge. Signed: Diana oLja MD 
7/25/2020 
10:43 AM

## 2020-07-25 NOTE — PROGRESS NOTES
Report called to Akash Davis at [facility]. Hospice to follow upon discharge. Peripheral IV's removed. [facility] to transport.

## 2020-07-25 NOTE — PROGRESS NOTES
Bedside, Verbal and Written shift change report given to Alma (oncoming nurse) by Cheryl Mora RN (offgoing nurse). Report included the following information SBAR, Kardex, Procedure Summary, Intake/Output, MAR and Recent Results.

## 2020-07-25 NOTE — HOSPICE
Hospice Liaison: 
 
Per hospital CM, transportation has been arranged for this afternoon at 13:00. Unit nurse, Kd Quintanilla RN, has called report to the group home. Hospice nurse to visit patient this afternoon in group home, at 15:00 for hospice admission. José Miguel Junior RN, [facility] Hospice Nurse Liaison [phone] [place] [phone] Office

## 2020-07-29 PROBLEM — Z51.5 HOSPICE CARE PATIENT: Status: ACTIVE | Noted: 2020-01-01

## 2020-10-19 NOTE — PROGRESS NOTES
Care After Your Endoscopy  Dr. Aravind Brantley  [phone]      Activity  • For the next 24 hours: Do not stay alone, drive a car, use electrical/power tools or appliances, drink alcohol, or sign any legal papers.  • Do not do any strenuous activity for 24 hours (for example: bicycling, jogging, and exercising).     Diet  · You may resume a regular diet.    Special Instructions  • You had no polyps/biopsies of the esophagus were taken.  • Some procedures, such as biopsies or removal of polyps, may cause minimal bleeding for 7-14 days.   • If bleeding becomes excessive, if you have black tarry stools, or you are worried call Dr. Brantley.  • If polyps/biopsies were removed, do not take any aspirin, arthritis medication, ibuprofen, (Nuprin, Advil, Motrin, Aleve) for 10 (ten) days due to possible bleeding.  • You may take Tylenol (acetaminophen).   • Recommend the use of an over-the-counter probiotic for 30 days after a colonoscopy to restore colon alberto.  (Ex: Florajen)    Call the doctor if you have:  • Fever over 101 degrees (oral).  • Persistent nausea/vomiting (over 12 hours).  • Abnormal pain (sharp, severe abdominal pain).  • Increased pain (bloating, pressure).      If you had polyps or biopsies taken, Dr. Brantley’s office will contact you once the results have been reviewed by Dr Brantley.          Verta Holstein, MD. [facility] [place] Patient: Brett Lao : 1935 Today's Date: 2020 CARDIOLOGY PROGRESS NOTE 
S: Denies CP or SOB. Seems better per daughter - less confused. O: 
Visit Vitals /53 (BP 1 Location: Left arm, BP Patient Position: At rest) Pulse 60 Temp 98.7 °F (37.1 °C) Resp 16 Ht 5' 10\" (1.778 m) Wt 130 lb 11.2 oz (59.3 kg) SpO2 92% BMI 18.75 kg/m² General: AAOx1 cooperative, no acute distress. HEENT: Atraumatic. Pink and moist.  Anicteric sclerae. Neck : Supple, no thyromegaly. Lungs: diminished bases with few coarse sounds. No wheezing Heart: Regular rhythm, grade 2-3 SCOTT,   
Abdomen: Soft, non-distended, non-tender. + Bowel sounds. No bruits. Extremities: No edema, no clubbing, no cyanosis. No calf tenderness Neurologic: Grossly intact.  Alert and oriented X 1.  No acute neurological distress. Psych:  Not anxious nor agitated. 
  
  
Review of Symptoms: 
Constitutional: Negative for fever HEENT: Negative for vision changes. Respiratory: Negative for productive cough Cardiovascular: Negative for syncope Gastrointestinal: Negative for abdominal pain, melena Genitourinary: Negative for dysuria Skin: Negative for rash Heme: No problems bleeding. Neuro - no speech changes or focal weaknesses Intake/Output Summary (Last 24 hours) at 2020 1441 Last data filed at 2020 3712 Gross per 24 hour Intake 240 ml Output  Net 240 ml  
 
 
 
 
LABS / OTHER STUDIES:  
 
Recent Results (from the past 24 hour(s)) GLUCOSE, POC Collection Time: 20  4:05 PM  
Result Value Ref Range Glucose (POC) 262 (H) 65 - 100 mg/dL Performed by Julius Duval (PCT) GLUCOSE, POC Collection Time: 20  8:54 PM  
Result Value Ref Range Glucose (POC) 241 (H) 65 - 100 mg/dL Performed by Bel Barahona (CON) D DIMER Collection Time: 20  2:51 AM  
Result Value Ref Range D-dimer 4.30 (H) 0.00 - 0.65 mg/L FEU FIBRINOGEN Collection Time: 05/24/20  2:51 AM  
Result Value Ref Range Fibrinogen 328 200 - 475 mg/dL PROTHROMBIN TIME + INR Collection Time: 05/24/20  2:51 AM  
Result Value Ref Range INR 1.3 (H) 0.9 - 1.1 Prothrombin time 13.4 (H) 9.0 - 11.1 sec PTT Collection Time: 05/24/20  2:51 AM  
Result Value Ref Range aPTT 32.0 22.1 - 32.0 sec  
 aPTT, therapeutic range     58.0 - 77.0 SECS  
CBC WITH AUTOMATED DIFF Collection Time: 05/24/20  2:51 AM  
Result Value Ref Range WBC 8.4 4.1 - 11.1 K/uL  
 RBC 3.23 (L) 4.10 - 5.70 M/uL HGB 9.4 (L) 12.1 - 17.0 g/dL HCT 30.7 (L) 36.6 - 50.3 % MCV 95.0 80.0 - 99.0 FL  
 MCH 29.1 26.0 - 34.0 PG  
 MCHC 30.6 30.0 - 36.5 g/dL  
 RDW 17.2 (H) 11.5 - 14.5 % PLATELET 816 991 - 427 K/uL MPV 10.7 8.9 - 12.9 FL  
 NRBC 0.0 0  WBC ABSOLUTE NRBC 0.00 0.00 - 0.01 K/uL NEUTROPHILS 72 32 - 75 % LYMPHOCYTES 13 12 - 49 % MONOCYTES 10 5 - 13 % EOSINOPHILS 3 0 - 7 % BASOPHILS 1 0 - 1 % IMMATURE GRANULOCYTES 1 (H) 0.0 - 0.5 % ABS. NEUTROPHILS 6.1 1.8 - 8.0 K/UL  
 ABS. LYMPHOCYTES 1.1 0.8 - 3.5 K/UL  
 ABS. MONOCYTES 0.8 0.0 - 1.0 K/UL  
 ABS. EOSINOPHILS 0.2 0.0 - 0.4 K/UL  
 ABS. BASOPHILS 0.0 0.0 - 0.1 K/UL  
 ABS. IMM. GRANS. 0.0 0.00 - 0.04 K/UL  
 DF AUTOMATED METABOLIC PANEL, COMPREHENSIVE Collection Time: 05/24/20  2:51 AM  
Result Value Ref Range Sodium 139 136 - 145 mmol/L Potassium 4.0 3.5 - 5.1 mmol/L Chloride 98 97 - 108 mmol/L  
 CO2 40 (H) 21 - 32 mmol/L Anion gap 1 (L) 5 - 15 mmol/L Glucose 118 (H) 65 - 100 mg/dL BUN 26 (H) 6 - 20 MG/DL Creatinine 0.81 0.70 - 1.30 MG/DL  
 BUN/Creatinine ratio 32 (H) 12 - 20 GFR est AA >60 >60 ml/min/1.73m2 GFR est non-AA >60 >60 ml/min/1.73m2 Calcium 9.0 8.5 - 10.1 MG/DL  Bilirubin, total 1.3 (H) 0.2 - 1.0 MG/DL  
 ALT (SGPT) 201 (H) 12 - 78 U/L  
 AST (SGOT) 40 (H) 15 - 37 U/L  
 Alk. phosphatase 78 45 - 117 U/L Protein, total 6.5 6.4 - 8.2 g/dL Albumin 2.8 (L) 3.5 - 5.0 g/dL Globulin 3.7 2.0 - 4.0 g/dL A-G Ratio 0.8 (L) 1.1 - 2.2 SAMPLES BEING HELD Collection Time: 05/24/20  2:51 AM  
Result Value Ref Range SAMPLES BEING HELD 1BLU   
 COMMENT Add-on orders for these samples will be processed based on acceptable specimen integrity and analyte stability, which may vary by analyte. GLUCOSE, POC Collection Time: 05/24/20  8:21 AM  
Result Value Ref Range Glucose (POC) 190 (H) 65 - 100 mg/dL Performed by [place] (PCT) GLUCOSE, POC Collection Time: 05/24/20 11:48 AM  
Result Value Ref Range Glucose (POC) 211 (H) 65 - 100 mg/dL Performed by [facility] (PCT)   
 
 
 
  
CARDIAC DIAGNOSTICS:  
  
Cardiac Evaluation Includes: 
  
  
    
05/18/20 ECHO ADULT COMPLETE 05/19/2020 5/19/2020  
  Narrative · Normal cavity size and wall thickness. Severe global systolic function. Estimated left ventricular ejection fraction is 20 - 25%. Visually  
measured ejection fraction. Abnormal left ventricular septal motion  
consistent with right ventricular pacing. · Severely dilated left atrium. · Mildly dilated right ventricle. Mildly reduced systolic function. Pacing  
wire present · Moderately dilated right atrium. · Aortic valve sclerosis with reduced excursion. Aortic valve leaflet  
calcification present. Mild aortic valve stenosis is present. · Mitral valve non-specific thickening. Mild mitral annular calcification. Mild to moderate mitral valve regurgitation is present. · Dilated annulus of the tricuspid valve. Moderate tricuspid valve  
regurgitation is present. · Moderate pulmonary hypertension. Pulmonary arterial systolic pressure is 58 mmHg. 
   
    Signed by: Maite Manzo MD  
  
  
  
CXR 5/22/20 - Moderately large right pleural effusion and tiny left pleural effusion are not significantly changed. There is persistent right basilar atelectasis. Mild pulmonary edema persists.  
  
CXR 5/23/20 - Frontal and lateral upright views of the chest show stable appearance including moderately large right pleural effusion and mild pulmonary edema.  
 
  
ASSESSMENT AND PLAN:  
  
Assessment and Plan: 
  
Jerrell Giraldo is a 81 y. o. male PMH CAD s/p CABG x3, cardiomyopathy, colon cancer, depression, IDDM, GERD, HTN, lung cancer, aortic stenosis, pulm HTN, PVD, stroke, thryoid disease presenting for weakness. Patient has a history of dementia and is a poor historian.  Echo shows new LV dysfunction with LVEF 20-25% 
  
  
1) Acute on chronic HFrEF 
- severe LV dysfunction, likely chronic RV pacing.  
- has had a good clinical response to IV diuretics  but effusion persists. Pulmonary noted on 5/22/20 that \"Will hold off on thoracentesis at this time given his lack of symptoms and risk of procedure given underlying multiple comorbidities. \" 
- Will proceed with PO Bumex 1 mg BID for now (may need to adjust as an outpatient)  
- Cont Coreg, lisinopril  
  
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 Would not pursue biv pacer ugprade due to comorbid condition   
3) Bilateral pleural effusions R>>L, likely all HF related   
4) CAD s/p CABG. - cont ASA and statin  
  
5) Chronic anemia 
  
6) Elevation in LFTs (transaminases) - consistent with hepatic congestion from HF 
  
7) Mild-moderate AS 
  
8) Dementia 
  
9) DNR 
  
10) Dispo - OK for SNF/rehab soon (next couple of days) from a cardiac standpoint. Will need early FU with Cardiology (VV with Dr. Rivera Abbott) - office to arrange that for next week. I reviewed plan with his daughter. Jan Mcfarland MD, [facility] [address], [address]      [place] [address] [address] [place] [address]                 [address] Ph: [phone]                                [phone]

## (undated) DEVICE — Device

## (undated) DEVICE — BASIC PACK: Brand: CONVERTORS

## (undated) DEVICE — CODMAN® SURGICAL PATTIES 1/2" X 3" (1.27CM X 7.62CM): Brand: CODMAN®

## (undated) DEVICE — FRAZIER SUCTION INSTRUMENT 7 FR W/CONTROL VENT & OBTURATOR: Brand: FRAZIER

## (undated) DEVICE — BIPOLAR FORCEPS CORD: Brand: VALLEYLAB

## (undated) DEVICE — SUTURE PLN GUT SZ 6-0 L18IN ABSRB YELLOWISH TAN L6.5MM 1735G

## (undated) DEVICE — INFECTION CONTROL KIT SYS

## (undated) DEVICE — SUTURE VCRL SZ 2-0 L27IN ABSRB VLT L40MM CT 1/2 CIR J351H

## (undated) DEVICE — SURGICAL PROCEDURE PACK BASIN MAJ SET CUST NO CAUT

## (undated) DEVICE — NEEDLE HYPO 25GA L0.625IN BLU POLYPR HUB S STL REG BVL STR

## (undated) DEVICE — MEDI-VAC NON-CONDUCTIVE SUCTION TUBING: Brand: CARDINAL HEALTH

## (undated) DEVICE — SOLUTION IRRIG 1000ML H2O STRL BLT

## (undated) DEVICE — 1200 GUARD II KIT W/5MM TUBE W/O VAC TUBE: Brand: GUARDIAN

## (undated) DEVICE — STERILE COTTON BALLS LARGE 5/P: Brand: MEDLINE

## (undated) DEVICE — CONTAINER,SPEC,PNEUM TUBE,3OZ,STRL PATH: Brand: MEDLINE

## (undated) DEVICE — SOLUTION IV STRL H2O 500 ML AQUALITE POUR BTL

## (undated) DEVICE — ROD RMR L950MM DIA2.5MM W/ EXTN BALL TIP

## (undated) DEVICE — SURGICAL PROCEDURE PACK STRAB BSR LF

## (undated) DEVICE — SUTURE VCRL SZ 0 L27IN ABSRB UD L36MM CP-1 1/2 CIR REV CUT J267H

## (undated) DEVICE — GOWN,SIRUS,FABRNF,XL,20/CS: Brand: MEDLINE

## (undated) DEVICE — DRAPE C-ARMOUR C-ARM KIT --

## (undated) DEVICE — SOLUTION IV 1000ML 0.9% SOD CHL

## (undated) DEVICE — STERILE POLYISOPRENE POWDER-FREE SURGICAL GLOVES WITH EMOLLIENT COATING: Brand: PROTEXIS

## (undated) DEVICE — 6619 2 PTNT ISO SYS INCISE AREA&LT;(&GT;&&LT;)&GT;P: Brand: STERI-DRAPE™ IOBAN™ 2

## (undated) DEVICE — SUTURE MCRYL SZ 3-0 L27IN ABSRB UD L19MM PS-2 3/8 CIR PRIM Y427H

## (undated) DEVICE — CATH SUC CTRL PRT TRIFLO 8FR --

## (undated) DEVICE — HANDLE LT SNAP ON ULT DURABLE LENS FOR TRUMPF ALC DISPOSABLE

## (undated) DEVICE — REM POLYHESIVE ADULT PATIENT RETURN ELECTRODE: Brand: VALLEYLAB

## (undated) DEVICE — ROCKER SWITCH PENCIL BLADE ELECTRODE, HOLSTER: Brand: EDGE

## (undated) DEVICE — INTENDED FOR TISSUE SEPARATION, AND OTHER PROCEDURES THAT REQUIRE A SHARP SURGICAL BLADE TO PUNCTURE OR CUT.: Brand: BARD-PARKER ® CARBON RIB-BACK BLADES

## (undated) DEVICE — PADDING CAST SPEC 6INX4YD COT --

## (undated) DEVICE — 4-PORT MANIFOLD: Brand: NEPTUNE 2

## (undated) DEVICE — SUTURE ABSORBABLE MONOFILAMENT 6-0 G-1 18 IN CHROMIC GUT UD 796G

## (undated) DEVICE — DERMABOND SKIN ADH 0.7ML -- DERMABOND ADVANCED 12/BX

## (undated) DEVICE — STERILE POLYISOPRENE POWDER-FREE SURGICAL GLOVES: Brand: PROTEXIS

## (undated) DEVICE — PAD,EYE,1-5/8X2 5/8,STERILE,LF,1/PK: Brand: MEDLINE

## (undated) DEVICE — PADDING CST 4INX4YD --

## (undated) DEVICE — SPONGE GZ W4XL4IN COT RADPQ HIGHLY ABSRB

## (undated) DEVICE — ELECTRODE NDL 2.8IN COAT VALLEYLAB

## (undated) DEVICE — SYR 3ML LL TIP 1/10ML GRAD --

## (undated) DEVICE — APPLICATOR COTTON TIP 6IN -- MIN CASE ORDER IS 5 CA

## (undated) DEVICE — Z DISCONTINUED USE 2744636  DRESSING AQUACEL 14 IN ALG W3.5XL14IN POLYUR FLM CVR W/ HYDRCOLL

## (undated) DEVICE — KENDALL SCD EXPRESS SLEEVES, KNEE LENGTH, MEDIUM: Brand: KENDALL SCD

## (undated) DEVICE — DRILL, AO SMALL: Brand: GAMMA

## (undated) DEVICE — DRAPE,U/ SHT,SPLIT,PLAS,STERIL: Brand: MEDLINE

## (undated) DEVICE — BLADE OPHTH MINI BEAV SHRP --

## (undated) DEVICE — SYR 10ML LUER LOK 1/5ML GRAD --